# Patient Record
Sex: FEMALE | Race: WHITE | NOT HISPANIC OR LATINO | Employment: OTHER | URBAN - NONMETROPOLITAN AREA
[De-identification: names, ages, dates, MRNs, and addresses within clinical notes are randomized per-mention and may not be internally consistent; named-entity substitution may affect disease eponyms.]

---

## 2017-02-15 DIAGNOSIS — M85.80 OSTEOPENIA: Primary | ICD-10-CM

## 2017-02-15 DIAGNOSIS — M85.88 OSTEOPENIA OF SPINE: ICD-10-CM

## 2017-03-23 RX ORDER — MONTELUKAST SODIUM 10 MG/1
TABLET ORAL
Qty: 90 TABLET | Refills: 3 | Status: SHIPPED | OUTPATIENT
Start: 2017-03-23 | End: 2018-03-30 | Stop reason: SDUPTHER

## 2017-03-23 RX ORDER — AMLODIPINE BESYLATE 5 MG/1
TABLET ORAL
Qty: 90 TABLET | Refills: 3 | Status: SHIPPED | OUTPATIENT
Start: 2017-03-23 | End: 2018-03-31 | Stop reason: SDUPTHER

## 2017-05-02 ENCOUNTER — LAB (OUTPATIENT)
Dept: LAB | Facility: OTHER | Age: 72
End: 2017-05-02

## 2017-05-02 DIAGNOSIS — E78.00 HYPERCHOLESTEROLEMIA: ICD-10-CM

## 2017-05-02 DIAGNOSIS — E55.9 VITAMIN D DEFICIENCY: ICD-10-CM

## 2017-05-02 DIAGNOSIS — E11.9 TYPE 2 DIABETES MELLITUS WITHOUT COMPLICATION, WITHOUT LONG-TERM CURRENT USE OF INSULIN (HCC): ICD-10-CM

## 2017-05-02 DIAGNOSIS — E03.9 ACQUIRED HYPOTHYROIDISM: ICD-10-CM

## 2017-05-02 LAB
ALBUMIN SERPL-MCNC: 4.3 G/DL (ref 3.2–5.5)
ALBUMIN/GLOB SERPL: 1.2 G/DL (ref 1–3)
ALP SERPL-CCNC: 73 U/L (ref 15–121)
ALT SERPL W P-5'-P-CCNC: 48 U/L (ref 10–60)
ANION GAP SERPL CALCULATED.3IONS-SCNC: 11 MMOL/L (ref 5–15)
ARTICHOKE IGE QN: 107 MG/DL (ref 0–129)
AST SERPL-CCNC: 47 U/L (ref 10–60)
BASOPHILS # BLD AUTO: 0.12 10*3/MM3 (ref 0–0.2)
BASOPHILS NFR BLD AUTO: 1.4 % (ref 0–2)
BILIRUB SERPL-MCNC: 0.7 MG/DL (ref 0.2–1)
BUN BLD-MCNC: 20 MG/DL (ref 8–25)
BUN/CREAT SERPL: 18.2 (ref 7–25)
CALCIUM SPEC-SCNC: 10 MG/DL (ref 8.4–10.8)
CHLORIDE SERPL-SCNC: 104 MMOL/L (ref 100–112)
CO2 SERPL-SCNC: 26 MMOL/L (ref 20–32)
CREAT BLD-MCNC: 1.1 MG/DL (ref 0.4–1.3)
DEPRECATED RDW RBC AUTO: 47.4 FL (ref 36.4–46.3)
EOSINOPHIL # BLD AUTO: 0.72 10*3/MM3 (ref 0–0.7)
EOSINOPHIL NFR BLD AUTO: 8.1 % (ref 0–7)
ERYTHROCYTE [DISTWIDTH] IN BLOOD BY AUTOMATED COUNT: 14.9 % (ref 11.5–14.5)
GFR SERPL CREATININE-BSD FRML MDRD: 49 ML/MIN/1.73 (ref 45–104)
GLOBULIN UR ELPH-MCNC: 3.7 GM/DL (ref 2.5–4.6)
GLUCOSE BLD-MCNC: 123 MG/DL (ref 70–100)
HCT VFR BLD AUTO: 41.7 % (ref 35–45)
HGB BLD-MCNC: 13.2 G/DL (ref 12–15.5)
LYMPHOCYTES # BLD AUTO: 2.32 10*3/MM3 (ref 0.6–4.2)
LYMPHOCYTES NFR BLD AUTO: 26.2 % (ref 10–50)
MCH RBC QN AUTO: 27.7 PG (ref 26.5–34)
MCHC RBC AUTO-ENTMCNC: 31.7 G/DL (ref 31.4–36)
MCV RBC AUTO: 87.4 FL (ref 80–98)
MONOCYTES # BLD AUTO: 0.86 10*3/MM3 (ref 0–0.9)
MONOCYTES NFR BLD AUTO: 9.7 % (ref 0–12)
NEUTROPHILS # BLD AUTO: 4.85 10*3/MM3 (ref 2–8.6)
NEUTROPHILS NFR BLD AUTO: 54.6 % (ref 37–80)
PLATELET # BLD AUTO: 436 10*3/MM3 (ref 150–450)
PMV BLD AUTO: 9.5 FL (ref 8–12)
POTASSIUM BLD-SCNC: 4.6 MMOL/L (ref 3.4–5.4)
PROT SERPL-MCNC: 8 G/DL (ref 6.7–8.2)
RBC # BLD AUTO: 4.77 10*6/MM3 (ref 3.77–5.16)
SODIUM BLD-SCNC: 141 MMOL/L (ref 134–146)
WBC NRBC COR # BLD: 8.87 10*3/MM3 (ref 3.2–9.8)

## 2017-05-02 PROCEDURE — 83036 HEMOGLOBIN GLYCOSYLATED A1C: CPT | Performed by: INTERNAL MEDICINE

## 2017-05-02 PROCEDURE — 83721 ASSAY OF BLOOD LIPOPROTEIN: CPT | Performed by: INTERNAL MEDICINE

## 2017-05-02 PROCEDURE — 84439 ASSAY OF FREE THYROXINE: CPT | Performed by: INTERNAL MEDICINE

## 2017-05-02 PROCEDURE — 85025 COMPLETE CBC W/AUTO DIFF WBC: CPT | Performed by: INTERNAL MEDICINE

## 2017-05-02 PROCEDURE — 80053 COMPREHEN METABOLIC PANEL: CPT | Performed by: INTERNAL MEDICINE

## 2017-05-02 PROCEDURE — 82306 VITAMIN D 25 HYDROXY: CPT | Performed by: INTERNAL MEDICINE

## 2017-05-02 PROCEDURE — 84443 ASSAY THYROID STIM HORMONE: CPT | Performed by: INTERNAL MEDICINE

## 2017-05-02 PROCEDURE — 36415 COLL VENOUS BLD VENIPUNCTURE: CPT | Performed by: INTERNAL MEDICINE

## 2017-05-03 LAB
25(OH)D3 SERPL-MCNC: 39.1 NG/ML (ref 30–100)
HBA1C MFR BLD: 7.02 % (ref 4–5.6)
T4 FREE SERPL-MCNC: 1.05 NG/DL (ref 0.78–2.19)
TSH SERPL DL<=0.05 MIU/L-ACNC: 3.52 MIU/ML (ref 0.46–4.68)

## 2017-05-09 ENCOUNTER — OFFICE VISIT (OUTPATIENT)
Dept: FAMILY MEDICINE CLINIC | Facility: CLINIC | Age: 72
End: 2017-05-09

## 2017-05-09 VITALS
BODY MASS INDEX: 32.5 KG/M2 | DIASTOLIC BLOOD PRESSURE: 60 MMHG | TEMPERATURE: 98 F | HEIGHT: 62 IN | WEIGHT: 176.6 LBS | SYSTOLIC BLOOD PRESSURE: 108 MMHG | HEART RATE: 60 BPM

## 2017-05-09 DIAGNOSIS — J30.1 SEASONAL ALLERGIC RHINITIS DUE TO POLLEN: ICD-10-CM

## 2017-05-09 DIAGNOSIS — E78.00 HYPERCHOLESTEROLEMIA: ICD-10-CM

## 2017-05-09 DIAGNOSIS — M85.80 OSTEOPENIA: ICD-10-CM

## 2017-05-09 DIAGNOSIS — I10 ESSENTIAL HYPERTENSION: ICD-10-CM

## 2017-05-09 DIAGNOSIS — E03.9 ACQUIRED HYPOTHYROIDISM: ICD-10-CM

## 2017-05-09 DIAGNOSIS — E55.9 VITAMIN D DEFICIENCY: ICD-10-CM

## 2017-05-09 DIAGNOSIS — E11.9 TYPE 2 DIABETES MELLITUS WITHOUT COMPLICATION, WITHOUT LONG-TERM CURRENT USE OF INSULIN (HCC): Primary | ICD-10-CM

## 2017-05-09 DIAGNOSIS — E66.9 OBESITY (BMI 30.0-34.9): ICD-10-CM

## 2017-05-09 PROCEDURE — 99214 OFFICE O/P EST MOD 30 MIN: CPT | Performed by: INTERNAL MEDICINE

## 2017-06-22 DIAGNOSIS — Z12.31 ENCOUNTER FOR SCREENING MAMMOGRAM FOR MALIGNANT NEOPLASM OF BREAST: Primary | ICD-10-CM

## 2017-07-14 ENCOUNTER — APPOINTMENT (OUTPATIENT)
Dept: MAMMOGRAPHY | Facility: CLINIC | Age: 72
End: 2017-07-14

## 2017-07-14 DIAGNOSIS — Z12.31 ENCOUNTER FOR SCREENING MAMMOGRAM FOR MALIGNANT NEOPLASM OF BREAST: ICD-10-CM

## 2017-07-14 PROCEDURE — G0202 SCR MAMMO BI INCL CAD: HCPCS | Performed by: INTERNAL MEDICINE

## 2017-07-14 PROCEDURE — 77063 BREAST TOMOSYNTHESIS BI: CPT | Performed by: INTERNAL MEDICINE

## 2017-09-22 RX ORDER — LEVOTHYROXINE SODIUM 0.05 MG/1
TABLET ORAL
Qty: 90 TABLET | Refills: 3 | Status: SHIPPED | OUTPATIENT
Start: 2017-09-22 | End: 2018-09-26 | Stop reason: SDUPTHER

## 2017-09-22 RX ORDER — LOSARTAN POTASSIUM AND HYDROCHLOROTHIAZIDE 12.5; 1 MG/1; MG/1
TABLET ORAL
Qty: 90 TABLET | Refills: 3 | Status: SHIPPED | OUTPATIENT
Start: 2017-09-22 | End: 2018-09-26 | Stop reason: SDUPTHER

## 2017-11-02 ENCOUNTER — LAB (OUTPATIENT)
Dept: LAB | Facility: OTHER | Age: 72
End: 2017-11-02

## 2017-11-02 DIAGNOSIS — E55.9 VITAMIN D DEFICIENCY: ICD-10-CM

## 2017-11-02 DIAGNOSIS — E78.00 HYPERCHOLESTEROLEMIA: ICD-10-CM

## 2017-11-02 DIAGNOSIS — E03.9 ACQUIRED HYPOTHYROIDISM: ICD-10-CM

## 2017-11-02 LAB
ALBUMIN SERPL-MCNC: 4.3 G/DL (ref 3.2–5.5)
ALBUMIN/GLOB SERPL: 1.1 G/DL (ref 1–3)
ALP SERPL-CCNC: 56 U/L (ref 15–121)
ALT SERPL W P-5'-P-CCNC: 49 U/L (ref 10–60)
ANION GAP SERPL CALCULATED.3IONS-SCNC: 9 MMOL/L (ref 5–15)
AST SERPL-CCNC: 56 U/L (ref 10–60)
BASOPHILS # BLD AUTO: 0.17 10*3/MM3 (ref 0–0.2)
BASOPHILS NFR BLD AUTO: 1.7 % (ref 0–2)
BILIRUB SERPL-MCNC: 0.7 MG/DL (ref 0.2–1)
BUN BLD-MCNC: 17 MG/DL (ref 8–25)
BUN/CREAT SERPL: 15.5 (ref 7–25)
CALCIUM SPEC-SCNC: 10.1 MG/DL (ref 8.4–10.8)
CHLORIDE SERPL-SCNC: 106 MMOL/L (ref 100–112)
CHOLEST SERPL-MCNC: 146 MG/DL (ref 150–200)
CO2 SERPL-SCNC: 27 MMOL/L (ref 20–32)
CREAT BLD-MCNC: 1.1 MG/DL (ref 0.4–1.3)
DEPRECATED RDW RBC AUTO: 49.5 FL (ref 36.4–46.3)
EOSINOPHIL # BLD AUTO: 0.89 10*3/MM3 (ref 0–0.7)
EOSINOPHIL NFR BLD AUTO: 8.7 % (ref 0–7)
ERYTHROCYTE [DISTWIDTH] IN BLOOD BY AUTOMATED COUNT: 15.4 % (ref 11.5–14.5)
GFR SERPL CREATININE-BSD FRML MDRD: 49 ML/MIN/1.73 (ref 39–90)
GLOBULIN UR ELPH-MCNC: 3.8 GM/DL (ref 2.5–4.6)
GLUCOSE BLD-MCNC: 108 MG/DL (ref 70–100)
HCT VFR BLD AUTO: 41.6 % (ref 35–45)
HDLC SERPL-MCNC: 40 MG/DL (ref 35–100)
HGB BLD-MCNC: 12.8 G/DL (ref 12–15.5)
LDLC SERPL CALC-MCNC: 86 MG/DL
LDLC/HDLC SERPL: 2.14 {RATIO}
LYMPHOCYTES # BLD AUTO: 2.68 10*3/MM3 (ref 0.6–4.2)
LYMPHOCYTES NFR BLD AUTO: 26.2 % (ref 10–50)
MCH RBC QN AUTO: 27.6 PG (ref 26.5–34)
MCHC RBC AUTO-ENTMCNC: 30.8 G/DL (ref 31.4–36)
MCV RBC AUTO: 89.7 FL (ref 80–98)
MONOCYTES # BLD AUTO: 0.91 10*3/MM3 (ref 0–0.9)
MONOCYTES NFR BLD AUTO: 8.9 % (ref 0–12)
NEUTROPHILS # BLD AUTO: 5.59 10*3/MM3 (ref 2–8.6)
NEUTROPHILS NFR BLD AUTO: 54.5 % (ref 37–80)
PLATELET # BLD AUTO: 479 10*3/MM3 (ref 150–450)
PMV BLD AUTO: 10.2 FL (ref 8–12)
POTASSIUM BLD-SCNC: 4.8 MMOL/L (ref 3.4–5.4)
PROT SERPL-MCNC: 8.1 G/DL (ref 6.7–8.2)
RBC # BLD AUTO: 4.64 10*6/MM3 (ref 3.77–5.16)
SODIUM BLD-SCNC: 142 MMOL/L (ref 134–146)
TRIGL SERPL-MCNC: 102 MG/DL (ref 35–160)
VLDLC SERPL-MCNC: 20.4 MG/DL
WBC NRBC COR # BLD: 10.24 10*3/MM3 (ref 3.2–9.8)

## 2017-11-02 PROCEDURE — 80061 LIPID PANEL: CPT | Performed by: INTERNAL MEDICINE

## 2017-11-02 PROCEDURE — 84443 ASSAY THYROID STIM HORMONE: CPT | Performed by: INTERNAL MEDICINE

## 2017-11-02 PROCEDURE — 36415 COLL VENOUS BLD VENIPUNCTURE: CPT | Performed by: INTERNAL MEDICINE

## 2017-11-02 PROCEDURE — 82306 VITAMIN D 25 HYDROXY: CPT | Performed by: INTERNAL MEDICINE

## 2017-11-02 PROCEDURE — 80053 COMPREHEN METABOLIC PANEL: CPT | Performed by: INTERNAL MEDICINE

## 2017-11-02 PROCEDURE — 85025 COMPLETE CBC W/AUTO DIFF WBC: CPT | Performed by: INTERNAL MEDICINE

## 2017-11-02 PROCEDURE — 84439 ASSAY OF FREE THYROXINE: CPT | Performed by: INTERNAL MEDICINE

## 2017-11-03 LAB
25(OH)D3 SERPL-MCNC: 44.1 NG/ML (ref 30–100)
T4 FREE SERPL-MCNC: 1.11 NG/DL (ref 0.78–2.19)
TSH SERPL DL<=0.05 MIU/L-ACNC: 1.52 MIU/ML (ref 0.46–4.68)

## 2017-11-08 RX ORDER — MOMETASONE FUROATE AND FORMOTEROL FUMARATE DIHYDRATE 200; 5 UG/1; UG/1
AEROSOL RESPIRATORY (INHALATION)
Qty: 13 G | Refills: 5 | Status: SHIPPED | OUTPATIENT
Start: 2017-11-08 | End: 2019-03-25 | Stop reason: SDUPTHER

## 2017-11-10 ENCOUNTER — OFFICE VISIT (OUTPATIENT)
Dept: FAMILY MEDICINE CLINIC | Facility: CLINIC | Age: 72
End: 2017-11-10

## 2017-11-10 VITALS
BODY MASS INDEX: 31.91 KG/M2 | HEART RATE: 72 BPM | WEIGHT: 173.4 LBS | TEMPERATURE: 98.2 F | SYSTOLIC BLOOD PRESSURE: 140 MMHG | HEIGHT: 62 IN | DIASTOLIC BLOOD PRESSURE: 60 MMHG

## 2017-11-10 DIAGNOSIS — E78.00 HYPERCHOLESTEROLEMIA: Chronic | ICD-10-CM

## 2017-11-10 DIAGNOSIS — E03.9 ACQUIRED HYPOTHYROIDISM: Chronic | ICD-10-CM

## 2017-11-10 DIAGNOSIS — E55.9 VITAMIN D DEFICIENCY: Chronic | ICD-10-CM

## 2017-11-10 DIAGNOSIS — R19.7 DIARRHEA, UNSPECIFIED TYPE: ICD-10-CM

## 2017-11-10 DIAGNOSIS — J30.1 CHRONIC SEASONAL ALLERGIC RHINITIS DUE TO POLLEN: Chronic | ICD-10-CM

## 2017-11-10 DIAGNOSIS — J45.30 MILD PERSISTENT ASTHMA WITHOUT COMPLICATION: Chronic | ICD-10-CM

## 2017-11-10 DIAGNOSIS — I10 ESSENTIAL HYPERTENSION: Chronic | ICD-10-CM

## 2017-11-10 DIAGNOSIS — E66.9 OBESITY (BMI 30.0-34.9): Chronic | ICD-10-CM

## 2017-11-10 DIAGNOSIS — E11.9 TYPE 2 DIABETES MELLITUS WITHOUT COMPLICATION, WITHOUT LONG-TERM CURRENT USE OF INSULIN (HCC): Primary | Chronic | ICD-10-CM

## 2017-11-10 PROCEDURE — 99214 OFFICE O/P EST MOD 30 MIN: CPT | Performed by: INTERNAL MEDICINE

## 2017-11-10 NOTE — PROGRESS NOTES
Subjective        History of Present Illness     Gale Cabral is a 72 y.o. female who presents for 6-month follow up on type 2 diabetes, hypertension, hyperlipidemia/low HDL, asthma, and hypothyroidism among other issues.     She reports some occasional episodes of numbness/weakness in her upper extremities.  She denies chest pain.      She reports persistent diarrhea since her cholecystectomy in 1990.  She has frequent flares of days   She takes Pepto Bismol tablets, which hasn't been effective for symptom control.  She had a recent airplane trip, which was uncomfortable due to the urgency or bowel.  I recommended use of Imodium and suggested she may want to premedicate prior to future flights.           She reports good diabetic control when monitoring at home.  She is checking glucose in the morning, for which I recommended alternating the monitoring morning and evening for more consistency.         She is reporting episodes of inadequate control of asthma symptoms.  She has been using Dulera nightly, but has not been compliant with twice daily use.  She continues to have ProAir to use for rescue inhaler.  I recommended compliance with her Dulera twice daily to help avoid flares.  She is having increased postnasal drainage.  She is using Flonase each night and continues on Singulair 10 mg daily.  I recommended daily antihistamine and increasing her Flonase to twice daily.        Repeat DEXA 02/2017 reveals osteopenia of left hip and lumbar spine.  She continues on calcium/vitamin D supplement once daily.    Weight is down 3 pounds in the past six months.  Blood pressure is at goal.      The patient's relevant past medical, surgical, and social history was reviewed in Epic.   Lab results are reviewed with the patient today.  CBC unremarkable other than mild elevation of WBCs at 10.2.  Total cholesterol 146  Vitamin D level is at goal.      Review of Systems   Constitutional: Negative for chills, fatigue and  "fever.   HENT: Negative for congestion, ear pain, postnasal drip, sinus pressure and sore throat.    Respiratory: Negative for cough, shortness of breath and wheezing.    Cardiovascular: Negative for chest pain, palpitations and leg swelling.   Gastrointestinal: Negative for abdominal pain, blood in stool, constipation, diarrhea, nausea and vomiting.   Endocrine: Negative for cold intolerance, heat intolerance, polydipsia and polyuria.   Genitourinary: Negative for dysuria, frequency, hematuria and urgency.   Skin: Negative for rash.   Neurological: Negative for syncope and weakness.        Objective     Vitals:    11/10/17 1035   BP: 140/60   Pulse: 72   Temp: 98.2 °F (36.8 °C)   TempSrc: Oral   Weight: 173 lb 6.4 oz (78.7 kg)   Height: 62\" (157.5 cm)     Physical Exam   Constitutional: She is oriented to person, place, and time. She appears well-developed and well-nourished. No distress.   Obese female.     HENT:   Head: Normocephalic and atraumatic.   Nose: Right sinus exhibits no maxillary sinus tenderness and no frontal sinus tenderness. Left sinus exhibits no maxillary sinus tenderness and no frontal sinus tenderness.   Mouth/Throat: Uvula is midline, oropharynx is clear and moist and mucous membranes are normal. No oral lesions. No tonsillar exudate.   Eyes: Conjunctivae and EOM are normal. Pupils are equal, round, and reactive to light.   Neck: Trachea normal. Neck supple. No JVD present. Carotid bruit is not present. No tracheal deviation present. No thyroid mass and no thyromegaly present.   Cardiovascular: Normal rate, regular rhythm and normal heart sounds.   No extrasystoles are present. PMI is not displaced.    No murmur heard.  Pulmonary/Chest: Effort normal. No accessory muscle usage. No respiratory distress. She has no decreased breath sounds. She has wheezes. She has no rhonchi. She has no rales.   Diminished breath sounds on expiratory phase of cough with wheezes.     Abdominal: Soft. Bowel sounds " are normal. She exhibits no distension. There is no hepatosplenomegaly. There is no tenderness.   Obese abdomen limits exam.        Vascular Status -  Her exam exhibits right foot vasculature normal. Her exam exhibits no right foot edema. Her exam exhibits left foot vasculature normal. Her exam exhibits no left foot edema.  Lymphadenopathy:     She has no cervical adenopathy.   Neurological: She is alert and oriented to person, place, and time. No cranial nerve deficit. Coordination normal.   Skin: Skin is warm, dry and intact. No rash noted. No cyanosis. Nails show no clubbing.   Dry skin noted.    Psychiatric: She has a normal mood and affect. Her speech is normal and behavior is normal. Thought content normal.   Vitals reviewed.        Assessment/Plan      I recommended use of Imodium taking two tablets with first loose stool and one with each additional stool, up to five daily.   She may need to premedicate before long trips on a plane.       Increase use of Dulera to two puffs twice daily with current flare.  Continue the rescue inhaler.  Increase us of Flonase to one spray each nostril twice daily.  Recommended use of a daily antihistamine Claritin (loratadine) 10 mg daily.  Continue with Singulair.         Recommended use of a good moisturizing lotion for the dry skin.      Continue current prescription medications.  Continue with vitamin D and calcium supplements.   Recommended compliance with diabetic diet, exercise, and weight loss efforts.  She will return in six months for follow up with fasting labs one week prior.       Scribed for Dr. Soliman by Lydia Davis [facility].     Diagnoses and all orders for this visit:    Type 2 diabetes mellitus without complication, without long-term current use of insulin  -     CBC Auto Differential; Future  -     Comprehensive Metabolic Panel; Future  -     Hemoglobin A1c; Future  -     TSH; Future  -     T4, free; Future  -     Microalbumin / Creatinine Urine Ratio -  Urine, Clean Catch; Future  -     LDL Cholesterol, Direct; Future  -     Vitamin D 25 Hydroxy; Future    Mild persistent asthma without complication    Chronic seasonal allergic rhinitis due to pollen    Essential hypertension    Hypercholesterolemia  -     LDL Cholesterol, Direct; Future    Obesity (BMI 30.0-34.9)    Vitamin D deficiency  -     Vitamin D 25 Hydroxy; Future    Acquired hypothyroidism  -     TSH; Future  -     T4, free; Future    Diarrhea, unspecified type - prior cholecystectomy.  On metformin      Lab on 11/02/2017   Component Date Value Ref Range Status   • WBC 11/02/2017 10.24* 3.20 - 9.80 10*3/mm3 Final   • RBC 11/02/2017 4.64  3.77 - 5.16 10*6/mm3 Final   • Hemoglobin 11/02/2017 12.8  12.0 - 15.5 g/dL Final   • Hematocrit 11/02/2017 41.6  35.0 - 45.0 % Final   • MCV 11/02/2017 89.7  80.0 - 98.0 fL Final   • MCH 11/02/2017 27.6  26.5 - 34.0 pg Final   • MCHC 11/02/2017 30.8* 31.4 - 36.0 g/dL Final   • RDW 11/02/2017 15.4* 11.5 - 14.5 % Final   • RDW-SD 11/02/2017 49.5* 36.4 - 46.3 fl Final   • MPV 11/02/2017 10.2  8.0 - 12.0 fL Final   • Platelets 11/02/2017 479* 150 - 450 10*3/mm3 Final   • Neutrophil % 11/02/2017 54.5  37.0 - 80.0 % Final   • Lymphocyte % 11/02/2017 26.2  10.0 - 50.0 % Final   • Monocyte % 11/02/2017 8.9  0.0 - 12.0 % Final   • Eosinophil % 11/02/2017 8.7* 0.0 - 7.0 % Final   • Basophil % 11/02/2017 1.7  0.0 - 2.0 % Final   • Neutrophils, Absolute 11/02/2017 5.59  2.00 - 8.60 10*3/mm3 Final   • Lymphocytes, Absolute 11/02/2017 2.68  0.60 - 4.20 10*3/mm3 Final   • Monocytes, Absolute 11/02/2017 0.91* 0.00 - 0.90 10*3/mm3 Final   • Eosinophils, Absolute 11/02/2017 0.89* 0.00 - 0.70 10*3/mm3 Final   • Basophils, Absolute 11/02/2017 0.17  0.00 - 0.20 10*3/mm3 Final   • Glucose 11/02/2017 108* 70 - 100 mg/dL Final   • BUN 11/02/2017 17  8 - 25 mg/dL Final   • Creatinine 11/02/2017 1.10  0.40 - 1.30 mg/dL Final   • Sodium 11/02/2017 142  134 - 146 mmol/L Final   • Potassium  11/02/2017 4.8  3.4 - 5.4 mmol/L Final   • Chloride 11/02/2017 106  100 - 112 mmol/L Final   • CO2 11/02/2017 27.0  20.0 - 32.0 mmol/L Final   • Calcium 11/02/2017 10.1  8.4 - 10.8 mg/dL Final   • Total Protein 11/02/2017 8.1  6.7 - 8.2 g/dL Final   • Albumin 11/02/2017 4.30  3.20 - 5.50 g/dL Final   • ALT (SGPT) 11/02/2017 49  10 - 60 U/L Final   • AST (SGOT) 11/02/2017 56  10 - 60 U/L Final   • Alkaline Phosphatase 11/02/2017 56  15 - 121 U/L Final   • Total Bilirubin 11/02/2017 0.7  0.2 - 1.0 mg/dL Final   • eGFR Non African Amer 11/02/2017 49  39 - 90 mL/min/1.73 Final   • Globulin 11/02/2017 3.8  2.5 - 4.6 gm/dL Final   • A/G Ratio 11/02/2017 1.1  1.0 - 3.0 g/dL Final   • BUN/Creatinine Ratio 11/02/2017 15.5  7.0 - 25.0 Final   • Anion Gap 11/02/2017 9.0  5.0 - 15.0 mmol/L Final   • Total Cholesterol 11/02/2017 146* 150 - 200 mg/dL Final   • Triglycerides 11/02/2017 102  35 - 160 mg/dL Final   • HDL Cholesterol 11/02/2017 40  35 - 100 mg/dL Final   • LDL Cholesterol  11/02/2017 86  mg/dL Final   • VLDL Cholesterol 11/02/2017 20.4  mg/dL Final   • LDL/HDL Ratio 11/02/2017 2.14   Final   • TSH 11/02/2017 1.520  0.460 - 4.680 mIU/mL Final   • Free T4 11/02/2017 1.11  0.78 - 2.19 ng/dL Final   • 25 Hydroxy, Vitamin D 11/02/2017 44.1  30.0 - 100.0 ng/ml Final   ]

## 2017-12-26 RX ORDER — ATORVASTATIN CALCIUM 20 MG/1
TABLET, FILM COATED ORAL
Qty: 90 TABLET | Refills: 3 | Status: SHIPPED | OUTPATIENT
Start: 2017-12-26 | End: 2018-12-13 | Stop reason: SDUPTHER

## 2017-12-26 RX ORDER — CITALOPRAM 20 MG/1
TABLET ORAL
Qty: 90 TABLET | Refills: 3 | Status: SHIPPED | OUTPATIENT
Start: 2017-12-26 | End: 2019-03-25 | Stop reason: SDUPTHER

## 2018-03-30 RX ORDER — LANCETS 33 GAUGE
EACH MISCELLANEOUS
Qty: 100 EACH | Refills: 3 | Status: SHIPPED | OUTPATIENT
Start: 2018-03-30 | End: 2019-05-28 | Stop reason: SDUPTHER

## 2018-03-30 RX ORDER — MONTELUKAST SODIUM 10 MG/1
TABLET ORAL
Qty: 90 TABLET | Refills: 3 | Status: SHIPPED | OUTPATIENT
Start: 2018-03-30 | End: 2019-03-26 | Stop reason: SDUPTHER

## 2018-04-02 RX ORDER — AMLODIPINE BESYLATE 5 MG/1
TABLET ORAL
Qty: 90 TABLET | Refills: 3 | Status: SHIPPED | OUTPATIENT
Start: 2018-04-02 | End: 2019-03-26 | Stop reason: SDUPTHER

## 2018-05-04 ENCOUNTER — LAB (OUTPATIENT)
Dept: LAB | Facility: OTHER | Age: 73
End: 2018-05-04

## 2018-05-04 DIAGNOSIS — E55.9 VITAMIN D DEFICIENCY: Chronic | ICD-10-CM

## 2018-05-04 DIAGNOSIS — E78.00 HYPERCHOLESTEROLEMIA: Chronic | ICD-10-CM

## 2018-05-04 DIAGNOSIS — E03.9 ACQUIRED HYPOTHYROIDISM: Chronic | ICD-10-CM

## 2018-05-04 DIAGNOSIS — E11.9 TYPE 2 DIABETES MELLITUS WITHOUT COMPLICATION, WITHOUT LONG-TERM CURRENT USE OF INSULIN (HCC): Chronic | ICD-10-CM

## 2018-05-04 LAB
25(OH)D3 SERPL-MCNC: 45.3 NG/ML (ref 30–100)
ALBUMIN SERPL-MCNC: 4 G/DL (ref 3.2–5.5)
ALBUMIN UR-MCNC: <0.6 MG/L
ALBUMIN/GLOB SERPL: 1.1 G/DL (ref 1–3)
ALP SERPL-CCNC: 67 U/L (ref 15–121)
ALT SERPL W P-5'-P-CCNC: 48 U/L (ref 10–60)
ANION GAP SERPL CALCULATED.3IONS-SCNC: 8 MMOL/L (ref 5–15)
ARTICHOKE IGE QN: 107 MG/DL (ref 0–129)
AST SERPL-CCNC: 62 U/L (ref 10–60)
BASOPHILS # BLD AUTO: 0.15 10*3/MM3 (ref 0–0.2)
BASOPHILS NFR BLD AUTO: 1.5 % (ref 0–2)
BILIRUB SERPL-MCNC: 0.6 MG/DL (ref 0.2–1)
BUN BLD-MCNC: 17 MG/DL (ref 8–25)
BUN/CREAT SERPL: 18.9 (ref 7–25)
CALCIUM SPEC-SCNC: 9.6 MG/DL (ref 8.4–10.8)
CHLORIDE SERPL-SCNC: 106 MMOL/L (ref 100–112)
CO2 SERPL-SCNC: 26 MMOL/L (ref 20–32)
CREAT BLD-MCNC: 0.9 MG/DL (ref 0.4–1.3)
CREAT UR-MCNC: 135.3 MG/DL
DEPRECATED RDW RBC AUTO: 49.3 FL (ref 36.4–46.3)
EOSINOPHIL # BLD AUTO: 0.82 10*3/MM3 (ref 0–0.7)
EOSINOPHIL NFR BLD AUTO: 8.4 % (ref 0–7)
ERYTHROCYTE [DISTWIDTH] IN BLOOD BY AUTOMATED COUNT: 15.4 % (ref 11.5–14.5)
GFR SERPL CREATININE-BSD FRML MDRD: 62 ML/MIN/1.73 (ref 39–90)
GLOBULIN UR ELPH-MCNC: 3.8 GM/DL (ref 2.5–4.6)
GLUCOSE BLD-MCNC: 129 MG/DL (ref 70–100)
HBA1C MFR BLD: 7.4 % (ref 4–5.6)
HCT VFR BLD AUTO: 41.2 % (ref 35–45)
HGB BLD-MCNC: 12.7 G/DL (ref 12–15.5)
LYMPHOCYTES # BLD AUTO: 2.69 10*3/MM3 (ref 0.6–4.2)
LYMPHOCYTES NFR BLD AUTO: 27.5 % (ref 10–50)
MCH RBC QN AUTO: 27.4 PG (ref 26.5–34)
MCHC RBC AUTO-ENTMCNC: 30.8 G/DL (ref 31.4–36)
MCV RBC AUTO: 89 FL (ref 80–98)
MICROALBUMIN/CREAT UR: NORMAL MG/G (ref 0–30)
MONOCYTES # BLD AUTO: 0.94 10*3/MM3 (ref 0–0.9)
MONOCYTES NFR BLD AUTO: 9.6 % (ref 0–12)
NEUTROPHILS # BLD AUTO: 5.18 10*3/MM3 (ref 2–8.6)
NEUTROPHILS NFR BLD AUTO: 53 % (ref 37–80)
PLATELET # BLD AUTO: 409 10*3/MM3 (ref 150–450)
PMV BLD AUTO: 9.1 FL (ref 8–12)
POTASSIUM BLD-SCNC: 4.1 MMOL/L (ref 3.4–5.4)
PROT SERPL-MCNC: 7.8 G/DL (ref 6.7–8.2)
RBC # BLD AUTO: 4.63 10*6/MM3 (ref 3.77–5.16)
SODIUM BLD-SCNC: 140 MMOL/L (ref 134–146)
T4 FREE SERPL-MCNC: 1.08 NG/DL (ref 0.78–2.19)
TSH SERPL DL<=0.05 MIU/L-ACNC: 3.48 MIU/ML (ref 0.46–4.68)
WBC NRBC COR # BLD: 9.78 10*3/MM3 (ref 3.2–9.8)

## 2018-05-04 PROCEDURE — 82043 UR ALBUMIN QUANTITATIVE: CPT | Performed by: INTERNAL MEDICINE

## 2018-05-04 PROCEDURE — 83036 HEMOGLOBIN GLYCOSYLATED A1C: CPT | Performed by: INTERNAL MEDICINE

## 2018-05-04 PROCEDURE — 36415 COLL VENOUS BLD VENIPUNCTURE: CPT | Performed by: INTERNAL MEDICINE

## 2018-05-04 PROCEDURE — 84439 ASSAY OF FREE THYROXINE: CPT | Performed by: INTERNAL MEDICINE

## 2018-05-04 PROCEDURE — 82570 ASSAY OF URINE CREATININE: CPT | Performed by: INTERNAL MEDICINE

## 2018-05-04 PROCEDURE — 85025 COMPLETE CBC W/AUTO DIFF WBC: CPT | Performed by: INTERNAL MEDICINE

## 2018-05-04 PROCEDURE — 82306 VITAMIN D 25 HYDROXY: CPT | Performed by: INTERNAL MEDICINE

## 2018-05-04 PROCEDURE — 83721 ASSAY OF BLOOD LIPOPROTEIN: CPT | Performed by: INTERNAL MEDICINE

## 2018-05-04 PROCEDURE — 84443 ASSAY THYROID STIM HORMONE: CPT | Performed by: INTERNAL MEDICINE

## 2018-05-04 PROCEDURE — 80053 COMPREHEN METABOLIC PANEL: CPT | Performed by: INTERNAL MEDICINE

## 2018-05-14 ENCOUNTER — OFFICE VISIT (OUTPATIENT)
Dept: FAMILY MEDICINE CLINIC | Facility: CLINIC | Age: 73
End: 2018-05-14

## 2018-05-14 VITALS
SYSTOLIC BLOOD PRESSURE: 140 MMHG | BODY MASS INDEX: 32.24 KG/M2 | WEIGHT: 175.2 LBS | DIASTOLIC BLOOD PRESSURE: 60 MMHG | TEMPERATURE: 98.2 F | HEART RATE: 80 BPM | HEIGHT: 62 IN

## 2018-05-14 DIAGNOSIS — E78.00 HYPERCHOLESTEROLEMIA: Chronic | ICD-10-CM

## 2018-05-14 DIAGNOSIS — J45.30 MILD PERSISTENT ASTHMA WITHOUT COMPLICATION: Chronic | ICD-10-CM

## 2018-05-14 DIAGNOSIS — E55.9 VITAMIN D DEFICIENCY: Chronic | ICD-10-CM

## 2018-05-14 DIAGNOSIS — K91.89 POSTCHOLECYSTECTOMY DIARRHEA: Chronic | ICD-10-CM

## 2018-05-14 DIAGNOSIS — E66.9 OBESITY (BMI 30.0-34.9): Chronic | ICD-10-CM

## 2018-05-14 DIAGNOSIS — I10 ESSENTIAL HYPERTENSION: Chronic | ICD-10-CM

## 2018-05-14 DIAGNOSIS — J30.1 CHRONIC SEASONAL ALLERGIC RHINITIS DUE TO POLLEN: Chronic | ICD-10-CM

## 2018-05-14 DIAGNOSIS — E03.9 ACQUIRED HYPOTHYROIDISM: Chronic | ICD-10-CM

## 2018-05-14 DIAGNOSIS — M85.88 OSTEOPENIA OF SPINE: Chronic | ICD-10-CM

## 2018-05-14 DIAGNOSIS — R19.7 POSTCHOLECYSTECTOMY DIARRHEA: Chronic | ICD-10-CM

## 2018-05-14 DIAGNOSIS — E11.9 TYPE 2 DIABETES MELLITUS WITHOUT COMPLICATION, WITHOUT LONG-TERM CURRENT USE OF INSULIN (HCC): Primary | Chronic | ICD-10-CM

## 2018-05-14 PROCEDURE — 99214 OFFICE O/P EST MOD 30 MIN: CPT | Performed by: INTERNAL MEDICINE

## 2018-05-14 NOTE — PROGRESS NOTES
Subjective        History of Present Illness     Gale Cabral is a 72 y.o. female who presents for 6-month follow up on type 2 diabetes, hypertension, hyperlipidemia/low HDL, asthma, and hypothyroidism among other issues.    She is taking Claritin,, Flonase Singular and using Dulera for management of seasonal allergies and mild persistent asthma.  She has not been requiring use of her rescue ProAir inhaler, but has this at home to use as needed.       She reports episodic diarrhea post cholecystectomy, but feels this is manageable.      She is reporting some mild pain with range of motion involving right shoulder, for which I recommended range of motion exercises.      She reports a fullness in the right side of her neck last winter, which has resolved.  She is given assurance this was probably related to eustachian tube dysfunction given her description of symptoms and resolution.       DEXA 02/2017 revealed osteopenia of left hip and lumbar spine.  She continues on calcium/vitamin D supplement once daily. She will be due for repeat DEXA 02/2019.     She brings in a diabetic diary today revealing an average glucose of 130-135 for the past three weeks, a little more variable as the day progresses.  She reports she has been drinking a lot of orange juice recently.  I recommended she limit this to around 4 ounces daily.       Weight is up 2 pounds in the past six months after winter and holiday months, but down 1 pound in the past year.  Blood pressure is at goal.      The patient's relevant past medical, surgical, and social history was reviewed in Epic.   Lab results are reviewed with the patient today. CBC unremarkable.  Fasting glucose 129.  A1c is 7.4.   on Lipitor.  Vitamin D at goal.  Liver and renal function normal.     Review of Systems   Constitutional: Negative for chills, fatigue and fever.   HENT: Negative for congestion, ear pain, postnasal drip, sinus pressure and sore throat.   "  Respiratory: Negative for cough, shortness of breath and wheezing.    Cardiovascular: Negative for chest pain, palpitations and leg swelling.   Gastrointestinal: Negative for abdominal pain, blood in stool, constipation, diarrhea, nausea and vomiting.   Endocrine: Negative for cold intolerance, heat intolerance, polydipsia and polyuria.   Genitourinary: Negative for dysuria, frequency, hematuria and urgency.   Skin: Negative for rash.   Neurological: Negative for syncope and weakness.        Objective     Vitals:    05/14/18 1029   BP: 140/60   Pulse: 80   Temp: 98.2 °F (36.8 °C)   TempSrc: Oral   Weight: 79.5 kg (175 lb 3.2 oz)   Height: 157.5 cm (62\")     Physical Exam   Constitutional: She is oriented to person, place, and time. She appears well-developed and well-nourished. No distress.   Obese female.    HENT:   Head: Normocephalic and atraumatic.   Nose: Right sinus exhibits no maxillary sinus tenderness and no frontal sinus tenderness. Left sinus exhibits no maxillary sinus tenderness and no frontal sinus tenderness.   Mouth/Throat: Uvula is midline, oropharynx is clear and moist and mucous membranes are normal. No oral lesions. No tonsillar exudate.   Mild postnasal drip.     Eyes: Conjunctivae and EOM are normal. Pupils are equal, round, and reactive to light.   Neck: Trachea normal. Neck supple. No JVD present. Carotid bruit is not present. No tracheal deviation present. No thyroid mass and no thyromegaly present.   Cardiovascular: Normal rate, regular rhythm, normal heart sounds and intact distal pulses.   No extrasystoles are present. PMI is not displaced.    No murmur heard.  Pulmonary/Chest: Effort normal and breath sounds normal. No accessory muscle usage. No respiratory distress. She has no decreased breath sounds. She has no wheezes. She has no rhonchi. She has no rales.   Diminished excursion on expiratory phase of cough.    Abdominal: Soft. Bowel sounds are normal. She exhibits no distension. " There is no hepatosplenomegaly. There is no tenderness.   Obese abdomen limits exam.      Vascular Status -  Her right foot exhibits normal foot vasculature  and no edema. Her left foot exhibits normal foot vasculature  and no edema.  Lymphadenopathy:     She has no cervical adenopathy.   Neurological: She is alert and oriented to person, place, and time. No cranial nerve deficit. Coordination normal.   Skin: Skin is warm, dry and intact. No rash noted. No cyanosis. Nails show no clubbing.   Psychiatric: She has a normal mood and affect. Her speech is normal and behavior is normal. Judgment and thought content normal.   Vitals reviewed.        Assessment/Plan      Continue with vitamin D and calcium supplements.  She will be due for repeat DEXA 02/2019.    Continue daily Singulair, Claritin, Flonase nasal spray and use of inhalers for management of seasonal allergies and intermittent asthma.     Recommended range of motion exercises to help improve range of motion of the right shoulder.     Continue with metformin 500 mg b.i.d.  Continue to monitor glucose and notify me if consistently above 150.  Intensify diabetic diet, exercise, and weight loss efforts.  Written literature regarding diet and exercise included in patient's AVS today.     Continue other medications and vitamin and mineral supplements to treat additional medical problems which we addressed today.  Return in six months for follow up with fasting labs one week prior.        Scribed for Dr. Soliman by Lydia Davis [facility].     Diagnoses and all orders for this visit:    Type 2 diabetes mellitus without complication, without long-term current use of insulin    Essential hypertension    Hypercholesterolemia    Mild persistent asthma without complication    Chronic seasonal allergic rhinitis due to pollen    Vitamin D deficiency    Obesity (BMI 30.0-34.9)    Acquired hypothyroidism    Osteopenia of spine    Postcholecystectomy diarrhea    Other  orders  -     glucose blood (ONE TOUCH ULTRA TEST) test strip; 1 each by Other route Daily. Check 1-2times daily  E11.9  90 day supply        Lab on 05/04/2018   Component Date Value Ref Range Status   • Microalbumin/Creatinine Ratio 05/04/2018   0.0 - 30.0 mg/g Final    Unable to calculate   • Creatinine, Urine 05/04/2018 135.3  mg/dL Final   • Microalbumin, Urine 05/04/2018 <0.6  mg/L Final   • WBC 05/04/2018 9.78  3.20 - 9.80 10*3/mm3 Final   • RBC 05/04/2018 4.63  3.77 - 5.16 10*6/mm3 Final   • Hemoglobin 05/04/2018 12.7  12.0 - 15.5 g/dL Final   • Hematocrit 05/04/2018 41.2  35.0 - 45.0 % Final   • MCV 05/04/2018 89.0  80.0 - 98.0 fL Final   • MCH 05/04/2018 27.4  26.5 - 34.0 pg Final   • MCHC 05/04/2018 30.8* 31.4 - 36.0 g/dL Final   • RDW 05/04/2018 15.4* 11.5 - 14.5 % Final   • RDW-SD 05/04/2018 49.3* 36.4 - 46.3 fl Final   • MPV 05/04/2018 9.1  8.0 - 12.0 fL Final   • Platelets 05/04/2018 409  150 - 450 10*3/mm3 Final   • Neutrophil % 05/04/2018 53.0  37.0 - 80.0 % Final   • Lymphocyte % 05/04/2018 27.5  10.0 - 50.0 % Final   • Monocyte % 05/04/2018 9.6  0.0 - 12.0 % Final   • Eosinophil % 05/04/2018 8.4* 0.0 - 7.0 % Final   • Basophil % 05/04/2018 1.5  0.0 - 2.0 % Final   • Neutrophils, Absolute 05/04/2018 5.18  2.00 - 8.60 10*3/mm3 Final   • Lymphocytes, Absolute 05/04/2018 2.69  0.60 - 4.20 10*3/mm3 Final   • Monocytes, Absolute 05/04/2018 0.94* 0.00 - 0.90 10*3/mm3 Final   • Eosinophils, Absolute 05/04/2018 0.82* 0.00 - 0.70 10*3/mm3 Final   • Basophils, Absolute 05/04/2018 0.15  0.00 - 0.20 10*3/mm3 Final   • Glucose 05/04/2018 129* 70 - 100 mg/dL Final   • BUN 05/04/2018 17  8 - 25 mg/dL Final   • Creatinine 05/04/2018 0.90  0.40 - 1.30 mg/dL Final   • Sodium 05/04/2018 140  134 - 146 mmol/L Final   • Potassium 05/04/2018 4.1  3.4 - 5.4 mmol/L Final   • Chloride 05/04/2018 106  100 - 112 mmol/L Final   • CO2 05/04/2018 26.0  20.0 - 32.0 mmol/L Final   • Calcium 05/04/2018 9.6  8.4 - 10.8 mg/dL Final    • Total Protein 05/04/2018 7.8  6.7 - 8.2 g/dL Final   • Albumin 05/04/2018 4.00  3.20 - 5.50 g/dL Final   • ALT (SGPT) 05/04/2018 48  10 - 60 U/L Final   • AST (SGOT) 05/04/2018 62* 10 - 60 U/L Final   • Alkaline Phosphatase 05/04/2018 67  15 - 121 U/L Final   • Total Bilirubin 05/04/2018 0.6  0.2 - 1.0 mg/dL Final   • eGFR Non African Amer 05/04/2018 62  39 - 90 mL/min/1.73 Final   • Globulin 05/04/2018 3.8  2.5 - 4.6 gm/dL Final   • A/G Ratio 05/04/2018 1.1  1.0 - 3.0 g/dL Final   • BUN/Creatinine Ratio 05/04/2018 18.9  7.0 - 25.0 Final   • Anion Gap 05/04/2018 8.0  5.0 - 15.0 mmol/L Final   • Hemoglobin A1C 05/04/2018 7.4* 4 - 5.6 % Final   • TSH 05/04/2018 3.480  0.460 - 4.680 mIU/mL Final   • Free T4 05/04/2018 1.08  0.78 - 2.19 ng/dL Final   • LDL Cholesterol  05/04/2018 107  0 - 129 mg/dL Final   • 25 Hydroxy, Vitamin D 05/04/2018 45.3  30.0 - 100.0 ng/ml Final   ]

## 2018-05-14 NOTE — PATIENT INSTRUCTIONS
Diabetes Mellitus and Exercise  Exercising regularly is important for your overall health, especially when you have diabetes (diabetes mellitus). Exercising is not only about losing weight. It has many health benefits, such as increasing muscle strength and bone density and reducing body fat and stress. This leads to improved fitness, flexibility, and endurance, all of which result in better overall health.  Exercise has additional benefits for people with diabetes, including:  · Reducing appetite.  · Helping to lower and control blood glucose.  · Lowering blood pressure.  · Helping to control amounts of fatty substances (lipids) in the blood, such as cholesterol and triglycerides.  · Helping the body to respond better to insulin (improving insulin sensitivity).  · Reducing how much insulin the body needs.  · Decreasing the risk for heart disease by:  ¨ Lowering cholesterol and triglyceride levels.  ¨ Increasing the levels of good cholesterol.  ¨ Lowering blood glucose levels.  What is my activity plan?  Your health care provider or certified diabetes educator can help you make a plan for the type and frequency of exercise (activity plan) that works for you. Make sure that you:  · Do at least 150 minutes of moderate-intensity or vigorous-intensity exercise each week. This could be brisk walking, biking, or water aerobics.  ¨ Do stretching and strength exercises, such as yoga or weightlifting, at least 2 times a week.  ¨ Spread out your activity over at least 3 days of the week.  · Get some form of physical activity every day.  ¨ Do not go more than 2 days in a row without some kind of physical activity.  ¨ Avoid being inactive for more than 90 minutes at a time. Take frequent breaks to walk or stretch.  · Choose a type of exercise or activity that you enjoy, and set realistic goals.  · Start slowly, and gradually increase the intensity of your exercise over time.  What do I need to know about managing my  diabetes?  · Check your blood glucose before and after exercising.  ¨ If your blood glucose is higher than 240 mg/dL (13.3 mmol/L) before you exercise, check your urine for ketones. If you have ketones in your urine, do not exercise until your blood glucose returns to normal.  · Know the symptoms of low blood glucose (hypoglycemia) and how to treat it. Your risk for hypoglycemia increases during and after exercise. Common symptoms of hypoglycemia can include:  ¨ Hunger.  ¨ Anxiety.  ¨ Sweating and feeling clammy.  ¨ Confusion.  ¨ Dizziness or feeling light-headed.  ¨ Increased heart rate or palpitations.  ¨ Blurry vision.  ¨ Tingling or numbness around the mouth, lips, or tongue.  ¨ Tremors or shakes.  ¨ Irritability.  · Keep a rapid-acting carbohydrate snack available before, during, and after exercise to help prevent or treat hypoglycemia.  · Avoid injecting insulin into areas of the body that are going to be exercised. For example, avoid injecting insulin into:  ¨ The arms, when playing tennis.  ¨ The legs, when jogging.  · Keep records of your exercise habits. Doing this can help you and your health care provider adjust your diabetes management plan as needed. Write down:  ¨ Food that you eat before and after you exercise.  ¨ Blood glucose levels before and after you exercise.  ¨ The type and amount of exercise you have done.  ¨ When your insulin is expected to peak, if you use insulin. Avoid exercising at times when your insulin is peaking.  · When you start a new exercise or activity, work with your health care provider to make sure the activity is safe for you, and to adjust your insulin, medicines, or food intake as needed.  · Drink plenty of water while you exercise to prevent dehydration or heat stroke. Drink enough fluid to keep your urine clear or pale yellow.  This information is not intended to replace advice given to you by your health care provider. Make sure you discuss any questions you have with  your health care provider.  Document Released: 03/09/2005 Document Revised: 07/07/2017 Document Reviewed: 05/29/2017  [employer] Interactive Patient Education © 2017 [employer]  Calorie Counting for Weight Loss  Calories are units of energy. Your body needs a certain amount of calories from food to keep you going throughout the day. When you eat more calories than your body needs, your body stores the extra calories as fat. When you eat fewer calories than your body needs, your body burns fat to get the energy it needs.  Calorie counting means keeping track of how many calories you eat and drink each day. Calorie counting can be helpful if you need to lose weight. If you make sure to eat fewer calories than your body needs, you should lose weight. Ask your health care provider what a healthy weight is for you.  For calorie counting to work, you will need to eat the right number of calories in a day in order to lose a healthy amount of weight per week. A dietitian can help you determine how many calories you need in a day and will give you suggestions on how to reach your calorie goal.  · A healthy amount of weight to lose per week is usually 1-2 lb (0.5-0.9 kg). This usually means that your daily calorie intake should be reduced by 500-750 calories.  · Eating 1,200 - 1,500 calories per day can help most women lose weight.  · Eating 1,500 - 1,800 calories per day can help most men lose weight.  What is my plan?  My goal is to have __________ calories per day.  If I have this many calories per day, I should lose around __________ pounds per week.  What do I need to know about calorie counting?  In order to meet your daily calorie goal, you will need to:  · Find out how many calories are in each food you would like to eat. Try to do this before you eat.  · Decide how much of the food you plan to eat.  · Write down what you ate and how many calories it had. Doing this is called keeping a food log.  To successfully lose  weight, it is important to balance calorie counting with a healthy lifestyle that includes regular activity. Aim for 150 minutes of moderate exercise (such as walking) or 75 minutes of vigorous exercise (such as running) each week.  Where do I find calorie information?     The number of calories in a food can be found on a Nutrition Facts label. If a food does not have a Nutrition Facts label, try to look up the calories online or ask your dietitian for help.  Remember that calories are listed per serving. If you choose to have more than one serving of a food, you will have to multiply the calories per serving by the amount of servings you plan to eat. For example, the label on a package of bread might say that a serving size is 1 slice and that there are 90 calories in a serving. If you eat 1 slice, you will have eaten 90 calories. If you eat 2 slices, you will have eaten 180 calories.  How do I keep a food log?  Immediately after each meal, record the following information in your food log:  · What you ate. Don't forget to include toppings, sauces, and other extras on the food.  · How much you ate. This can be measured in cups, ounces, or number of items.  · How many calories each food and drink had.  · The total number of calories in the meal.  Keep your food log near you, such as in a small notebook in your pocket, or use a mobile hebert or website. Some programs will calculate calories for you and show you how many calories you have left for the day to meet your goal.  What are some calorie counting tips?  · Use your calories on foods and drinks that will fill you up and not leave you hungry:  ¨ Some examples of foods that fill you up are nuts and nut butters, vegetables, lean proteins, and high-fiber foods like whole grains. High-fiber foods are foods with more than 5 g fiber per serving.  ¨ Drinks such as sodas, specialty coffee drinks, alcohol, and juices have a lot of calories, yet do not fill you up.  · Eat  "nutritious foods and avoid empty calories. Empty calories are calories you get from foods or beverages that do not have many vitamins or protein, such as candy, sweets, and soda. It is better to have a nutritious high-calorie food (such as an avocado) than a food with few nutrients (such as a bag of chips).  · Know how many calories are in the foods you eat most often. This will help you calculate calorie counts faster.  · Pay attention to calories in drinks. Low-calorie drinks include water and unsweetened drinks.  · Pay attention to nutrition labels for \"low fat\" or \"fat free\" foods. These foods sometimes have the same amount of calories or more calories than the full fat versions. They also often have added sugar, starch, or salt, to make up for flavor that was removed with the fat.  · Find a way of tracking calories that works for you. Get creative. Try different apps or programs if writing down calories does not work for you.  What are some portion control tips?  · Know how many calories are in a serving. This will help you know how many servings of a certain food you can have.  · Use a measuring cup to measure serving sizes. You could also try weighing out portions on a kitchen scale. With time, you will be able to estimate serving sizes for some foods.  · Take some time to put servings of different foods on your favorite plates, bowls, and cups so you know what a serving looks like.  · Try not to eat straight from a bag or box. Doing this can lead to overeating. Put the amount you would like to eat in a cup or on a plate to make sure you are eating the right portion.  · Use smaller plates, glasses, and bowls to prevent overeating.  · Try not to multitask (for example, watch TV or use your computer) while eating. If it is time to eat, sit down at a table and enjoy your food. This will help you to know when you are full. It will also help you to be aware of what you are eating and how much you are eating.  What " are tips for following this plan?  Reading food labels   · Check the calorie count compared to the serving size. The serving size may be smaller than what you are used to eating.  · Check the source of the calories. Make sure the food you are eating is high in vitamins and protein and low in saturated and trans fats.  Shopping   · Read nutrition labels while you shop. This will help you make healthy decisions before you decide to purchase your food.  · Make a grocery list and stick to it.  Cooking   · Try to cook your favorite foods in a healthier way. For example, try baking instead of frying.  · Use low-fat dairy products.  Meal planning   · Use more fruits and vegetables. Half of your plate should be fruits and vegetables.  · Include lean proteins like poultry and fish.  How do I count calories when eating out?  · Ask for smaller portion sizes.  · Consider sharing an entree and sides instead of getting your own entree.  · If you get your own entree, eat only half. Ask for a box at the beginning of your meal and put the rest of your entree in it so you are not tempted to eat it.  · If calories are listed on the menu, choose the lower calorie options.  · Choose dishes that include vegetables, fruits, whole grains, low-fat dairy products, and lean protein.  · Choose items that are boiled, broiled, grilled, or steamed. Stay away from items that are buttered, battered, fried, or served with cream sauce. Items labeled “crispy” are usually fried, unless stated otherwise.  · Choose water, low-fat milk, unsweetened iced tea, or other drinks without added sugar. If you want an alcoholic beverage, choose a lower calorie option such as a glass of wine or light beer.  · Ask for dressings, sauces, and syrups on the side. These are usually high in calories, so you should limit the amount you eat.  · If you want a salad, choose a garden salad and ask for grilled meats. Avoid extra toppings like juarez, cheese, or fried items. Ask  for the dressing on the side, or ask for olive oil and vinegar or lemon to use as dressing.  · Estimate how many servings of a food you are given. For example, a serving of cooked rice is ½ cup or about the size of half a baseball. Knowing serving sizes will help you be aware of how much food you are eating at restaurants. The list below tells you how big or small some common portion sizes are based on everyday objects:  ¨ 1 oz--4 stacked dice.  ¨ 3 oz--1 deck of cards.  ¨ 1 tsp--1 die.  ¨ 1 Tbsp--½ a ping-pong ball.  ¨ 2 Tbsp--1 ping-pong ball.  ¨ ½ cup--½ baseball.  ¨ 1 cup--1 baseball.  Summary  · Calorie counting means keeping track of how many calories you eat and drink each day. If you eat fewer calories than your body needs, you should lose weight.  · A healthy amount of weight to lose per week is usually 1-2 lb (0.5-0.9 kg). This usually means reducing your daily calorie intake by 500-750 calories.  · The number of calories in a food can be found on a Nutrition Facts label. If a food does not have a Nutrition Facts label, try to look up the calories online or ask your dietitian for help.  · Use your calories on foods and drinks that will fill you up, and not on foods and drinks that will leave you hungry.  · Use smaller plates, glasses, and bowls to prevent overeating.  This information is not intended to replace advice given to you by your health care provider. Make sure you discuss any questions you have with your health care provider.  Document Released: 12/18/2006 Document Revised: 11/17/2017 Document Reviewed: 11/17/2017  [employer] Interactive Patient Education © 2017 [employer]

## 2018-07-11 DIAGNOSIS — Z12.31 ENCOUNTER FOR SCREENING MAMMOGRAM FOR MALIGNANT NEOPLASM OF BREAST: Primary | ICD-10-CM

## 2018-09-26 RX ORDER — LEVOTHYROXINE SODIUM 0.05 MG/1
50 TABLET ORAL DAILY
Qty: 90 TABLET | Refills: 3 | Status: SHIPPED | OUTPATIENT
Start: 2018-09-26 | End: 2019-12-03 | Stop reason: SDUPTHER

## 2018-09-26 RX ORDER — LOSARTAN POTASSIUM AND HYDROCHLOROTHIAZIDE 12.5; 1 MG/1; MG/1
1 TABLET ORAL DAILY
Qty: 90 TABLET | Refills: 3 | Status: SHIPPED | OUTPATIENT
Start: 2018-09-26 | End: 2019-08-30 | Stop reason: SDUPTHER

## 2018-10-24 DIAGNOSIS — E03.9 ACQUIRED HYPOTHYROIDISM: ICD-10-CM

## 2018-10-24 DIAGNOSIS — I10 ESSENTIAL HYPERTENSION: ICD-10-CM

## 2018-10-24 DIAGNOSIS — E11.9 TYPE 2 DIABETES MELLITUS WITHOUT COMPLICATION, WITHOUT LONG-TERM CURRENT USE OF INSULIN (HCC): ICD-10-CM

## 2018-10-24 DIAGNOSIS — E78.00 HYPERCHOLESTEROLEMIA: Primary | ICD-10-CM

## 2018-10-24 DIAGNOSIS — E55.9 VITAMIN D DEFICIENCY: ICD-10-CM

## 2018-10-25 ENCOUNTER — LAB (OUTPATIENT)
Dept: LAB | Facility: OTHER | Age: 73
End: 2018-10-25

## 2018-10-25 DIAGNOSIS — E55.9 VITAMIN D DEFICIENCY: ICD-10-CM

## 2018-10-25 DIAGNOSIS — E03.9 ACQUIRED HYPOTHYROIDISM: ICD-10-CM

## 2018-10-25 DIAGNOSIS — E78.00 HYPERCHOLESTEROLEMIA: ICD-10-CM

## 2018-10-25 DIAGNOSIS — E11.9 TYPE 2 DIABETES MELLITUS WITHOUT COMPLICATION, WITHOUT LONG-TERM CURRENT USE OF INSULIN (HCC): ICD-10-CM

## 2018-10-25 DIAGNOSIS — I10 ESSENTIAL HYPERTENSION: ICD-10-CM

## 2018-10-25 LAB
25(OH)D3 SERPL-MCNC: 44.9 NG/ML (ref 30–100)
ALBUMIN SERPL-MCNC: 4.5 G/DL (ref 3.5–5)
ALBUMIN/GLOB SERPL: 1.2 G/DL (ref 1.1–1.8)
ALP SERPL-CCNC: 76 U/L (ref 38–126)
ALT SERPL W P-5'-P-CCNC: 51 U/L
ANION GAP SERPL CALCULATED.3IONS-SCNC: 13 MMOL/L (ref 5–15)
AST SERPL-CCNC: 59 U/L (ref 14–36)
BASOPHILS # BLD AUTO: 0.15 10*3/MM3 (ref 0–0.2)
BASOPHILS NFR BLD AUTO: 1.5 % (ref 0–2)
BILIRUB SERPL-MCNC: 0.5 MG/DL (ref 0.2–1.3)
BUN BLD-MCNC: 17 MG/DL (ref 7–17)
BUN/CREAT SERPL: 15 (ref 7–25)
CALCIUM SPEC-SCNC: 10.1 MG/DL (ref 8.4–10.2)
CHLORIDE SERPL-SCNC: 108 MMOL/L (ref 98–107)
CHOLEST SERPL-MCNC: 174 MG/DL (ref 150–200)
CO2 SERPL-SCNC: 25 MMOL/L (ref 22–30)
CREAT BLD-MCNC: 1.13 MG/DL (ref 0.52–1.04)
DEPRECATED RDW RBC AUTO: 48.1 FL (ref 36.4–46.3)
EOSINOPHIL # BLD AUTO: 0.9 10*3/MM3 (ref 0–0.7)
EOSINOPHIL NFR BLD AUTO: 9.2 % (ref 0–7)
ERYTHROCYTE [DISTWIDTH] IN BLOOD BY AUTOMATED COUNT: 15 % (ref 11.5–14.5)
GFR SERPL CREATININE-BSD FRML MDRD: 47 ML/MIN/1.73 (ref 39–90)
GLOBULIN UR ELPH-MCNC: 3.9 GM/DL (ref 2.3–3.5)
GLUCOSE BLD-MCNC: 137 MG/DL (ref 74–99)
HBA1C MFR BLD: 7.5 % (ref 4–5.6)
HCT VFR BLD AUTO: 42 % (ref 35–45)
HDLC SERPL-MCNC: 41 MG/DL (ref 40–59)
HGB BLD-MCNC: 13 G/DL (ref 12–15.5)
LDLC SERPL CALC-MCNC: 98 MG/DL
LDLC/HDLC SERPL: 2.4 {RATIO} (ref 0–3.22)
LYMPHOCYTES # BLD AUTO: 2.39 10*3/MM3 (ref 0.6–4.2)
LYMPHOCYTES NFR BLD AUTO: 24.4 % (ref 10–50)
MCH RBC QN AUTO: 27.6 PG (ref 26.5–34)
MCHC RBC AUTO-ENTMCNC: 31 G/DL (ref 31.4–36)
MCV RBC AUTO: 89.2 FL (ref 80–98)
MONOCYTES # BLD AUTO: 0.81 10*3/MM3 (ref 0–0.9)
MONOCYTES NFR BLD AUTO: 8.3 % (ref 0–12)
NEUTROPHILS # BLD AUTO: 5.53 10*3/MM3 (ref 2–8.6)
NEUTROPHILS NFR BLD AUTO: 56.6 % (ref 37–80)
PLATELET # BLD AUTO: 435 10*3/MM3 (ref 150–450)
PMV BLD AUTO: 9 FL (ref 8–12)
POTASSIUM BLD-SCNC: 4.2 MMOL/L (ref 3.4–5)
PROT SERPL-MCNC: 8.4 G/DL (ref 6.3–8.2)
RBC # BLD AUTO: 4.71 10*6/MM3 (ref 3.77–5.16)
SODIUM BLD-SCNC: 146 MMOL/L (ref 137–145)
T4 FREE SERPL-MCNC: 1.15 NG/DL (ref 0.78–2.19)
TRIGL SERPL-MCNC: 173 MG/DL
TSH SERPL DL<=0.05 MIU/L-ACNC: 3.4 MIU/ML (ref 0.46–4.68)
VLDLC SERPL-MCNC: 34.6 MG/DL
WBC NRBC COR # BLD: 9.78 10*3/MM3 (ref 3.2–9.8)

## 2018-10-25 PROCEDURE — 82306 VITAMIN D 25 HYDROXY: CPT | Performed by: INTERNAL MEDICINE

## 2018-10-25 PROCEDURE — 80053 COMPREHEN METABOLIC PANEL: CPT | Performed by: INTERNAL MEDICINE

## 2018-10-25 PROCEDURE — 85025 COMPLETE CBC W/AUTO DIFF WBC: CPT | Performed by: INTERNAL MEDICINE

## 2018-10-25 PROCEDURE — 36415 COLL VENOUS BLD VENIPUNCTURE: CPT | Performed by: INTERNAL MEDICINE

## 2018-10-25 PROCEDURE — 83036 HEMOGLOBIN GLYCOSYLATED A1C: CPT | Performed by: INTERNAL MEDICINE

## 2018-10-25 PROCEDURE — 80061 LIPID PANEL: CPT | Performed by: INTERNAL MEDICINE

## 2018-10-25 PROCEDURE — 84443 ASSAY THYROID STIM HORMONE: CPT | Performed by: INTERNAL MEDICINE

## 2018-10-25 PROCEDURE — 84439 ASSAY OF FREE THYROXINE: CPT | Performed by: INTERNAL MEDICINE

## 2018-11-16 ENCOUNTER — OFFICE VISIT (OUTPATIENT)
Dept: FAMILY MEDICINE CLINIC | Facility: CLINIC | Age: 73
End: 2018-11-16

## 2018-11-16 VITALS
BODY MASS INDEX: 31.83 KG/M2 | WEIGHT: 173 LBS | HEART RATE: 80 BPM | TEMPERATURE: 98 F | HEIGHT: 62 IN | DIASTOLIC BLOOD PRESSURE: 60 MMHG | SYSTOLIC BLOOD PRESSURE: 132 MMHG

## 2018-11-16 DIAGNOSIS — J30.1 SEASONAL ALLERGIC RHINITIS DUE TO POLLEN: Chronic | ICD-10-CM

## 2018-11-16 DIAGNOSIS — I10 ESSENTIAL HYPERTENSION: Chronic | ICD-10-CM

## 2018-11-16 DIAGNOSIS — E03.9 ACQUIRED HYPOTHYROIDISM: ICD-10-CM

## 2018-11-16 DIAGNOSIS — E66.9 OBESITY (BMI 30.0-34.9): Chronic | ICD-10-CM

## 2018-11-16 DIAGNOSIS — J45.30 MILD PERSISTENT ASTHMA WITHOUT COMPLICATION: Chronic | ICD-10-CM

## 2018-11-16 DIAGNOSIS — E78.00 HYPERCHOLESTEROLEMIA: Chronic | ICD-10-CM

## 2018-11-16 DIAGNOSIS — M85.88 OSTEOPENIA OF SPINE: Chronic | ICD-10-CM

## 2018-11-16 DIAGNOSIS — E11.9 TYPE 2 DIABETES MELLITUS WITHOUT COMPLICATION, WITHOUT LONG-TERM CURRENT USE OF INSULIN (HCC): Primary | Chronic | ICD-10-CM

## 2018-11-16 PROCEDURE — 90662 IIV NO PRSV INCREASED AG IM: CPT | Performed by: INTERNAL MEDICINE

## 2018-11-16 PROCEDURE — 99214 OFFICE O/P EST MOD 30 MIN: CPT | Performed by: INTERNAL MEDICINE

## 2018-11-16 PROCEDURE — G0008 ADMIN INFLUENZA VIRUS VAC: HCPCS | Performed by: INTERNAL MEDICINE

## 2018-11-16 PROCEDURE — 90732 PPSV23 VACC 2 YRS+ SUBQ/IM: CPT | Performed by: INTERNAL MEDICINE

## 2018-11-16 PROCEDURE — G0009 ADMIN PNEUMOCOCCAL VACCINE: HCPCS | Performed by: INTERNAL MEDICINE

## 2018-11-16 NOTE — PROGRESS NOTES
Subjective        History of Present Illness     Gale Cabral is a 73 y.o. female who presents for 6-month follow up on type 2 diabetes, hypertension, hyperlipidemia/low HDL, asthma, and hypothyroidism among other issues.    Diabetes is not quite at goal on metformin 500 mg b.i.d.  She brings in a diabetic diary revealing glucose is at goal 65% of the time with one isolated glucose over 200. We reviewed treatment options including increasing her dose of metformin.  However, she struggles with episodic diarrhea post cholecystectomy in 1990, but feels this is manageable.  Jardiance is covered on her insurance formulary.  She agrees to add this.  We discussed side effects of increased urination and possibility of mycotic infections.  She wears a protective pad for bladder incontinence.           She is taking Claritin, Flonase, Singular and using Dulera and ProAir for management of seasonal allergies and mild persistent asthma.  She has only been using her Dulera once daily, as she feels like she is excessively dried out when using Dulera twice daily, but continues to have a frequent nonproductive cough.  I recommended trying one puff of Dulera three times daily to help manage symptoms without the excessive drying out.             DEXA 02/2017 revealed osteopenia of left hip and lumbar spine.  She continues on calcium/vitamin D supplement once daily. She will be due for repeat DEXA 02/2019.     She had Prevnar 05/2017.  She is due Pneumovax.  She is given Prevnar and influenza today.       Weight is down 2 pounds in the past six months.   Blood pressure at goal.     The patient's relevant past medical, surgical, and social history was reviewed in Epic.   Lab results are reviewed with the patient today.  CBC unremarkable.  Fasting glucose 137.  Total cholesterol 174.  LDL 98.  LDL 41.  Triglycerides Vitamin D at goal.  Renal function.  Liver enzymes mildly above goal.      Review of Systems   Constitutional:  "Negative for chills, fatigue and fever.   HENT: Negative for congestion, ear pain, postnasal drip, sinus pressure and sore throat.    Respiratory: Negative for cough, shortness of breath and wheezing.    Cardiovascular: Negative for chest pain, palpitations and leg swelling.   Gastrointestinal: Negative for abdominal pain, blood in stool, constipation, diarrhea, nausea and vomiting.   Endocrine: Negative for cold intolerance, heat intolerance, polydipsia and polyuria.   Genitourinary: Negative for dysuria, frequency, hematuria and urgency.   Skin: Negative for rash.   Neurological: Negative for syncope and weakness.        Objective     Vitals:    11/16/18 1015   BP: 132/60   Pulse: 80   Temp: 98 °F (36.7 °C)   TempSrc: Oral   Weight: 78.5 kg (173 lb)   Height: 157.5 cm (62\")     Physical Exam   Constitutional: She is oriented to person, place, and time. She appears well-developed and well-nourished. No distress.   Obese female.  Accompanied by her .    HENT:   Head: Normocephalic and atraumatic.   Nose: Right sinus exhibits no maxillary sinus tenderness and no frontal sinus tenderness. Left sinus exhibits no maxillary sinus tenderness and no frontal sinus tenderness.   Mouth/Throat: Uvula is midline, oropharynx is clear and moist and mucous membranes are normal. No oral lesions. No tonsillar exudate.   Eyes: Conjunctivae and EOM are normal. Pupils are equal, round, and reactive to light.   Bilateral sclera red and injected.    Neck: Trachea normal. Neck supple. No JVD present. Carotid bruit is not present. No tracheal deviation present. No thyroid mass and no thyromegaly present.   Cardiovascular: Normal rate, regular rhythm, normal heart sounds and intact distal pulses.  No extrasystoles are present. PMI is not displaced.   No murmur heard.  Pulmonary/Chest: Effort normal and breath sounds normal. No accessory muscle usage. No respiratory distress. She has no decreased breath sounds. She has no wheezes. She " has no rhonchi. She has no rales.   Mild wheeze on expiratory phase of cough.      Abdominal: Soft. Bowel sounds are normal. She exhibits no distension. There is no hepatosplenomegaly. There is no tenderness.   Obese abdomen limits exam.       Vascular Status -  Her right foot exhibits normal foot vasculature  and no edema. Her left foot exhibits normal foot vasculature  and no edema.  Lymphadenopathy:     She has no cervical adenopathy.   Neurological: She is alert and oriented to person, place, and time. No cranial nerve deficit. Coordination normal.   Skin: Skin is warm, dry and intact. No rash noted. No cyanosis. Nails show no clubbing.   Excessively dry skin bilateral lower extremities.    Psychiatric: She has a normal mood and affect. Her speech is normal and behavior is normal. Judgment and thought content normal.   Vitals reviewed.        Assessment/Plan      A prescription is sent for Jardiance.  Continue the metformin 500 mg b.i.d. Pursue diabetic diet, exercise and weight loss efforts.  She continues on losartan HCT. If she starts to notice orthostatic symptoms when the Jardiance is added, she can notify us and we can eliminate the hydrochlorothiazide.     Continue daily Singulair, Claritin, and Flonase nasal spray.  Increase the Dulera to one puff 2-3 times daily to help improvement management of symptoms without the excessive drying. Continue with ProAir rescue inhaler as needed.     She is given influenza and Pneumovax today.  She had Prevnar 05/2017.  She can use Tylenol for local site reaction or generalized pain.       Moisturize skin liberally.  She may add a combination of Aquaphor to the more severe dry skin.       Continue with vitamin D and calcium supplements.  She will be due for repeat DEXA 02/2019.    Continue other medications and vitamin and mineral supplements to treat additional medical problems which we addressed today.  Return in six months for follow up with fasting labs one week  prior.        Scribed for Dr. Soliman by Lydia Davis [facility].     Diagnoses and all orders for this visit:    Type 2 diabetes mellitus without complication, without long-term current use of insulin (CMS/[facility])  -     CBC Auto Differential; Future  -     Comprehensive Metabolic Panel; Future  -     Hemoglobin A1c; Future  -     Microalbumin / Creatinine Urine Ratio - Urine, Clean Catch; Future    Essential hypertension    Hypercholesterolemia  -     LDL Cholesterol, Direct; Future    Osteopenia of spine  -     Vitamin D 25 Hydroxy; Future    Obesity (BMI 30.0-34.9)    Mild persistent asthma without complication    Seasonal allergic rhinitis due to pollen    Acquired hypothyroidism  -     TSH; Future  -     T4, free; Future    Other orders  -     Empagliflozin 25 MG tablet; Take 25 mg by mouth Every Morning. For diabetes  -     Pneumococcal Polysaccharide Vaccine 23-Valent Greater Than or Equal To 3yo Subcutaneous / IM  -     Fluzone High Dose =>65Years        No visits with results within 3 Week(s) from this visit.   Latest known visit with results is:   Lab on 10/25/2018   Component Date Value Ref Range Status   • Glucose 10/25/2018 137* 74 - 99 mg/dL Final   • BUN 10/25/2018 17  7 - 17 mg/dL Final   • Creatinine 10/25/2018 1.13* 0.52 - 1.04 mg/dL Final   • Sodium 10/25/2018 146* 137 - 145 mmol/L Final   • Potassium 10/25/2018 4.2  3.4 - 5.0 mmol/L Final   • Chloride 10/25/2018 108* 98 - 107 mmol/L Final   • CO2 10/25/2018 25.0  22.0 - 30.0 mmol/L Final   • Calcium 10/25/2018 10.1  8.4 - 10.2 mg/dL Final   • Total Protein 10/25/2018 8.4* 6.3 - 8.2 g/dL Final   • Albumin 10/25/2018 4.50  3.50 - 5.00 g/dL Final   • ALT (SGPT) 10/25/2018 51* <=35 U/L Final   • AST (SGOT) 10/25/2018 59* 14 - 36 U/L Final   • Alkaline Phosphatase 10/25/2018 76  38 - 126 U/L Final   • Total Bilirubin 10/25/2018 0.5  0.2 - 1.3 mg/dL Final   • eGFR Non  Amer 10/25/2018 47  39 - 90 mL/min/1.73 Final   • Globulin 10/25/2018 3.9* 2.3 -  3.5 gm/dL Final   • A/G Ratio 10/25/2018 1.2  1.1 - 1.8 g/dL Final   • BUN/Creatinine Ratio 10/25/2018 15.0  7.0 - 25.0 Final   • Anion Gap 10/25/2018 13.0  5.0 - 15.0 mmol/L Final   • Total Cholesterol 10/25/2018 174  150 - 200 mg/dL Final   • Triglycerides 10/25/2018 173* <=150 mg/dL Final   • HDL Cholesterol 10/25/2018 41  40 - 59 mg/dL Final   • LDL Cholesterol  10/25/2018 98  <=100 mg/dL Final   • VLDL Cholesterol 10/25/2018 34.6  mg/dL Final   • LDL/HDL Ratio 10/25/2018 2.40  0.00 - 3.22 Final   • TSH 10/25/2018 3.400  0.460 - 4.680 mIU/mL Final   • Free T4 10/25/2018 1.15  0.78 - 2.19 ng/dL Final   • Hemoglobin A1C 10/25/2018 7.5* 4 - 5.6 % Final   • 25 Hydroxy, Vitamin D 10/25/2018 44.9  30.0 - 100.0 ng/ml Final   • WBC 10/25/2018 9.78  3.20 - 9.80 10*3/mm3 Final   • RBC 10/25/2018 4.71  3.77 - 5.16 10*6/mm3 Final   • Hemoglobin 10/25/2018 13.0  12.0 - 15.5 g/dL Final   • Hematocrit 10/25/2018 42.0  35.0 - 45.0 % Final   • MCV 10/25/2018 89.2  80.0 - 98.0 fL Final   • MCH 10/25/2018 27.6  26.5 - 34.0 pg Final   • MCHC 10/25/2018 31.0* 31.4 - 36.0 g/dL Final   • RDW 10/25/2018 15.0* 11.5 - 14.5 % Final   • RDW-SD 10/25/2018 48.1* 36.4 - 46.3 fl Final   • MPV 10/25/2018 9.0  8.0 - 12.0 fL Final   • Platelets 10/25/2018 435  150 - 450 10*3/mm3 Final   • Neutrophil % 10/25/2018 56.6  37.0 - 80.0 % Final   • Lymphocyte % 10/25/2018 24.4  10.0 - 50.0 % Final   • Monocyte % 10/25/2018 8.3  0.0 - 12.0 % Final   • Eosinophil % 10/25/2018 9.2* 0.0 - 7.0 % Final   • Basophil % 10/25/2018 1.5  0.0 - 2.0 % Final   • Neutrophils, Absolute 10/25/2018 5.53  2.00 - 8.60 10*3/mm3 Final   • Lymphocytes, Absolute 10/25/2018 2.39  0.60 - 4.20 10*3/mm3 Final   • Monocytes, Absolute 10/25/2018 0.81  0.00 - 0.90 10*3/mm3 Final   • Eosinophils, Absolute 10/25/2018 0.90* 0.00 - 0.70 10*3/mm3 Final   • Basophils, Absolute 10/25/2018 0.15  0.00 - 0.20 10*3/mm3 Final   ]

## 2018-11-16 NOTE — PATIENT INSTRUCTIONS
Exercising to Lose Weight  Exercising can help you to lose weight. In order to lose weight through exercise, you need to do vigorous-intensity exercise. You can tell that you are exercising with vigorous intensity if you are breathing very hard and fast and cannot hold a conversation while exercising.  Moderate-intensity exercise helps to maintain your current weight. You can tell that you are exercising at a moderate level if you have a higher heart rate and faster breathing, but you are still able to hold a conversation.  How often should I exercise?  Choose an activity that you enjoy and set realistic goals. Your health care provider can help you to make an activity plan that works for you. Exercise regularly as directed by your health care provider. This may include:  · Doing resistance training twice each week, such as:  ? Push-ups.  ? Sit-ups.  ? Lifting weights.  ? Using resistance bands.  · Doing a given intensity of exercise for a given amount of time. Choose from these options:  ? 150 minutes of moderate-intensity exercise every week.  ? 75 minutes of vigorous-intensity exercise every week.  ? A mix of moderate-intensity and vigorous-intensity exercise every week.    Children, pregnant women, people who are out of shape, people who are overweight, and older adults may need to consult a health care provider for individual recommendations. If you have any sort of medical condition, be sure to consult your health care provider before starting a new exercise program.  What are some activities that can help me to lose weight?  · Walking at a rate of at least 4.5 miles an hour.  · Jogging or running at a rate of 5 miles per hour.  · Biking at a rate of at least 10 miles per hour.  · Lap swimming.  · Roller-skating or in-line skating.  · Cross-country skiing.  · Vigorous competitive sports, such as football, basketball, and soccer.  · Jumping rope.  · Aerobic dancing.  How can I be more active in my day-to-day  activities?  · Use the stairs instead of the elevator.  · Take a walk during your lunch break.  · If you drive, park your car farther away from work or school.  · If you take public transportation, get off one stop early and walk the rest of the way.  · Make all of your phone calls while standing up and walking around.  · Get up, stretch, and walk around every 30 minutes throughout the day.  What guidelines should I follow while exercising?  · Do not exercise so much that you hurt yourself, feel dizzy, or get very short of breath.  · Consult your health care provider prior to starting a new exercise program.  · Wear comfortable clothes and shoes with good support.  · Drink plenty of water while you exercise to prevent dehydration or heat stroke. Body water is lost during exercise and must be replaced.  · Work out until you breathe faster and your heart beats faster.  This information is not intended to replace advice given to you by your health care provider. Make sure you discuss any questions you have with your health care provider.  Document Released: 01/20/2012 Document Revised: 05/25/2017 Document Reviewed: 05/21/2015  [employer] Patient Education © 2018 [employer]      Calorie Counting for Weight Loss  Calories are units of energy. Your body needs a certain amount of calories from food to keep you going throughout the day. When you eat more calories than your body needs, your body stores the extra calories as fat. When you eat fewer calories than your body needs, your body burns fat to get the energy it needs.  Calorie counting means keeping track of how many calories you eat and drink each day. Calorie counting can be helpful if you need to lose weight. If you make sure to eat fewer calories than your body needs, you should lose weight. Ask your health care provider what a healthy weight is for you.  For calorie counting to work, you will need to eat the right number of calories in a day in order  to lose a healthy amount of weight per week. A dietitian can help you determine how many calories you need in a day and will give you suggestions on how to reach your calorie goal.  · A healthy amount of weight to lose per week is usually 1-2 lb (0.5-0.9 kg). This usually means that your daily calorie intake should be reduced by 500-750 calories.  · Eating 1,200 - 1,500 calories per day can help most women lose weight.  · Eating 1,500 - 1,800 calories per day can help most men lose weight.    What do I need to know about calorie counting?  In order to meet your daily calorie goal, you will need to:  · Find out how many calories are in each food you would like to eat. Try to do this before you eat.  · Decide how much of the food you plan to eat.  · Write down what you ate and how many calories it had. Doing this is called keeping a food log.    To successfully lose weight, it is important to balance calorie counting with a healthy lifestyle that includes regular activity. Aim for 150 minutes of moderate exercise (such as walking) or 75 minutes of vigorous exercise (such as running) each week.  Where do I find calorie information?    The number of calories in a food can be found on a Nutrition Facts label. If a food does not have a Nutrition Facts label, try to look up the calories online or ask your dietitian for help.  Remember that calories are listed per serving. If you choose to have more than one serving of a food, you will have to multiply the calories per serving by the amount of servings you plan to eat. For example, the label on a package of bread might say that a serving size is 1 slice and that there are 90 calories in a serving. If you eat 1 slice, you will have eaten 90 calories. If you eat 2 slices, you will have eaten 180 calories.  How do I keep a food log?  Immediately after each meal, record the following information in your food log:  · What you ate. Don't forget to include toppings, sauces, and  "other extras on the food.  · How much you ate. This can be measured in cups, ounces, or number of items.  · How many calories each food and drink had.  · The total number of calories in the meal.    Keep your food log near you, such as in a small notebook in your pocket, or use a mobile hebert or website. Some programs will calculate calories for you and show you how many calories you have left for the day to meet your goal.  What are some calorie counting tips?  · Use your calories on foods and drinks that will fill you up and not leave you hungry:  ? Some examples of foods that fill you up are nuts and nut butters, vegetables, lean proteins, and high-fiber foods like whole grains. High-fiber foods are foods with more than 5 g fiber per serving.  ? Drinks such as sodas, specialty coffee drinks, alcohol, and juices have a lot of calories, yet do not fill you up.  · Eat nutritious foods and avoid empty calories. Empty calories are calories you get from foods or beverages that do not have many vitamins or protein, such as candy, sweets, and soda. It is better to have a nutritious high-calorie food (such as an avocado) than a food with few nutrients (such as a bag of chips).  · Know how many calories are in the foods you eat most often. This will help you calculate calorie counts faster.  · Pay attention to calories in drinks. Low-calorie drinks include water and unsweetened drinks.  · Pay attention to nutrition labels for \"low fat\" or \"fat free\" foods. These foods sometimes have the same amount of calories or more calories than the full fat versions. They also often have added sugar, starch, or salt, to make up for flavor that was removed with the fat.  · Find a way of tracking calories that works for you. Get creative. Try different apps or programs if writing down calories does not work for you.  What are some portion control tips?  · Know how many calories are in a serving. This will help you know how many servings of " a certain food you can have.  · Use a measuring cup to measure serving sizes. You could also try weighing out portions on a kitchen scale. With time, you will be able to estimate serving sizes for some foods.  · Take some time to put servings of different foods on your favorite plates, bowls, and cups so you know what a serving looks like.  · Try not to eat straight from a bag or box. Doing this can lead to overeating. Put the amount you would like to eat in a cup or on a plate to make sure you are eating the right portion.  · Use smaller plates, glasses, and bowls to prevent overeating.  · Try not to multitask (for example, watch TV or use your computer) while eating. If it is time to eat, sit down at a table and enjoy your food. This will help you to know when you are full. It will also help you to be aware of what you are eating and how much you are eating.  What are tips for following this plan?  Reading food labels  · Check the calorie count compared to the serving size. The serving size may be smaller than what you are used to eating.  · Check the source of the calories. Make sure the food you are eating is high in vitamins and protein and low in saturated and trans fats.  Shopping  · Read nutrition labels while you shop. This will help you make healthy decisions before you decide to purchase your food.  · Make a grocery list and stick to it.  Cooking  · Try to cook your favorite foods in a healthier way. For example, try baking instead of frying.  · Use low-fat dairy products.  Meal planning  · Use more fruits and vegetables. Half of your plate should be fruits and vegetables.  · Include lean proteins like poultry and fish.  How do I count calories when eating out?  · Ask for smaller portion sizes.  · Consider sharing an entree and sides instead of getting your own entree.  · If you get your own entree, eat only half. Ask for a box at the beginning of your meal and put the rest of your entree in it so you are  not tempted to eat it.  · If calories are listed on the menu, choose the lower calorie options.  · Choose dishes that include vegetables, fruits, whole grains, low-fat dairy products, and lean protein.  · Choose items that are boiled, broiled, grilled, or steamed. Stay away from items that are buttered, battered, fried, or served with cream sauce. Items labeled “crispy” are usually fried, unless stated otherwise.  · Choose water, low-fat milk, unsweetened iced tea, or other drinks without added sugar. If you want an alcoholic beverage, choose a lower calorie option such as a glass of wine or light beer.  · Ask for dressings, sauces, and syrups on the side. These are usually high in calories, so you should limit the amount you eat.  · If you want a salad, choose a garden salad and ask for grilled meats. Avoid extra toppings like juarez, cheese, or fried items. Ask for the dressing on the side, or ask for olive oil and vinegar or lemon to use as dressing.  · Estimate how many servings of a food you are given. For example, a serving of cooked rice is ½ cup or about the size of half a baseball. Knowing serving sizes will help you be aware of how much food you are eating at restaurants. The list below tells you how big or small some common portion sizes are based on everyday objects:  ? 1 oz--4 stacked dice.  ? 3 oz--1 deck of cards.  ? 1 tsp--1 die.  ? 1 Tbsp--½ a ping-pong ball.  ? 2 Tbsp--1 ping-pong ball.  ? ½ cup--½ baseball.  ? 1 cup--1 baseball.  Summary  · Calorie counting means keeping track of how many calories you eat and drink each day. If you eat fewer calories than your body needs, you should lose weight.  · A healthy amount of weight to lose per week is usually 1-2 lb (0.5-0.9 kg). This usually means reducing your daily calorie intake by 500-750 calories.  · The number of calories in a food can be found on a Nutrition Facts label. If a food does not have a Nutrition Facts label, try to look up the calories  online or ask your dietitian for help.  · Use your calories on foods and drinks that will fill you up, and not on foods and drinks that will leave you hungry.  · Use smaller plates, glasses, and bowls to prevent overeating.  This information is not intended to replace advice given to you by your health care provider. Make sure you discuss any questions you have with your health care provider.  Document Released: 12/18/2006 Document Revised: 11/17/2017 Document Reviewed: 11/17/2017  [employer] Interactive Patient Education © 2018 [employer]

## 2018-12-13 RX ORDER — ATORVASTATIN CALCIUM 20 MG/1
TABLET, FILM COATED ORAL
Qty: 90 TABLET | Refills: 0 | Status: SHIPPED | OUTPATIENT
Start: 2018-12-13 | End: 2019-03-26 | Stop reason: SDUPTHER

## 2019-03-20 DIAGNOSIS — M85.88 OSTEOPENIA OF SPINE: Chronic | ICD-10-CM

## 2019-03-20 DIAGNOSIS — Z78.0 POST-MENOPAUSAL: ICD-10-CM

## 2019-03-20 DIAGNOSIS — E55.9 VITAMIN D DEFICIENCY: Primary | Chronic | ICD-10-CM

## 2019-03-21 ENCOUNTER — TELEPHONE (OUTPATIENT)
Dept: FAMILY MEDICINE CLINIC | Facility: CLINIC | Age: 74
End: 2019-03-21

## 2019-03-21 NOTE — TELEPHONE ENCOUNTER
I advised the patient of her DEXA results and  that her DEXA reveals persistent osteopenia of the lumbar spine and hip, essentially unchanged from prior study.  Continue the calcium and vitamin D supplements.  Pursue daily weightbearing exercises, such as walking for exercise. Dr will reevaluate in 2 years. The patient voiced understanding.       ----- Message from Darian Soliman MD sent at 3/20/2019  6:17 PM CDT -----  Notify the patient that her DEXA reveals persistent osteopenia of the lumbar spine and hip, essentially unchanged from prior study.  Continue the calcium and vitamin D supplements.  Pursue daily weightbearing exercises, such as walking for exercise.  We will reevaluate in 2 years.  EB

## 2019-03-25 RX ORDER — ALBUTEROL SULFATE 90 UG/1
2 AEROSOL, METERED RESPIRATORY (INHALATION) 4 TIMES DAILY PRN
Qty: 8.5 INHALER | Refills: 6 | Status: SHIPPED | OUTPATIENT
Start: 2019-03-25 | End: 2022-01-31 | Stop reason: SDUPTHER

## 2019-03-25 RX ORDER — CITALOPRAM 20 MG/1
20 TABLET ORAL DAILY
Qty: 90 TABLET | Refills: 3 | Status: SHIPPED | OUTPATIENT
Start: 2019-03-25 | End: 2020-10-05

## 2019-03-26 RX ORDER — ATORVASTATIN CALCIUM 20 MG/1
TABLET, FILM COATED ORAL
Qty: 90 TABLET | Refills: 3 | Status: SHIPPED | OUTPATIENT
Start: 2019-03-26 | End: 2020-03-11

## 2019-03-26 RX ORDER — AMLODIPINE BESYLATE 5 MG/1
TABLET ORAL
Qty: 90 TABLET | Refills: 3 | Status: SHIPPED | OUTPATIENT
Start: 2019-03-26 | End: 2019-12-03 | Stop reason: SDUPTHER

## 2019-03-26 RX ORDER — MONTELUKAST SODIUM 10 MG/1
TABLET ORAL
Qty: 90 TABLET | Refills: 3 | Status: SHIPPED | OUTPATIENT
Start: 2019-03-26 | End: 2019-12-03 | Stop reason: SDUPTHER

## 2019-05-28 ENCOUNTER — OFFICE VISIT (OUTPATIENT)
Dept: FAMILY MEDICINE CLINIC | Facility: CLINIC | Age: 74
End: 2019-05-28

## 2019-05-28 VITALS
TEMPERATURE: 98.3 F | BODY MASS INDEX: 30.95 KG/M2 | SYSTOLIC BLOOD PRESSURE: 128 MMHG | WEIGHT: 168.2 LBS | HEIGHT: 62 IN | DIASTOLIC BLOOD PRESSURE: 70 MMHG | HEART RATE: 56 BPM

## 2019-05-28 DIAGNOSIS — R19.7 POSTCHOLECYSTECTOMY DIARRHEA: Chronic | ICD-10-CM

## 2019-05-28 DIAGNOSIS — J45.30 MILD PERSISTENT ASTHMA WITHOUT COMPLICATION: Chronic | ICD-10-CM

## 2019-05-28 DIAGNOSIS — E11.9 TYPE 2 DIABETES MELLITUS WITHOUT COMPLICATION, WITHOUT LONG-TERM CURRENT USE OF INSULIN (HCC): Primary | Chronic | ICD-10-CM

## 2019-05-28 DIAGNOSIS — E03.9 ACQUIRED HYPOTHYROIDISM: Chronic | ICD-10-CM

## 2019-05-28 DIAGNOSIS — E66.9 OBESITY (BMI 30.0-34.9): Chronic | ICD-10-CM

## 2019-05-28 DIAGNOSIS — G47.33 OBSTRUCTIVE SLEEP APNEA SYNDROME: ICD-10-CM

## 2019-05-28 DIAGNOSIS — E55.9 VITAMIN D DEFICIENCY: Chronic | ICD-10-CM

## 2019-05-28 DIAGNOSIS — K91.89 POSTCHOLECYSTECTOMY DIARRHEA: Chronic | ICD-10-CM

## 2019-05-28 DIAGNOSIS — I10 ESSENTIAL HYPERTENSION: Chronic | ICD-10-CM

## 2019-05-28 DIAGNOSIS — E78.00 HYPERCHOLESTEROLEMIA: Chronic | ICD-10-CM

## 2019-05-28 DIAGNOSIS — J30.1 SEASONAL ALLERGIC RHINITIS DUE TO POLLEN: Chronic | ICD-10-CM

## 2019-05-28 DIAGNOSIS — M85.88 OSTEOPENIA OF SPINE: Chronic | ICD-10-CM

## 2019-05-28 PROCEDURE — 99214 OFFICE O/P EST MOD 30 MIN: CPT | Performed by: INTERNAL MEDICINE

## 2019-05-28 RX ORDER — LANCETS 33 GAUGE
EACH MISCELLANEOUS
Qty: 100 EACH | Refills: 3 | Status: SHIPPED | OUTPATIENT
Start: 2019-05-28 | End: 2022-04-29 | Stop reason: SDUPTHER

## 2019-05-28 NOTE — PATIENT INSTRUCTIONS
Exercising to Lose Weight  Exercising can help you to lose weight. In order to lose weight through exercise, you need to do vigorous-intensity exercise. You can tell that you are exercising with vigorous intensity if you are breathing very hard and fast and cannot hold a conversation while exercising.  Moderate-intensity exercise helps to maintain your current weight. You can tell that you are exercising at a moderate level if you have a higher heart rate and faster breathing, but you are still able to hold a conversation.  How often should I exercise?  Choose an activity that you enjoy and set realistic goals. Your health care provider can help you to make an activity plan that works for you. Exercise regularly as directed by your health care provider. This may include:  · Doing resistance training twice each week, such as:  ? Push-ups.  ? Sit-ups.  ? Lifting weights.  ? Using resistance bands.  · Doing a given intensity of exercise for a given amount of time. Choose from these options:  ? 150 minutes of moderate-intensity exercise every week.  ? 75 minutes of vigorous-intensity exercise every week.  ? A mix of moderate-intensity and vigorous-intensity exercise every week.    Children, pregnant women, people who are out of shape, people who are overweight, and older adults may need to consult a health care provider for individual recommendations. If you have any sort of medical condition, be sure to consult your health care provider before starting a new exercise program.  What are some activities that can help me to lose weight?  · Walking at a rate of at least 4.5 miles an hour.  · Jogging or running at a rate of 5 miles per hour.  · Biking at a rate of at least 10 miles per hour.  · Lap swimming.  · Roller-skating or in-line skating.  · Cross-country skiing.  · Vigorous competitive sports, such as football, basketball, and soccer.  · Jumping rope.  · Aerobic dancing.  How can I be more active in my day-to-day  activities?  · Use the stairs instead of the elevator.  · Take a walk during your lunch break.  · If you drive, park your car farther away from work or school.  · If you take public transportation, get off one stop early and walk the rest of the way.  · Make all of your phone calls while standing up and walking around.  · Get up, stretch, and walk around every 30 minutes throughout the day.  What guidelines should I follow while exercising?  · Do not exercise so much that you hurt yourself, feel dizzy, or get very short of breath.  · Consult your health care provider prior to starting a new exercise program.  · Wear comfortable clothes and shoes with good support.  · Drink plenty of water while you exercise to prevent dehydration or heat stroke. Body water is lost during exercise and must be replaced.  · Work out until you breathe faster and your heart beats faster.  This information is not intended to replace advice given to you by your health care provider. Make sure you discuss any questions you have with your health care provider.  Document Released: 01/20/2012 Document Revised: 05/25/2017 Document Reviewed: 05/21/2015  [employer] Interactive Patient Education © 2019 [employer]      Calorie Counting for Weight Loss  Calories are units of energy. Your body needs a certain amount of calories from food to keep you going throughout the day. When you eat more calories than your body needs, your body stores the extra calories as fat. When you eat fewer calories than your body needs, your body burns fat to get the energy it needs.  Calorie counting means keeping track of how many calories you eat and drink each day. Calorie counting can be helpful if you need to lose weight. If you make sure to eat fewer calories than your body needs, you should lose weight. Ask your health care provider what a healthy weight is for you.  For calorie counting to work, you will need to eat the right number of calories in a day in order  to lose a healthy amount of weight per week. A dietitian can help you determine how many calories you need in a day and will give you suggestions on how to reach your calorie goal.  · A healthy amount of weight to lose per week is usually 1-2 lb (0.5-0.9 kg). This usually means that your daily calorie intake should be reduced by 500-750 calories.  · Eating 1,200 - 1,500 calories per day can help most women lose weight.  · Eating 1,500 - 1,800 calories per day can help most men lose weight.    What is my plan?  My goal is to have __________ calories per day.  If I have this many calories per day, I should lose around __________ pounds per week.  What do I need to know about calorie counting?  In order to meet your daily calorie goal, you will need to:  · Find out how many calories are in each food you would like to eat. Try to do this before you eat.  · Decide how much of the food you plan to eat.  · Write down what you ate and how many calories it had. Doing this is called keeping a food log.    To successfully lose weight, it is important to balance calorie counting with a healthy lifestyle that includes regular activity. Aim for 150 minutes of moderate exercise (such as walking) or 75 minutes of vigorous exercise (such as running) each week.  Where do I find calorie information?    The number of calories in a food can be found on a Nutrition Facts label. If a food does not have a Nutrition Facts label, try to look up the calories online or ask your dietitian for help.  Remember that calories are listed per serving. If you choose to have more than one serving of a food, you will have to multiply the calories per serving by the amount of servings you plan to eat. For example, the label on a package of bread might say that a serving size is 1 slice and that there are 90 calories in a serving. If you eat 1 slice, you will have eaten 90 calories. If you eat 2 slices, you will have eaten 180 calories.  How do I keep a  "food log?  Immediately after each meal, record the following information in your food log:  · What you ate. Don't forget to include toppings, sauces, and other extras on the food.  · How much you ate. This can be measured in cups, ounces, or number of items.  · How many calories each food and drink had.  · The total number of calories in the meal.    Keep your food log near you, such as in a small notebook in your pocket, or use a mobile hebert or website. Some programs will calculate calories for you and show you how many calories you have left for the day to meet your goal.  What are some calorie counting tips?  · Use your calories on foods and drinks that will fill you up and not leave you hungry:  ? Some examples of foods that fill you up are nuts and nut butters, vegetables, lean proteins, and high-fiber foods like whole grains. High-fiber foods are foods with more than 5 g fiber per serving.  ? Drinks such as sodas, specialty coffee drinks, alcohol, and juices have a lot of calories, yet do not fill you up.  · Eat nutritious foods and avoid empty calories. Empty calories are calories you get from foods or beverages that do not have many vitamins or protein, such as candy, sweets, and soda. It is better to have a nutritious high-calorie food (such as an avocado) than a food with few nutrients (such as a bag of chips).  · Know how many calories are in the foods you eat most often. This will help you calculate calorie counts faster.  · Pay attention to calories in drinks. Low-calorie drinks include water and unsweetened drinks.  · Pay attention to nutrition labels for \"low fat\" or \"fat free\" foods. These foods sometimes have the same amount of calories or more calories than the full fat versions. They also often have added sugar, starch, or salt, to make up for flavor that was removed with the fat.  · Find a way of tracking calories that works for you. Get creative. Try different apps or programs if writing down " calories does not work for you.  What are some portion control tips?  · Know how many calories are in a serving. This will help you know how many servings of a certain food you can have.  · Use a measuring cup to measure serving sizes. You could also try weighing out portions on a kitchen scale. With time, you will be able to estimate serving sizes for some foods.  · Take some time to put servings of different foods on your favorite plates, bowls, and cups so you know what a serving looks like.  · Try not to eat straight from a bag or box. Doing this can lead to overeating. Put the amount you would like to eat in a cup or on a plate to make sure you are eating the right portion.  · Use smaller plates, glasses, and bowls to prevent overeating.  · Try not to multitask (for example, watch TV or use your computer) while eating. If it is time to eat, sit down at a table and enjoy your food. This will help you to know when you are full. It will also help you to be aware of what you are eating and how much you are eating.  What are tips for following this plan?  Reading food labels  · Check the calorie count compared to the serving size. The serving size may be smaller than what you are used to eating.  · Check the source of the calories. Make sure the food you are eating is high in vitamins and protein and low in saturated and trans fats.  Shopping  · Read nutrition labels while you shop. This will help you make healthy decisions before you decide to purchase your food.  · Make a grocery list and stick to it.  Cooking  · Try to cook your favorite foods in a healthier way. For example, try baking instead of frying.  · Use low-fat dairy products.  Meal planning  · Use more fruits and vegetables. Half of your plate should be fruits and vegetables.  · Include lean proteins like poultry and fish.  How do I count calories when eating out?  · Ask for smaller portion sizes.  · Consider sharing an entree and sides instead of  getting your own entree.  · If you get your own entree, eat only half. Ask for a box at the beginning of your meal and put the rest of your entree in it so you are not tempted to eat it.  · If calories are listed on the menu, choose the lower calorie options.  · Choose dishes that include vegetables, fruits, whole grains, low-fat dairy products, and lean protein.  · Choose items that are boiled, broiled, grilled, or steamed. Stay away from items that are buttered, battered, fried, or served with cream sauce. Items labeled “crispy” are usually fried, unless stated otherwise.  · Choose water, low-fat milk, unsweetened iced tea, or other drinks without added sugar. If you want an alcoholic beverage, choose a lower calorie option such as a glass of wine or light beer.  · Ask for dressings, sauces, and syrups on the side. These are usually high in calories, so you should limit the amount you eat.  · If you want a salad, choose a garden salad and ask for grilled meats. Avoid extra toppings like juarez, cheese, or fried items. Ask for the dressing on the side, or ask for olive oil and vinegar or lemon to use as dressing.  · Estimate how many servings of a food you are given. For example, a serving of cooked rice is ½ cup or about the size of half a baseball. Knowing serving sizes will help you be aware of how much food you are eating at restaurants. The list below tells you how big or small some common portion sizes are based on everyday objects:  ? 1 oz--4 stacked dice.  ? 3 oz--1 deck of cards.  ? 1 tsp--1 die.  ? 1 Tbsp--½ a ping-pong ball.  ? 2 Tbsp--1 ping-pong ball.  ? ½ cup--½ baseball.  ? 1 cup--1 baseball.  Summary  · Calorie counting means keeping track of how many calories you eat and drink each day. If you eat fewer calories than your body needs, you should lose weight.  · A healthy amount of weight to lose per week is usually 1-2 lb (0.5-0.9 kg). This usually means reducing your daily calorie intake by 500-750  calories.  · The number of calories in a food can be found on a Nutrition Facts label. If a food does not have a Nutrition Facts label, try to look up the calories online or ask your dietitian for help.  · Use your calories on foods and drinks that will fill you up, and not on foods and drinks that will leave you hungry.  · Use smaller plates, glasses, and bowls to prevent overeating.  This information is not intended to replace advice given to you by your health care provider. Make sure you discuss any questions you have with your health care provider.  Document Released: 12/18/2006 Document Revised: 11/17/2017 Document Reviewed: 11/17/2017  [employer] Interactive Patient Education © 2019 [employer]

## 2019-05-28 NOTE — PROGRESS NOTES
Subjective        History of Present Illness     Gale Cabral is a 73 y.o. female who presents for 6-month follow up on type 2 diabetes, hypertension, hyperlipidemia/low HDL, asthma, and hypothyroidism among other issues.    DEXA 03/2019 reveals persistent osteopenia of the lumbar spine and hip, essentially unchanged from prior study, although, L4 T-score of -2.8  corresponds to osteoporosisy.  With this finding in mind, I recommended fall precautions to avoid falls and decrease fracture risk.  She continues calcium and vitamin D supplements.      She reports loud snoring and apneic pauses consistent with sleep apnea.  We will refer to [facility] to evaluate and schedule sleep study.     We started patient on Jardiance last fall in addition to her metformin.  A1c is improved and only slightly above goal at 6.8 decreased from 7.5 with adding the Jardiance.  Weight is down 5 pounds in the past six months.  She reports the Jardiance is costing $300 and is asking if there is a less expensive alternative.  I recommended she decrease the Jardiance to take 1/2 tablet.  I also recommended she contact her pharmacy to see if there is a comparable alternative. She denies diabetic neuropathy symptoms.        She reports being treated at an Urgent Care in [place] for bronchitis around [place] holidays.  Symptoms resolved with antibiotics and steroids.  She was seen again in April with another flare of bronchitis treated and resolved with antibiotics.  She reports current cough occasionally productive of yellow sputum and continues using her Dulera twice daily with rare use of albuterol inhaler and daily Singulair.  Flares of seasonal allergies are managed with episodic use of Claritin and Flonase, although, she is not currently using these in peak allergy season.         Blood pressure at goal.      The patient's relevant past medical, surgical, and social history was reviewed in Epic.   Lab results are scanned into  "patient's electronic record and reviewed with the patient today.  CBC unremarkable.  Fasting glucose 127.  Normal thyroid function.  Normal electrolytes. Normal liver and renal function.  LDL is 127 on Lipitor 20 mg daily.    Review of Systems   Constitutional: Negative for chills, fatigue and fever.   HENT: Negative for congestion, ear pain, postnasal drip, sinus pressure and sore throat.    Respiratory: Positive for cough and wheezing. Negative for shortness of breath.    Cardiovascular: Negative for chest pain, palpitations and leg swelling.   Gastrointestinal: Negative for abdominal pain, blood in stool, constipation, diarrhea, nausea and vomiting.   Endocrine: Negative for cold intolerance, heat intolerance, polydipsia and polyuria.   Genitourinary: Negative for dysuria, frequency, hematuria and urgency.   Skin: Negative for rash.   Neurological: Negative for syncope and weakness.        Objective     Vitals:    05/28/19 0914   BP: 128/70   Pulse: 56   Temp: 98.3 °F (36.8 °C)   TempSrc: Oral   Weight: 76.3 kg (168 lb 3.2 oz)   Height: 157.5 cm (62\")     Physical Exam   Constitutional: She is oriented to person, place, and time. She appears well-developed and well-nourished. No distress.   Obese female.    HENT:   Head: Normocephalic and atraumatic.   Nose: Right sinus exhibits no maxillary sinus tenderness and no frontal sinus tenderness. Left sinus exhibits no maxillary sinus tenderness and no frontal sinus tenderness.   Mouth/Throat: Uvula is midline, oropharynx is clear and moist and mucous membranes are normal. No oral lesions. No tonsillar exudate.   Eyes: Conjunctivae and EOM are normal. Pupils are equal, round, and reactive to light.   Neck: Trachea normal. Neck supple. No JVD present. Carotid bruit is not present. No tracheal deviation present. No thyroid mass and no thyromegaly present.   Cardiovascular: Normal rate, regular rhythm, normal heart sounds and intact distal pulses.  No extrasystoles are " present. PMI is not displaced.   No murmur heard.  Pulmonary/Chest: Effort normal. No accessory muscle usage. No respiratory distress. She has no decreased breath sounds. She has wheezes. She has no rhonchi. She has no rales.   A few inspiratory wheezes.     Abdominal: Soft. Bowel sounds are normal. She exhibits no distension. There is no hepatosplenomegaly. There is no tenderness.   Obese abdomen limits exam.     Vascular Status -  Her right foot exhibits normal foot vasculature  and no edema. Her left foot exhibits normal foot vasculature  and no edema.  Lymphadenopathy:     She has no cervical adenopathy.   Neurological: She is alert and oriented to person, place, and time. No cranial nerve deficit. Coordination normal.   Skin: Skin is warm, dry and intact. No rash noted. No cyanosis. Nails show no clubbing.   Psychiatric: She has a normal mood and affect. Her speech is normal and behavior is normal. Judgment and thought content normal.   Vitals reviewed.        Assessment/Plan      Continue Lipitor 20 mg daily.  We will monitor LDL closely.  If no improvement in the next six months, we will need to add Zetia or increase Lipitor.      Continue daily Singulair, Dulera, and albuterol rescue inhaler.  I recommended she resume her daily Claritin and Flonase to help manage her seasonal allergy symptoms during this peak allergy season.        We will refer to [facility] to evaluate and schedule sleep study.  Recommended weight loss, which would also be beneficial.     Continue with vitamin D and calcium supplements.  She will be due for repeat DEXA 02/2021.    Continue the Jardiance and metformin.  I recommended she decrease the Jardiance to take 1/2 tablet and contact her pharmacy to see if a comparable medication is available at less expensive copay.         Continue current dose of Synthroid.      Continue other medications and vitamin and mineral supplements to treat additional medical problems which we addressed  today.  Return in six months for follow up with fasting labs one week prior.         Scribed for Dr. Soliman by Lydia Davis [facility].     Diagnoses and all orders for this visit:    Type 2 diabetes mellitus without complication, without long-term current use of insulin (CMS/[facility])  -     CBC Auto Differential; Future  -     Comprehensive Metabolic Panel; Future  -     Hemoglobin A1c; Future    Essential hypertension    Hypercholesterolemia  -     Lipid Panel; Future    Mild persistent asthma without complication    Seasonal allergic rhinitis due to pollen    Postcholecystectomy diarrhea    Vitamin D deficiency  -     Vitamin D 25 Hydroxy; Future    Acquired hypothyroidism  -     TSH; Future  -     T4, free; Future    Osteopenia of spine    Obesity (BMI 30.0-34.9)    Obstructive sleep apnea syndrome  -     Ambulatory Referral to Sleep Medicine    Other orders  -     LANCETS MICRO THIN 33G misc; Check once daily  E11.9  [facility]  -     Discontinue: glucose blood (ONE TOUCH ULTRA TEST) test strip; 1 each by Other route Daily. Check 1-2times daily  E11.9  90 day supply  One Touch Verio        No visits with results within 3 Week(s) from this visit.   Latest known visit with results is:   Lab on 10/25/2018   Component Date Value Ref Range Status   • Glucose 10/25/2018 137* 74 - 99 mg/dL Final   • BUN 10/25/2018 17  7 - 17 mg/dL Final   • Creatinine 10/25/2018 1.13* 0.52 - 1.04 mg/dL Final   • Sodium 10/25/2018 146* 137 - 145 mmol/L Final   • Potassium 10/25/2018 4.2  3.4 - 5.0 mmol/L Final   • Chloride 10/25/2018 108* 98 - 107 mmol/L Final   • CO2 10/25/2018 25.0  22.0 - 30.0 mmol/L Final   • Calcium 10/25/2018 10.1  8.4 - 10.2 mg/dL Final   • Total Protein 10/25/2018 8.4* 6.3 - 8.2 g/dL Final   • Albumin 10/25/2018 4.50  3.50 - 5.00 g/dL Final   • ALT (SGPT) 10/25/2018 51* <=35 U/L Final   • AST (SGOT) 10/25/2018 59* 14 - 36 U/L Final   • Alkaline Phosphatase 10/25/2018 76  38 - 126 U/L Final   • Total Bilirubin  10/25/2018 0.5  0.2 - 1.3 mg/dL Final   • eGFR Non  Amer 10/25/2018 47  39 - 90 mL/min/1.73 Final   • Globulin 10/25/2018 3.9* 2.3 - 3.5 gm/dL Final   • A/G Ratio 10/25/2018 1.2  1.1 - 1.8 g/dL Final   • BUN/Creatinine Ratio 10/25/2018 15.0  7.0 - 25.0 Final   • Anion Gap 10/25/2018 13.0  5.0 - 15.0 mmol/L Final   • Total Cholesterol 10/25/2018 174  150 - 200 mg/dL Final   • Triglycerides 10/25/2018 173* <=150 mg/dL Final   • HDL Cholesterol 10/25/2018 41  40 - 59 mg/dL Final   • LDL Cholesterol  10/25/2018 98  <=100 mg/dL Final   • VLDL Cholesterol 10/25/2018 34.6  mg/dL Final   • LDL/HDL Ratio 10/25/2018 2.40  0.00 - 3.22 Final   • TSH 10/25/2018 3.400  0.460 - 4.680 mIU/mL Final   • Free T4 10/25/2018 1.15  0.78 - 2.19 ng/dL Final   • Hemoglobin A1C 10/25/2018 7.5* 4 - 5.6 % Final   • 25 Hydroxy, Vitamin D 10/25/2018 44.9  30.0 - 100.0 ng/ml Final   • WBC 10/25/2018 9.78  3.20 - 9.80 10*3/mm3 Final   • RBC 10/25/2018 4.71  3.77 - 5.16 10*6/mm3 Final   • Hemoglobin 10/25/2018 13.0  12.0 - 15.5 g/dL Final   • Hematocrit 10/25/2018 42.0  35.0 - 45.0 % Final   • MCV 10/25/2018 89.2  80.0 - 98.0 fL Final   • MCH 10/25/2018 27.6  26.5 - 34.0 pg Final   • MCHC 10/25/2018 31.0* 31.4 - 36.0 g/dL Final   • RDW 10/25/2018 15.0* 11.5 - 14.5 % Final   • RDW-SD 10/25/2018 48.1* 36.4 - 46.3 fl Final   • MPV 10/25/2018 9.0  8.0 - 12.0 fL Final   • Platelets 10/25/2018 435  150 - 450 10*3/mm3 Final   • Neutrophil % 10/25/2018 56.6  37.0 - 80.0 % Final   • Lymphocyte % 10/25/2018 24.4  10.0 - 50.0 % Final   • Monocyte % 10/25/2018 8.3  0.0 - 12.0 % Final   • Eosinophil % 10/25/2018 9.2* 0.0 - 7.0 % Final   • Basophil % 10/25/2018 1.5  0.0 - 2.0 % Final   • Neutrophils, Absolute 10/25/2018 5.53  2.00 - 8.60 10*3/mm3 Final   • Lymphocytes, Absolute 10/25/2018 2.39  0.60 - 4.20 10*3/mm3 Final   • Monocytes, Absolute 10/25/2018 0.81  0.00 - 0.90 10*3/mm3 Final   • Eosinophils, Absolute 10/25/2018 0.90* 0.00 - 0.70 10*3/mm3  Final   • Basophils, Absolute 10/25/2018 0.15  0.00 - 0.20 10*3/mm3 Final   ]

## 2019-06-13 ENCOUNTER — CONSULT (OUTPATIENT)
Dept: SLEEP MEDICINE | Facility: HOSPITAL | Age: 74
End: 2019-06-13

## 2019-06-13 VITALS
OXYGEN SATURATION: 98 % | WEIGHT: 167 LBS | SYSTOLIC BLOOD PRESSURE: 118 MMHG | HEIGHT: 62 IN | HEART RATE: 70 BPM | DIASTOLIC BLOOD PRESSURE: 60 MMHG | BODY MASS INDEX: 30.73 KG/M2

## 2019-06-13 DIAGNOSIS — G47.33 OBSTRUCTIVE SLEEP APNEA, ADULT: Primary | ICD-10-CM

## 2019-06-13 PROCEDURE — 99203 OFFICE O/P NEW LOW 30 MIN: CPT | Performed by: INTERNAL MEDICINE

## 2019-06-13 NOTE — PROGRESS NOTES
New Patient Sleep Medicine Consultation    Encounter Date: 6/13/2019         Patient's PCP: Darian Soliman MD  Referring provider: Darian Soliman MD  Reason for consultation chief complaint: snoring, excessive daytime sleepiness and unrefreshing sleep    Gale Cabral is a 73 y.o. female who admits to snoring, unrestful sleep, High blod pressure, gasping during sleep, frequent unexplained arousals, irritability, sleeping less than 6 hours per night, Disturbed or restless sleep, Up to the bathroom at night, abnormal or violent dreams and difficulty falling asleep.   She denies cataplexy, sleep paralysis, or hypnagogic hallucinations. Her bedtime is ~ 2200. She  falls asleep after 5-60 minutes, and is up 1-2 times per night. She wakes up ~ 8953-0651. She endorses 5-6 hours of sleep. She drinks 3 cups of coffee, 1 teas, and 2 sodas per day. She drinks 0 alcoholic beverages per week. She is not a current smoker. She does not take sedatives or hypnotics. She has some sleepiness with driving. She naps some days    [place] - 6    Past Medical History:   Diagnosis Date   • Acquired hypothyroidism     started synthroid 9/2015   • Allergic rhinitis, seasonal    • Cervicalgia     right upper extremity radiculopathy   • Encounter for screening for malignant neoplasm of colon    • Essential hypertension    • Glaucoma    • Health maintenance examination     individual health exam   • Hypercholesterolemia    • Hyperglycemia     steroid-induced hyperglycemia in diabetic patient   • Lumbar disc disorder     w/right radiculopathy   • Menopause    • Mild persistent asthma     resumed dulera twice daily   • Osteopenia     improved DEXA 2/2013   • 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   • Sebaceous cyst     subepidermal cyst   • Shoulder pain     likely radicular pain due to cervical DJD   • Spasm of back muscles     right neck and trapezius   • Spinal stenosis of lumbar region    • Strain of neck muscle     cervical strain   •  "Temporomandibular joint disorder     h/o   • Vitamin D deficiency     on oral calcium/vitamin D supplement     Social History     Socioeconomic History   • Marital status:      Spouse name: Not on file   • Number of children: Not on file   • Years of education: Not on file   • Highest education level: Not on file   Tobacco Use   • Smoking status: Never Smoker   • Smokeless tobacco: Never Used   Substance and Sexual Activity   • Alcohol use: No   • Drug use: No     Family History   Problem Relation Age of Onset   • Colon cancer Mother    , 2 kids  1 brothers and 0 sisters  Other family history + for: brother  of brain aneurysm  Smoking history: smoked never  FH of sleep disorders: son    Review of Systems:  Constitutional: negative  Eyes: negative  Ears, nose, mouth, throat, and face: negative  Respiratory: negative  Cardiovascular: negative  Gastrointestinal: negative  Genitourinary:negative  Integument/breast: negative  Hematologic/lymphatic: negative  Musculoskeletal:negative  Neurological: negative  Behavioral/Psych: negative  Endocrine: negative  Allergic/Immunologic: negative Patient advised to discuss any positive ROS with PCP.      Vitals:    19 1103   BP: 118/60   Pulse: 70   SpO2: 98%           19  1103   Weight: 75.8 kg (167 lb)       Body mass index is 30.54 kg/m². Patient's Body mass index is 30.54 kg/m². BMI is above normal parameters. Recommendations include: referral to primary care.  Neck circumference: 14.5\"          General: Alert. Cooperative. Well developed. No acute distress.             Head:  Normocephalic. Symmetrical. Atraumatic.              Eyes: Sclera clear. No icterus. PERRLA. Normal EOM.             Ears: No deformities. Normal hearing.             Nose: No septal deviation. No drainage.          Throat: No oral lesions. No thrush. Moist mucous membranes. Trachea midline    Tongue is enlarged    Dentition is fair       Pharynx: Posterior pharyngeal pillars " are narrow    Mallampati score of IV (only hard palate visible)    Pharynx is normal with unrermarkable tonsils   Chest Wall:  Normal shape. Symmetric expansion with respiration. No tenderness.          Lungs:  Clear to auscultation bilaterally. No wheezes. No rhonchi. No rales. Respirations regular, even and unlabored.            Heart:  Regular rhythm and normal rate. Normal S1 and S2. No murmur.     Abdomen:  Soft, non-tender and non-distended. Normal bowel sounds. No masses.  Extremities:  Moves all extremities well. No edema.           Pulses: Pulses palpable and equal bilaterally.               Skin: Dry. Intact. No bleeding. No rash.           Neuro: Moves all 4 extremities and cranial nerves grossly intact.  Psychiatric: Normal mood and affect.      Current Outpatient Medications:   •  albuterol sulfate HFA (PROAIR HFA) 108 (90 Base) MCG/ACT inhaler, Inhale 2 puffs 4 (Four) Times a Day As Needed for Wheezing., Disp: 8.5 inhaler, Rfl: 6  •  amLODIPine (NORVASC) 5 MG tablet, TAKE 1 TABLET BY MOUTH ONCE DAILY FOR BLOOD PRESSURE, Disp: 90 tablet, Rfl: 3  •  aspirin 81 MG EC tablet, Take 81 mg by mouth Daily., Disp: , Rfl:   •  atorvastatin (LIPITOR) 20 MG tablet, TAKE 1 TABLET BY MOUTH ONCE DAILY, Disp: 90 tablet, Rfl: 3  •  Blood Glucose Monitoring Suppl (ONE TOUCH ULTRA 2) W/DEVICE kit, , Disp: , Rfl:   •  Calcium Carbonate-Vitamin D 600-200 MG-UNIT tablet, Take 1 tablet by mouth Daily., Disp: , Rfl:   •  citalopram (CeleXA) 20 MG tablet, Take 1 tablet by mouth Daily., Disp: 90 tablet, Rfl: 3  •  Empagliflozin 25 MG tablet, Take 25 mg by mouth Every Morning. For diabetes, Disp: 90 tablet, Rfl: 3  •  fluticasone (FLONASE) 50 MCG/ACT nasal spray, 2 sprays into each nostril As Needed for rhinitis., Disp: , Rfl:   •  glucose blood (ONE TOUCH ULTRA TEST) test strip, 1 each by Other route Daily. Check 1-2times daily  E11.9  90 day supply One Touch Verio, Disp: 150 each, Rfl: 3  •  LANCETS MICRO THIN 33G [facility] [place]  once daily  E11.9  Trueplus, Disp: 100 each, Rfl: 3  •  latanoprost (XALATAN) 0.005 % ophthalmic solution, Administer 1 drop to both eyes Every Night., Disp: , Rfl:   •  levothyroxine (SYNTHROID, LEVOTHROID) 50 MCG tablet, Take 1 tablet by mouth Daily., Disp: 90 tablet, Rfl: 3  •  losartan-hydrochlorothiazide (HYZAAR) 100-12.5 MG per tablet, Take 1 tablet by mouth Daily., Disp: 90 tablet, Rfl: 3  •  metFORMIN (GLUCOPHAGE) 500 MG tablet, TAKE 1 TABLET BY MOUTH TWICE A DAY WITH FOOD FOR DIABETES, Disp: 180 tablet, Rfl: 3  •  mometasone-formoterol (DULERA) 200-5 MCG/ACT inhaler, Inhale 2 puffs 2 (Two) Times a Day., Disp: 13 g, Rfl: 5  •  montelukast (SINGULAIR) 10 MG tablet, TAKE 1 TABLET BY MOUTH AT BEDTIME FOR ASTHMA, Disp: 90 tablet, Rfl: 3    WBC   Date Value Ref Range Status   10/25/2018 9.78 3.20 - 9.80 10*3/mm3 Final     RBC   Date Value Ref Range Status   10/25/2018 4.71 3.77 - 5.16 10*6/mm3 Final     Hemoglobin   Date Value Ref Range Status   10/25/2018 13.0 12.0 - 15.5 g/dL Final     Hematocrit   Date Value Ref Range Status   10/25/2018 42.0 35.0 - 45.0 % Final     MCV   Date Value Ref Range Status   10/25/2018 89.2 80.0 - 98.0 fL Final     MCH   Date Value Ref Range Status   10/25/2018 27.6 26.5 - 34.0 pg Final     MCHC   Date Value Ref Range Status   10/25/2018 31.0 (L) 31.4 - 36.0 g/dL Final     RDW   Date Value Ref Range Status   10/25/2018 15.0 (H) 11.5 - 14.5 % Final     RDW-SD   Date Value Ref Range Status   10/25/2018 48.1 (H) 36.4 - 46.3 fl Final     MPV   Date Value Ref Range Status   10/25/2018 9.0 8.0 - 12.0 fL Final     Platelets   Date Value Ref Range Status   10/25/2018 435 150 - 450 10*3/mm3 Final     Neutrophil %   Date Value Ref Range Status   10/25/2018 56.6 37.0 - 80.0 % Final     Lymphocyte %   Date Value Ref Range Status   10/25/2018 24.4 10.0 - 50.0 % Final     Monocyte %   Date Value Ref Range Status   10/25/2018 8.3 0.0 - 12.0 % Final     Eosinophil %   Date Value Ref Range Status    10/25/2018 9.2 (H) 0.0 - 7.0 % Final     Basophil %   Date Value Ref Range Status   10/25/2018 1.5 0.0 - 2.0 % Final     Neutrophils, Absolute   Date Value Ref Range Status   10/25/2018 5.53 2.00 - 8.60 10*3/mm3 Final     Lymphocytes, Absolute   Date Value Ref Range Status   10/25/2018 2.39 0.60 - 4.20 10*3/mm3 Final     Monocytes, Absolute   Date Value Ref Range Status   10/25/2018 0.81 0.00 - 0.90 10*3/mm3 Final     Eosinophils, Absolute   Date Value Ref Range Status   10/25/2018 0.90 (H) 0.00 - 0.70 10*3/mm3 Final     Basophils, Absolute   Date Value Ref Range Status   10/25/2018 0.15 0.00 - 0.20 10*3/mm3 Final     nRBC   Date Value Ref Range Status   10/25/2016 0  Final   10/25/2016 0  Final       ASSESSMENT:  1. Obstructive sleep apnea New, further workup planned (4)  1. Check home sleep study   2. DM  3. HTN  4. Obesity  5. Sleep onset insomnia  New, no further w/u planned (3)   1. Reduce evening caffeine    RTC in 3 months         This document has been electronically signed by Ministerio Pulido MD on June 13, 2019         CC: Darian Soliman MD Bandy, Eric L, MD

## 2019-06-20 ENCOUNTER — HOSPITAL ENCOUNTER (OUTPATIENT)
Dept: SLEEP MEDICINE | Facility: HOSPITAL | Age: 74
Discharge: HOME | End: 2019-06-20
Admitting: INTERNAL MEDICINE

## 2019-06-20 DIAGNOSIS — G47.33 OBSTRUCTIVE SLEEP APNEA, ADULT: ICD-10-CM

## 2019-06-20 PROCEDURE — 95800 SLP STDY UNATTENDED: CPT

## 2019-06-20 PROCEDURE — 95800 SLP STDY UNATTENDED: CPT | Performed by: INTERNAL MEDICINE

## 2019-07-02 DIAGNOSIS — G47.33 OBSTRUCTIVE SLEEP APNEA, ADULT: Primary | ICD-10-CM

## 2019-08-06 DIAGNOSIS — Z12.31 ENCOUNTER FOR SCREENING MAMMOGRAM FOR MALIGNANT NEOPLASM OF BREAST: Primary | ICD-10-CM

## 2019-08-30 RX ORDER — LOSARTAN POTASSIUM AND HYDROCHLOROTHIAZIDE 12.5; 1 MG/1; MG/1
1 TABLET ORAL DAILY
Qty: 90 TABLET | Refills: 0 | Status: SHIPPED | OUTPATIENT
Start: 2019-08-30 | End: 2019-12-03 | Stop reason: SDUPTHER

## 2019-09-17 ENCOUNTER — OFFICE VISIT (OUTPATIENT)
Dept: FAMILY MEDICINE CLINIC | Facility: CLINIC | Age: 74
End: 2019-09-17

## 2019-09-17 VITALS
HEIGHT: 62 IN | OXYGEN SATURATION: 96 % | DIASTOLIC BLOOD PRESSURE: 60 MMHG | SYSTOLIC BLOOD PRESSURE: 126 MMHG | WEIGHT: 166 LBS | HEART RATE: 86 BPM | TEMPERATURE: 98.4 F | BODY MASS INDEX: 30.55 KG/M2

## 2019-09-17 DIAGNOSIS — G47.33 OBSTRUCTIVE SLEEP APNEA SYNDROME: Chronic | ICD-10-CM

## 2019-09-17 DIAGNOSIS — B35.4 TINEA CORPORIS: Primary | ICD-10-CM

## 2019-09-17 DIAGNOSIS — R61 HYPERHIDROSIS: ICD-10-CM

## 2019-09-17 DIAGNOSIS — L57.0 AK (ACTINIC KERATOSIS): ICD-10-CM

## 2019-09-17 DIAGNOSIS — B35.6 TINEA CRURIS: ICD-10-CM

## 2019-09-17 PROCEDURE — 99213 OFFICE O/P EST LOW 20 MIN: CPT | Performed by: INTERNAL MEDICINE

## 2019-09-17 PROCEDURE — 17000 DESTRUCT PREMALG LESION: CPT | Performed by: INTERNAL MEDICINE

## 2019-09-17 RX ORDER — NYSTATIN AND TRIAMCINOLONE ACETONIDE 100000; 1 [USP'U]/G; MG/G
OINTMENT TOPICAL 2 TIMES DAILY
Qty: 60 G | Refills: 2 | Status: SHIPPED | OUTPATIENT
Start: 2019-09-17 | End: 2020-09-28

## 2019-09-17 RX ORDER — TERBINAFINE HYDROCHLORIDE 250 MG/1
250 TABLET ORAL DAILY
Qty: 42 TABLET | Refills: 0 | Status: SHIPPED | OUTPATIENT
Start: 2019-09-17 | End: 2019-10-29

## 2019-09-17 NOTE — PROGRESS NOTES
"Subjective        History of Present Illness     Gale Cabral is a 73 y.o. female who reports 10-day history of pruritic fungal-appearing skin rash in skin folds of legs and axilla.   She reports history of recurring tinea infections.  Her mother had similar issues under the breasts.  She has tried multiple medicated powders and OTC creams with partial relief of the itching, but not improving the rash.  Applying deodorant also burns.      She has a couple of skin lesions she would like evaluated.  Exam reveals an AK on the dorsum of right wrist and right side of her nose. Cryotherapy is used to destroy the lesions without difficulty.  See procedure note.        Dr. Pulido is managing her sleep apnea with CPAP.  Since she has started using it, breathing has been improved and she has not been requiring her inhaler as often.  She also feels since wearing the CPAP, she has noticed increased energy.      Review of Systems   Constitutional: Negative for chills, fatigue and fever.   HENT: Negative for congestion, ear pain, postnasal drip, sinus pressure and sore throat.    Respiratory: Negative for cough, shortness of breath and wheezing.    Cardiovascular: Negative for chest pain, palpitations and leg swelling.   Gastrointestinal: Negative for abdominal pain, blood in stool, constipation, diarrhea, nausea and vomiting.   Endocrine: Negative for cold intolerance, heat intolerance, polydipsia and polyuria.   Genitourinary: Negative for dysuria, frequency, hematuria and urgency.   Skin: Positive for rash.   Neurological: Negative for syncope and weakness.        Objective     Vitals:    09/17/19 0826   BP: 126/60   Pulse: 86   Temp: 98.4 °F (36.9 °C)   TempSrc: Oral   SpO2: 96%   Weight: 75.3 kg (166 lb)   Height: 157.5 cm (62\")     Physical Exam   Constitutional: She is oriented to person, place, and time. She appears well-developed and well-nourished. No distress.   HENT:   Head: Normocephalic and atraumatic.   Nose: " Nose normal.   Mouth/Throat: Oropharynx is clear and moist. No oropharyngeal exudate.   Eyes: EOM are normal. Pupils are equal, round, and reactive to light.   .    Neck: Neck supple. No JVD present. No thyromegaly present.   Cardiovascular: Normal rate, regular rhythm and normal heart sounds.   Pulmonary/Chest: Effort normal and breath sounds normal. No accessory muscle usage. No respiratory distress. She has no wheezes. She has no rales.   Abdominal: Soft. Bowel sounds are normal. She exhibits no distension. There is no tenderness.   Musculoskeletal: She exhibits no edema.   Lymphadenopathy:     She has no cervical adenopathy.   Neurological: She is alert and oriented to person, place, and time. No cranial nerve deficit.   Skin:   Tinea rash bilateral axilla, on skin fold of panus and in inguinal skin folds.  No skin breakdown currently.  Actinic keratosis on right side of the nose and dorsum of right wrist    Psychiatric: She has a normal mood and affect. Her speech is normal and behavior is normal. Judgment and thought content normal.     Future Appointments   Date Time Provider Department Center   10/24/2019  2:00 PM Maral Fish APRN [facility]  [facility] None   12/2/2019  9:30 AM Darian Soliman MD [facility] None         Assessment/Plan      For the tinea corporis and tinea cruris, I recommended she keep the skin clean and dry.  A prescription is sent for Lamisil 250 mg to take one tablet q.d. and Mycolog ointment to apply topically twice daily applying a layer of powder.  When symptoms are settled down, she can apply Certain Dri or Sure Dry antiperspirant to axilla and skin folds.  Applying a cool cloth may give temporary relief.      Cryotherapy, Skin Lesion  Date/Time: 9/17/2019 9:00 AM  Performed by: Darian Soliman MD  Authorized by: Darian Soliman MD   Local anesthesia used: no    Anesthesia:  Local anesthesia used: no    Sedation:  Patient sedated: no    Patient tolerance: Patient tolerated the procedure  well with no immediate complications  Comments: After informed verbal consent, cryotherapy is used to destroy AK on the right side of the nose and dorsum of right wrist without difficulty.  She tolerated well.           Continue follow up visits with Dr. Pulido and compliance with CPAP to treat sleep apnea.      Return in December for routine follow up with fasting labs one week prior.        Scribed for Dr. Soliman by Lydia Davis [facility].     Diagnoses and all orders for this visit:    Tinea corporis    Tinea cruris    Hyperhidrosis    AK (actinic keratosis)    Obstructive sleep apnea syndrome - Dr. Pulido    Other orders  -     mupirocin (BACTROBAN) 2 % ointment; APPLY INTO NOSTRIL BID FOR 7 DAYS  -     terbinafine (LAMISIL) 250 MG tablet; Take 1 tablet by mouth Daily for 42 days.  -     nystatin-triamcinolone (MYCOLOG) 321254-7.1 UNIT/GM-% ointment; Apply  topically to the appropriate area as directed 2 (Two) Times a Day.        No visits with results within 3 Week(s) from this visit.   Latest known visit with results is:   Lab on 10/25/2018   Component Date Value Ref Range Status   • Glucose 10/25/2018 137* 74 - 99 mg/dL Final   • BUN 10/25/2018 17  7 - 17 mg/dL Final   • Creatinine 10/25/2018 1.13* 0.52 - 1.04 mg/dL Final   • Sodium 10/25/2018 146* 137 - 145 mmol/L Final   • Potassium 10/25/2018 4.2  3.4 - 5.0 mmol/L Final   • Chloride 10/25/2018 108* 98 - 107 mmol/L Final   • CO2 10/25/2018 25.0  22.0 - 30.0 mmol/L Final   • Calcium 10/25/2018 10.1  8.4 - 10.2 mg/dL Final   • Total Protein 10/25/2018 8.4* 6.3 - 8.2 g/dL Final   • Albumin 10/25/2018 4.50  3.50 - 5.00 g/dL Final   • ALT (SGPT) 10/25/2018 51* <=35 U/L Final   • AST (SGOT) 10/25/2018 59* 14 - 36 U/L Final   • Alkaline Phosphatase 10/25/2018 76  38 - 126 U/L Final   • Total Bilirubin 10/25/2018 0.5  0.2 - 1.3 mg/dL Final   • eGFR Non  Amer 10/25/2018 47  39 - 90 mL/min/1.73 Final   • Globulin 10/25/2018 3.9* 2.3 - 3.5 gm/dL Final   • A/G  Ratio 10/25/2018 1.2  1.1 - 1.8 g/dL Final   • BUN/Creatinine Ratio 10/25/2018 15.0  7.0 - 25.0 Final   • Anion Gap 10/25/2018 13.0  5.0 - 15.0 mmol/L Final   • Total Cholesterol 10/25/2018 174  150 - 200 mg/dL Final   • Triglycerides 10/25/2018 173* <=150 mg/dL Final   • HDL Cholesterol 10/25/2018 41  40 - 59 mg/dL Final   • LDL Cholesterol  10/25/2018 98  <=100 mg/dL Final   • VLDL Cholesterol 10/25/2018 34.6  mg/dL Final   • LDL/HDL Ratio 10/25/2018 2.40  0.00 - 3.22 Final   • TSH 10/25/2018 3.400  0.460 - 4.680 mIU/mL Final   • Free T4 10/25/2018 1.15  0.78 - 2.19 ng/dL Final   • Hemoglobin A1C 10/25/2018 7.5* 4 - 5.6 % Final   • 25 Hydroxy, Vitamin D 10/25/2018 44.9  30.0 - 100.0 ng/ml Final   • WBC 10/25/2018 9.78  3.20 - 9.80 10*3/mm3 Final   • RBC 10/25/2018 4.71  3.77 - 5.16 10*6/mm3 Final   • Hemoglobin 10/25/2018 13.0  12.0 - 15.5 g/dL Final   • Hematocrit 10/25/2018 42.0  35.0 - 45.0 % Final   • MCV 10/25/2018 89.2  80.0 - 98.0 fL Final   • MCH 10/25/2018 27.6  26.5 - 34.0 pg Final   • MCHC 10/25/2018 31.0* 31.4 - 36.0 g/dL Final   • RDW 10/25/2018 15.0* 11.5 - 14.5 % Final   • RDW-SD 10/25/2018 48.1* 36.4 - 46.3 fl Final   • MPV 10/25/2018 9.0  8.0 - 12.0 fL Final   • Platelets 10/25/2018 435  150 - 450 10*3/mm3 Final   • Neutrophil % 10/25/2018 56.6  37.0 - 80.0 % Final   • Lymphocyte % 10/25/2018 24.4  10.0 - 50.0 % Final   • Monocyte % 10/25/2018 8.3  0.0 - 12.0 % Final   • Eosinophil % 10/25/2018 9.2* 0.0 - 7.0 % Final   • Basophil % 10/25/2018 1.5  0.0 - 2.0 % Final   • Neutrophils, Absolute 10/25/2018 5.53  2.00 - 8.60 10*3/mm3 Final   • Lymphocytes, Absolute 10/25/2018 2.39  0.60 - 4.20 10*3/mm3 Final   • Monocytes, Absolute 10/25/2018 0.81  0.00 - 0.90 10*3/mm3 Final   • Eosinophils, Absolute 10/25/2018 0.90* 0.00 - 0.70 10*3/mm3 Final   • Basophils, Absolute 10/25/2018 0.15  0.00 - 0.20 10*3/mm3 Final   ]

## 2019-11-06 ENCOUNTER — OFFICE VISIT (OUTPATIENT)
Dept: SLEEP MEDICINE | Facility: HOSPITAL | Age: 74
End: 2019-11-06

## 2019-11-06 VITALS
OXYGEN SATURATION: 98 % | BODY MASS INDEX: 30.36 KG/M2 | HEIGHT: 62 IN | SYSTOLIC BLOOD PRESSURE: 126 MMHG | DIASTOLIC BLOOD PRESSURE: 66 MMHG | HEART RATE: 80 BPM | WEIGHT: 165 LBS

## 2019-11-06 DIAGNOSIS — G47.33 OBSTRUCTIVE SLEEP APNEA, ADULT: Primary | ICD-10-CM

## 2019-11-06 PROCEDURE — 99214 OFFICE O/P EST MOD 30 MIN: CPT | Performed by: NURSE PRACTITIONER

## 2019-11-06 NOTE — PROGRESS NOTES
Sleep Clinic Follow Up    Date: 2019  Primary Care Physician: Darian Soliman MD    Last office visit: 2019 (I reviewed this note)    CC: Follow up: GLO started on CPAP      Interim History:  Since the last visit:    1) mild GLO -  Gale Cabral was started on CPAP. She has remained compliant with therapy. She denies mask and machine issues, dry mouth, headaches, or pressures intolerance. She denies abnormal dreams, sleep paralysis, nasal congestion, URI sx. Since starting on therapy, patient reports less daytime sleepiness, more refreshing sleep, and less snoring.    Sleep Testin. HST on 2019, AHI of 8.7   2. Currently on 5-20 cm H2O    PAP Data:    Time frame: 2019-2019   Compliance 94.1 %  Average use on days used: 6 hrs 39 min  Percent of days with usage greater than or equal to 4 hours: 91.2%  PAP range : 5-20 cm H2O  Average 90% pressure: 9.0 cmH2O  Leak: 5 minutes  Average AHI 5.4 events/hr  Mask type: Nasal pillows  DME: [employer]    Bed time: 6212-6991  Sleep latency: 15-30 minutes  Number of times awakens during the night: 1-2  Wake time: 4403-3611  Estimated total sleep time at night: 6-7 hours  Caffeine intake: 3 cups of coffee, 1 cups of tea, and 1-2 sodas per day  Alcohol intake: 0 drinks per week  Nap time: very rare   Sleepiness with Driving: none     [place] - 3    2) Patient denies RLS symptoms.     PMHx, FH, SH reviewed and pertinent changes are: unchanged from last office visit on 2019      REVIEW OF SYSTEMS:   Negative for chest pain, SOA, fever, chills, cough, N/V/D, abdominal pain.    Smoking:none        Exam:  Vitals:    19 08   BP: 126/66   Pulse: 80   SpO2: 98%           19   Weight: 74.8 kg (165 lb)     Body mass index is 30.17 kg/m². Patient's Body mass index is 30.17 kg/m². BMI is above normal parameters. Recommendations include: referral to primary care.      Gen:                No distress, conversant, pleasant, appears  stated age, alert, oriented  Eyes:               Anicteric sclera, moist conjunctiva, no lid lag                           PERRL, EOMI   Heent:             NC/AT                          Oropharynx clear                          Normal hearing  Lungs:             Normal effort, non-labored breathing                          Clear to auscultation bilaterally          CV:                  Normal S1/S2, no murmur                          No lower extremity edema  ABD:               Soft, rounded, non-distended                          Normal bowel sounds                    Psych:             Appropriate affect  Neuro:             CN 2-12 appear intact    Past Medical History:   Diagnosis Date   • Acquired hypothyroidism     started synthroid 9/2015   • Allergic rhinitis, seasonal    • Cervicalgia     right upper extremity radiculopathy   • Encounter for screening for malignant neoplasm of colon    • Essential hypertension    • Glaucoma    • Health maintenance examination     individual health exam   • Hypercholesterolemia    • Hyperglycemia     steroid-induced hyperglycemia in diabetic patient   • Lumbar disc disorder     w/right radiculopathy   • Menopause    • Mild persistent asthma     resumed dulera twice daily   • Obstructive sleep apnea syndrome 9/17/2019   • Osteopenia     improved DEXA 2/2013   • 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   • Sebaceous cyst     subepidermal cyst   • Shoulder pain     likely radicular pain due to cervical DJD   • Spasm of back muscles     right neck and trapezius   • Spinal stenosis of lumbar region    • Strain of neck muscle     cervical strain   • Temporomandibular joint disorder     h/o   • Vitamin D deficiency     on oral calcium/vitamin D supplement       Current Outpatient Medications:   •  albuterol sulfate HFA (PROAIR HFA) 108 (90 Base) MCG/ACT inhaler, Inhale 2 puffs 4 (Four) Times a Day As Needed for Wheezing., Disp: 8.5 inhaler, Rfl: 6  •  amLODIPine (NORVASC) 5 MG  tablet, TAKE 1 TABLET BY MOUTH ONCE DAILY FOR BLOOD PRESSURE, Disp: 90 tablet, Rfl: 3  •  aspirin 81 MG EC tablet, Take 81 mg by mouth Daily., Disp: , Rfl:   •  atorvastatin (LIPITOR) 20 MG tablet, TAKE 1 TABLET BY MOUTH ONCE DAILY, Disp: 90 tablet, Rfl: 3  •  Blood Glucose Monitoring Suppl (ONE TOUCH ULTRA 2) W/DEVICE kit, , Disp: , Rfl:   •  Calcium Carbonate-Vitamin D 600-200 MG-UNIT tablet, Take 1 tablet by mouth Daily., Disp: , Rfl:   •  citalopram (CeleXA) 20 MG tablet, Take 1 tablet by mouth Daily., Disp: 90 tablet, Rfl: 3  •  Empagliflozin 25 MG tablet, Take 25 mg by mouth Every Morning. For diabetes, Disp: 90 tablet, Rfl: 3  •  fluticasone (FLONASE) 50 MCG/ACT nasal spray, 2 sprays into each nostril As Needed for rhinitis., Disp: , Rfl:   •  glucose blood (ONE TOUCH ULTRA TEST) test strip, 1 each by Other route Daily. Check 1-2times daily  E11.9  90 day supply One Touch Verio, Disp: 150 each, Rfl: 3  •  LANCETS MICRO THIN 33G misc, Check once daily  E11.9  Trueplus, Disp: 100 each, Rfl: 3  •  latanoprost (XALATAN) 0.005 % ophthalmic solution, Administer 1 drop to both eyes Every Night., Disp: , Rfl:   •  levothyroxine (SYNTHROID, LEVOTHROID) 50 MCG tablet, Take 1 tablet by mouth Daily., Disp: 90 tablet, Rfl: 3  •  losartan-hydrochlorothiazide (HYZAAR) 100-12.5 MG per tablet, TAKE 1 TABLET BY MOUTH DAILY, Disp: 90 tablet, Rfl: 0  •  metFORMIN (GLUCOPHAGE) 500 MG tablet, TAKE 1 TABLET BY MOUTH TWICE A DAY WITH FOOD FOR DIABETES, Disp: 180 tablet, Rfl: 3  •  mometasone-formoterol (DULERA) 200-5 MCG/ACT inhaler, Inhale 2 puffs 2 (Two) Times a Day., Disp: 13 g, Rfl: 5  •  montelukast (SINGULAIR) 10 MG tablet, TAKE 1 TABLET BY MOUTH AT BEDTIME FOR ASTHMA, Disp: 90 tablet, Rfl: 3  •  mupirocin (BACTROBAN) 2 % ointment, APPLY INTO NOSTRIL BID FOR 7 DAYS, Disp: , Rfl: 0  •  nystatin-triamcinolone (MYCOLOG) 563831-6.1 UNIT/GM-% ointment, Apply  topically to the appropriate area as directed 2 (Two) Times a Day., Disp:  60 g, Rfl: 2      Assessment and Plan:    1. Obstructive sleep apnea stable chronic illness and Established, stable (1)  1. Compliant with PAP therapy  2. Continue PAP as prescribed - increase pressure to 8-20 cm h2O  3. Script for PAP supplies  4. Return to clinic in 1 year with compliance report unless change in symptoms in interim period  2. Insomnia - sleep onset Established, improved (1)    1.    Improved since starting therapy        15 of 25 minutes spent face-to-face counseling patient extensively regarding:   PAP therapy, PAP compliance, PAP maintenance and Sleep hygiene      RTC in 12 months. Patient agrees to return sooner if changes in symptoms.        This document has been electronically signed by YOHANA Irving on November 6, 2019 8:20 AM          CC: Darian Soliman MD          No ref. provider found

## 2019-11-25 ENCOUNTER — LAB (OUTPATIENT)
Dept: LAB | Facility: OTHER | Age: 74
End: 2019-11-25

## 2019-11-25 DIAGNOSIS — E11.9 TYPE 2 DIABETES MELLITUS WITHOUT COMPLICATION, WITHOUT LONG-TERM CURRENT USE OF INSULIN (HCC): Chronic | ICD-10-CM

## 2019-11-25 DIAGNOSIS — E55.9 VITAMIN D DEFICIENCY: Chronic | ICD-10-CM

## 2019-11-25 DIAGNOSIS — E78.00 HYPERCHOLESTEROLEMIA: Chronic | ICD-10-CM

## 2019-11-25 DIAGNOSIS — E03.9 ACQUIRED HYPOTHYROIDISM: Chronic | ICD-10-CM

## 2019-11-25 LAB
25(OH)D3 SERPL-MCNC: 30.9 NG/ML (ref 30–100)
ALBUMIN SERPL-MCNC: 4.5 G/DL (ref 3.5–5)
ALBUMIN/GLOB SERPL: 1.2 G/DL (ref 1.1–1.8)
ALP SERPL-CCNC: 78 U/L (ref 38–126)
ALT SERPL W P-5'-P-CCNC: 36 U/L
ANION GAP SERPL CALCULATED.3IONS-SCNC: 8 MMOL/L (ref 5–15)
AST SERPL-CCNC: 38 U/L (ref 14–36)
BASOPHILS # BLD AUTO: 0.18 10*3/MM3 (ref 0–0.2)
BASOPHILS NFR BLD AUTO: 1.8 % (ref 0–1.5)
BILIRUB SERPL-MCNC: 0.3 MG/DL (ref 0.2–1.3)
BUN BLD-MCNC: 18 MG/DL (ref 7–23)
BUN/CREAT SERPL: 16.4 (ref 7–25)
CALCIUM SPEC-SCNC: 10.1 MG/DL (ref 8.4–10.2)
CHLORIDE SERPL-SCNC: 107 MMOL/L (ref 101–112)
CHOLEST SERPL-MCNC: 204 MG/DL (ref 150–200)
CO2 SERPL-SCNC: 28 MMOL/L (ref 22–30)
CREAT BLD-MCNC: 1.1 MG/DL (ref 0.52–1.04)
DEPRECATED RDW RBC AUTO: 50.4 FL (ref 37–54)
EOSINOPHIL # BLD AUTO: 0.67 10*3/MM3 (ref 0–0.4)
EOSINOPHIL NFR BLD AUTO: 6.9 % (ref 0.3–6.2)
ERYTHROCYTE [DISTWIDTH] IN BLOOD BY AUTOMATED COUNT: 16.4 % (ref 12.3–15.4)
GFR SERPL CREATININE-BSD FRML MDRD: 49 ML/MIN/1.73 (ref 39–90)
GLOBULIN UR ELPH-MCNC: 3.7 GM/DL (ref 2.3–3.5)
GLUCOSE BLD-MCNC: 109 MG/DL (ref 70–99)
HBA1C MFR BLD: 7.08 % (ref 4.8–5.6)
HCT VFR BLD AUTO: 41.1 % (ref 34–46.6)
HDLC SERPL-MCNC: 47 MG/DL (ref 40–59)
HGB BLD-MCNC: 12.9 G/DL (ref 12–15.9)
LDLC SERPL CALC-MCNC: 126 MG/DL
LDLC/HDLC SERPL: 2.68 {RATIO} (ref 0–3.22)
LYMPHOCYTES # BLD AUTO: 2.32 10*3/MM3 (ref 0.7–3.1)
LYMPHOCYTES NFR BLD AUTO: 23.7 % (ref 19.6–45.3)
MCH RBC QN AUTO: 26.5 PG (ref 26.6–33)
MCHC RBC AUTO-ENTMCNC: 31.4 G/DL (ref 31.5–35.7)
MCV RBC AUTO: 84.6 FL (ref 79–97)
MONOCYTES # BLD AUTO: 0.83 10*3/MM3 (ref 0.1–0.9)
MONOCYTES NFR BLD AUTO: 8.5 % (ref 5–12)
NEUTROPHILS # BLD AUTO: 5.77 10*3/MM3 (ref 1.7–7)
NEUTROPHILS NFR BLD AUTO: 59.1 % (ref 42.7–76)
PLATELET # BLD AUTO: 443 10*3/MM3 (ref 140–450)
PMV BLD AUTO: 9.1 FL (ref 6–12)
POTASSIUM BLD-SCNC: 4.1 MMOL/L (ref 3.4–5)
PROT SERPL-MCNC: 8.2 G/DL (ref 6.3–8.6)
RBC # BLD AUTO: 4.86 10*6/MM3 (ref 3.77–5.28)
SODIUM BLD-SCNC: 143 MMOL/L (ref 137–145)
T4 FREE SERPL-MCNC: 1.12 NG/DL (ref 0.93–1.7)
TRIGL SERPL-MCNC: 156 MG/DL
TSH SERPL DL<=0.05 MIU/L-ACNC: 4.02 UIU/ML (ref 0.27–4.2)
VLDLC SERPL-MCNC: 31.2 MG/DL
WBC NRBC COR # BLD: 9.77 10*3/MM3 (ref 3.4–10.8)

## 2019-11-25 PROCEDURE — 83036 HEMOGLOBIN GLYCOSYLATED A1C: CPT | Performed by: INTERNAL MEDICINE

## 2019-11-25 PROCEDURE — 84439 ASSAY OF FREE THYROXINE: CPT | Performed by: INTERNAL MEDICINE

## 2019-11-25 PROCEDURE — 82306 VITAMIN D 25 HYDROXY: CPT | Performed by: INTERNAL MEDICINE

## 2019-11-25 PROCEDURE — 84443 ASSAY THYROID STIM HORMONE: CPT | Performed by: INTERNAL MEDICINE

## 2019-11-25 PROCEDURE — 80053 COMPREHEN METABOLIC PANEL: CPT | Performed by: INTERNAL MEDICINE

## 2019-11-25 PROCEDURE — 80061 LIPID PANEL: CPT | Performed by: INTERNAL MEDICINE

## 2019-11-25 PROCEDURE — 36415 COLL VENOUS BLD VENIPUNCTURE: CPT | Performed by: INTERNAL MEDICINE

## 2019-11-25 PROCEDURE — 85025 COMPLETE CBC W/AUTO DIFF WBC: CPT | Performed by: INTERNAL MEDICINE

## 2019-12-02 ENCOUNTER — OFFICE VISIT (OUTPATIENT)
Dept: FAMILY MEDICINE CLINIC | Facility: CLINIC | Age: 74
End: 2019-12-02

## 2019-12-02 VITALS
HEIGHT: 62 IN | WEIGHT: 168.4 LBS | HEART RATE: 72 BPM | DIASTOLIC BLOOD PRESSURE: 60 MMHG | SYSTOLIC BLOOD PRESSURE: 126 MMHG | BODY MASS INDEX: 30.99 KG/M2 | TEMPERATURE: 98.1 F

## 2019-12-02 DIAGNOSIS — E66.9 OBESITY (BMI 30.0-34.9): Chronic | ICD-10-CM

## 2019-12-02 DIAGNOSIS — M85.88 OSTEOPENIA OF SPINE: Chronic | ICD-10-CM

## 2019-12-02 DIAGNOSIS — R21 RASH: ICD-10-CM

## 2019-12-02 DIAGNOSIS — E03.9 ACQUIRED HYPOTHYROIDISM: Chronic | ICD-10-CM

## 2019-12-02 DIAGNOSIS — Z00.00 MEDICARE ANNUAL WELLNESS VISIT, INITIAL: Primary | ICD-10-CM

## 2019-12-02 DIAGNOSIS — E11.9 TYPE 2 DIABETES MELLITUS WITHOUT COMPLICATION, WITHOUT LONG-TERM CURRENT USE OF INSULIN (HCC): Chronic | ICD-10-CM

## 2019-12-02 DIAGNOSIS — I10 ESSENTIAL HYPERTENSION: Chronic | ICD-10-CM

## 2019-12-02 DIAGNOSIS — G47.33 OBSTRUCTIVE SLEEP APNEA SYNDROME: Chronic | ICD-10-CM

## 2019-12-02 DIAGNOSIS — E78.00 HYPERCHOLESTEROLEMIA: Chronic | ICD-10-CM

## 2019-12-02 DIAGNOSIS — E55.9 VITAMIN D DEFICIENCY: Chronic | ICD-10-CM

## 2019-12-02 PROCEDURE — G0439 PPPS, SUBSEQ VISIT: HCPCS | Performed by: INTERNAL MEDICINE

## 2019-12-02 PROCEDURE — 99214 OFFICE O/P EST MOD 30 MIN: CPT | Performed by: INTERNAL MEDICINE

## 2019-12-02 RX ORDER — VITAMIN B COMPLEX
1000 TABLET ORAL DAILY
COMMUNITY
End: 2020-11-16

## 2019-12-02 NOTE — PROGRESS NOTES
Subjective        History of Present Illness     Gale Cabral is a 74 y.o. female who presents for 6-month follow up on type 2 diabetes, hypertension, hyperlipidemia/low HDL, asthma, and hypothyroidism among other issues.  Patient was started on CPAP by DR. Ministerio Pulido to treat mild obstructive sleep apnea.  She has noticed improved energy level.       She has a new problem of a new, mild rash of the right axilla waxing and waning in intensity for the past 2 weeks.  She reports the previous tinea corporis and tinea cruris we treated with Lamisil and Mycolog resolved.  She has refills on the Mycolog and Lamisil.  We may need to treat with Diflucan if rash flares again in the future.         DEXA dated 03/2019 reveals persistent osteopenia of the lumbar spine and hip, essentially unchanged from prior study, although, L4 T-score of -2.8  corresponds to osteoporosisy.  She continues a calcium supplement, but is not taking a vitamin D supplement.   Vitamin D has drifted down.    Diabetes management improved with A1c 7.08 improved from  7.5 one year ago despite decreasing the Jardiance to 1/2 tablet. The Jardiance is more affordable at approximately $50 monthly with taking 1/2 tablet.  She also continues on metformin.  She did not ask her pharmacist about a comparable alternative, but this remains an option.   She reports occasional glucose greater than 150 at night, but this is not occurring frequently.     She has a new problem of a skin lesion on the dorsum of the right wrist.  We had attempted cryotherapy in the past, but it did not completely respond with cryotherapy 09/2019.  Lesion could be a dermatofibroma, but does not appear suspicious, for which I recommended referral to dermatology or general surgery for removal.  She would like to ask around and contact us if she needs a referral to a dermatologist in the [place] area.         Weight is stable.  Blood pressure at goal.  She is not meeting her  "dietary goals or weight loss goals, reporting there is lots of room for improvement in her diet.   .  The patient's relevant past medical, surgical, and social history was reviewed in Epic.   Lab results are reviewed with the patient today.  CBC unremarkable.  Fasting glucose 109. A1c is 7.08.  Total cholesterol 204. HDL 47. .  Triglycerides 156.  LDL/HDL ratio 2.68.  Vitamin D is slightly low at 31.     Review of Systems   Constitutional: Negative for chills, fatigue and fever.   HENT: Negative for congestion, ear pain, postnasal drip, sinus pressure and sore throat.    Respiratory: Negative for cough, shortness of breath and wheezing.    Cardiovascular: Negative for chest pain, palpitations and leg swelling.   Gastrointestinal: Negative for abdominal pain, blood in stool, constipation, diarrhea, nausea and vomiting.   Endocrine: Negative for cold intolerance, heat intolerance, polydipsia and polyuria.   Genitourinary: Negative for dysuria, frequency, hematuria and urgency.   Skin: Positive for rash.   Neurological: Negative for syncope and weakness.        Objective     Visit Vitals  /60   Pulse 72   Temp 98.1 °F (36.7 °C) (Oral)   Ht 157.5 cm (62\")   Wt 76.4 kg (168 lb 6.4 oz)   Breastfeeding? No   BMI 30.80 kg/m²     Physical Exam   Constitutional: She is oriented to person, place, and time. She appears well-developed and well-nourished. No distress.   Obese female.  Accompanied by her .   HENT:   Head: Normocephalic and atraumatic.   Nose: Right sinus exhibits no maxillary sinus tenderness and no frontal sinus tenderness. Left sinus exhibits no maxillary sinus tenderness and no frontal sinus tenderness.   Mouth/Throat: Uvula is midline, oropharynx is clear and moist and mucous membranes are normal. No oral lesions. No tonsillar exudate.   Eyes: Conjunctivae and EOM are normal. Pupils are equal, round, and reactive to light.   Allergy changes to the eyes.    Neck: Trachea normal. Neck supple. " No JVD present. Carotid bruit is not present. No tracheal deviation present. No thyroid mass and no thyromegaly present.   Cardiovascular: Normal rate, regular rhythm, normal heart sounds and intact distal pulses.  No extrasystoles are present. PMI is not displaced.   No murmur heard.  Pulmonary/Chest: Effort normal and breath sounds normal. No accessory muscle usage. No respiratory distress. She has no decreased breath sounds. She has no wheezes. She has no rhonchi. She has no rales.   Abdominal: Soft. Bowel sounds are normal. She exhibits no distension. There is no hepatosplenomegaly. There is no tenderness.   Obese abdomen.      Vascular Status -  Her right foot exhibits normal foot vasculature  and no edema. Her left foot exhibits normal foot vasculature  and no edema.  Lymphadenopathy:     She has no cervical adenopathy.   Neurological: She is alert and oriented to person, place, and time. No cranial nerve deficit. Coordination normal.   Skin: Skin is warm, dry and intact. No rash noted. No cyanosis. Nails show no clubbing.   Some hyperpigmented skin in the skin folds due to prior extensive tinea infection.  Just a minimal rash in the right axilla.  No pustules.   Psychiatric: She has a normal mood and affect. Her speech is normal and behavior is normal. Judgment and thought content normal.   Vitals reviewed.          Assessment/Plan      For the new problem of rash of the right axilla, try using the Mycolog topically as needed.  For the new problem of persistent skin lesion dorsum of the right wrist, I recommended dermatology referral, and they could also further evaluate the skin rash issue.  She wants to ask around and see a dermatologist in [place].  She will let us know if she needs a referral.    For any tinea corporis flares,  I recommended she keep the skin clean and dry.  She has Lamisil and Mycolog ointment to apply topically twice daily applying a layer of powder.  Continue with the Ban  antiperspirant.    She responded to a 6-week course of Lamisil, but felt that the improvement was slow.  We may need to try Diflucan for future flares.     She will contact us if she needs a referral for removal of the benign appearing skin lesion/possible dermatofibroma to dorsum of right wrist.        Continue Lipitor 20 mg daily.      Recommended start OTC vitamin D 1000 IU daily.  Continue the calcium supplement.  We will plan to repeat DEXA 03/2021.     Continue the Jardiance and metformin. Pursue weight loss with more aggressive diabetic diet, exercise, and weight loss efforts.      Continue current dose of Synthroid.    Continue the nasal CPAP.  Keep follow-up appointments with Dr. Pulido office.    Continue other medications and vitamin and mineral supplements to treat additional medical problems which we addressed today.  Return in six months for follow up with fasting labs one week prior.       Scribed for Dr. Soliman by Lydia Davis [facility].     Diagnoses and all orders for this visit:    Medicare annual wellness visit, initial    Type 2 diabetes mellitus without complication, without long-term current use of insulin (CMS/[facility])  -     CBC Auto Differential; Future  -     Comprehensive Metabolic Panel; Future  -     Hemoglobin A1c; Future  -     Microalbumin / Creatinine Urine Ratio - Urine, Clean Catch; Future    Essential hypertension  -     Comprehensive Metabolic Panel; Future    Hypercholesterolemia  -     LDL Cholesterol, Direct; Future  -     Triglycerides; Future    Vitamin D deficiency  -     Vitamin D 25 Hydroxy; Future    Acquired hypothyroidism    Osteopenia of spine  -     Vitamin D 25 Hydroxy; Future    Obesity (BMI 30.0-34.9)    Obstructive sleep apnea syndrome - Dr. Pulido    Rash    Other orders  -     Cholecalciferol (VITAMIN D) 25 MCG (1000 UT) tablet; Take 1 tablet by mouth Daily.        Lab on 11/25/2019   Component Date Value Ref Range Status   • WBC 11/25/2019 9.77  3.40 - 10.80  10*3/mm3 Final   • RBC 11/25/2019 4.86  3.77 - 5.28 10*6/mm3 Final   • Hemoglobin 11/25/2019 12.9  12.0 - 15.9 g/dL Final   • Hematocrit 11/25/2019 41.1  34.0 - 46.6 % Final   • MCV 11/25/2019 84.6  79.0 - 97.0 fL Final   • MCH 11/25/2019 26.5* 26.6 - 33.0 pg Final   • MCHC 11/25/2019 31.4* 31.5 - 35.7 g/dL Final   • RDW 11/25/2019 16.4* 12.3 - 15.4 % Final   • RDW-SD 11/25/2019 50.4  37.0 - 54.0 fl Final   • MPV 11/25/2019 9.1  6.0 - 12.0 fL Final   • Platelets 11/25/2019 443  140 - 450 10*3/mm3 Final   • Neutrophil % 11/25/2019 59.1  42.7 - 76.0 % Final   • Lymphocyte % 11/25/2019 23.7  19.6 - 45.3 % Final   • Monocyte % 11/25/2019 8.5  5.0 - 12.0 % Final   • Eosinophil % 11/25/2019 6.9* 0.3 - 6.2 % Final   • Basophil % 11/25/2019 1.8* 0.0 - 1.5 % Final   • Neutrophils, Absolute 11/25/2019 5.77  1.70 - 7.00 10*3/mm3 Final   • Lymphocytes, Absolute 11/25/2019 2.32  0.70 - 3.10 10*3/mm3 Final   • Monocytes, Absolute 11/25/2019 0.83  0.10 - 0.90 10*3/mm3 Final   • Eosinophils, Absolute 11/25/2019 0.67* 0.00 - 0.40 10*3/mm3 Final   • Basophils, Absolute 11/25/2019 0.18  0.00 - 0.20 10*3/mm3 Final   • Glucose 11/25/2019 109* 70 - 99 mg/dL Final   • BUN 11/25/2019 18  7 - 23 mg/dL Final   • Creatinine 11/25/2019 1.10* 0.52 - 1.04 mg/dL Final   • Sodium 11/25/2019 143  137 - 145 mmol/L Final   • Potassium 11/25/2019 4.1  3.4 - 5.0 mmol/L Final   • Chloride 11/25/2019 107  101 - 112 mmol/L Final   • CO2 11/25/2019 28.0  22.0 - 30.0 mmol/L Final   • Calcium 11/25/2019 10.1  8.4 - 10.2 mg/dL Final   • Total Protein 11/25/2019 8.2  6.3 - 8.6 g/dL Final   • Albumin 11/25/2019 4.50  3.50 - 5.00 g/dL Final   • ALT (SGPT) 11/25/2019 36* <=35 U/L Final   • AST (SGOT) 11/25/2019 38* 14 - 36 U/L Final   • Alkaline Phosphatase 11/25/2019 78  38 - 126 U/L Final   • Total Bilirubin 11/25/2019 0.3  0.2 - 1.3 mg/dL Final   • eGFR Non African Amer 11/25/2019 49  39 - 90 mL/min/1.73 Final   • Globulin 11/25/2019 3.7* 2.3 - 3.5 gm/dL Final    • A/G Ratio 11/25/2019 1.2  1.1 - 1.8 g/dL Final   • BUN/Creatinine Ratio 11/25/2019 16.4  7.0 - 25.0 Final   • Anion Gap 11/25/2019 8.0  5.0 - 15.0 mmol/L Final   • Hemoglobin A1C 11/25/2019 7.08* 4.80 - 5.60 % Final   • Total Cholesterol 11/25/2019 204* 150 - 200 mg/dL Final   • Triglycerides 11/25/2019 156* <=150 mg/dL Final   • HDL Cholesterol 11/25/2019 47  40 - 59 mg/dL Final   • LDL Cholesterol  11/25/2019 126* <=100 mg/dL Final   • VLDL Cholesterol 11/25/2019 31.2  mg/dL Final   • LDL/HDL Ratio 11/25/2019 2.68  0.00 - 3.22 Final   • TSH 11/25/2019 4.020  0.270 - 4.200 uIU/mL Final   • Free T4 11/25/2019 1.12  0.93 - 1.70 ng/dL Final   • 25 Hydroxy, Vitamin D 11/25/2019 30.9  30.0 - 100.0 ng/ml Final   ]

## 2019-12-02 NOTE — PATIENT INSTRUCTIONS
Medicare Wellness  Personal Prevention Plan of Service     Date of Office Visit:  2019  Encounter Provider:  Darian Soliman MD  Place of Service:  [facility]  Patient Name: Gale Cabral  :  1945    As part of the Medicare Wellness portion of your visit today, we are providing you with this personalized preventive plan of services (PPPS). This plan is based upon recommendations of the [place] Preventive Services Task Force (USPSTF) and the Advisory Committee on Immunization Practices (ACIP).    This lists the preventive care services that should be considered, and provides dates of when you are due. Items listed as completed are up-to-date and do not require any further intervention.    Health Maintenance   Topic Date Due   • TDAP/TD VACCINES (1 - Tdap) 1966   • ZOSTER VACCINE (2 of 3) 2013   • HEPATITIS C SCREENING  10/24/2016   • MEDICARE ANNUAL WELLNESS  10/24/2016   • DIABETIC FOOT EXAM  10/24/2016   • DIABETIC EYE EXAM  2018   • URINE MICROALBUMIN  2019   • INFLUENZA VACCINE  2019   • HEMOGLOBIN A1C  2020   • LIPID PANEL  2020   • DXA SCAN  2021   • MAMMOGRAM  2021   • COLONOSCOPY  2025   • PNEUMOCOCCAL VACCINES (65+ LOW/MEDIUM RISK)  Completed       No orders of the defined types were placed in this encounter.      Return in about 6 months (around 2020) for Next scheduled follow up.          Exercising to Lose Weight  Exercise is structured, repetitive physical activity to improve fitness and health. Getting regular exercise is important for everyone. It is especially important if you are overweight. Being overweight increases your risk of heart disease, stroke, diabetes, high blood pressure, and several types of cancer. Reducing your calorie intake and exercising can help you lose weight.  Exercise is usually categorized as moderate or vigorous intensity. To lose weight, most  people need to do a certain amount of moderate-intensity or vigorous-intensity exercise each week.  Moderate-intensity exercise    Moderate-intensity exercise is any activity that gets you moving enough to burn at least three times more energy (calories) than if you were sitting.  Examples of moderate exercise include:  · Walking a mile in 15 minutes.  · Doing light yard work.  · Biking at an easy pace.  Most people should get at least 150 minutes (2 hours and 30 minutes) a week of moderate-intensity exercise to maintain their body weight.  Vigorous-intensity exercise  Vigorous-intensity exercise is any activity that gets you moving enough to burn at least six times more calories than if you were sitting. When you exercise at this intensity, you should be working hard enough that you are not able to carry on a conversation.  Examples of vigorous exercise include:  · Running.  · Playing a team sport, such as football, basketball, and soccer.  · Jumping rope.  Most people should get at least 75 minutes (1 hour and 15 minutes) a week of vigorous-intensity exercise to maintain their body weight.  How can exercise affect me?  When you exercise enough to burn more calories than you eat, you lose weight. Exercise also reduces body fat and builds muscle. The more muscle you have, the more calories you burn. Exercise also:  · Improves mood.  · Reduces stress and tension.  · Improves your overall fitness, flexibility, and endurance.  · Increases bone strength.  The amount of exercise you need to lose weight depends on:  · Your age.  · The type of exercise.  · Any health conditions you have.  · Your overall physical ability.  Talk to your health care provider about how much exercise you need and what types of activities are safe for you.  What actions can I take to lose weight?  Nutrition    · Make changes to your diet as told by your health care provider or diet and nutrition specialist (dietitian). This may  include:  ? Eating fewer calories.  ? Eating more protein.  ? Eating less unhealthy fats.  ? Eating a diet that includes fresh fruits and vegetables, whole grains, low-fat dairy products, and lean protein.  ? Avoiding foods with added fat, salt, and sugar.  · Drink plenty of water while you exercise to prevent dehydration or heat stroke.  Activity  · Choose an activity that you enjoy and set realistic goals. Your health care provider can help you make an exercise plan that works for you.  · Exercise at a moderate or vigorous intensity most days of the week.  ? The intensity of exercise may vary from person to person. You can tell how intense a workout is for you by paying attention to your breathing and heartbeat. Most people will notice their breathing and heartbeat get faster with more intense exercise.  · Do resistance training twice each week, such as:  ? Push-ups.  ? Sit-ups.  ? Lifting weights.  ? Using resistance bands.  · Getting short amounts of exercise can be just as helpful as long structured periods of exercise. If you have trouble finding time to exercise, try to include exercise in your daily routine.  ? Get up, stretch, and walk around every 30 minutes throughout the day.  ? Go for a walk during your lunch break.  ? Park your car farther away from your destination.  ? If you take public transportation, get off one stop early and walk the rest of the way.  ? Make phone calls while standing up and walking around.  ? Take the stairs instead of elevators or escalators.  · Wear comfortable clothes and shoes with good support.  · Do not exercise so much that you hurt yourself, feel dizzy, or get very short of breath.  Where to find more information  · [place] Department of Health and Human Services: www.hhs.gov  · Centers for Disease Control and Prevention (CDC): www.cdc.gov  Contact a health care provider:  · Before starting a new exercise program.  · If you have questions or concerns about your  weight.  · If you have a medical problem that keeps you from exercising.  Get help right away if you have any of the following while exercising:  · Injury.  · Dizziness.  · Difficulty breathing or shortness of breath that does not go away when you stop exercising.  · Chest pain.  · Rapid heartbeat.  Summary  · Being overweight increases your risk of heart disease, stroke, diabetes, high blood pressure, and several types of cancer.  · Losing weight happens when you burn more calories than you eat.  · Reducing the amount of calories you eat in addition to getting regular moderate or vigorous exercise each week helps you lose weight.  This information is not intended to replace advice given to you by your health care provider. Make sure you discuss any questions you have with your health care provider.  Document Released: 01/20/2012 Document Revised: 12/31/2018 Document Reviewed: 12/31/2018  [employer] Interactive Patient Education © 2019 [employer]      Calorie Counting for Weight Loss  Calories are units of energy. Your body needs a certain amount of calories from food to keep you going throughout the day. When you eat more calories than your body needs, your body stores the extra calories as fat. When you eat fewer calories than your body needs, your body burns fat to get the energy it needs.  Calorie counting means keeping track of how many calories you eat and drink each day. Calorie counting can be helpful if you need to lose weight. If you make sure to eat fewer calories than your body needs, you should lose weight. Ask your health care provider what a healthy weight is for you.  For calorie counting to work, you will need to eat the right number of calories in a day in order to lose a healthy amount of weight per week. A dietitian can help you determine how many calories you need in a day and will give you suggestions on how to reach your calorie goal.  · A healthy amount of weight to lose per week is usually  1-2 lb (0.5-0.9 kg). This usually means that your daily calorie intake should be reduced by 500-750 calories.  · Eating 1,200 - 1,500 calories per day can help most women lose weight.  · Eating 1,500 - 1,800 calories per day can help most men lose weight.  What is my plan?  My goal is to have __________ calories per day.  If I have this many calories per day, I should lose around __________ pounds per week.  What do I need to know about calorie counting?  In order to meet your daily calorie goal, you will need to:  · Find out how many calories are in each food you would like to eat. Try to do this before you eat.  · Decide how much of the food you plan to eat.  · Write down what you ate and how many calories it had. Doing this is called keeping a food log.  To successfully lose weight, it is important to balance calorie counting with a healthy lifestyle that includes regular activity. Aim for 150 minutes of moderate exercise (such as walking) or 75 minutes of vigorous exercise (such as running) each week.  Where do I find calorie information?    The number of calories in a food can be found on a Nutrition Facts label. If a food does not have a Nutrition Facts label, try to look up the calories online or ask your dietitian for help.  Remember that calories are listed per serving. If you choose to have more than one serving of a food, you will have to multiply the calories per serving by the amount of servings you plan to eat. For example, the label on a package of bread might say that a serving size is 1 slice and that there are 90 calories in a serving. If you eat 1 slice, you will have eaten 90 calories. If you eat 2 slices, you will have eaten 180 calories.  How do I keep a food log?  Immediately after each meal, record the following information in your food log:  · What you ate. Don't forget to include toppings, sauces, and other extras on the food.  · How much you ate. This can be measured in cups, ounces, or  "number of items.  · How many calories each food and drink had.  · The total number of calories in the meal.  Keep your food log near you, such as in a small notebook in your pocket, or use a mobile hebert or website. Some programs will calculate calories for you and show you how many calories you have left for the day to meet your goal.  What are some calorie counting tips?    · Use your calories on foods and drinks that will fill you up and not leave you hungry:  ? Some examples of foods that fill you up are nuts and nut butters, vegetables, lean proteins, and high-fiber foods like whole grains. High-fiber foods are foods with more than 5 g fiber per serving.  ? Drinks such as sodas, specialty coffee drinks, alcohol, and juices have a lot of calories, yet do not fill you up.  · Eat nutritious foods and avoid empty calories. Empty calories are calories you get from foods or beverages that do not have many vitamins or protein, such as candy, sweets, and soda. It is better to have a nutritious high-calorie food (such as an avocado) than a food with few nutrients (such as a bag of chips).  · Know how many calories are in the foods you eat most often. This will help you calculate calorie counts faster.  · Pay attention to calories in drinks. Low-calorie drinks include water and unsweetened drinks.  · Pay attention to nutrition labels for \"low fat\" or \"fat free\" foods. These foods sometimes have the same amount of calories or more calories than the full fat versions. They also often have added sugar, starch, or salt, to make up for flavor that was removed with the fat.  · Find a way of tracking calories that works for you. Get creative. Try different apps or programs if writing down calories does not work for you.  What are some portion control tips?  · Know how many calories are in a serving. This will help you know how many servings of a certain food you can have.  · Use a measuring cup to measure serving sizes. You could " also try weighing out portions on a kitchen scale. With time, you will be able to estimate serving sizes for some foods.  · Take some time to put servings of different foods on your favorite plates, bowls, and cups so you know what a serving looks like.  · Try not to eat straight from a bag or box. Doing this can lead to overeating. Put the amount you would like to eat in a cup or on a plate to make sure you are eating the right portion.  · Use smaller plates, glasses, and bowls to prevent overeating.  · Try not to multitask (for example, watch TV or use your computer) while eating. If it is time to eat, sit down at a table and enjoy your food. This will help you to know when you are full. It will also help you to be aware of what you are eating and how much you are eating.  What are tips for following this plan?  Reading food labels  · Check the calorie count compared to the serving size. The serving size may be smaller than what you are used to eating.  · Check the source of the calories. Make sure the food you are eating is high in vitamins and protein and low in saturated and trans fats.  Shopping  · Read nutrition labels while you shop. This will help you make healthy decisions before you decide to purchase your food.  · Make a grocery list and stick to it.  Cooking  · Try to cook your favorite foods in a healthier way. For example, try baking instead of frying.  · Use low-fat dairy products.  Meal planning  · Use more fruits and vegetables. Half of your plate should be fruits and vegetables.  · Include lean proteins like poultry and fish.  How do I count calories when eating out?  · Ask for smaller portion sizes.  · Consider sharing an entree and sides instead of getting your own entree.  · If you get your own entree, eat only half. Ask for a box at the beginning of your meal and put the rest of your entree in it so you are not tempted to eat it.  · If calories are listed on the menu, choose the lower calorie  "options.  · Choose dishes that include vegetables, fruits, whole grains, low-fat dairy products, and lean protein.  · Choose items that are boiled, broiled, grilled, or steamed. Stay away from items that are buttered, battered, fried, or served with cream sauce. Items labeled \"crispy\" are usually fried, unless stated otherwise.  · Choose water, low-fat milk, unsweetened iced tea, or other drinks without added sugar. If you want an alcoholic beverage, choose a lower calorie option such as a glass of wine or light beer.  · Ask for dressings, sauces, and syrups on the side. These are usually high in calories, so you should limit the amount you eat.  · If you want a salad, choose a garden salad and ask for grilled meats. Avoid extra toppings like juarez, cheese, or fried items. Ask for the dressing on the side, or ask for olive oil and vinegar or lemon to use as dressing.  · Estimate how many servings of a food you are given. For example, a serving of cooked rice is ½ cup or about the size of half a baseball. Knowing serving sizes will help you be aware of how much food you are eating at restaurants. The list below tells you how big or small some common portion sizes are based on everyday objects:  ? 1 oz--4 stacked dice.  ? 3 oz--1 deck of cards.  ? 1 tsp--1 die.  ? 1 Tbsp--½ a ping-pong ball.  ? 2 Tbsp--1 ping-pong ball.  ? ½ cup--½ baseball.  ? 1 cup--1 baseball.  Summary  · Calorie counting means keeping track of how many calories you eat and drink each day. If you eat fewer calories than your body needs, you should lose weight.  · A healthy amount of weight to lose per week is usually 1-2 lb (0.5-0.9 kg). This usually means reducing your daily calorie intake by 500-750 calories.  · The number of calories in a food can be found on a Nutrition Facts label. If a food does not have a Nutrition Facts label, try to look up the calories online or ask your dietitian for help.  · Use your calories on foods and drinks that " will fill you up, and not on foods and drinks that will leave you hungry.  · Use smaller plates, glasses, and bowls to prevent overeating.  This information is not intended to replace advice given to you by your health care provider. Make sure you discuss any questions you have with your health care provider.  Document Released: 12/18/2006 Document Revised: 09/06/2019 Document Reviewed: 11/17/2017  [employer] Interactive Patient Education © 2019 [employer]

## 2019-12-02 NOTE — PROGRESS NOTES
The ABCs of the Annual Wellness Visit  Initial Medicare Wellness Visit    Chief Complaint   Patient presents with   • Follow-up     6 month   • Medicare Wellness-Initial Visit       Subjective   History of Present Illness:  Gale Cabral is a 74 y.o. female who presents for an Initial Medicare Wellness Visit.    HEALTH RISK ASSESSMENT    Recent Hospitalizations:  No hospitalization(s) within the last year.    Current Medical Providers:  Patient Care Team:  Darian Soliman MD as PCP - General    Smoking Status:  Social History     Tobacco Use   Smoking Status Never Smoker   Smokeless Tobacco Never Used       Alcohol Consumption:  Social History     Substance and Sexual Activity   Alcohol Use No       Depression Screen:   PHQ-2/PHQ-9 Depression Screening 12/2/2019   Little interest or pleasure in doing things 0   Feeling down, depressed, or hopeless 0   Trouble falling or staying asleep, or sleeping too much -   Feeling tired or having little energy -   Poor appetite or overeating -   Feeling bad about yourself - or that you are a failure or have let yourself or your family down -   Trouble concentrating on things, such as reading the newspaper or watching television -   Moving or speaking so slowly that other people could have noticed. Or the opposite - being so fidgety or restless that you have been moving around a lot more than usual -   Thoughts that you would be better off dead, or of hurting yourself in some way -   Total Score 0   If you checked off any problems, how difficult have these problems made it for you to do your work, take care of things at home, or get along with other people? -       Fall Risk Screen:  STEADI Fall Risk Assessment was completed, and patient is at LOW risk for falls.Assessment completed on:9/17/2019    Health Habits and Functional and Cognitive Screening:  Functional & Cognitive Status 12/2/2019   Do you have difficulty preparing food and eating? No   Do you have difficulty  bathing yourself, getting dressed or grooming yourself? No   Do you have difficulty using the toilet? No   Do you have difficulty moving around from place to place? No   Do you have trouble with steps or getting out of a bed or a chair? No   Current Diet Limited Junk Food   Dental Exam Up to date   Eye Exam Up to date   Exercise (times per week) 7 times per week   Current Exercise Activities Include Housecleaning   Do you need help using the phone?  No   Are you deaf or do you have serious difficulty hearing?  No   Do you need help with transportation? No   Do you need help shopping? No   Do you need help preparing meals?  No   Do you need help with housework?  No   Do you need help with laundry? No   Do you need help taking your medications? No   Do you need help managing money? No   Do you ever drive or ride in a car without wearing a seat belt? No   Have you felt unusual stress, anger or loneliness in the last month? No   Who do you live with? Spouse   If you need help, do you have trouble finding someone available to you? No   Have you been bothered in the last four weeks by sexual problems? No   Do you have difficulty concentrating, remembering or making decisions? No         Does the patient have evidence of cognitive impairment? No    Asprin use counseling:Taking ASA appropriately as indicated    Age-appropriate Screening Schedule:  Refer to the list below for future screening recommendations based on patient's age, sex and/or medical conditions. Orders for these recommended tests are listed in the plan section. The patient has been provided with a written plan.    Health Maintenance   Topic Date Due   • TDAP/TD VACCINES (1 - Tdap) 09/29/1966   • ZOSTER VACCINE (2 of 3) 04/22/2013   • DIABETIC FOOT EXAM  10/24/2016   • DIABETIC EYE EXAM  03/01/2018   • URINE MICROALBUMIN  05/04/2019   • INFLUENZA VACCINE  08/01/2019   • HEMOGLOBIN A1C  05/25/2020   • LIPID PANEL  11/25/2020   • DXA SCAN  03/20/2021   •  MAMMOGRAM  09/05/2021   • COLONOSCOPY  12/17/2025   • PNEUMOCOCCAL VACCINES (65+ LOW/MEDIUM RISK)  Completed          The following portions of the patient's history were reviewed and updated as appropriate:   She  has a past medical history of Acquired hypothyroidism, Allergic rhinitis, seasonal, Cervicalgia, Encounter for screening for malignant neoplasm of colon, Essential hypertension, Glaucoma, Health maintenance examination, Hypercholesterolemia, Hyperglycemia, Lumbar disc disorder, Menopause, Mild persistent asthma, Obstructive sleep apnea syndrome (9/17/2019), Osteopenia, 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  She does not have any pertinent problems on file.  She  has a past surgical history that includes endoscopy and colonoscopy (08/2010); Colonoscopy (12/17/2015); Hysterectomy; and Oophorectomy.  Her family history includes Colon cancer in her mother.  She  reports that she has never smoked. She has never used smokeless tobacco. She reports that she does not drink alcohol or use drugs.  Current Outpatient Medications   Medication Sig Dispense Refill   • albuterol sulfate HFA (PROAIR HFA) 108 (90 Base) MCG/ACT inhaler Inhale 2 puffs 4 (Four) Times a Day As Needed for Wheezing. 8.5 inhaler 6   • amLODIPine (NORVASC) 5 MG tablet TAKE 1 TABLET BY MOUTH ONCE DAILY FOR BLOOD PRESSURE 90 tablet 3   • aspirin 81 MG EC tablet Take 81 mg by mouth Daily.     • atorvastatin (LIPITOR) 20 MG tablet TAKE 1 TABLET BY MOUTH ONCE DAILY 90 tablet 3   • Blood Glucose Monitoring Suppl (ONE TOUCH ULTRA 2) W/DEVICE kit      • Calcium Carbonate-Vitamin D 600-200 MG-UNIT tablet Take 1 tablet by mouth Daily.     • citalopram (CeleXA) 20 MG tablet Take 1 tablet by mouth Daily. 90 tablet 3   • Empagliflozin 25 MG tablet Take 25 mg by mouth Every Morning. For  diabetes 90 tablet 3   • fluticasone (FLONASE) 50 MCG/ACT nasal spray 2 sprays into each nostril As Needed for rhinitis.     • glucose blood (ONE TOUCH ULTRA TEST) test strip 1 each by Other route Daily. Check 1-2times daily  E11.9  90 day supply  One Touch Verio 150 each 3   • LANCETS MICRO THIN 33G misc Check once daily  E11.9  Trueplus 100 each 3   • latanoprost (XALATAN) 0.005 % ophthalmic solution Administer 1 drop to both eyes Every Night.     • levothyroxine (SYNTHROID, LEVOTHROID) 50 MCG tablet Take 1 tablet by mouth Daily. 90 tablet 3   • losartan-hydrochlorothiazide (HYZAAR) 100-12.5 MG per tablet TAKE 1 TABLET BY MOUTH DAILY 90 tablet 0   • metFORMIN (GLUCOPHAGE) 500 MG tablet TAKE 1 TABLET BY MOUTH TWICE A DAY WITH FOOD FOR DIABETES 180 tablet 3   • mometasone-formoterol (DULERA) 200-5 MCG/ACT inhaler Inhale 2 puffs 2 (Two) Times a Day. 13 g 5   • montelukast (SINGULAIR) 10 MG tablet TAKE 1 TABLET BY MOUTH AT BEDTIME FOR ASTHMA 90 tablet 3   • mupirocin (BACTROBAN) 2 % ointment APPLY INTO NOSTRIL BID FOR 7 DAYS  0   • nystatin-triamcinolone (MYCOLOG) 028600-9.1 UNIT/GM-% ointment Apply  topically to the appropriate area as directed 2 (Two) Times a Day. 60 g 2   • Cholecalciferol (VITAMIN D) 25 MCG (1000 UT) tablet Take 1 tablet by mouth Daily.       No current facility-administered medications for this visit.      Current Outpatient Medications on File Prior to Visit   Medication Sig   • albuterol sulfate HFA (PROAIR HFA) 108 (90 Base) MCG/ACT inhaler Inhale 2 puffs 4 (Four) Times a Day As Needed for Wheezing.   • amLODIPine (NORVASC) 5 MG tablet TAKE 1 TABLET BY MOUTH ONCE DAILY FOR BLOOD PRESSURE   • aspirin 81 MG EC tablet Take 81 mg by mouth Daily.   • atorvastatin (LIPITOR) 20 MG tablet TAKE 1 TABLET BY MOUTH ONCE DAILY   • Blood Glucose Monitoring Suppl (ONE TOUCH ULTRA 2) W/DEVICE kit    • Calcium Carbonate-Vitamin D 600-200 MG-UNIT tablet Take 1 tablet by mouth Daily.   • citalopram (CeleXA) 20  MG tablet Take 1 tablet by mouth Daily.   • Empagliflozin 25 MG tablet Take 25 mg by mouth Every Morning. For diabetes   • fluticasone (FLONASE) 50 MCG/ACT nasal spray 2 sprays into each nostril As Needed for rhinitis.   • glucose blood (ONE TOUCH ULTRA TEST) test strip 1 each by Other route Daily. Check 1-2times daily  E11.9  90 day supply  One Touch Verio   • LANCETS MICRO THIN 33G misc Check once daily  E11.9  Trueplus   • latanoprost (XALATAN) 0.005 % ophthalmic solution Administer 1 drop to both eyes Every Night.   • levothyroxine (SYNTHROID, LEVOTHROID) 50 MCG tablet Take 1 tablet by mouth Daily.   • losartan-hydrochlorothiazide (HYZAAR) 100-12.5 MG per tablet TAKE 1 TABLET BY MOUTH DAILY   • metFORMIN (GLUCOPHAGE) 500 MG tablet TAKE 1 TABLET BY MOUTH TWICE A DAY WITH FOOD FOR DIABETES   • mometasone-formoterol (DULERA) 200-5 MCG/ACT inhaler Inhale 2 puffs 2 (Two) Times a Day.   • montelukast (SINGULAIR) 10 MG tablet TAKE 1 TABLET BY MOUTH AT BEDTIME FOR ASTHMA   • mupirocin (BACTROBAN) 2 % ointment APPLY INTO NOSTRIL BID FOR 7 DAYS   • nystatin-triamcinolone (MYCOLOG) 685017-8.1 UNIT/GM-% ointment Apply  topically to the appropriate area as directed 2 (Two) Times a Day.   • Cholecalciferol (VITAMIN D) 25 MCG (1000 UT) tablet Take 1 tablet by mouth Daily.     No current facility-administered medications on file prior to visit.      She is allergic to other..    Outpatient Medications Prior to Visit   Medication Sig Dispense Refill   • albuterol sulfate HFA (PROAIR HFA) 108 (90 Base) MCG/ACT inhaler Inhale 2 puffs 4 (Four) Times a Day As Needed for Wheezing. 8.5 inhaler 6   • amLODIPine (NORVASC) 5 MG tablet TAKE 1 TABLET BY MOUTH ONCE DAILY FOR BLOOD PRESSURE 90 tablet 3   • aspirin 81 MG EC tablet Take 81 mg by mouth Daily.     • atorvastatin (LIPITOR) 20 MG tablet TAKE 1 TABLET BY MOUTH ONCE DAILY 90 tablet 3   • Blood Glucose Monitoring Suppl (ONE TOUCH ULTRA 2) W/DEVICE kit      • Calcium Carbonate-Vitamin  D 600-200 MG-UNIT tablet Take 1 tablet by mouth Daily.     • citalopram (CeleXA) 20 MG tablet Take 1 tablet by mouth Daily. 90 tablet 3   • Empagliflozin 25 MG tablet Take 25 mg by mouth Every Morning. For diabetes 90 tablet 3   • fluticasone (FLONASE) 50 MCG/ACT nasal spray 2 sprays into each nostril As Needed for rhinitis.     • glucose blood (ONE TOUCH ULTRA TEST) test strip 1 each by Other route Daily. Check 1-2times daily  E11.9  90 day supply  One Touch Verio 150 each 3   • LANCETS MICRO THIN 33G misc Check once daily  E11.9  Trueplus 100 each 3   • latanoprost (XALATAN) 0.005 % ophthalmic solution Administer 1 drop to both eyes Every Night.     • levothyroxine (SYNTHROID, LEVOTHROID) 50 MCG tablet Take 1 tablet by mouth Daily. 90 tablet 3   • losartan-hydrochlorothiazide (HYZAAR) 100-12.5 MG per tablet TAKE 1 TABLET BY MOUTH DAILY 90 tablet 0   • metFORMIN (GLUCOPHAGE) 500 MG tablet TAKE 1 TABLET BY MOUTH TWICE A DAY WITH FOOD FOR DIABETES 180 tablet 3   • mometasone-formoterol (DULERA) 200-5 MCG/ACT inhaler Inhale 2 puffs 2 (Two) Times a Day. 13 g 5   • montelukast (SINGULAIR) 10 MG tablet TAKE 1 TABLET BY MOUTH AT BEDTIME FOR ASTHMA 90 tablet 3   • mupirocin (BACTROBAN) 2 % ointment APPLY INTO NOSTRIL BID FOR 7 DAYS  0   • nystatin-triamcinolone (MYCOLOG) 937988-8.1 UNIT/GM-% ointment Apply  topically to the appropriate area as directed 2 (Two) Times a Day. 60 g 2   • Cholecalciferol (VITAMIN D) 25 MCG (1000 UT) tablet Take 1 tablet by mouth Daily.       No facility-administered medications prior to visit.        Patient Active Problem List   Diagnosis   • Vitamin D deficiency   • Type 2 diabetes mellitus without complication (CMS/HCC)   • Temporomandibular joint disorder   • Strain of neck muscle   • Spinal stenosis of lumbar region   • Spasm of back muscles   • Shoulder pain   • Sebaceous cyst   • Osteopenia of spine   • Mild persistent asthma   • Menopause   • Lumbar disc disorder   • Hyperglycemia   •  "Hypercholesterolemia   • Health maintenance examination   • Glaucoma   • Essential hypertension   • Encounter for screening for malignant neoplasm of colon   • Cervicalgia   • Allergic rhinitis, seasonal   • Acquired hypothyroidism   • Obesity (BMI 30.0-34.9)   • Postcholecystectomy diarrhea   • Obstructive sleep apnea syndrome - Nasal CPAP. Dr. Pulido       Advanced Care Planning:  Patient does not have an advance directive - information provided to the patient today    Review of Systems    Compared to one year ago, the patient feels her physical health is the same.  Compared to one year ago, the patient feels her mental health is the same.    Reviewed chart for potential of high risk medication in the elderly: yes  Reviewed chart for potential of harmful drug interactions in the elderly:yes    Objective         Vitals:    12/02/19 0936   BP: 126/60   Pulse: 72   Temp: 98.1 °F (36.7 °C)   TempSrc: Oral   Weight: 76.4 kg (168 lb 6.4 oz)   Height: 157.5 cm (62\")   PainSc: 0-No pain       Body mass index is 30.8 kg/m².  Discussed the patient's BMI with her. The BMI is above average; BMI management plan is completed.    Physical Exam    Lab Results   Component Value Date    TRIG 156 (H) 11/25/2019    HDL 47 11/25/2019     (H) 11/25/2019    VLDL 31.2 11/25/2019    HGBA1C 7.08 (H) 11/25/2019        Assessment/Plan   Medicare Risks and Personalized Health Plan  CMS Preventative Services Quick Reference  Advance Directive Discussion  Obesity/Overweight     The above risks/problems have been discussed with the patient.  Pertinent information has been shared with the patient in the After Visit Summary.  Follow up plans and orders are seen below in the Assessment/Plan Section.    Diagnoses and all orders for this visit:    1. Medicare annual wellness visit, initial (Primary)    2. Type 2 diabetes mellitus without complication, without long-term current use of insulin (CMS/[facility])  -     CBC Auto Differential; Future  -     " Comprehensive Metabolic Panel; Future  -     Hemoglobin A1c; Future  -     Microalbumin / Creatinine Urine Ratio - Urine, Clean Catch; Future    3. Essential hypertension  -     Comprehensive Metabolic Panel; Future    4. Hypercholesterolemia  -     LDL Cholesterol, Direct; Future  -     Triglycerides; Future    5. Vitamin D deficiency  -     Vitamin D 25 Hydroxy; Future    6. Acquired hypothyroidism    7. Osteopenia of spine  -     Vitamin D 25 Hydroxy; Future    8. Obesity (BMI 30.0-34.9)    9. Obstructive sleep apnea syndrome - Dr. Pulido    10. Rash      Follow Up:  Return in about 6 months (around 6/2/2020) for Next scheduled follow up.     An After Visit Summary and PPPS were given to the patient.

## 2019-12-03 RX ORDER — MONTELUKAST SODIUM 10 MG/1
TABLET ORAL
Qty: 90 TABLET | Refills: 3 | Status: SHIPPED | OUTPATIENT
Start: 2019-12-03 | End: 2020-10-05

## 2019-12-03 RX ORDER — LEVOTHYROXINE SODIUM 0.05 MG/1
50 TABLET ORAL DAILY
Qty: 90 TABLET | Refills: 3 | Status: SHIPPED | OUTPATIENT
Start: 2019-12-03 | End: 2021-01-05 | Stop reason: SDUPTHER

## 2019-12-03 RX ORDER — LOSARTAN POTASSIUM AND HYDROCHLOROTHIAZIDE 12.5; 1 MG/1; MG/1
1 TABLET ORAL DAILY
Qty: 90 TABLET | Refills: 3 | Status: SHIPPED | OUTPATIENT
Start: 2019-12-03 | End: 2021-03-03 | Stop reason: SDUPTHER

## 2019-12-03 RX ORDER — AMLODIPINE BESYLATE 5 MG/1
TABLET ORAL
Qty: 90 TABLET | Refills: 3 | Status: SHIPPED | OUTPATIENT
Start: 2019-12-03 | End: 2020-10-05

## 2020-03-11 RX ORDER — ATORVASTATIN CALCIUM 20 MG/1
TABLET, FILM COATED ORAL
Qty: 90 TABLET | Refills: 3 | Status: SHIPPED | OUTPATIENT
Start: 2020-03-11 | End: 2020-10-05

## 2020-06-03 ENCOUNTER — LAB (OUTPATIENT)
Dept: LAB | Facility: OTHER | Age: 75
End: 2020-06-03

## 2020-06-03 DIAGNOSIS — I10 ESSENTIAL HYPERTENSION: Chronic | ICD-10-CM

## 2020-06-03 DIAGNOSIS — M85.88 OSTEOPENIA OF SPINE: Chronic | ICD-10-CM

## 2020-06-03 DIAGNOSIS — E11.9 TYPE 2 DIABETES MELLITUS WITHOUT COMPLICATION, WITHOUT LONG-TERM CURRENT USE OF INSULIN (HCC): Chronic | ICD-10-CM

## 2020-06-03 DIAGNOSIS — E55.9 VITAMIN D DEFICIENCY: Chronic | ICD-10-CM

## 2020-06-03 DIAGNOSIS — E78.00 HYPERCHOLESTEROLEMIA: Chronic | ICD-10-CM

## 2020-06-03 LAB
ALBUMIN SERPL-MCNC: 4.4 G/DL (ref 3.5–5)
ALBUMIN UR-MCNC: <1.2 MG/DL
ALBUMIN/GLOB SERPL: 1.2 G/DL (ref 1.1–1.8)
ALP SERPL-CCNC: 67 U/L (ref 38–126)
ALT SERPL W P-5'-P-CCNC: 42 U/L
ANION GAP SERPL CALCULATED.3IONS-SCNC: 8 MMOL/L (ref 5–15)
AST SERPL-CCNC: 49 U/L (ref 14–36)
BASOPHILS # BLD AUTO: 0.16 10*3/MM3 (ref 0–0.2)
BASOPHILS NFR BLD AUTO: 1.8 % (ref 0–1.5)
BILIRUB SERPL-MCNC: 0.2 MG/DL (ref 0.2–1.3)
BUN BLD-MCNC: 17 MG/DL (ref 7–23)
BUN/CREAT SERPL: 17 (ref 7–25)
CALCIUM SPEC-SCNC: 9.8 MG/DL (ref 8.4–10.2)
CHLORIDE SERPL-SCNC: 106 MMOL/L (ref 101–112)
CO2 SERPL-SCNC: 27 MMOL/L (ref 22–30)
CREAT BLD-MCNC: 1 MG/DL (ref 0.52–1.04)
CREAT UR-MCNC: 145.7 MG/DL
DEPRECATED RDW RBC AUTO: 51.7 FL (ref 37–54)
EOSINOPHIL # BLD AUTO: 0.69 10*3/MM3 (ref 0–0.4)
EOSINOPHIL NFR BLD AUTO: 7.8 % (ref 0.3–6.2)
ERYTHROCYTE [DISTWIDTH] IN BLOOD BY AUTOMATED COUNT: 16.8 % (ref 12.3–15.4)
GFR SERPL CREATININE-BSD FRML MDRD: 55 ML/MIN/1.73 (ref 39–90)
GLOBULIN UR ELPH-MCNC: 3.6 GM/DL (ref 2.3–3.5)
GLUCOSE BLD-MCNC: 126 MG/DL (ref 70–99)
HBA1C MFR BLD: 7.41 % (ref 4.8–5.6)
HCT VFR BLD AUTO: 42.4 % (ref 34–46.6)
HGB BLD-MCNC: 13.3 G/DL (ref 12–15.9)
LYMPHOCYTES # BLD AUTO: 2.34 10*3/MM3 (ref 0.7–3.1)
LYMPHOCYTES NFR BLD AUTO: 26.4 % (ref 19.6–45.3)
MCH RBC QN AUTO: 26.4 PG (ref 26.6–33)
MCHC RBC AUTO-ENTMCNC: 31.4 G/DL (ref 31.5–35.7)
MCV RBC AUTO: 84.3 FL (ref 79–97)
MICROALBUMIN/CREAT UR: NORMAL MG/G{CREAT}
MONOCYTES # BLD AUTO: 0.77 10*3/MM3 (ref 0.1–0.9)
MONOCYTES NFR BLD AUTO: 8.7 % (ref 5–12)
NEUTROPHILS # BLD AUTO: 4.92 10*3/MM3 (ref 1.7–7)
NEUTROPHILS NFR BLD AUTO: 55.3 % (ref 42.7–76)
PLATELET # BLD AUTO: 441 10*3/MM3 (ref 140–450)
PMV BLD AUTO: 9.1 FL (ref 6–12)
POTASSIUM BLD-SCNC: 4.1 MMOL/L (ref 3.4–5)
PROT SERPL-MCNC: 8 G/DL (ref 6.3–8.6)
RBC # BLD AUTO: 5.03 10*6/MM3 (ref 3.77–5.28)
SODIUM BLD-SCNC: 141 MMOL/L (ref 137–145)
TRIGL SERPL-MCNC: 168 MG/DL
WBC NRBC COR # BLD: 8.88 10*3/MM3 (ref 3.4–10.8)

## 2020-06-03 PROCEDURE — 84478 ASSAY OF TRIGLYCERIDES: CPT | Performed by: INTERNAL MEDICINE

## 2020-06-03 PROCEDURE — 82043 UR ALBUMIN QUANTITATIVE: CPT | Performed by: INTERNAL MEDICINE

## 2020-06-03 PROCEDURE — 36415 COLL VENOUS BLD VENIPUNCTURE: CPT | Performed by: INTERNAL MEDICINE

## 2020-06-03 PROCEDURE — 83036 HEMOGLOBIN GLYCOSYLATED A1C: CPT | Performed by: INTERNAL MEDICINE

## 2020-06-03 PROCEDURE — 83721 ASSAY OF BLOOD LIPOPROTEIN: CPT | Performed by: INTERNAL MEDICINE

## 2020-06-03 PROCEDURE — 80053 COMPREHEN METABOLIC PANEL: CPT | Performed by: INTERNAL MEDICINE

## 2020-06-03 PROCEDURE — 82306 VITAMIN D 25 HYDROXY: CPT | Performed by: INTERNAL MEDICINE

## 2020-06-03 PROCEDURE — 85025 COMPLETE CBC W/AUTO DIFF WBC: CPT | Performed by: INTERNAL MEDICINE

## 2020-06-03 PROCEDURE — 82570 ASSAY OF URINE CREATININE: CPT | Performed by: INTERNAL MEDICINE

## 2020-06-04 LAB
25(OH)D3 SERPL-MCNC: 39.2 NG/ML (ref 30–100)
ARTICHOKE IGE QN: 116 MG/DL (ref 0–100)

## 2020-06-12 ENCOUNTER — TELEMEDICINE (OUTPATIENT)
Dept: FAMILY MEDICINE CLINIC | Facility: CLINIC | Age: 75
End: 2020-06-12

## 2020-06-12 VITALS
DIASTOLIC BLOOD PRESSURE: 66 MMHG | SYSTOLIC BLOOD PRESSURE: 132 MMHG | BODY MASS INDEX: 30.91 KG/M2 | WEIGHT: 168 LBS | HEIGHT: 62 IN

## 2020-06-12 DIAGNOSIS — I10 ESSENTIAL HYPERTENSION: Chronic | ICD-10-CM

## 2020-06-12 DIAGNOSIS — G47.33 OBSTRUCTIVE SLEEP APNEA SYNDROME: Chronic | ICD-10-CM

## 2020-06-12 DIAGNOSIS — E03.9 ACQUIRED HYPOTHYROIDISM: Chronic | ICD-10-CM

## 2020-06-12 DIAGNOSIS — E66.9 OBESITY (BMI 30.0-34.9): Chronic | ICD-10-CM

## 2020-06-12 DIAGNOSIS — E55.9 VITAMIN D DEFICIENCY: Chronic | ICD-10-CM

## 2020-06-12 DIAGNOSIS — E78.00 HYPERCHOLESTEROLEMIA: Chronic | ICD-10-CM

## 2020-06-12 DIAGNOSIS — E11.9 TYPE 2 DIABETES MELLITUS WITHOUT COMPLICATION, WITHOUT LONG-TERM CURRENT USE OF INSULIN (HCC): Primary | Chronic | ICD-10-CM

## 2020-06-12 PROCEDURE — 99443 PR PHYS/QHP TELEPHONE EVALUATION 21-30 MIN: CPT | Performed by: INTERNAL MEDICINE

## 2020-06-12 NOTE — PATIENT INSTRUCTIONS
Exercising to Lose Weight  Exercise is structured, repetitive physical activity to improve fitness and health. Getting regular exercise is important for everyone. It is especially important if you are overweight. Being overweight increases your risk of heart disease, stroke, diabetes, high blood pressure, and several types of cancer. Reducing your calorie intake and exercising can help you lose weight.  Exercise is usually categorized as moderate or vigorous intensity. To lose weight, most people need to do a certain amount of moderate-intensity or vigorous-intensity exercise each week.  Moderate-intensity exercise    Moderate-intensity exercise is any activity that gets you moving enough to burn at least three times more energy (calories) than if you were sitting.  Examples of moderate exercise include:  · Walking a mile in 15 minutes.  · Doing light yard work.  · Biking at an easy pace.  Most people should get at least 150 minutes (2 hours and 30 minutes) a week of moderate-intensity exercise to maintain their body weight.  Vigorous-intensity exercise  Vigorous-intensity exercise is any activity that gets you moving enough to burn at least six times more calories than if you were sitting. When you exercise at this intensity, you should be working hard enough that you are not able to carry on a conversation.  Examples of vigorous exercise include:  · Running.  · Playing a team sport, such as football, basketball, and soccer.  · Jumping rope.  Most people should get at least 75 minutes (1 hour and 15 minutes) a week of vigorous-intensity exercise to maintain their body weight.  How can exercise affect me?  When you exercise enough to burn more calories than you eat, you lose weight. Exercise also reduces body fat and builds muscle. The more muscle you have, the more calories you burn. Exercise also:  · Improves mood.  · Reduces stress and tension.  · Improves your overall fitness, flexibility, and  endurance.  · Increases bone strength.  The amount of exercise you need to lose weight depends on:  · Your age.  · The type of exercise.  · Any health conditions you have.  · Your overall physical ability.  Talk to your health care provider about how much exercise you need and what types of activities are safe for you.  What actions can I take to lose weight?  Nutrition    · Make changes to your diet as told by your health care provider or diet and nutrition specialist (dietitian). This may include:  ? Eating fewer calories.  ? Eating more protein.  ? Eating less unhealthy fats.  ? Eating a diet that includes fresh fruits and vegetables, whole grains, low-fat dairy products, and lean protein.  ? Avoiding foods with added fat, salt, and sugar.  · Drink plenty of water while you exercise to prevent dehydration or heat stroke.  Activity  · Choose an activity that you enjoy and set realistic goals. Your health care provider can help you make an exercise plan that works for you.  · Exercise at a moderate or vigorous intensity most days of the week.  ? The intensity of exercise may vary from person to person. You can tell how intense a workout is for you by paying attention to your breathing and heartbeat. Most people will notice their breathing and heartbeat get faster with more intense exercise.  · Do resistance training twice each week, such as:  ? Push-ups.  ? Sit-ups.  ? Lifting weights.  ? Using resistance bands.  · Getting short amounts of exercise can be just as helpful as long structured periods of exercise. If you have trouble finding time to exercise, try to include exercise in your daily routine.  ? Get up, stretch, and walk around every 30 minutes throughout the day.  ? Go for a walk during your lunch break.  ? Park your car farther away from your destination.  ? If you take public transportation, get off one stop early and walk the rest of the way.  ? Make phone calls while standing up and walking  around.  ? Take the stairs instead of elevators or escalators.  · Wear comfortable clothes and shoes with good support.  · Do not exercise so much that you hurt yourself, feel dizzy, or get very short of breath.  Where to find more information  · [place] Department of Health and Human Services: www.hhs.gov  · Centers for Disease Control and Prevention (CDC): www.cdc.gov  Contact a health care provider:  · Before starting a new exercise program.  · If you have questions or concerns about your weight.  · If you have a medical problem that keeps you from exercising.  Get help right away if you have any of the following while exercising:  · Injury.  · Dizziness.  · Difficulty breathing or shortness of breath that does not go away when you stop exercising.  · Chest pain.  · Rapid heartbeat.  Summary  · Being overweight increases your risk of heart disease, stroke, diabetes, high blood pressure, and several types of cancer.  · Losing weight happens when you burn more calories than you eat.  · Reducing the amount of calories you eat in addition to getting regular moderate or vigorous exercise each week helps you lose weight.  This information is not intended to replace advice given to you by your health care provider. Make sure you discuss any questions you have with your health care provider.  Document Released: 01/20/2012 Document Revised: 12/31/2018 Document Reviewed: 12/31/2018  [employer]vier Patient Education © 2020 [employer]      Calorie Counting for Weight Loss  Calories are units of energy. Your body needs a certain amount of calories from food to keep you going throughout the day. When you eat more calories than your body needs, your body stores the extra calories as fat. When you eat fewer calories than your body needs, your body burns fat to get the energy it needs.  Calorie counting means keeping track of how many calories you eat and drink each day. Calorie counting can be helpful if you need to lose weight. If  you make sure to eat fewer calories than your body needs, you should lose weight. Ask your health care provider what a healthy weight is for you.  For calorie counting to work, you will need to eat the right number of calories in a day in order to lose a healthy amount of weight per week. A dietitian can help you determine how many calories you need in a day and will give you suggestions on how to reach your calorie goal.  · A healthy amount of weight to lose per week is usually 1-2 lb (0.5-0.9 kg). This usually means that your daily calorie intake should be reduced by 500-750 calories.  · Eating 1,200 - 1,500 calories per day can help most women lose weight.  · Eating 1,500 - 1,800 calories per day can help most men lose weight.  What is my plan?  My goal is to have __________ calories per day.  If I have this many calories per day, I should lose around __________ pounds per week.  What do I need to know about calorie counting?  In order to meet your daily calorie goal, you will need to:  · Find out how many calories are in each food you would like to eat. Try to do this before you eat.  · Decide how much of the food you plan to eat.  · Write down what you ate and how many calories it had. Doing this is called keeping a food log.  To successfully lose weight, it is important to balance calorie counting with a healthy lifestyle that includes regular activity. Aim for 150 minutes of moderate exercise (such as walking) or 75 minutes of vigorous exercise (such as running) each week.  Where do I find calorie information?    The number of calories in a food can be found on a Nutrition Facts label. If a food does not have a Nutrition Facts label, try to look up the calories online or ask your dietitian for help.  Remember that calories are listed per serving. If you choose to have more than one serving of a food, you will have to multiply the calories per serving by the amount of servings you plan to eat. For example, the  "label on a package of bread might say that a serving size is 1 slice and that there are 90 calories in a serving. If you eat 1 slice, you will have eaten 90 calories. If you eat 2 slices, you will have eaten 180 calories.  How do I keep a food log?  Immediately after each meal, record the following information in your food log:  · What you ate. Don't forget to include toppings, sauces, and other extras on the food.  · How much you ate. This can be measured in cups, ounces, or number of items.  · How many calories each food and drink had.  · The total number of calories in the meal.  Keep your food log near you, such as in a small notebook in your pocket, or use a mobile hebert or website. Some programs will calculate calories for you and show you how many calories you have left for the day to meet your goal.  What are some calorie counting tips?    · Use your calories on foods and drinks that will fill you up and not leave you hungry:  ? Some examples of foods that fill you up are nuts and nut butters, vegetables, lean proteins, and high-fiber foods like whole grains. High-fiber foods are foods with more than 5 g fiber per serving.  ? Drinks such as sodas, specialty coffee drinks, alcohol, and juices have a lot of calories, yet do not fill you up.  · Eat nutritious foods and avoid empty calories. Empty calories are calories you get from foods or beverages that do not have many vitamins or protein, such as candy, sweets, and soda. It is better to have a nutritious high-calorie food (such as an avocado) than a food with few nutrients (such as a bag of chips).  · Know how many calories are in the foods you eat most often. This will help you calculate calorie counts faster.  · Pay attention to calories in drinks. Low-calorie drinks include water and unsweetened drinks.  · Pay attention to nutrition labels for \"low fat\" or \"fat free\" foods. These foods sometimes have the same amount of calories or more calories than the " full fat versions. They also often have added sugar, starch, or salt, to make up for flavor that was removed with the fat.  · Find a way of tracking calories that works for you. Get creative. Try different apps or programs if writing down calories does not work for you.  What are some portion control tips?  · Know how many calories are in a serving. This will help you know how many servings of a certain food you can have.  · Use a measuring cup to measure serving sizes. You could also try weighing out portions on a kitchen scale. With time, you will be able to estimate serving sizes for some foods.  · Take some time to put servings of different foods on your favorite plates, bowls, and cups so you know what a serving looks like.  · Try not to eat straight from a bag or box. Doing this can lead to overeating. Put the amount you would like to eat in a cup or on a plate to make sure you are eating the right portion.  · Use smaller plates, glasses, and bowls to prevent overeating.  · Try not to multitask (for example, watch TV or use your computer) while eating. If it is time to eat, sit down at a table and enjoy your food. This will help you to know when you are full. It will also help you to be aware of what you are eating and how much you are eating.  What are tips for following this plan?  Reading food labels  · Check the calorie count compared to the serving size. The serving size may be smaller than what you are used to eating.  · Check the source of the calories. Make sure the food you are eating is high in vitamins and protein and low in saturated and trans fats.  Shopping  · Read nutrition labels while you shop. This will help you make healthy decisions before you decide to purchase your food.  · Make a grocery list and stick to it.  Cooking  · Try to cook your favorite foods in a healthier way. For example, try baking instead of frying.  · Use low-fat dairy products.  Meal planning  · Use more fruits and  "vegetables. Half of your plate should be fruits and vegetables.  · Include lean proteins like poultry and fish.  How do I count calories when eating out?  · Ask for smaller portion sizes.  · Consider sharing an entree and sides instead of getting your own entree.  · If you get your own entree, eat only half. Ask for a box at the beginning of your meal and put the rest of your entree in it so you are not tempted to eat it.  · If calories are listed on the menu, choose the lower calorie options.  · Choose dishes that include vegetables, fruits, whole grains, low-fat dairy products, and lean protein.  · Choose items that are boiled, broiled, grilled, or steamed. Stay away from items that are buttered, battered, fried, or served with cream sauce. Items labeled \"crispy\" are usually fried, unless stated otherwise.  · Choose water, low-fat milk, unsweetened iced tea, or other drinks without added sugar. If you want an alcoholic beverage, choose a lower calorie option such as a glass of wine or light beer.  · Ask for dressings, sauces, and syrups on the side. These are usually high in calories, so you should limit the amount you eat.  · If you want a salad, choose a garden salad and ask for grilled meats. Avoid extra toppings like juarez, cheese, or fried items. Ask for the dressing on the side, or ask for olive oil and vinegar or lemon to use as dressing.  · Estimate how many servings of a food you are given. For example, a serving of cooked rice is ½ cup or about the size of half a baseball. Knowing serving sizes will help you be aware of how much food you are eating at restaurants. The list below tells you how big or small some common portion sizes are based on everyday objects:  ? 1 oz--4 stacked dice.  ? 3 oz--1 deck of cards.  ? 1 tsp--1 die.  ? 1 Tbsp--½ a ping-pong ball.  ? 2 Tbsp--1 ping-pong ball.  ? ½ cup--½ baseball.  ? 1 cup--1 baseball.  Summary  · Calorie counting means keeping track of how many calories you " eat and drink each day. If you eat fewer calories than your body needs, you should lose weight.  · A healthy amount of weight to lose per week is usually 1-2 lb (0.5-0.9 kg). This usually means reducing your daily calorie intake by 500-750 calories.  · The number of calories in a food can be found on a Nutrition Facts label. If a food does not have a Nutrition Facts label, try to look up the calories online or ask your dietitian for help.  · Use your calories on foods and drinks that will fill you up, and not on foods and drinks that will leave you hungry.  · Use smaller plates, glasses, and bowls to prevent overeating.  This information is not intended to replace advice given to you by your health care provider. Make sure you discuss any questions you have with your health care provider.  Document Released: 12/18/2006 Document Revised: 09/06/2019 Document Reviewed: 11/17/2017  [employer] Patient Education © 2020 [employer]

## 2020-06-12 NOTE — PROGRESS NOTES
Subjective          History of Present Illness       You have chosen to receive care through a telephone visit. Do you consent to use a telephone visit for your medical care today? Yes  Total visit time: 21 minutes.      Gale Cabral is a 74 y.o. female who receives care via video visit for 6-month follow up on type 2 diabetes, hypertension, hyperlipidemia/low HDL, asthma, and hypothyroidism among other issues.  Patient continues using CPAP to treat mild obstructive sleep apnea.     DEXA dated 03/2019 reveals persistent osteopenia of the lumbar spine and hip, essentially unchanged from prior study, although, L4 T-score of -2.8 corresponds to osteoporosis.  With her last visit, I recommended start OTC vitamin D 1000 IU daily.g a vitamin D supplement due to her vitamin D level drifting down.   Vitamin D had drifted down.  Vitamin D improved at 39.2.     Diabetes progressed with A1c 7.41, increased from 7.08. She checked glucose this morning, which was 128.   She reports noncompliance with diabetic diet and feels like she has plenty of room for improvement.  She has not monitored glucose as frequently at home for the past few weeks.  She reports stable body weight.      Blood pressure at goal at 132/66.     The patient's relevant past medical, surgical, and social history was reviewed in Epic.   Lab results are reviewed with the patient today.  CBC unremarkable.  A1c 7.41.  Fasting glucose 126.  .  Triglycerides 168. Liver enzymes slightly elevated with ALT 42 increased from 36 and ALT 49 up from 38. Normal renal function.      Review of Systems   Constitutional: Negative for chills, fatigue and fever.   HENT: Negative for congestion, ear pain, postnasal drip, sinus pressure and sore throat.    Respiratory: Negative for cough, shortness of breath and wheezing.    Cardiovascular: Negative for chest pain, palpitations and leg swelling.   Gastrointestinal: Negative for abdominal pain, blood in stool,  "constipation, diarrhea, nausea and vomiting.   Endocrine: Negative for cold intolerance, heat intolerance, polydipsia and polyuria.   Genitourinary: Negative for dysuria, frequency, hematuria and urgency.   Skin: Negative for rash.   Neurological: Negative for syncope and weakness.        Objective     Visit Vitals  Ht 157.5 cm (62\")   Wt 76.2 kg (168 lb)   BMI 30.73 kg/m²     Physical Exam      Assessment/Plan      Continue the current diabetic medications.  Patient reports noncompliance with diabetic diet, for which I recommended she decrease carbohydrates and sugar and monitor glucose alternating morning and evening, notifying me after a month if glucose is not consistently at goal and we will make adjustments in medications.  Recommended she stay up to date with diabetic eye exams with Dr. Tuttle.        Encouraged to continue social distancing and frequent handwashing to help prevent Covid-19.     Continue Lipitor 20 mg daily. Intensify low-cholesterol, low-fat diet.       Continue OTC vitamin D 1000 IU daily.  Continue the calcium supplement.  We will plan to repeat DEXA 03/2021.      Continue current dose of Synthroid.     Continue the nasal CPAP.  Keep follow-up appointments with sleep medicine.     Continue other medications and vitamin and mineral supplements to treat additional medical problems which we addressed today.  Return in six months for follow up with fasting labs one week prior.        Scribed for Dr. Soliman by Lydia Davis [facility].     Diagnoses and all orders for this visit:    Type 2 diabetes mellitus without complication, without long-term current use of insulin (CMS/[facility])  -     CBC Auto Differential; Future  -     Comprehensive Metabolic Panel; Future  -     Hemoglobin A1c; Future    Essential hypertension  -     Comprehensive Metabolic Panel; Future    Hypercholesterolemia  -     Lipid Panel; Future    Vitamin D deficiency  -     Vitamin D 25 Hydroxy; Future    Acquired " hypothyroidism  -     TSH; Future  -     T4, Free; Future    Obesity (BMI 30.0-34.9)    Obstructive sleep apnea syndrome - Nasal CPAP. Dr. Pulido        Lab on 06/03/2020   Component Date Value Ref Range Status   • WBC 06/03/2020 8.88  3.40 - 10.80 10*3/mm3 Final   • RBC 06/03/2020 5.03  3.77 - 5.28 10*6/mm3 Final   • Hemoglobin 06/03/2020 13.3  12.0 - 15.9 g/dL Final   • Hematocrit 06/03/2020 42.4  34.0 - 46.6 % Final   • MCV 06/03/2020 84.3  79.0 - 97.0 fL Final   • MCH 06/03/2020 26.4* 26.6 - 33.0 pg Final   • MCHC 06/03/2020 31.4* 31.5 - 35.7 g/dL Final   • RDW 06/03/2020 16.8* 12.3 - 15.4 % Final   • RDW-SD 06/03/2020 51.7  37.0 - 54.0 fl Final   • MPV 06/03/2020 9.1  6.0 - 12.0 fL Final   • Platelets 06/03/2020 441  140 - 450 10*3/mm3 Final   • Neutrophil % 06/03/2020 55.3  42.7 - 76.0 % Final   • Lymphocyte % 06/03/2020 26.4  19.6 - 45.3 % Final   • Monocyte % 06/03/2020 8.7  5.0 - 12.0 % Final   • Eosinophil % 06/03/2020 7.8* 0.3 - 6.2 % Final   • Basophil % 06/03/2020 1.8* 0.0 - 1.5 % Final   • Neutrophils, Absolute 06/03/2020 4.92  1.70 - 7.00 10*3/mm3 Final   • Lymphocytes, Absolute 06/03/2020 2.34  0.70 - 3.10 10*3/mm3 Final   • Monocytes, Absolute 06/03/2020 0.77  0.10 - 0.90 10*3/mm3 Final   • Eosinophils, Absolute 06/03/2020 0.69* 0.00 - 0.40 10*3/mm3 Final   • Basophils, Absolute 06/03/2020 0.16  0.00 - 0.20 10*3/mm3 Final   • Glucose 06/03/2020 126* 70 - 99 mg/dL Final   • BUN 06/03/2020 17  7 - 23 mg/dL Final   • Creatinine 06/03/2020 1.00  0.52 - 1.04 mg/dL Final   • Sodium 06/03/2020 141  137 - 145 mmol/L Final   • Potassium 06/03/2020 4.1  3.4 - 5.0 mmol/L Final   • Chloride 06/03/2020 106  101 - 112 mmol/L Final   • CO2 06/03/2020 27.0  22.0 - 30.0 mmol/L Final   • Calcium 06/03/2020 9.8  8.4 - 10.2 mg/dL Final   • Total Protein 06/03/2020 8.0  6.3 - 8.6 g/dL Final   • Albumin 06/03/2020 4.40  3.50 - 5.00 g/dL Final   • ALT (SGPT) 06/03/2020 42* <=35 U/L Final   • AST (SGOT) 06/03/2020 49* 14 -  36 U/L Final   • Alkaline Phosphatase 06/03/2020 67  38 - 126 U/L Final   • Total Bilirubin 06/03/2020 0.2  0.2 - 1.3 mg/dL Final   • eGFR Non African Amer 06/03/2020 55  39 - 90 mL/min/1.73 Final   • Globulin 06/03/2020 3.6* 2.3 - 3.5 gm/dL Final   • A/G Ratio 06/03/2020 1.2  1.1 - 1.8 g/dL Final   • BUN/Creatinine Ratio 06/03/2020 17.0  7.0 - 25.0 Final   • Anion Gap 06/03/2020 8.0  5.0 - 15.0 mmol/L Final   • Hemoglobin A1C 06/03/2020 7.41* 4.80 - 5.60 % Final   • Microalbumin/Creatinine Ratio 06/03/2020    Final    Unable to calculate   • Creatinine, Urine 06/03/2020 145.7  mg/dL Final   • Microalbumin, Urine 06/03/2020 <1.2  mg/dL Final   • LDL Cholesterol  06/03/2020 116* 0 - 100 mg/dL Final   • Triglycerides 06/03/2020 168* <=150 mg/dL Final   • 25 Hydroxy, Vitamin D 06/03/2020 39.2  30.0 - 100.0 ng/ml Final   ]

## 2020-06-17 ENCOUNTER — TELEPHONE (OUTPATIENT)
Dept: FAMILY MEDICINE CLINIC | Facility: CLINIC | Age: 75
End: 2020-06-17

## 2020-06-17 NOTE — TELEPHONE ENCOUNTER
----- Message from Darian Soliman MD sent at 6/17/2020  4:30 PM CDT -----  Farxiga 10 mg every morning.  EB  ----- Message -----  From: Cassy Izaguirre RN  Sent: 6/17/2020   2:48 PM CDT  To: Darian Soliman MD    She called and states Jardiance not on formulary but they will pay for Invokana or Farxiga. Please advise. TP

## 2020-08-11 DIAGNOSIS — Z12.31 ENCOUNTER FOR SCREENING MAMMOGRAM FOR MALIGNANT NEOPLASM OF BREAST: Primary | ICD-10-CM

## 2020-09-02 RX ORDER — MOMETASONE FUROATE AND FORMOTEROL FUMARATE DIHYDRATE 200; 5 UG/1; UG/1
AEROSOL RESPIRATORY (INHALATION)
Qty: 13 G | Refills: 5 | Status: SHIPPED | OUTPATIENT
Start: 2020-09-02 | End: 2020-10-05

## 2020-09-14 ENCOUNTER — TELEPHONE (OUTPATIENT)
Dept: FAMILY MEDICINE CLINIC | Facility: CLINIC | Age: 75
End: 2020-09-14

## 2020-09-14 DIAGNOSIS — R92.8 ABNORMAL MAMMOGRAM OF RIGHT BREAST: Primary | ICD-10-CM

## 2020-09-14 NOTE — TELEPHONE ENCOUNTER
I relayed results to patient and sent orders for referral and ultrasound. TP        ----- Message from Darian Soliman MD sent at 9/14/2020  9:46 AM CDT -----  Inform Glae that her mammogram is abnormal again this year.  The area in question is the same as last year, but it has more suspicious features this year.  Radiologist recommends an ultrasound, and arrange that.  Refer to Dr. Alexandra, or her surgeon of choice for further evaluation.  She needs a biopsy.  EB

## 2020-09-23 ENCOUNTER — TRANSCRIBE ORDERS (OUTPATIENT)
Dept: GENERAL RADIOLOGY | Facility: HOSPITAL | Age: 75
End: 2020-09-23

## 2020-09-23 ENCOUNTER — CONSULT (OUTPATIENT)
Dept: SURGERY | Facility: CLINIC | Age: 75
End: 2020-09-23

## 2020-09-23 VITALS
BODY MASS INDEX: 30.88 KG/M2 | HEART RATE: 73 BPM | OXYGEN SATURATION: 98 % | HEIGHT: 62 IN | WEIGHT: 167.8 LBS | TEMPERATURE: 97.7 F | DIASTOLIC BLOOD PRESSURE: 62 MMHG | SYSTOLIC BLOOD PRESSURE: 110 MMHG

## 2020-09-23 DIAGNOSIS — R92.8 ABNORMAL MAMMOGRAM OF RIGHT BREAST: Primary | ICD-10-CM

## 2020-09-23 DIAGNOSIS — Z01.818 PRE-OP TESTING: Primary | ICD-10-CM

## 2020-09-23 PROCEDURE — 99203 OFFICE O/P NEW LOW 30 MIN: CPT | Performed by: SURGERY

## 2020-09-23 RX ORDER — DIAZEPAM 5 MG/1
5 TABLET ORAL ONCE
Status: CANCELLED | OUTPATIENT
Start: 2020-09-23 | End: 2020-09-23

## 2020-09-23 RX ORDER — OXYCODONE HYDROCHLORIDE AND ACETAMINOPHEN 5; 325 MG/1; MG/1
1 TABLET ORAL ONCE
Status: CANCELLED | OUTPATIENT
Start: 2020-09-23 | End: 2020-09-23

## 2020-09-23 RX ORDER — LIDOCAINE HYDROCHLORIDE AND EPINEPHRINE 10; 10 MG/ML; UG/ML
30 INJECTION, SOLUTION INFILTRATION; PERINEURAL ONCE
Status: CANCELLED | OUTPATIENT
Start: 2020-09-23 | End: 2020-09-23

## 2020-09-23 NOTE — H&P (VIEW-ONLY)
Chief Complaint   Patient presents with   • Consult     ref: Jourdan, abnormal mammogram        HPI  74 year old with abnormality found on routine screening mammogram. Present last year but grew in the intervening time. No newly palpables, no skin dimpling, no nipple discharge, no axillary adenopathy. Imagng:    Study Result    PROCEDURE: Right breast sonogram     COMPARISON: No comparison     HISTORY: Abnormal mammogram     FINDINGS: Realtime grayscale and color-flow imaging is performed  of the right breast in the 10:00 axis, 4 cm from the nipple,  showing a hypoechoic mass with posterior acoustic shadowing  representing the calcifications seen on the recent mammogram.  Recommend stereotactic biopsy.  Additionally, there is a cyst in the 9:00 axis, 7 cm from the  nipple.     IMPRESSION:  CONCLUSION:    1.  Hypoechoic mass with posterior acoustic shadowing  representing the calcifications seen on the recent mammogram.  2.  Recommend stereotactic biopsy.  3.  BI-RADS Category 5: Highly suggestive of malignancy.     Findings and recommendations were discussed with the patient at  the time of the exam.  Findings and recommendations  discussed with BRIELLE CLANCY on  9/17/2020 at 3:45 PM.     Electronically signed by:  Quinton Huerta MD  9/17/2020 4:43 PM CDT  Workstation: [id number]     I have personally reviewed the imaging and concur with the radiologist's findings.      Past Medical History:   Diagnosis Date   • Abnormal mammogram of right breast    • Acquired hypothyroidism     started synthroid 9/2015   • Allergic rhinitis, seasonal    • Cervicalgia     right upper extremity radiculopathy   • Encounter for screening for malignant neoplasm of colon    • Essential hypertension    • Glaucoma    • Health maintenance examination     individual health exam   • Hypercholesterolemia    • Hyperglycemia     steroid-induced hyperglycemia in diabetic patient   • Lumbar disc disorder     w/right radiculopathy   • Menopause    •  Mild persistent asthma     resumed dulera twice daily   • Obstructive sleep apnea syndrome 9/17/2019   • Osteopenia     improved DEXA 2/2013   • 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   • Sebaceous cyst     subepidermal cyst   • Shoulder pain     likely radicular pain due to cervical DJD   • Spasm of back muscles     right neck and trapezius   • Spinal stenosis of lumbar region    • Strain of neck muscle     cervical strain   • Temporomandibular joint disorder     h/o   • Vitamin D deficiency     on oral calcium/vitamin D supplement       Past Surgical History:   Procedure Laterality Date   • COLONOSCOPY  12/17/2015    normal   • ENDOSCOPY AND COLONOSCOPY  08/2010   • HYSTERECTOMY     • OOPHORECTOMY           Current Outpatient Medications:   •  albuterol sulfate HFA (PROAIR HFA) 108 (90 Base) MCG/ACT inhaler, Inhale 2 puffs 4 (Four) Times a Day As Needed for Wheezing., Disp: 8.5 inhaler, Rfl: 6  •  amLODIPine (NORVASC) 5 MG tablet, TAKE 1 TABLET BY MOUTH ONCE DAILY FOR BLOOD PRESSURE, Disp: 90 tablet, Rfl: 3  •  aspirin 81 MG EC tablet, Take 81 mg by mouth Daily., Disp: , Rfl:   •  atorvastatin (LIPITOR) 20 MG tablet, TAKE 1 TABLET BY MOUTH ONCE DAILY, Disp: 90 tablet, Rfl: 3  •  Blood Glucose Monitoring Suppl (ONE TOUCH ULTRA 2) W/DEVICE kit, , Disp: , Rfl:   •  Calcium Carbonate-Vitamin D 600-200 MG-UNIT tablet, Take 1 tablet by mouth Daily., Disp: , Rfl:   •  Cholecalciferol (VITAMIN D) 25 MCG (1000 UT) tablet, Take 1 tablet by mouth Daily., Disp: , Rfl:   •  citalopram (CeleXA) 20 MG tablet, Take 1 tablet by mouth Daily. (Patient taking differently: Take 10 mg by mouth Daily.), Disp: 90 tablet, Rfl: 3  •  Dapagliflozin Propanediol (Farxiga) 10 MG tablet, Take 10 mg by mouth Every Morning., Disp: 90 tablet, Rfl: 1  •  DULERA 200-5 MCG/ACT inhaler, INHALE 2 PUFFS BY MOUTH TWICE DAILY, Disp: 13 g, Rfl: 5  •  fluticasone (FLONASE) 50 MCG/ACT nasal spray, 2 sprays into each nostril As Needed for rhinitis., Disp: ,  Rfl:   •  glucose blood (ONE TOUCH ULTRA TEST) test strip, 1 each by Other route Daily. Check 1-2times daily  E11.9  90 day supply One Touch Verio, Disp: 150 each, Rfl: 3  •  LANCETS MICRO THIN 33G misc, Check once daily  E11.9  Trueplus, Disp: 100 each, Rfl: 3  •  latanoprost (XALATAN) 0.005 % ophthalmic solution, Administer 1 drop to both eyes Every Night., Disp: , Rfl:   •  levothyroxine (SYNTHROID, LEVOTHROID) 50 MCG tablet, TAKE 1 TABLET BY MOUTH DAILY, Disp: 90 tablet, Rfl: 3  •  losartan-hydrochlorothiazide (HYZAAR) 100-12.5 MG per tablet, TAKE 1 TABLET BY MOUTH DAILY, Disp: 90 tablet, Rfl: 3  •  metFORMIN (GLUCOPHAGE) 500 MG tablet, TAKE 1 TABLET BY MOUTH TWICE A DAY WITH FOOD FOR DIABETES, Disp: 180 tablet, Rfl: 3  •  montelukast (SINGULAIR) 10 MG tablet, TAKE 1 TABLET BY MOUTH AT BEDTIME FOR ASTHMA, Disp: 90 tablet, Rfl: 3  •  mupirocin (BACTROBAN) 2 % ointment, APPLY INTO NOSTRIL BID FOR 7 DAYS, Disp: , Rfl: 0  •  nystatin-triamcinolone (MYCOLOG) 335078-0.1 UNIT/GM-% ointment, Apply  topically to the appropriate area as directed 2 (Two) Times a Day., Disp: 60 g, Rfl: 2    Allergies   Allergen Reactions   • Other Other (See Comments)     Coda-clear       Family History   Problem Relation Age of Onset   • Colon cancer Mother    • Heart attack Father    • Aneurysm Brother    • No Known Problems Son    • Alzheimer's disease Maternal Grandmother    • Heart attack Maternal Grandfather    • Alzheimer's disease Paternal Grandmother    • No Known Problems Son        Social History     Socioeconomic History   • Marital status:      Spouse name: Not on file   • Number of children: Not on file   • Years of education: Not on file   • Highest education level: Not on file   Tobacco Use   • Smoking status: Never Smoker   • Smokeless tobacco: Never Used   Substance and Sexual Activity   • Alcohol use: No   • Drug use: No   • Sexual activity: Defer       Review of Systems   Constitutional: Negative for appetite  change, chills, fever and unexpected weight change.   HENT: Negative for hearing loss, nosebleeds and trouble swallowing.    Eyes: Negative for visual disturbance.   Respiratory: Positive for shortness of breath and wheezing. Negative for apnea, cough, choking, chest tightness and stridor.    Cardiovascular: Negative for chest pain, palpitations and leg swelling.   Gastrointestinal: Positive for diarrhea. Negative for abdominal distention, abdominal pain, blood in stool, constipation, nausea and vomiting.   Endocrine: Negative for cold intolerance, heat intolerance, polydipsia, polyphagia and polyuria.   Genitourinary: Negative for difficulty urinating, dysuria, frequency, hematuria and urgency.        Stress urinary incontinence   Musculoskeletal: Negative for arthralgias, back pain, myalgias and neck pain.   Skin: Negative for color change, pallor and rash.   Allergic/Immunologic: Negative for immunocompromised state.   Neurological: Positive for dizziness. Negative for seizures, syncope, light-headedness, numbness and headaches.   Hematological: Negative for adenopathy.   Psychiatric/Behavioral: Negative for suicidal ideas. The patient is not nervous/anxious.    All other systems reviewed and are negative.      Physical Exam  Vitals signs reviewed.   Constitutional:       General: She is not in acute distress.     Appearance: She is well-developed. She is not diaphoretic.   HENT:      Head: Normocephalic and atraumatic.      Mouth/Throat:      Pharynx: No oropharyngeal exudate.   Eyes:      General: No scleral icterus.     Pupils: Pupils are equal, round, and reactive to light.   Neck:      Musculoskeletal: Normal range of motion and neck supple.      Thyroid: No thyromegaly.      Vascular: No JVD.      Trachea: No tracheal deviation.   Cardiovascular:      Rate and Rhythm: Normal rate and regular rhythm.      Heart sounds: Normal heart sounds.   Pulmonary:      Effort: Pulmonary effort is normal. No respiratory  distress.   Chest:      Breasts: Breasts are symmetrical.         Right: No inverted nipple, mass, nipple discharge, skin change or tenderness.         Left: No inverted nipple, mass, nipple discharge, skin change or tenderness.   Musculoskeletal: Normal range of motion.         General: No tenderness or deformity.   Lymphadenopathy:      Cervical: No cervical adenopathy.   Skin:     General: Skin is warm and dry.      Coloration: Skin is not pale.      Findings: No erythema or rash.   Neurological:      Mental Status: She is alert and oriented to person, place, and time.   Psychiatric:         Behavior: Behavior normal.         Judgment: Judgment normal.           ASSESSMENT    Gale was seen today for consult.    Diagnoses and all orders for this visit:    Abnormal mammogram of right breast  -     US Guided Breast Biopsy With & Without Device initial Right        PLAN    1. US mammotome right breat with clip placement    The following were discussed with the patient/family:      What are the indications that have led your doctor to the opinion that an operation is necessary?    Breast imaging has determined an abnormal area requiring biopsy.    What, if any, alternative treatments are available for your condition?    Alternatively, open biopsy in the operating room may be performed under sedation or general anesthesia. Observation is not a good option.    What will be the likely result if you don't have the operation?    There is a possibility of missing a breast cancer diagnosis.    What are the basic procedures involved in the operation?    The patient will be placed supine for the procedure. A small incision will be made under local anesthesia, and a special, large needle will be used to perform the biopsy.   A permanent titanium clip will be placed in the breast to juliana the site of biopsy should further surgery be necessary.    What are the risks?    The risks of bleeding, infection, the possible need for open  biopsy or other procedure, scarring, and poor wound healing are explained. Bruising almost always occurs. There is a low transfusion risk. The risks of chronic pain are, likewise, low.     How is the operation expected to improve your health or quality of life?    The lesion can usually be determined to be benign or malignant. Follow up xrays or further open excision may be necessary depending on the pathology.    Is hospitalization necessary and, if so, how long can you expect to be hospitalized?    This procedure is performed on an outpatient basis.    What can you expect during your recovery period?    Minimal pain is usually controlled with non-narcotic analgesia.    When can you expect to resume normal activities?    Normal activity may be resumed the following day.    Are there likely to be residual effects from the operation?    Usually, there are no residual effects following biiopsy.    .   All questions were answered. The patient agrees to operation.              This document has been electronically signed by Duke Alexandra MD on September 23, 2020 11:12 CDT

## 2020-09-23 NOTE — PATIENT INSTRUCTIONS

## 2020-09-23 NOTE — PROGRESS NOTES
Chief Complaint   Patient presents with   • Consult     ref: Jourdan, abnormal mammogram        HPI  74 year old with abnormality found on routine screening mammogram. Present last year but grew in the intervening time. No newly palpables, no skin dimpling, no nipple discharge, no axillary adenopathy. Imagng:    Study Result    PROCEDURE: Right breast sonogram     COMPARISON: No comparison     HISTORY: Abnormal mammogram     FINDINGS: Realtime grayscale and color-flow imaging is performed  of the right breast in the 10:00 axis, 4 cm from the nipple,  showing a hypoechoic mass with posterior acoustic shadowing  representing the calcifications seen on the recent mammogram.  Recommend stereotactic biopsy.  Additionally, there is a cyst in the 9:00 axis, 7 cm from the  nipple.     IMPRESSION:  CONCLUSION:    1.  Hypoechoic mass with posterior acoustic shadowing  representing the calcifications seen on the recent mammogram.  2.  Recommend stereotactic biopsy.  3.  BI-RADS Category 5: Highly suggestive of malignancy.     Findings and recommendations were discussed with the patient at  the time of the exam.  Findings and recommendations  discussed with BRIELLE CLANCY on  9/17/2020 at 3:45 PM.     Electronically signed by:  Quinton Huerta MD  9/17/2020 4:43 PM CDT  Workstation: [id number]     I have personally reviewed the imaging and concur with the radiologist's findings.      Past Medical History:   Diagnosis Date   • Abnormal mammogram of right breast    • Acquired hypothyroidism     started synthroid 9/2015   • Allergic rhinitis, seasonal    • Cervicalgia     right upper extremity radiculopathy   • Encounter for screening for malignant neoplasm of colon    • Essential hypertension    • Glaucoma    • Health maintenance examination     individual health exam   • Hypercholesterolemia    • Hyperglycemia     steroid-induced hyperglycemia in diabetic patient   • Lumbar disc disorder     w/right radiculopathy   • Menopause    •  Mild persistent asthma     resumed dulera twice daily   • Obstructive sleep apnea syndrome 9/17/2019   • Osteopenia     improved DEXA 2/2013   • 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   • Sebaceous cyst     subepidermal cyst   • Shoulder pain     likely radicular pain due to cervical DJD   • Spasm of back muscles     right neck and trapezius   • Spinal stenosis of lumbar region    • Strain of neck muscle     cervical strain   • Temporomandibular joint disorder     h/o   • Vitamin D deficiency     on oral calcium/vitamin D supplement       Past Surgical History:   Procedure Laterality Date   • COLONOSCOPY  12/17/2015    normal   • ENDOSCOPY AND COLONOSCOPY  08/2010   • HYSTERECTOMY     • OOPHORECTOMY           Current Outpatient Medications:   •  albuterol sulfate HFA (PROAIR HFA) 108 (90 Base) MCG/ACT inhaler, Inhale 2 puffs 4 (Four) Times a Day As Needed for Wheezing., Disp: 8.5 inhaler, Rfl: 6  •  amLODIPine (NORVASC) 5 MG tablet, TAKE 1 TABLET BY MOUTH ONCE DAILY FOR BLOOD PRESSURE, Disp: 90 tablet, Rfl: 3  •  aspirin 81 MG EC tablet, Take 81 mg by mouth Daily., Disp: , Rfl:   •  atorvastatin (LIPITOR) 20 MG tablet, TAKE 1 TABLET BY MOUTH ONCE DAILY, Disp: 90 tablet, Rfl: 3  •  Blood Glucose Monitoring Suppl (ONE TOUCH ULTRA 2) W/DEVICE kit, , Disp: , Rfl:   •  Calcium Carbonate-Vitamin D 600-200 MG-UNIT tablet, Take 1 tablet by mouth Daily., Disp: , Rfl:   •  Cholecalciferol (VITAMIN D) 25 MCG (1000 UT) tablet, Take 1 tablet by mouth Daily., Disp: , Rfl:   •  citalopram (CeleXA) 20 MG tablet, Take 1 tablet by mouth Daily. (Patient taking differently: Take 10 mg by mouth Daily.), Disp: 90 tablet, Rfl: 3  •  Dapagliflozin Propanediol (Farxiga) 10 MG tablet, Take 10 mg by mouth Every Morning., Disp: 90 tablet, Rfl: 1  •  DULERA 200-5 MCG/ACT inhaler, INHALE 2 PUFFS BY MOUTH TWICE DAILY, Disp: 13 g, Rfl: 5  •  fluticasone (FLONASE) 50 MCG/ACT nasal spray, 2 sprays into each nostril As Needed for rhinitis., Disp: ,  Rfl:   •  glucose blood (ONE TOUCH ULTRA TEST) test strip, 1 each by Other route Daily. Check 1-2times daily  E11.9  90 day supply One Touch Verio, Disp: 150 each, Rfl: 3  •  LANCETS MICRO THIN 33G misc, Check once daily  E11.9  Trueplus, Disp: 100 each, Rfl: 3  •  latanoprost (XALATAN) 0.005 % ophthalmic solution, Administer 1 drop to both eyes Every Night., Disp: , Rfl:   •  levothyroxine (SYNTHROID, LEVOTHROID) 50 MCG tablet, TAKE 1 TABLET BY MOUTH DAILY, Disp: 90 tablet, Rfl: 3  •  losartan-hydrochlorothiazide (HYZAAR) 100-12.5 MG per tablet, TAKE 1 TABLET BY MOUTH DAILY, Disp: 90 tablet, Rfl: 3  •  metFORMIN (GLUCOPHAGE) 500 MG tablet, TAKE 1 TABLET BY MOUTH TWICE A DAY WITH FOOD FOR DIABETES, Disp: 180 tablet, Rfl: 3  •  montelukast (SINGULAIR) 10 MG tablet, TAKE 1 TABLET BY MOUTH AT BEDTIME FOR ASTHMA, Disp: 90 tablet, Rfl: 3  •  mupirocin (BACTROBAN) 2 % ointment, APPLY INTO NOSTRIL BID FOR 7 DAYS, Disp: , Rfl: 0  •  nystatin-triamcinolone (MYCOLOG) 626021-3.1 UNIT/GM-% ointment, Apply  topically to the appropriate area as directed 2 (Two) Times a Day., Disp: 60 g, Rfl: 2    Allergies   Allergen Reactions   • Other Other (See Comments)     Coda-clear       Family History   Problem Relation Age of Onset   • Colon cancer Mother    • Heart attack Father    • Aneurysm Brother    • No Known Problems Son    • Alzheimer's disease Maternal Grandmother    • Heart attack Maternal Grandfather    • Alzheimer's disease Paternal Grandmother    • No Known Problems Son        Social History     Socioeconomic History   • Marital status:      Spouse name: Not on file   • Number of children: Not on file   • Years of education: Not on file   • Highest education level: Not on file   Tobacco Use   • Smoking status: Never Smoker   • Smokeless tobacco: Never Used   Substance and Sexual Activity   • Alcohol use: No   • Drug use: No   • Sexual activity: Defer       Review of Systems   Constitutional: Negative for appetite  change, chills, fever and unexpected weight change.   HENT: Negative for hearing loss, nosebleeds and trouble swallowing.    Eyes: Negative for visual disturbance.   Respiratory: Positive for shortness of breath and wheezing. Negative for apnea, cough, choking, chest tightness and stridor.    Cardiovascular: Negative for chest pain, palpitations and leg swelling.   Gastrointestinal: Positive for diarrhea. Negative for abdominal distention, abdominal pain, blood in stool, constipation, nausea and vomiting.   Endocrine: Negative for cold intolerance, heat intolerance, polydipsia, polyphagia and polyuria.   Genitourinary: Negative for difficulty urinating, dysuria, frequency, hematuria and urgency.        Stress urinary incontinence   Musculoskeletal: Negative for arthralgias, back pain, myalgias and neck pain.   Skin: Negative for color change, pallor and rash.   Allergic/Immunologic: Negative for immunocompromised state.   Neurological: Positive for dizziness. Negative for seizures, syncope, light-headedness, numbness and headaches.   Hematological: Negative for adenopathy.   Psychiatric/Behavioral: Negative for suicidal ideas. The patient is not nervous/anxious.    All other systems reviewed and are negative.      Physical Exam  Vitals signs reviewed.   Constitutional:       General: She is not in acute distress.     Appearance: She is well-developed. She is not diaphoretic.   HENT:      Head: Normocephalic and atraumatic.      Mouth/Throat:      Pharynx: No oropharyngeal exudate.   Eyes:      General: No scleral icterus.     Pupils: Pupils are equal, round, and reactive to light.   Neck:      Musculoskeletal: Normal range of motion and neck supple.      Thyroid: No thyromegaly.      Vascular: No JVD.      Trachea: No tracheal deviation.   Cardiovascular:      Rate and Rhythm: Normal rate and regular rhythm.      Heart sounds: Normal heart sounds.   Pulmonary:      Effort: Pulmonary effort is normal. No respiratory  distress.   Chest:      Breasts: Breasts are symmetrical.         Right: No inverted nipple, mass, nipple discharge, skin change or tenderness.         Left: No inverted nipple, mass, nipple discharge, skin change or tenderness.   Musculoskeletal: Normal range of motion.         General: No tenderness or deformity.   Lymphadenopathy:      Cervical: No cervical adenopathy.   Skin:     General: Skin is warm and dry.      Coloration: Skin is not pale.      Findings: No erythema or rash.   Neurological:      Mental Status: She is alert and oriented to person, place, and time.   Psychiatric:         Behavior: Behavior normal.         Judgment: Judgment normal.           ASSESSMENT    Gale was seen today for consult.    Diagnoses and all orders for this visit:    Abnormal mammogram of right breast  -     US Guided Breast Biopsy With & Without Device initial Right        PLAN    1. US mammotome right breat with clip placement    The following were discussed with the patient/family:      What are the indications that have led your doctor to the opinion that an operation is necessary?    Breast imaging has determined an abnormal area requiring biopsy.    What, if any, alternative treatments are available for your condition?    Alternatively, open biopsy in the operating room may be performed under sedation or general anesthesia. Observation is not a good option.    What will be the likely result if you don't have the operation?    There is a possibility of missing a breast cancer diagnosis.    What are the basic procedures involved in the operation?    The patient will be placed supine for the procedure. A small incision will be made under local anesthesia, and a special, large needle will be used to perform the biopsy.   A permanent titanium clip will be placed in the breast to juliana the site of biopsy should further surgery be necessary.    What are the risks?    The risks of bleeding, infection, the possible need for open  biopsy or other procedure, scarring, and poor wound healing are explained. Bruising almost always occurs. There is a low transfusion risk. The risks of chronic pain are, likewise, low.     How is the operation expected to improve your health or quality of life?    The lesion can usually be determined to be benign or malignant. Follow up xrays or further open excision may be necessary depending on the pathology.    Is hospitalization necessary and, if so, how long can you expect to be hospitalized?    This procedure is performed on an outpatient basis.    What can you expect during your recovery period?    Minimal pain is usually controlled with non-narcotic analgesia.    When can you expect to resume normal activities?    Normal activity may be resumed the following day.    Are there likely to be residual effects from the operation?    Usually, there are no residual effects following biiopsy.    .   All questions were answered. The patient agrees to operation.              This document has been electronically signed by Duke Alexandra MD on September 23, 2020 11:12 CDT

## 2020-09-25 ENCOUNTER — LAB (OUTPATIENT)
Dept: LAB | Facility: HOSPITAL | Age: 75
End: 2020-09-25

## 2020-09-25 DIAGNOSIS — Z01.818 PRE-OP TESTING: ICD-10-CM

## 2020-09-25 PROCEDURE — C9803 HOPD COVID-19 SPEC COLLECT: HCPCS

## 2020-09-25 PROCEDURE — 88360 TUMOR IMMUNOHISTOCHEM/MANUAL: CPT

## 2020-09-25 PROCEDURE — U0003 INFECTIOUS AGENT DETECTION BY NUCLEIC ACID (DNA OR RNA); SEVERE ACUTE RESPIRATORY SYNDROME CORONAVIRUS 2 (SARS-COV-2) (CORONAVIRUS DISEASE [COVID-19]), AMPLIFIED PROBE TECHNIQUE, MAKING USE OF HIGH THROUGHPUT TECHNOLOGIES AS DESCRIBED BY CMS-2020-01-R: HCPCS

## 2020-09-26 LAB
COVID LABCORP PRIORITY: NORMAL
SARS-COV-2 RNA RESP QL NAA+PROBE: NOT DETECTED

## 2020-09-28 ENCOUNTER — HOSPITAL ENCOUNTER (OUTPATIENT)
Dept: ULTRASOUND IMAGING | Facility: HOSPITAL | Age: 75
Discharge: HOME | End: 2020-09-28
Admitting: SURGERY

## 2020-09-28 VITALS
HEART RATE: 67 BPM | TEMPERATURE: 97.8 F | HEIGHT: 62 IN | BODY MASS INDEX: 31.28 KG/M2 | WEIGHT: 169.97 LBS | SYSTOLIC BLOOD PRESSURE: 117 MMHG | OXYGEN SATURATION: 95 % | RESPIRATION RATE: 18 BRPM | DIASTOLIC BLOOD PRESSURE: 57 MMHG

## 2020-09-28 DIAGNOSIS — R92.8 ABNORMAL MAMMOGRAM OF RIGHT BREAST: ICD-10-CM

## 2020-09-28 PROCEDURE — 88360 TUMOR IMMUNOHISTOCHEM/MANUAL: CPT

## 2020-09-28 PROCEDURE — 19083 BX BREAST 1ST LESION US IMAG: CPT | Performed by: SURGERY

## 2020-09-28 PROCEDURE — 88305 TISSUE EXAM BY PATHOLOGIST: CPT

## 2020-09-28 PROCEDURE — 88342 IMHCHEM/IMCYTCHM 1ST ANTB: CPT

## 2020-09-28 PROCEDURE — A4648 IMPLANTABLE TISSUE MARKER: HCPCS

## 2020-09-28 RX ORDER — OXYCODONE HYDROCHLORIDE AND ACETAMINOPHEN 5; 325 MG/1; MG/1
1 TABLET ORAL ONCE
Status: COMPLETED | OUTPATIENT
Start: 2020-09-28 | End: 2020-09-28

## 2020-09-28 RX ORDER — LIDOCAINE HYDROCHLORIDE AND EPINEPHRINE 10; 10 MG/ML; UG/ML
30 INJECTION, SOLUTION INFILTRATION; PERINEURAL ONCE
Status: COMPLETED | OUTPATIENT
Start: 2020-09-28 | End: 2020-09-28

## 2020-09-28 RX ORDER — DIAZEPAM 5 MG/1
5 TABLET ORAL ONCE
Status: COMPLETED | OUTPATIENT
Start: 2020-09-28 | End: 2020-09-28

## 2020-09-28 RX ADMIN — LIDOCAINE HYDROCHLORIDE,EPINEPHRINE BITARTRATE 14 ML: 10; .01 INJECTION, SOLUTION INFILTRATION; PERINEURAL at 08:17

## 2020-09-28 RX ADMIN — DIAZEPAM 5 MG: 5 TABLET ORAL at 07:15

## 2020-09-28 RX ADMIN — OXYCODONE HYDROCHLORIDE AND ACETAMINOPHEN 1 TABLET: 5; 325 TABLET ORAL at 07:14

## 2020-09-28 NOTE — INTERVAL H&P NOTE
H&P reviewed. The patient was examined and there are no changes to the H&P.      Temp:  [98.6 °F (37 °C)] 98.6 °F (37 °C)  Heart Rate:  [67] 67  Resp:  [18] 18  BP: (134)/(63) 134/63

## 2020-09-30 ENCOUNTER — NURSE NAVIGATOR (OUTPATIENT)
Dept: ONCOLOGY | Facility: CLINIC | Age: 75
End: 2020-09-30

## 2020-09-30 ENCOUNTER — OFFICE VISIT (OUTPATIENT)
Dept: SURGERY | Facility: CLINIC | Age: 75
End: 2020-09-30

## 2020-09-30 VITALS
BODY MASS INDEX: 30.95 KG/M2 | WEIGHT: 168.2 LBS | TEMPERATURE: 98.2 F | OXYGEN SATURATION: 99 % | DIASTOLIC BLOOD PRESSURE: 64 MMHG | HEIGHT: 62 IN | SYSTOLIC BLOOD PRESSURE: 110 MMHG | HEART RATE: 72 BPM

## 2020-09-30 DIAGNOSIS — C50.911 INVASIVE DUCTAL CARCINOMA OF RIGHT BREAST (HCC): Primary | ICD-10-CM

## 2020-09-30 LAB
LAB AP CASE REPORT: NORMAL
PATH REPORT.FINAL DX SPEC: NORMAL

## 2020-09-30 PROCEDURE — 99212 OFFICE O/P EST SF 10 MIN: CPT | Performed by: SURGERY

## 2020-09-30 NOTE — PROGRESS NOTES
ONCOLOGY PATIENT NAVIGATION    DIAGNOSIS: Breast Cancer  REFERRAL SOURCE: [facility]  ENCOUNTER TYPE: In-Person    NOTE: Spoke to pt today concerning newly diagnosed breast cancer by Dr. Alexandra. Pt informed of diagnosis by physician and plan of care. Referral received for nurse navigation support in cancer care. Referral to medical oncology for treatment evaluation based on diagnosis of [place] Breast Ca.    EDUCATION PROVIDED: Informed pt of my role as the nurse navigator in cancer care and my support to pt and family. Provided pt with the breast cancer treatment handbook for support and education surrounding diagnosis and ongoing treatment planning. Spent approximately 30 mins in face to face contact of diagnosis and treatment planning. Pt was had several questions surrounding plan of care. Pt validated extensive family support and denied any logistical concerns. Educated pt on team navigation approach offered at [facility] to include nurse navigator currently in place, oncology social worker, financial navigator, and dietician.     NEEDS ASSESSMENT:     Barriers to care:  1. Information Needs/Questions/Understanding  2. Coordinating Appts/Tests/Procedures on Patient Behalf  3. Difficulty Coping Related to Diagnosis    Action Plan to Address Barriers:  1. Educate patient about Diagnosis and Treatment Planning  2. Assist Patient with Formulating Questions to Ask Healthcare Team  3. Provided Written Education and Information  4. Provided Verbal Education  5. Schedule Appts/Tests/Procedures on Patient Behalf with Healthcare Team  6. Provide Emotional Support as needed  7. Navigator Available by Phone for Supportive Care Needs  8. Serve as Liaison between Patient and Healthcare Team  9. Connect Patient with  at Oncology Visit    NAVIGATION OUTCOMES ACHIEVED:  1. Improved Patient Satisfaction  2. Improved Healthcare Efficiency  3. Reduced Care Fragmentation  4. Improved Timeliness to Care  5. Distress Triage    PLAN  OF CARE: Provided my contact information and encouraged pt to call me with concerns or questions. Encouraged pt to utilize breast cancer handbook for education and assistance. Informed pt will set up medical oncology appt a team member will call pt with appt info. Will f/u with pt at next office visit for supportive care and education needs. Pt stated understanding of all discussed and denied further questions.

## 2020-10-01 NOTE — PROGRESS NOTES
CHIEF COMPLAINT:   Chief Complaint   Patient presents with   • Abnormal mammogram of right breast       HPI: This patient presents for a post-operative visit after undergoing a right ultrasound biopsy. Patient reports no problems.  No pain.  No bruising.    PATHOLOGY:       Physical Exam  Incisions healing with no infection, cellulitis or hematoma    ASSESSMENT:    Gale was seen today for abnormal mammogram of right breast.    Diagnoses and all orders for this visit:    Invasive ductal carcinoma of right breast (CMS/HCC)        PLAN:    1.  She is scheduled for medical oncologic visit to discuss whether or not chemotherapy should be administered immediately and surgery performed secondarily    Should Mediport be requested, the procedure is discussed.    The following were discussed with the patient/family:    What are the indications that have led your doctor to the opinion that an operation is necessary?    A mediport is a device placed under the skin, that connects to a large vein in the chest or neck. It is used for the administration of fluid or medication.     What, if any, alternative treatments are available for your condition?    Peripheral veins or a peripherally placed PICC line may be used.    What will be the likely result if you don't have the operation?    It may not be possible to administer needed medications. Toxic medications may be harmful to the veins if given in a peripheral vein.    What are the basic procedures involved in the operation?    After anesthesia, a needle is passed into a large vein in the neck or chest. Ultrasound may be used to find the veins of the neck, but not the chest. Once accessed, a wire is passed through the needle and into the central veins using fluoroscopy.  Sheath is passed over the wire and the wire is removed. The tubing is passed under the skin and through the sheath. The sheath is pealed away and the tubing is left in the vein. Placement is confirmed with  xray.    What are the risks?    Risks of the procedure include but are not limited to bleeding, infection, pneumothorax, blood clots, poor wound healing, poor port function, skin erosion, and pain. Facial and arm swelling require immediate evaluation.    How is the operation expected to improve your health or quality of life?    IV access is more easily obtained.    Is hospitalization necessary and, if so, how long can you expect to be hospitalized?    The procedure is performed on an outpatient basis.    What can you expect during your recovery period?    Minimal pain is usually controlled with non-narcotic pain medication.    When can you expect to resume normal activities?    Normal activity may occur within a few days. The port may be accessed immediately for treatment.    Are there likely to be residual effects from the operation?    There are usually no residual effects of operation.    He understands, agrees, and desires to proceed.

## 2020-10-02 ENCOUNTER — CONSULT (OUTPATIENT)
Dept: ONCOLOGY | Facility: CLINIC | Age: 75
End: 2020-10-02

## 2020-10-02 ENCOUNTER — LAB (OUTPATIENT)
Dept: ONCOLOGY | Facility: HOSPITAL | Age: 75
End: 2020-10-02

## 2020-10-02 ENCOUNTER — DOCUMENTATION (OUTPATIENT)
Dept: ONCOLOGY | Facility: CLINIC | Age: 75
End: 2020-10-02

## 2020-10-02 VITALS
DIASTOLIC BLOOD PRESSURE: 63 MMHG | HEART RATE: 80 BPM | RESPIRATION RATE: 16 BRPM | SYSTOLIC BLOOD PRESSURE: 143 MMHG | WEIGHT: 168.6 LBS | BODY MASS INDEX: 31.03 KG/M2 | HEIGHT: 62 IN | TEMPERATURE: 97.7 F

## 2020-10-02 DIAGNOSIS — D72.829 LEUKOCYTOSIS, UNSPECIFIED TYPE: ICD-10-CM

## 2020-10-02 DIAGNOSIS — D75.839 THROMBOCYTOSIS: ICD-10-CM

## 2020-10-02 DIAGNOSIS — Z17.1 MALIGNANT NEOPLASM OF LOWER-OUTER QUADRANT OF RIGHT BREAST OF FEMALE, ESTROGEN RECEPTOR NEGATIVE (HCC): Primary | ICD-10-CM

## 2020-10-02 DIAGNOSIS — C50.911 INVASIVE DUCTAL CARCINOMA OF RIGHT BREAST (HCC): ICD-10-CM

## 2020-10-02 DIAGNOSIS — R79.89 ELEVATED LFTS: ICD-10-CM

## 2020-10-02 DIAGNOSIS — C50.511 MALIGNANT NEOPLASM OF LOWER-OUTER QUADRANT OF RIGHT BREAST OF FEMALE, ESTROGEN RECEPTOR NEGATIVE (HCC): Primary | ICD-10-CM

## 2020-10-02 LAB
ALBUMIN SERPL-MCNC: 4.3 G/DL (ref 3.5–5.2)
ALBUMIN/GLOB SERPL: 1.2 G/DL
ALP SERPL-CCNC: 78 U/L (ref 39–117)
ALT SERPL W P-5'-P-CCNC: 35 U/L (ref 1–33)
ANION GAP SERPL CALCULATED.3IONS-SCNC: 13 MMOL/L (ref 5–15)
AST SERPL-CCNC: 41 U/L (ref 1–32)
BASOPHILS # BLD AUTO: 0.25 10*3/MM3 (ref 0–0.2)
BASOPHILS NFR BLD AUTO: 2 % (ref 0–1.5)
BILIRUB SERPL-MCNC: 0.3 MG/DL (ref 0–1.2)
BUN SERPL-MCNC: 17 MG/DL (ref 8–23)
BUN/CREAT SERPL: 15 (ref 7–25)
CALCIUM SPEC-SCNC: 10 MG/DL (ref 8.6–10.5)
CHLORIDE SERPL-SCNC: 102 MMOL/L (ref 98–107)
CO2 SERPL-SCNC: 23 MMOL/L (ref 22–29)
CREAT SERPL-MCNC: 1.13 MG/DL (ref 0.57–1)
DEPRECATED RDW RBC AUTO: 46.1 FL (ref 37–54)
EOSINOPHIL # BLD AUTO: 0.94 10*3/MM3 (ref 0–0.4)
EOSINOPHIL NFR BLD AUTO: 7.4 % (ref 0.3–6.2)
ERYTHROCYTE [DISTWIDTH] IN BLOOD BY AUTOMATED COUNT: 15.1 % (ref 12.3–15.4)
GFR SERPL CREATININE-BSD FRML MDRD: 47 ML/MIN/1.73
GLOBULIN UR ELPH-MCNC: 3.7 GM/DL
GLUCOSE SERPL-MCNC: 97 MG/DL (ref 65–99)
HCT VFR BLD AUTO: 43.1 % (ref 34–46.6)
HGB BLD-MCNC: 13.6 G/DL (ref 12–15.9)
IMM GRANULOCYTES # BLD AUTO: 0.03 10*3/MM3 (ref 0–0.05)
IMM GRANULOCYTES NFR BLD AUTO: 0.2 % (ref 0–0.5)
LYMPHOCYTES # BLD AUTO: 3.47 10*3/MM3 (ref 0.7–3.1)
LYMPHOCYTES NFR BLD AUTO: 27.5 % (ref 19.6–45.3)
MCH RBC QN AUTO: 26.3 PG (ref 26.6–33)
MCHC RBC AUTO-ENTMCNC: 31.6 G/DL (ref 31.5–35.7)
MCV RBC AUTO: 83.4 FL (ref 79–97)
MONOCYTES # BLD AUTO: 1.12 10*3/MM3 (ref 0.1–0.9)
MONOCYTES NFR BLD AUTO: 8.9 % (ref 5–12)
NEUTROPHILS NFR BLD AUTO: 54 % (ref 42.7–76)
NEUTROPHILS NFR BLD AUTO: 6.83 10*3/MM3 (ref 1.7–7)
NRBC BLD AUTO-RTO: 0 /100 WBC (ref 0–0.2)
PLATELET # BLD AUTO: 497 10*3/MM3 (ref 140–450)
PMV BLD AUTO: 9.1 FL (ref 6–12)
POTASSIUM SERPL-SCNC: 3.7 MMOL/L (ref 3.5–5.2)
PROT SERPL-MCNC: 8 G/DL (ref 6–8.5)
RBC # BLD AUTO: 5.17 10*6/MM3 (ref 3.77–5.28)
SODIUM SERPL-SCNC: 138 MMOL/L (ref 136–145)
WBC # BLD AUTO: 12.64 10*3/MM3 (ref 3.4–10.8)

## 2020-10-02 PROCEDURE — 1123F ACP DISCUSS/DSCN MKR DOCD: CPT | Performed by: INTERNAL MEDICINE

## 2020-10-02 PROCEDURE — 80053 COMPREHEN METABOLIC PANEL: CPT | Performed by: INTERNAL MEDICINE

## 2020-10-02 PROCEDURE — G8731 PAIN NEG NO PLAN: HCPCS | Performed by: INTERNAL MEDICINE

## 2020-10-02 PROCEDURE — 99204 OFFICE O/P NEW MOD 45 MIN: CPT | Performed by: INTERNAL MEDICINE

## 2020-10-02 PROCEDURE — G0463 HOSPITAL OUTPT CLINIC VISIT: HCPCS | Performed by: INTERNAL MEDICINE

## 2020-10-02 PROCEDURE — G9903 PT SCRN TBCO ID AS NON USER: HCPCS | Performed by: INTERNAL MEDICINE

## 2020-10-02 PROCEDURE — 85025 COMPLETE CBC W/AUTO DIFF WBC: CPT | Performed by: INTERNAL MEDICINE

## 2020-10-02 RX ORDER — SODIUM CHLORIDE 0.9 % (FLUSH) 0.9 %
10 SYRINGE (ML) INJECTION AS NEEDED
Status: CANCELLED | OUTPATIENT
Start: 2020-10-08

## 2020-10-02 RX ORDER — SODIUM CHLORIDE 0.9 % (FLUSH) 0.9 %
3 SYRINGE (ML) INJECTION EVERY 12 HOURS SCHEDULED
Status: CANCELLED | OUTPATIENT
Start: 2020-10-08

## 2020-10-02 RX ORDER — SODIUM CHLORIDE, SODIUM LACTATE, POTASSIUM CHLORIDE, CALCIUM CHLORIDE 600; 310; 30; 20 MG/100ML; MG/100ML; MG/100ML; MG/100ML
100 INJECTION, SOLUTION INTRAVENOUS CONTINUOUS
Status: CANCELLED | OUTPATIENT
Start: 2020-10-08

## 2020-10-02 NOTE — PROGRESS NOTES
"New patient consultation. Medical oncology.  75 year old female presents for first oncology visit having recently been diagnosed with breast cancer, invasive ductal carcinoma. Pt is accompanied in the room by her .   Pt. Presents alert and oriented x 3, engages openly and evidencing normal mood and affect.  Pt. Describes anxious distress that is consistent with her new diagnosis of cancer and uncertainties \"of the unknown\"   SW encouraged pt to share her thoughts and feelings and was supported openly with validation of emotions and reinforcement of support and adaptive coping.   Pt is prescribed Celexa and takes 10 mg daily, indicating it was prescribed some time ago during a time of situational mild depression.  SW offered feedback as to anticipated depression/ anxiety and major depressive disorder. Pt. denies symptoms of major mood disorder, no significant  psychiatric history reported by pt.  Pt. States no immediate needs, no resource needs and  Attests to good family support and history of adaptive coping skills.    LCSW offered person centered therapeutic feedback by means of collecting history, emotional support, active listening, validation of emotions and clarification of feedback and recommendations for care.  Provided education related to oncology supports and services.    "

## 2020-10-02 NOTE — PROGRESS NOTES
DATE OF CONSULT: 10/5/2020    REQUESTING SOURCE:  Dr Alexandra      REASON FOR CONSULTATION: Triple negative right breast cancer      HISTORY OF PRESENT ILLNESS:   75-year-old female with medical problem consisting of diabetes mellitus, hypertension, dyslipidemia, asthma, osteopenia, obstructive sleep apnea, chronic back pain and knee pain has been referred to [facility] for newly diagnosed invasive ductal carcinoma of right breast.  Patient had a routine screening mammogram done on September 11, 2020 that showed developing asymmetry in the right breast.  Subsequently patient underwent diagnostic mammogram and ultrasound on September 17, 2020 followed by biopsy on September 28, 2020.  Biopsy showed invasive ductal carcinoma which was triple negative.  Patient has been referred to [facility] for further evaluation and recommendation regarding newly diagnosed triple negative right breast cancer.  Patient states she did not feel any palpable abnormality in the breast or any skin changes or any nipple discharge prior to her biopsy.  Denies any new lymph node enlargement.  Denies any major weight loss.  Denies any new headache or dizziness.  Complains of chronic back pain and knee pain.  Denies any tingling or numbness affecting upper or lower extremity.      MENSTRUAL HISTORY:    Menarche at age 12: Had a hysterectomy in 1990.  Denies any prolonged use of hormone replacement therapy.        PAST MEDICAL HISTORY:    Past Medical History:   Diagnosis Date   • Abnormal mammogram of right breast    • Acquired hypothyroidism     started synthroid 9/2015   • Allergic rhinitis, seasonal    • Breast cancer (CMS/HCC)    • Cervicalgia     right upper extremity radiculopathy   • Diabetes mellitus (CMS/HCC)    • Encounter for screening for malignant neoplasm of colon    • Essential hypertension    • Glaucoma    • Health maintenance examination     individual health exam   • Hypercholesterolemia    • Hyperglycemia      steroid-induced hyperglycemia in diabetic patient   • Lumbar disc disorder     w/right radiculopathy   • Menopause    • Mild persistent asthma     resumed dulera twice daily   • Obstructive sleep apnea syndrome 9/17/2019   • Osteopenia     improved DEXA 2/2013   • 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   • Sebaceous cyst     subepidermal cyst   • Shoulder pain     likely radicular pain due to cervical DJD   • Spasm of back muscles     right neck and trapezius   • Spinal stenosis of lumbar region    • Strain of neck muscle     cervical strain   • Temporomandibular joint disorder     h/o   • Vitamin D deficiency     on oral calcium/vitamin D supplement       PAST SURGICAL HISTORY:  Past Surgical History:   Procedure Laterality Date   • COLONOSCOPY  12/17/2015    normal   • ENDOSCOPY AND COLONOSCOPY  08/2010   • HYSTERECTOMY     • OOPHORECTOMY         ALLERGIES:    Allergies   Allergen Reactions   • Other Other (See Comments)     Coda-clear       SOCIAL HISTORY:   Social History     Tobacco Use   • Smoking status: Never Smoker   • Smokeless tobacco: Never Used   Substance Use Topics   • Alcohol use: No   • Drug use: No       CURRENT MEDICATIONS:    Current Outpatient Medications   Medication Sig Dispense Refill   • albuterol sulfate HFA (PROAIR HFA) 108 (90 Base) MCG/ACT inhaler Inhale 2 puffs 4 (Four) Times a Day As Needed for Wheezing. 8.5 inhaler 6   • amLODIPine (NORVASC) 5 MG tablet TAKE 1 TABLET BY MOUTH ONCE DAILY FOR BLOOD PRESSURE 90 tablet 3   • aspirin 81 MG EC tablet Take 81 mg by mouth Daily.     • atorvastatin (LIPITOR) 20 MG tablet TAKE 1 TABLET BY MOUTH ONCE DAILY 90 tablet 3   • Blood Glucose Monitoring Suppl (ONE TOUCH ULTRA 2) W/DEVICE kit      • Calcium Carbonate-Vitamin D 600-200 MG-UNIT tablet Take 1 tablet by mouth Daily.     • Cholecalciferol (VITAMIN D) 25 MCG (1000 UT) tablet Take 1 tablet by mouth Daily.     • citalopram (CeleXA) 20 MG tablet Take 1 tablet by mouth Daily. (Patient taking  differently: Take 10 mg by mouth Daily.) 90 tablet 3   • Dapagliflozin Propanediol (Farxiga) 10 MG tablet Take 10 mg by mouth Every Morning. 90 tablet 1   • DULERA 200-5 MCG/ACT inhaler INHALE 2 PUFFS BY MOUTH TWICE DAILY 13 g 5   • fluticasone (FLONASE) 50 MCG/ACT nasal spray 2 sprays into each nostril As Needed for rhinitis.     • glucose blood (ONE TOUCH ULTRA TEST) test strip 1 each by Other route Daily. Check 1-2times daily  E11.9  90 day supply  One Touch Verio 150 each 3   • LANCETS MICRO THIN 33G misc Check once daily  E11.9  Trueplus 100 each 3   • latanoprost (XALATAN) 0.005 % ophthalmic solution Administer 1 drop to both eyes Every Night.     • levothyroxine (SYNTHROID, LEVOTHROID) 50 MCG tablet TAKE 1 TABLET BY MOUTH DAILY 90 tablet 3   • losartan-hydrochlorothiazide (HYZAAR) 100-12.5 MG per tablet TAKE 1 TABLET BY MOUTH DAILY 90 tablet 3   • metFORMIN (GLUCOPHAGE) 500 MG tablet TAKE 1 TABLET BY MOUTH TWICE A DAY WITH FOOD FOR DIABETES 180 tablet 3   • montelukast (SINGULAIR) 10 MG tablet TAKE 1 TABLET BY MOUTH AT BEDTIME FOR ASTHMA 90 tablet 3     No current facility-administered medications for this visit.         HOME MEDICATIONS:   Current Outpatient Medications on File Prior to Visit   Medication Sig Dispense Refill   • albuterol sulfate HFA (PROAIR HFA) 108 (90 Base) MCG/ACT inhaler Inhale 2 puffs 4 (Four) Times a Day As Needed for Wheezing. 8.5 inhaler 6   • amLODIPine (NORVASC) 5 MG tablet TAKE 1 TABLET BY MOUTH ONCE DAILY FOR BLOOD PRESSURE 90 tablet 3   • aspirin 81 MG EC tablet Take 81 mg by mouth Daily.     • atorvastatin (LIPITOR) 20 MG tablet TAKE 1 TABLET BY MOUTH ONCE DAILY 90 tablet 3   • Blood Glucose Monitoring Suppl (ONE TOUCH ULTRA 2) W/DEVICE kit      • Calcium Carbonate-Vitamin D 600-200 MG-UNIT tablet Take 1 tablet by mouth Daily.     • Cholecalciferol (VITAMIN D) 25 MCG (1000 UT) tablet Take 1 tablet by mouth Daily.     • citalopram (CeleXA) 20 MG tablet Take 1 tablet by mouth  Daily. (Patient taking differently: Take 10 mg by mouth Daily.) 90 tablet 3   • Dapagliflozin Propanediol (Farxiga) 10 MG tablet Take 10 mg by mouth Every Morning. 90 tablet 1   • DULERA 200-5 MCG/ACT inhaler INHALE 2 PUFFS BY MOUTH TWICE DAILY 13 g 5   • fluticasone (FLONASE) 50 MCG/ACT nasal spray 2 sprays into each nostril As Needed for rhinitis.     • glucose blood (ONE TOUCH ULTRA TEST) test strip 1 each by Other route Daily. Check 1-2times daily  E11.9  90 day supply  One Touch Verio 150 each 3   • LANCETS MICRO THIN 33G misc Check once daily  E11.9  Trueplus 100 each 3   • latanoprost (XALATAN) 0.005 % ophthalmic solution Administer 1 drop to both eyes Every Night.     • levothyroxine (SYNTHROID, LEVOTHROID) 50 MCG tablet TAKE 1 TABLET BY MOUTH DAILY 90 tablet 3   • losartan-hydrochlorothiazide (HYZAAR) 100-12.5 MG per tablet TAKE 1 TABLET BY MOUTH DAILY 90 tablet 3   • metFORMIN (GLUCOPHAGE) 500 MG tablet TAKE 1 TABLET BY MOUTH TWICE A DAY WITH FOOD FOR DIABETES 180 tablet 3   • montelukast (SINGULAIR) 10 MG tablet TAKE 1 TABLET BY MOUTH AT BEDTIME FOR ASTHMA 90 tablet 3     No current facility-administered medications on file prior to visit.        FAMILY HISTORY:    Family History   Problem Relation Age of Onset   • Colon cancer Mother    • Heart attack Father    • Aneurysm Brother    • No Known Problems Son    • Alzheimer's disease Maternal Grandmother    • Heart attack Maternal Grandfather    • Alzheimer's disease Paternal Grandmother    • No Known Problems Son        REVIEW OF SYSTEMS:        CONSTITUTIONAL:  Complains of fatigue. Denies any fever, chills or weight loss.     EYES: No visual disturbances. No discharge. No new lesions    ENMT:  No epistaxis, mouth sores or difficulty swallowing.    RESPIRATORY: Positive for chronic intermittent shortness of breath due to asthma.  No new cough or hemoptysis.    CARDIOVASCULAR:  No chest pain or palpitations.    GASTROINTESTINAL:  No abdominal pain nausea,  "vomiting or blood in the stool.    GENITOURINARY: No Dysuria or Hematuria.    MUSCULOSKELETAL: Positive for chronic back pain and knee pain.    LYMPHATICS:  Denies any abnormal swollen glands anywhere in the body.    NEUROLOGICAL : No tingling or numbness. No headache or dizziness. No seizures or balance problems.    SKIN: No new skin lesions.    ENDOCRINE : No new hot or cold intolerance. No new polyuria . No polydipsia.        PHYSICAL EXAMINATION:      VITAL SIGNS:  /63   Pulse 80   Temp 97.7 °F (36.5 °C)   Resp 16   Ht 157.5 cm (62\")   Wt 76.5 kg (168 lb 9.6 oz)   BMI 30.84 kg/m²       10/02/20  1523   Weight: 76.5 kg (168 lb 9.6 oz)       ECOG performance status: 2    CONSTITUTIONAL:  Not in any distress.    EYES: Mild conjunctival Pallor. No Icterus. No Pterygium. Extraocular Movements intact.No ptosis.    ENMT:  Normocephalic, Atraumatic.No Facial Asymmetry noted.    NECK:  No adenopathy.Trachea midline. NO JVD.    RESPIRATORY:  Fair air entry bilateral. No rhonchi or wheezing.Fair respiratory effort.    CARDIOVASCULAR:  S1, S2. Regular rate and rhythm. No murmur or gallop appreciated.    BREAST: Breast exam was performed in presence of registered nurse Natalie.  There is tumor measuring at 1.2 cm on right lower outer quadrant of breast without any evidence of enlarged palpable lymphadenopathy in axilla.    ABDOMEN:  Soft, obese, nontender. Bowel sounds present in all four quadrants.  No Hepatosplenomegaly appreciated.    MUSCULOSKELETAL:  No edema.No Calf Tenderness.Decreased range of motion.    NEUROLOGIC:    No  Motor or sensory deficit appreciated. Cranial Nerves 2-12 grossly intact.    SKIN : No new skin lesion identified. Skin is warm and dry to touch.    LYMPHATICS: No new enlarged lymph nodes in neck or supraclavicular area.    PSYCHIATRY: Alert, awake and oriented ×3.  Appears anxious.  Normal judgment.  Makes good eye contact.          DIAGNOSTIC DATA:    WBC   Date Value Ref Range " Status   10/02/2020 12.64 (H) 3.40 - 10.80 10*3/mm3 Final     RBC   Date Value Ref Range Status   10/02/2020 5.17 3.77 - 5.28 10*6/mm3 Final     Hemoglobin   Date Value Ref Range Status   10/02/2020 13.6 12.0 - 15.9 g/dL Final     Hematocrit   Date Value Ref Range Status   10/02/2020 43.1 34.0 - 46.6 % Final     MCV   Date Value Ref Range Status   10/02/2020 83.4 79.0 - 97.0 fL Final     MCH   Date Value Ref Range Status   10/02/2020 26.3 (L) 26.6 - 33.0 pg Final     MCHC   Date Value Ref Range Status   10/02/2020 31.6 31.5 - 35.7 g/dL Final     RDW   Date Value Ref Range Status   10/02/2020 15.1 12.3 - 15.4 % Final     RDW-SD   Date Value Ref Range Status   10/02/2020 46.1 37.0 - 54.0 fl Final     MPV   Date Value Ref Range Status   10/02/2020 9.1 6.0 - 12.0 fL Final     Platelets   Date Value Ref Range Status   10/02/2020 497 (H) 140 - 450 10*3/mm3 Final     Neutrophil %   Date Value Ref Range Status   10/02/2020 54.0 42.7 - 76.0 % Final     Lymphocyte %   Date Value Ref Range Status   10/02/2020 27.5 19.6 - 45.3 % Final     Monocyte %   Date Value Ref Range Status   10/02/2020 8.9 5.0 - 12.0 % Final     Eosinophil %   Date Value Ref Range Status   10/02/2020 7.4 (H) 0.3 - 6.2 % Final     Basophil %   Date Value Ref Range Status   10/02/2020 2.0 (H) 0.0 - 1.5 % Final     Immature Grans %   Date Value Ref Range Status   10/02/2020 0.2 0.0 - 0.5 % Final     Neutrophils, Absolute   Date Value Ref Range Status   10/02/2020 6.83 1.70 - 7.00 10*3/mm3 Final     Lymphocytes, Absolute   Date Value Ref Range Status   10/02/2020 3.47 (H) 0.70 - 3.10 10*3/mm3 Final     Monocytes, Absolute   Date Value Ref Range Status   10/02/2020 1.12 (H) 0.10 - 0.90 10*3/mm3 Final     Eosinophils, Absolute   Date Value Ref Range Status   10/02/2020 0.94 (H) 0.00 - 0.40 10*3/mm3 Final     Basophils, Absolute   Date Value Ref Range Status   10/02/2020 0.25 (H) 0.00 - 0.20 10*3/mm3 Final     Immature Grans, Absolute   Date Value Ref Range  Status   10/02/2020 0.03 0.00 - 0.05 10*3/mm3 Final     nRBC   Date Value Ref Range Status   10/02/2020 0.0 0.0 - 0.2 /100 WBC Final     Glucose   Date Value Ref Range Status   10/02/2020 97 65 - 99 mg/dL Final     Sodium   Date Value Ref Range Status   10/02/2020 138 136 - 145 mmol/L Final     Potassium   Date Value Ref Range Status   10/02/2020 3.7 3.5 - 5.2 mmol/L Final     CO2   Date Value Ref Range Status   10/02/2020 23.0 22.0 - 29.0 mmol/L Final     Chloride   Date Value Ref Range Status   10/02/2020 102 98 - 107 mmol/L Final     Anion Gap   Date Value Ref Range Status   10/02/2020 13.0 5.0 - 15.0 mmol/L Final     Creatinine   Date Value Ref Range Status   10/02/2020 1.13 (H) 0.57 - 1.00 mg/dL Final     BUN   Date Value Ref Range Status   10/02/2020 17 8 - 23 mg/dL Final     BUN/Creatinine Ratio   Date Value Ref Range Status   10/02/2020 15.0 7.0 - 25.0 Final     Calcium   Date Value Ref Range Status   10/02/2020 10.0 8.6 - 10.5 mg/dL Final     eGFR Non  Amer   Date Value Ref Range Status   10/02/2020 47 (L) >60 mL/min/1.73 Final     Alkaline Phosphatase   Date Value Ref Range Status   10/02/2020 78 39 - 117 U/L Final     Total Protein   Date Value Ref Range Status   10/02/2020 8.0 6.0 - 8.5 g/dL Final     ALT (SGPT)   Date Value Ref Range Status   10/02/2020 35 (H) 1 - 33 U/L Final     AST (SGOT)   Date Value Ref Range Status   10/02/2020 41 (H) 1 - 32 U/L Final     Total Bilirubin   Date Value Ref Range Status   10/02/2020 0.3 0.0 - 1.2 mg/dL Final     Albumin   Date Value Ref Range Status   10/02/2020 4.30 3.50 - 5.20 g/dL Final     Globulin   Date Value Ref Range Status   10/02/2020 3.7 gm/dL Final     No results found for: IRON, TIBC, LABIRON, FERRITIN, [id number], FOLATE  No results found for: , LABCA2, AFPTM, HCGQUANT, , CHROMGRNA, [id number], CEA, REFLABREPO]    Radiology Data :  Ultrasound of right breast done on September 17, 2020 showed:    FINDINGS: Realtime grayscale and  color-flow imaging is performed  of the right breast in the 10:00 axis, 4 cm from the nipple,  showing a hypoechoic mass with posterior acoustic shadowing  representing the calcifications seen on the recent mammogram.  Recommend stereotactic biopsy.  Additionally, there is a cyst in the 9:00 axis, 7 cm from the  nipple.     IMPRESSION:  CONCLUSION:    1.  Hypoechoic mass with posterior acoustic shadowing  representing the calcifications seen on the recent mammogram.  2.  Recommend stereotactic biopsy.  3.  BI-RADS Category 5: Highly suggestive of malignancy.     Findings and recommendations were discussed with the patient at  the time of the exam.        No radiology results for the last 7 days    Pathology :  Pathology report from September 28, 2020 showed:        * Cannot find OR log *    ASSESSMENT AND PLAN:      1.  Triple negative right breast cancer, clinically T1 lesion with measurement around 1.2 cm.  - Result of pathology, biologic treatment and treatment option according to NCCN guidelines were discussed with patient and her .  - Since her tumor is triple negative, patient will need chemotherapy either in neoadjuvant setting were in adjuvant setting.  - We will recommend neoadjuvant chemotherapy to evaluate response after 4 rounds of chemotherapy with Taxotere and Cytoxan to be done every 3 weeks.  - We will get her an appointment with Dr. Alexandra.  -Once port is placed, will tentatively start chemotherapy end of next week or beginning of week following that.  - Side effect of chemotherapy including but not limited to nausea, vomiting, diarrhea, risk of neuropathy, lowering of blood count, risk of infection, need for blood transfusion, kidney failure, possibility of allergic reaction which could be severe and life-threatening were discussed with patient and her family.  -CMP done earlier today shows AST and ALT is mildly elevated.  Since creatinine is higher than normal, patient cannot get CT scan with  contrast done.  Will get a PET/CT done for staging purposes to rule out any metastatic disease prior to starting chemotherapy.  Recommendation were discussed with patient.  - We will see patient back in clinic with cycle 1 of chemotherapy.  - Since patient at least has 3 family members with history of breast cancer including male breast cancer.  Recommend genetic counseling.  We will refer her for genetic counseling    2.  Diabetes mellitus    3.  Asthma    4.  Hypertension    5.  Leukocytosis and thrombocytosis:  --White blood cell count is elevated at 12,000.  Platelet count is elevated at 497,000.  Will do PET/CT to rule out any reactive increase due to metastatic cancer.    6.  Health maintenance: Patient does not smoke    7. Advance Care Planning: For now patient remains full code and is able to make decisions.  Patient has health care surrogate mentioned on chart.      PHQ-9 Total Score: 1       Gale Cabral reports a pain score of 0.  Given her pain assessment as noted, treatment options were discussed and the following options were decided upon as a follow-up plan to address the patient's pain: No acute intervention required.             Thank you for this consultation.    Carlos Duong MD  10/5/2020  08:18 CDT        Part of this note may be an electronic transcription/translation of spoken language to printed text using the Dragon Dictation System.

## 2020-10-02 NOTE — PATIENT INSTRUCTIONS
Chemotherapy  Chemotherapy is a cancer treatment. It uses medicines to slow down or stop the growth of cancer. You may have chemotherapy to:  · Cure your cancer.  · Prevent the cancer from growing or spreading (metastasizing).  · Ease symptoms and improve your quality of life (palliative care).  · Improve the effects of radiation treatment.  · Shrink a tumor before surgery.  · Rid the body of cancer cells that remain after having a tumor surgically removed.  The length of chemotherapy treatment depends on many factors, including:  · The type and stage of your cancer.  · How you respond to the chemotherapy.  · Your side effects.  What are the risks?  Generally, this is a safe treatment. However, problems may occur, including:  · Infection.  · Bleeding at the IV site.  · Allergic reactions to medicines.  You may have side effects from chemotherapy. What side effects you have depends on a variety of factors, including:  · The type of chemotherapy medicine used.  · Your dosage.  · How long the medicine is used for.  · Your overall health.  What happens before treatment?  · You will meet with your cancer care team to discuss:  ? How your chemotherapy medicine will be given.  ? Common side effects and how to manage them.  ? Your treatment schedule.  · You may have blood tests.  · You may be given medicine to help prevent common side effects.  What happens during treatment?  Chemotherapy may be given continuously over time, or it may be given in cycles. Some common ways chemotherapy may be given include:  · As a pill or capsule.  · As an injection.  · As a skin (topical) cream.  · As a special wafer that is put in your body where the cancer is.  · As an injection into the cerebrospinal fluid (CSF) in the brain or spinal cord (intraventricular or intrathecal chemotherapy).  · Through a small, thin tube (catheter). There are different kinds of catheters. You might have one that:  ? Goes into a vein (intravenous  catheter).  ? Connects to a device (port) that is inserted under the skin of your chest (port catheter). A port catheter connects the port to a large vein in your chest or upper arm. The port may stay in place for many weeks or months.  ? Goes into a vein near your elbow (PICC line). This may be used for weeks or months.  ? Goes into a vein in your neck that leads to your heart (non-tunneled catheter). This catheter has a risk of infection, so it is used for only a short time.  ? Goes through the skin of your chest and into a large vein that leads to your heart (tunneled catheter). This catheter can stay in your body for months or years.  While you are receiving your medicine, your cancer care team will monitor your blood pressure, heart rate, breathing rate, and blood oxygen level (vital signs) and watch for any problems.  Some types of chemotherapy medicine are given only one time. Others are given for months, years, or for life.  What can I expect after treatment?  After chemotherapy, you may have side effects, such as:  · Nausea and vomiting.  · Appetite loss.  · Constipation or diarrhea.  · Fatigue.  · Increased risk of infections, bruising, or bleeding.  · Hair loss.  · Mouth or throat sores.  · Tingling, pain, or numbness in the hands and feet.  · Dry, sensitive, itchy, or sore skin.  · Memory changes.  Follow these instructions at home:  General instructions    · If you get chemotherapy through an IV, PICC line, or port, check the site every day for signs of infection. Check for redness, swelling, pain, fluid, or warmth.  · Wash your hands frequently with soap and water. If soap and water are not available, use hand . Have other members of your household wash their hands often.  · Chemotherapy medicines leave the body through urine or stool (feces), but they can also be present in other body fluids including vomit, tears, vaginal fluids, and semen. You must carefully follow some safety precautions  to prevent harm to others while you are taking these medicines:  ? Wash any clothes, towels, and linens that may have your bodily fluids on them twice in a washing machine.  ? Use a condom during vaginal, anal, and oral sex while you are taking chemotherapy medicines and for 48 hours after your last dose.  ? Practice good bathroom hygiene:  § Always sit when using the toilet. Close the toilet seat lid before you flush.  § Wash your hands thoroughly with soap and water after each time you use the toilet.  · Keep all follow-up visits as told by your cancer care team. This is important.  Eating and drinking  · Talk with a dietitian about what you should eat and drink during cancer treatment.  · Always wash fresh fruits and vegetables well before eating them.  · Drink enough fluid to keep urine pale yellow.  Medicines  · Take over-the-counter and prescription medicines only as told by your health care provider.  · Talk with your health care provider before taking vitamins and supplements. Some can interfere with chemotherapy.  Activity  · Get plenty of rest.  · Get regular exercise such as walking, gentle yoga, or sidra chi.  · Return to your normal activities as told by your health care provider. Ask your health care provider what activities are safe for you.  Contact a health care provider if:  · You have:  ? A skin rash that does not go away.  ? A headache.  ? A stiff neck.  ? A cough.  ? Cold or flu symptoms.  ? A burning feeling when urinating.  ? Urine that smells bad.  ? Diarrhea.  ? Nausea.  ? Vomiting.  ? Blood in your urine or stool.  · You bleed or bruise often.  · You urinate more frequently than usual.  · You cannot eat because of mouth or throat pain.  Get help right away if:  · You have:  ? A fever.  ? Redness, swelling, pain, fluid, or warmth near your IV site.  ? Bleeding that does not stop.  ? A seizure.  ? Chest pain.  ? Difficulty breathing.  · You cannot swallow.  Summary  · Chemotherapy is a way to  treat cancer. It uses medicines to slow down or stop the growth of cancer.  · Before treatment, you and your cancer care team will discuss common side effects and how to manage them.  · The way that you will get chemotherapy medicines depends on your condition.  · Take over-the-counter and prescription medicines only as told by your health care provider.  This information is not intended to replace advice given to you by your health care provider. Make sure you discuss any questions you have with your health care provider.  Document Released: 10/14/2008 Document Revised: 04/08/2020 Document Reviewed: 10/04/2018  [employer] Patient Education © 2020 [employer]  Docetaxel injection  What is this medicine?  DOCETAXEL (laws se TAX el) is a chemotherapy drug. It targets fast dividing cells, like cancer cells, and causes these cells to die. This medicine is used to treat many types of cancers like breast cancer, certain stomach cancers, head and neck cancer, lung cancer, and prostate cancer.  This medicine may be used for other purposes; ask your health care provider or pharmacist if you have questions.  COMMON BRAND NAME(S): Rodrigo Xavier  What should I tell my health care provider before I take this medicine?  They need to know if you have any of these conditions:  · infection (especially a virus infection such as chickenpox, cold sores, or herpes)  · liver disease  · low blood counts, like low white cell, platelet, or red cell counts  · an unusual or allergic reaction to docetaxel, polysorbate 80, other chemotherapy agents, other medicines, foods, dyes, or preservatives  · pregnant or trying to get pregnant  · breast-feeding  How should I use this medicine?  This drug is given as an infusion into a vein. It is administered in a hospital or clinic by a specially trained health care professional.  Talk to your pediatrician regarding the use of this medicine in children. Special care may be needed.  Overdosage: If you  think you have taken too much of this medicine contact a poison control center or emergency room at once.  NOTE: This medicine is only for you. Do not share this medicine with others.  What if I miss a dose?  It is important not to miss your dose. Call your doctor or health care professional if you are unable to keep an appointment.  What may interact with this medicine?  · aprepitant  · certain antibiotics like erythromycin or clarithromycin  · certain antivirals for HIV or hepatitis  · certain medicines for fungal infections like fluconazole, itraconazole, ketoconazole, posaconazole, or voriconazole  · cimetidine  · ciprofloxacin  · conivaptan  · cyclosporine  · dronedarone  · fluvoxamine  · grapefruit juice  · imatinib  · verapamil  This list may not describe all possible interactions. Give your health care provider a list of all the medicines, herbs, non-prescription drugs, or dietary supplements you use. Also tell them if you smoke, drink alcohol, or use illegal drugs. Some items may interact with your medicine.  What should I watch for while using this medicine?  Your condition will be monitored carefully while you are receiving this medicine. You will need important blood work done while you are taking this medicine.  Call your doctor or health care professional for advice if you get a fever, chills or sore throat, or other symptoms of a cold or flu. Do not treat yourself. This drug decreases your body's ability to fight infections. Try to avoid being around people who are sick.  Some products may contain alcohol. Ask your health care professional if this medicine contains alcohol. Be sure to tell all health care professionals you are taking this medicine. Certain medicines, like metronidazole and disulfiram, can cause an unpleasant reaction when taken with alcohol. The reaction includes flushing, headache, nausea, vomiting, sweating, and increased thirst. The reaction can last from 30 minutes to several  hours.  You may get drowsy or dizzy. Do not drive, use machinery, or do anything that needs mental alertness until you know how this medicine affects you. Do not stand or sit up quickly, especially if you are an older patient. This reduces the risk of dizzy or fainting spells. Alcohol may interfere with the effect of this medicine.  Talk to your health care professional about your risk of cancer. You may be more at risk for certain types of cancer if you take this medicine.  Do not become pregnant while taking this medicine or for 6 months after stopping it. Women should inform their doctor if they wish to become pregnant or think they might be pregnant. There is a potential for serious side effects to an unborn child. Talk to your health care professional or pharmacist for more information. Do not breast-feed an infant while taking this medicine or for 1 to 2 weeks after stopping it.  Males who get this medicine must use a condom during sex with females who can get pregnant. If you get a woman pregnant, the baby could have birth defects. The baby could die before they are born. You will need to continue wearing a condom for 3 months after stopping the medicine. Tell your health care provider right away if your partner becomes pregnant while you are taking this medicine.  This may interfere with the ability to father a child. You should talk to your doctor or health care professional if you are concerned about your fertility.  What side effects may I notice from receiving this medicine?  Side effects that you should report to your doctor or health care professional as soon as possible:  · allergic reactions like skin rash, itching or hives, swelling of the face, lips, or tongue  · blurred vision  · breathing problems  · changes in vision  · low blood counts - This drug may decrease the number of white blood cells, red blood cells and platelets. You may be at increased risk for infections and bleeding.  · nausea and  vomiting  · pain, redness or irritation at site where injected  · pain, tingling, numbness in the hands or feet  · redness, blistering, peeling, or loosening of the skin, including inside the mouth  · signs of decreased platelets or bleeding - bruising, pinpoint red spots on the skin, black, tarry stools, nosebleeds  · signs of decreased red blood cells - unusually weak or tired, fainting spells, lightheadedness  · signs of infection - fever or chills, cough, sore throat, pain or difficulty passing urine  · swelling of the ankle, feet, hands  Side effects that usually do not require medical attention (report to your doctor or health care professional if they continue or are bothersome):  · constipation  · diarrhea  · fingernail or toenail changes  · hair loss  · loss of appetite  · mouth sores  · muscle pain  This list may not describe all possible side effects. Call your doctor for medical advice about side effects. You may report side effects to FDA at 0-146-RNC-6004.  Where should I keep my medicine?  This drug is given in a hospital or clinic and will not be stored at home.  NOTE: This sheet is a summary. It may not cover all possible information. If you have questions about this medicine, talk to your doctor, pharmacist, or health care provider.  © 2020 [employer] (2020-02-21 12:23:11)  Cyclophosphamide injection  What is this medicine?  CYCLOPHOSPHAMIDE (sye kloe EMILY fa mide) is a chemotherapy drug. It slows the growth of cancer cells. This medicine is used to treat many types of cancer like lymphoma, myeloma, leukemia, breast cancer, and ovarian cancer, to name a few.  This medicine may be used for other purposes; ask your health care provider or pharmacist if you have questions.  COMMON BRAND NAME(S): Cytoxan, Neosar  What should I tell my health care provider before I take this medicine?  They need to know if you have any of these conditions:  · blood disorders  · history of other  chemotherapy  · infection  · kidney disease  · liver disease  · recent or ongoing radiation therapy  · tumors in the bone marrow  · an unusual or allergic reaction to cyclophosphamide, other chemotherapy, other medicines, foods, dyes, or preservatives  · pregnant or trying to get pregnant  · breast-feeding  How should I use this medicine?  This drug is usually given as an injection into a vein or muscle or by infusion into a vein. It is administered in a hospital or clinic by a specially trained health care professional.  Talk to your pediatrician regarding the use of this medicine in children. Special care may be needed.  Overdosage: If you think you have taken too much of this medicine contact a poison control center or emergency room at once.  NOTE: This medicine is only for you. Do not share this medicine with others.  What if I miss a dose?  It is important not to miss your dose. Call your doctor or health care professional if you are unable to keep an appointment.  What may interact with this medicine?  This medicine may interact with the following medications:  · amiodarone  · amphotericin B  · azathioprine  · certain antiviral medicines for HIV or AIDS such as protease inhibitors (e.g., indinavir, ritonavir) and zidovudine  · certain blood pressure medications such as benazepril, captopril, enalapril, fosinopril, lisinopril, moexipril, monopril, perindopril, quinapril, ramipril, trandolapril  · certain cancer medications such as anthracyclines (e.g., daunorubicin, doxorubicin), busulfan, cytarabine, paclitaxel, pentostatin, tamoxifen, trastuzumab  · certain diuretics such as chlorothiazide, chlorthalidone, hydrochlorothiazide, indapamide, metolazone  · certain medicines that treat or prevent blood clots like warfarin  · certain muscle relaxants such as succinylcholine  · cyclosporine  · etanercept  · indomethacin  · medicines to increase blood counts like filgrastim, pegfilgrastim, sargramostim  · medicines  used as general anesthesia  · metronidazole  · natalizumab  This list may not describe all possible interactions. Give your health care provider a list of all the medicines, herbs, non-prescription drugs, or dietary supplements you use. Also tell them if you smoke, drink alcohol, or use illegal drugs. Some items may interact with your medicine.  What should I watch for while using this medicine?  Visit your doctor for checks on your progress. This drug may make you feel generally unwell. This is not uncommon, as chemotherapy can affect healthy cells as well as cancer cells. Report any side effects. Continue your course of treatment even though you feel ill unless your doctor tells you to stop.  Drink water or other fluids as directed. Urinate often, even at night.  In some cases, you may be given additional medicines to help with side effects. Follow all directions for their use.  Call your doctor or health care professional for advice if you get a fever, chills or sore throat, or other symptoms of a cold or flu. Do not treat yourself. This drug decreases your body's ability to fight infections. Try to avoid being around people who are sick.  This medicine may increase your risk to bruise or bleed. Call your doctor or health care professional if you notice any unusual bleeding.  Be careful brushing and flossing your teeth or using a toothpick because you may get an infection or bleed more easily. If you have any dental work done, tell your dentist you are receiving this medicine.  You may get drowsy or dizzy. Do not drive, use machinery, or do anything that needs mental alertness until you know how this medicine affects you.  Do not become pregnant while taking this medicine or for 1 year after stopping it. Women should inform their doctor if they wish to become pregnant or think they might be pregnant. Men should not father a child while taking this medicine and for 4 months after stopping it. There is a potential  for serious side effects to an unborn child. Talk to your health care professional or pharmacist for more information. Do not breast-feed an infant while taking this medicine.  This medicine may interfere with the ability to have a child. This medicine has caused ovarian failure in some women. This medicine has caused reduced sperm counts in some men. You should talk with your doctor or health care professional if you are concerned about your fertility.  If you are going to have surgery, tell your doctor or health care professional that you have taken this medicine.  What side effects may I notice from receiving this medicine?  Side effects that you should report to your doctor or health care professional as soon as possible:  · allergic reactions like skin rash, itching or hives, swelling of the face, lips, or tongue  · low blood counts - this medicine may decrease the number of white blood cells, red blood cells and platelets. You may be at increased risk for infections and bleeding.  · signs of infection - fever or chills, cough, sore throat, pain or difficulty passing urine  · signs of decreased platelets or bleeding - bruising, pinpoint red spots on the skin, black, tarry stools, blood in the urine  · signs of decreased red blood cells - unusually weak or tired, fainting spells, lightheadedness  · breathing problems  · dark urine  · dizziness  · palpitations  · swelling of the ankles, feet, hands  · trouble passing urine or change in the amount of urine  · weight gain  · yellowing of the eyes or skin  Side effects that usually do not require medical attention (report to your doctor or health care professional if they continue or are bothersome):  · changes in nail or skin color  · hair loss  · missed menstrual periods  · mouth sores  · nausea, vomiting  This list may not describe all possible side effects. Call your doctor for medical advice about side effects. You may report side effects to FDA at  1-800-FDA-1088.  Where should I keep my medicine?  This drug is given in a hospital or clinic and will not be stored at home.  NOTE: This sheet is a summary. It may not cover all possible information. If you have questions about this medicine, talk to your doctor, pharmacist, or health care provider.  © 2020 [employer] (2013-11-01 16:22:58)    Implanted Port Insertion  Implanted port insertion is a procedure to put in a port and catheter. The port is a device with an injectable disk that can be accessed by your health care provider. The port is connected to a vein in the chest or neck by a small flexible tube (catheter). There are different types of ports. The implanted port may be used as a long-term IV access for:  · Medicines, such as chemotherapy.  · Fluids.  · Liquid nutrition, such as total parenteral nutrition (TPN).  When you have a port, this means that your health care provider will not need to use the veins in your arms for these procedures.  Tell a health care provider about:  · Any allergies you have.  · All medicines you are taking, especially blood thinners, as well as any vitamins, herbs, eye drops, creams, over-the-counter medicines, and steroids.  · Any problems you or family members have had with anesthetic medicines.  · Any blood disorders you have.  · Any surgeries you have had.  · Any medical conditions you have or have had, including diabetes or kidney problems.  · Whether you are pregnant or may be pregnant.  What are the risks?  Generally, this is a safe procedure. However, problems may occur, including:  · Allergic reactions to medicines or dyes.  · Damage to other structures or organs.  · Infection.  · Damage to the blood vessel, bruising, or bleeding at the puncture site.  · Blood clot.  · Breakdown of the skin over the port.  · A collection of air in the chest that can cause one of the lungs to collapse (pneumothorax). This is rare.  What happens before the  procedure?  Medicines  · Ask your health care provider about:  ? Changing or stopping your regular medicines. This is especially important if you are taking diabetes medicines or blood thinners.  ? Taking medicines such as aspirin and ibuprofen. These medicines can thin your blood. Do not take these medicines unless your health care provider tells you to take them.  ? Taking over-the-counter medicines, vitamins, herbs, and supplements.  Staying hydrated  Follow instructions from your health care provider about hydration, which may include:  · Up to 2 hours before the procedure - you may continue to drink clear liquids, such as water, clear fruit juice, black coffee, and plain tea.    Eating and drinking restrictions  · Follow instructions from your health care provider about eating and drinking, which may include:  ? 8 hours before the procedure - stop eating heavy meals or foods, such as meat, fried foods, or fatty foods.  ? 6 hours before the procedure - stop eating light meals or foods, such as toast or cereal.  ? 6 hours before the procedure - stop drinking milk or drinks that contain milk.  ? 2 hours before the procedure - stop drinking clear liquids.  General instructions  · Plan to have someone take you home from the hospital or clinic.  · If you will be going home right after the procedure, plan to have someone with you for 24 hours.  · You may have blood tests.  · Do not use any products that contain nicotine or tobacco for at least 4-6 weeks before the procedure. These products include cigarettes, e-cigarettes, and chewing tobacco. If you need help quitting, ask your health care provider.  · Ask your health care provider what steps will be taken to help prevent infection. These may include:  ? Removing hair at the surgery site.  ? Washing skin with a germ-killing soap.  ? Taking antibiotic medicine.  What happens during the procedure?    · An IV will be inserted into one of your veins.  · You will be given  one or more of the following:  ? A medicine to help you relax (sedative).  ? A medicine to numb the area (local anesthetic).  · Two small incisions will be made to insert the port.  ? One smaller incision will be made in your neck to get access to the vein where the catheter will lie.  ? The other incision will be made in the upper chest. This is where the port will lie.  · The procedure may be done using continuous X-ray (fluoroscopy) or other imaging tools for guidance.  · The port and catheter will be placed. There may be a small, raised area where the port is.  · The port will be flushed with a salt solution (saline), and blood will be drawn to make sure that it is working correctly.  · The incisions will be closed.  · Bandages (dressings) may be placed over the incisions.  The procedure may vary among health care providers and hospitals.  What happens after the procedure?  · Your blood pressure, heart rate, breathing rate, and blood oxygen level will be monitored until you leave the hospital or clinic.  · Do not drive for 24 hours if you were given a sedative during your procedure.  · You will be given a 's information card for the type of port that you have. Keep this with you.  · Your port will need to be flushed and checked as told by your health care provider, usually every few weeks.  · A chest X-ray will be done to:  ? Check the placement of the port.  ? Make sure there is no injury to your lung.  Summary  · Implanted port insertion is a procedure to put in a port and catheter.  · The implanted port is used as a long-term IV access.  · The port will need to be flushed and checked as told by your health care provider, usually every few weeks.  · Keep your 's information card with you at all times.  This information is not intended to replace advice given to you by your health care provider. Make sure you discuss any questions you have with your health care provider.  Document  Released: 10/08/2014 Document Revised: 04/10/2020 Document Reviewed: 07/16/2019  Elsevier Patient Education © 2020 [employer]

## 2020-10-05 ENCOUNTER — APPOINTMENT (OUTPATIENT)
Dept: PREADMISSION TESTING | Facility: HOSPITAL | Age: 75
End: 2020-10-05

## 2020-10-05 ENCOUNTER — LAB (OUTPATIENT)
Dept: LAB | Facility: HOSPITAL | Age: 75
End: 2020-10-05

## 2020-10-05 VITALS
DIASTOLIC BLOOD PRESSURE: 72 MMHG | OXYGEN SATURATION: 97 % | SYSTOLIC BLOOD PRESSURE: 148 MMHG | BODY MASS INDEX: 31.28 KG/M2 | WEIGHT: 170 LBS | RESPIRATION RATE: 18 BRPM | HEART RATE: 77 BPM | HEIGHT: 62 IN

## 2020-10-05 DIAGNOSIS — Z01.818 PREOP TESTING: Primary | ICD-10-CM

## 2020-10-05 DIAGNOSIS — C50.911 INVASIVE DUCTAL CARCINOMA OF RIGHT BREAST (HCC): ICD-10-CM

## 2020-10-05 LAB — MRSA DNA SPEC QL NAA+PROBE: NEGATIVE

## 2020-10-05 PROCEDURE — C9803 HOPD COVID-19 SPEC COLLECT: HCPCS

## 2020-10-05 PROCEDURE — U0003 INFECTIOUS AGENT DETECTION BY NUCLEIC ACID (DNA OR RNA); SEVERE ACUTE RESPIRATORY SYNDROME CORONAVIRUS 2 (SARS-COV-2) (CORONAVIRUS DISEASE [COVID-19]), AMPLIFIED PROBE TECHNIQUE, MAKING USE OF HIGH THROUGHPUT TECHNOLOGIES AS DESCRIBED BY CMS-2020-01-R: HCPCS

## 2020-10-05 PROCEDURE — 93005 ELECTROCARDIOGRAM TRACING: CPT

## 2020-10-05 PROCEDURE — 93010 ELECTROCARDIOGRAM REPORT: CPT | Performed by: INTERNAL MEDICINE

## 2020-10-05 PROCEDURE — 87641 MR-STAPH DNA AMP PROBE: CPT

## 2020-10-05 RX ORDER — ATORVASTATIN CALCIUM 20 MG/1
20 TABLET, FILM COATED ORAL NIGHTLY
COMMUNITY
End: 2021-04-06 | Stop reason: SDUPTHER

## 2020-10-05 RX ORDER — CITALOPRAM 20 MG/1
10 TABLET ORAL EVERY OTHER DAY
COMMUNITY
End: 2021-03-15 | Stop reason: SDUPTHER

## 2020-10-05 RX ORDER — MONTELUKAST SODIUM 10 MG/1
10 TABLET ORAL NIGHTLY
COMMUNITY
End: 2021-01-05 | Stop reason: SDUPTHER

## 2020-10-05 RX ORDER — AMLODIPINE BESYLATE 5 MG/1
5 TABLET ORAL NIGHTLY
COMMUNITY
End: 2021-01-05 | Stop reason: SDUPTHER

## 2020-10-05 NOTE — DISCHARGE INSTRUCTIONS
[facility]  Pre-op Information and Guidelines    You will be called after 2 p.m. the day before your surgery (Friday for Monday surgery) and notified of your time for arrival and approximate surgery time.  If you have not received a call by 4P.M., please contact Same Day Surgery at [phone] of if outside [facility] call [phone].    Please Follow these Important Safety Guidelines:    • The morning of your procedure, take only the medications listed below with   A sip of water:_____________________________________________       ______________________________________________    • DO NOT eat or drink anything after 12:00 midnight the night before surgery  Specific instructions concerning drinking clear liquids will be discussed during  the pre-surgery instruction call the day before your surgery.    • If you take a blood thinner (ex. Plavix, Coumadin, aspirin), ask your doctor when to stop it before surgery  STOP DATE: _________________    • Only 2 visitors are allowed in patient rooms at a time  Your visitors will be asked to wait in the lobby until the admission process is complete with the exception of a parent with a child and patients in need of special assistance.    • YOU CANNOT DRIVE YOURSELF HOME  You must be accompanied by someone who will be responsible for driving you home after surgery and for your care at home.    • DO NOT chew gum, use breath mints, hard candy, or smoke the day of surgery  • DO NOT drink alcohol for at least 24 hours before your surgery  • DO NOT wear any jewelry and remove all body piercing before coming to the hospital  • DO NOT wear make-up to the hospital  • If you are having surgery on an extremity (arm/leg/foot) remove nail polish/artificial nails on the surgical side  • Clothing, glasses, contacts, dentures, and hairpieces must be removed before surgery  • Bathe the night before or the morning of your surgery and do not use powders/lotions on  skin.

## 2020-10-06 LAB
COVID LABCORP PRIORITY: NORMAL
SARS-COV-2 RNA RESP QL NAA+PROBE: NOT DETECTED

## 2020-10-07 ENCOUNTER — ANESTHESIA EVENT (OUTPATIENT)
Dept: PERIOP | Facility: HOSPITAL | Age: 75
End: 2020-10-07

## 2020-10-07 DIAGNOSIS — C50.911 INVASIVE DUCTAL CARCINOMA OF RIGHT BREAST (HCC): Primary | ICD-10-CM

## 2020-10-07 RX ORDER — ONDANSETRON 4 MG/1
4 TABLET, FILM COATED ORAL 4 TIMES DAILY PRN
Qty: 40 TABLET | Refills: 3 | Status: SHIPPED | OUTPATIENT
Start: 2020-10-07

## 2020-10-07 RX ORDER — DEXAMETHASONE 4 MG/1
TABLET ORAL
Qty: 6 TABLET | Refills: 3 | Status: SHIPPED | OUTPATIENT
Start: 2020-10-07 | End: 2021-02-09

## 2020-10-08 ENCOUNTER — HOSPITAL ENCOUNTER (OUTPATIENT)
Facility: HOSPITAL | Age: 75
Setting detail: HOSPITAL OUTPATIENT SURGERY
Discharge: HOME | End: 2020-10-08
Attending: SURGERY | Admitting: SURGERY

## 2020-10-08 ENCOUNTER — APPOINTMENT (OUTPATIENT)
Dept: GENERAL RADIOLOGY | Facility: HOSPITAL | Age: 75
End: 2020-10-08

## 2020-10-08 ENCOUNTER — ANESTHESIA (OUTPATIENT)
Dept: PERIOP | Facility: HOSPITAL | Age: 75
End: 2020-10-08

## 2020-10-08 VITALS
OXYGEN SATURATION: 97 % | DIASTOLIC BLOOD PRESSURE: 62 MMHG | SYSTOLIC BLOOD PRESSURE: 118 MMHG | BODY MASS INDEX: 30.83 KG/M2 | RESPIRATION RATE: 14 BRPM | HEIGHT: 62 IN | TEMPERATURE: 97.4 F | WEIGHT: 167.55 LBS | HEART RATE: 73 BPM

## 2020-10-08 DIAGNOSIS — C50.911 INVASIVE DUCTAL CARCINOMA OF RIGHT BREAST (HCC): Primary | ICD-10-CM

## 2020-10-08 LAB
GLUCOSE BLDC GLUCOMTR-MCNC: 115 MG/DL (ref 70–130)
GLUCOSE BLDC GLUCOMTR-MCNC: 125 MG/DL (ref 70–130)

## 2020-10-08 PROCEDURE — 25010000002 MIDAZOLAM PER 1 MG: Performed by: NURSE ANESTHETIST, CERTIFIED REGISTERED

## 2020-10-08 PROCEDURE — 94640 AIRWAY INHALATION TREATMENT: CPT

## 2020-10-08 PROCEDURE — 25010000002 PROPOFOL 10 MG/ML EMULSION: Performed by: NURSE ANESTHETIST, CERTIFIED REGISTERED

## 2020-10-08 PROCEDURE — 76000 FLUOROSCOPY <1 HR PHYS/QHP: CPT

## 2020-10-08 PROCEDURE — C1788 PORT, INDWELLING, IMP: HCPCS | Performed by: SURGERY

## 2020-10-08 PROCEDURE — 25010000003 HEPARIN LOCK FLUSH PER 10 UNITS: Performed by: SURGERY

## 2020-10-08 PROCEDURE — 77001 FLUOROGUIDE FOR VEIN DEVICE: CPT | Performed by: SURGERY

## 2020-10-08 PROCEDURE — 36561 INSERT TUNNELED CV CATH: CPT | Performed by: SURGERY

## 2020-10-08 PROCEDURE — 25010000002 PHENYLEPHRINE PER 1 ML: Performed by: NURSE ANESTHETIST, CERTIFIED REGISTERED

## 2020-10-08 PROCEDURE — 25010000002 FENTANYL CITRATE (PF) 100 MCG/2ML SOLUTION: Performed by: NURSE ANESTHETIST, CERTIFIED REGISTERED

## 2020-10-08 PROCEDURE — 82962 GLUCOSE BLOOD TEST: CPT

## 2020-10-08 PROCEDURE — 25010000002 ONDANSETRON PER 1 MG: Performed by: NURSE ANESTHETIST, CERTIFIED REGISTERED

## 2020-10-08 DEVICE — POWERPORT M.R.I. IMPLANTABLE PORT WITH PRE-ATTACHED 9.6F  OPEN-ENDED SINGLE-LUMEN VENOUS CATHETER. INTERMEDIATE KIT (WITH SUTURE PLUGS)
Type: IMPLANTABLE DEVICE | Status: FUNCTIONAL
Brand: POWERPORT M.R.I.

## 2020-10-08 RX ORDER — ACETAMINOPHEN 650 MG/1
650 SUPPOSITORY RECTAL ONCE AS NEEDED
Status: DISCONTINUED | OUTPATIENT
Start: 2020-10-08 | End: 2020-10-08 | Stop reason: HOSPADM

## 2020-10-08 RX ORDER — ACETAMINOPHEN 325 MG/1
650 TABLET ORAL ONCE AS NEEDED
Status: DISCONTINUED | OUTPATIENT
Start: 2020-10-08 | End: 2020-10-08 | Stop reason: HOSPADM

## 2020-10-08 RX ORDER — ONDANSETRON 2 MG/ML
4 INJECTION INTRAMUSCULAR; INTRAVENOUS ONCE AS NEEDED
Status: DISCONTINUED | OUTPATIENT
Start: 2020-10-08 | End: 2020-10-08 | Stop reason: HOSPADM

## 2020-10-08 RX ORDER — NALOXONE HCL 0.4 MG/ML
0.4 VIAL (ML) INJECTION AS NEEDED
Status: DISCONTINUED | OUTPATIENT
Start: 2020-10-08 | End: 2020-10-08 | Stop reason: HOSPADM

## 2020-10-08 RX ORDER — ALBUTEROL SULFATE 2.5 MG/3ML
2.5 SOLUTION RESPIRATORY (INHALATION) ONCE
Status: COMPLETED | OUTPATIENT
Start: 2020-10-08 | End: 2020-10-08

## 2020-10-08 RX ORDER — FENTANYL CITRATE 50 UG/ML
INJECTION, SOLUTION INTRAMUSCULAR; INTRAVENOUS AS NEEDED
Status: DISCONTINUED | OUTPATIENT
Start: 2020-10-08 | End: 2020-10-08 | Stop reason: SURG

## 2020-10-08 RX ORDER — SODIUM CHLORIDE 0.9 % (FLUSH) 0.9 %
10 SYRINGE (ML) INJECTION AS NEEDED
Status: DISCONTINUED | OUTPATIENT
Start: 2020-10-08 | End: 2020-10-08 | Stop reason: HOSPADM

## 2020-10-08 RX ORDER — SODIUM CHLORIDE 0.9 % (FLUSH) 0.9 %
3 SYRINGE (ML) INJECTION EVERY 12 HOURS SCHEDULED
Status: DISCONTINUED | OUTPATIENT
Start: 2020-10-08 | End: 2020-10-08 | Stop reason: HOSPADM

## 2020-10-08 RX ORDER — SODIUM CHLORIDE, SODIUM LACTATE, POTASSIUM CHLORIDE, CALCIUM CHLORIDE 600; 310; 30; 20 MG/100ML; MG/100ML; MG/100ML; MG/100ML
100 INJECTION, SOLUTION INTRAVENOUS CONTINUOUS
Status: DISCONTINUED | OUTPATIENT
Start: 2020-10-08 | End: 2020-10-08 | Stop reason: HOSPADM

## 2020-10-08 RX ORDER — LIDOCAINE HYDROCHLORIDE 20 MG/ML
INJECTION, SOLUTION INFILTRATION; PERINEURAL AS NEEDED
Status: DISCONTINUED | OUTPATIENT
Start: 2020-10-08 | End: 2020-10-08 | Stop reason: SURG

## 2020-10-08 RX ORDER — DIPHENHYDRAMINE HYDROCHLORIDE 50 MG/ML
12.5 INJECTION INTRAMUSCULAR; INTRAVENOUS
Status: DISCONTINUED | OUTPATIENT
Start: 2020-10-08 | End: 2020-10-08 | Stop reason: HOSPADM

## 2020-10-08 RX ORDER — PROPOFOL 10 MG/ML
VIAL (ML) INTRAVENOUS AS NEEDED
Status: DISCONTINUED | OUTPATIENT
Start: 2020-10-08 | End: 2020-10-08 | Stop reason: SURG

## 2020-10-08 RX ORDER — HYDROCODONE BITARTRATE AND ACETAMINOPHEN 5; 325 MG/1; MG/1
1 TABLET ORAL EVERY 4 HOURS PRN
Qty: 10 TABLET | Refills: 0 | Status: SHIPPED | OUTPATIENT
Start: 2020-10-08 | End: 2021-01-11 | Stop reason: HOSPADM

## 2020-10-08 RX ORDER — MIDAZOLAM HYDROCHLORIDE 1 MG/ML
INJECTION INTRAMUSCULAR; INTRAVENOUS AS NEEDED
Status: DISCONTINUED | OUTPATIENT
Start: 2020-10-08 | End: 2020-10-08 | Stop reason: SURG

## 2020-10-08 RX ORDER — ONDANSETRON 2 MG/ML
INJECTION INTRAMUSCULAR; INTRAVENOUS AS NEEDED
Status: DISCONTINUED | OUTPATIENT
Start: 2020-10-08 | End: 2020-10-08 | Stop reason: SURG

## 2020-10-08 RX ORDER — HEPARIN SODIUM (PORCINE) LOCK FLUSH IV SOLN 100 UNIT/ML 100 UNIT/ML
SOLUTION INTRAVENOUS AS NEEDED
Status: DISCONTINUED | OUTPATIENT
Start: 2020-10-08 | End: 2020-10-08 | Stop reason: HOSPADM

## 2020-10-08 RX ORDER — DIPHENHYDRAMINE HCL 25 MG
25 CAPSULE ORAL
Status: DISCONTINUED | OUTPATIENT
Start: 2020-10-08 | End: 2020-10-08 | Stop reason: HOSPADM

## 2020-10-08 RX ADMIN — ALBUTEROL SULFATE 2.5 MG: 2.5 SOLUTION RESPIRATORY (INHALATION) at 06:35

## 2020-10-08 RX ADMIN — FENTANYL CITRATE 50 MCG: 50 INJECTION, SOLUTION INTRAMUSCULAR; INTRAVENOUS at 07:49

## 2020-10-08 RX ADMIN — FENTANYL CITRATE 50 MCG: 50 INJECTION, SOLUTION INTRAMUSCULAR; INTRAVENOUS at 07:29

## 2020-10-08 RX ADMIN — PROPOFOL 150 MG: 10 INJECTION, EMULSION INTRAVENOUS at 07:29

## 2020-10-08 RX ADMIN — PROPOFOL 50 MG: 10 INJECTION, EMULSION INTRAVENOUS at 08:10

## 2020-10-08 RX ADMIN — LIDOCAINE HYDROCHLORIDE 100 MG: 20 INJECTION, SOLUTION INFILTRATION; PERINEURAL at 07:29

## 2020-10-08 RX ADMIN — PROPOFOL 50 MG: 10 INJECTION, EMULSION INTRAVENOUS at 08:13

## 2020-10-08 RX ADMIN — PHENYLEPHRINE HYDROCHLORIDE 100 MCG: 10 INJECTION INTRAVENOUS at 07:41

## 2020-10-08 RX ADMIN — ONDANSETRON 4 MG: 2 INJECTION INTRAMUSCULAR; INTRAVENOUS at 08:03

## 2020-10-08 RX ADMIN — CEFAZOLIN 1 G: 1 INJECTION, POWDER, FOR SOLUTION INTRAMUSCULAR; INTRAVENOUS; PARENTERAL at 07:36

## 2020-10-08 RX ADMIN — PROPOFOL 50 MG: 10 INJECTION, EMULSION INTRAVENOUS at 07:50

## 2020-10-08 RX ADMIN — SODIUM CHLORIDE, POTASSIUM CHLORIDE, SODIUM LACTATE AND CALCIUM CHLORIDE 100 ML/HR: 600; 310; 30; 20 INJECTION, SOLUTION INTRAVENOUS at 06:06

## 2020-10-08 RX ADMIN — MIDAZOLAM HYDROCHLORIDE 2 MG: 2 INJECTION, SOLUTION INTRAMUSCULAR; INTRAVENOUS at 07:21

## 2020-10-08 NOTE — ANESTHESIA PROCEDURE NOTES
Airway  Urgency: elective    Date/Time: 10/8/2020 7:30 AM  Airway not difficult    General Information and Staff    Patient location during procedure: OR  CRNA: Alanis Mcpherson CRNA    Indications and Patient Condition  Indications for airway management: airway protection    Preoxygenated: yes  Mask difficulty assessment: 0 - not attempted    Final Airway Details  Final airway type: supraglottic airway      Successful airway: I-gel  Size 4    Number of attempts at approach: 1  Assessment: lips, teeth, and gum same as pre-op and atraumatic intubation

## 2020-10-08 NOTE — ANESTHESIA PREPROCEDURE EVALUATION
Anesthesia Evaluation     Patient summary reviewed and Nursing notes reviewed   no history of anesthetic complications:  NPO Solid Status: > 8 hours  NPO Liquid Status: > 4 hours           Airway   Mallampati: II  TM distance: >3 FB  Neck ROM: full  possible difficult intubation  Dental    (+) poor dentation    Comment: Right lower partial.    Pulmonary    (+) asthma,sleep apnea, decreased breath sounds, wheezes,   (-) not a smoker    PE comment: Albuterol treatment ordered pre-op.  Cardiovascular - normal exam    ECG reviewed  Rhythm: regular  Rate: normal    (+) hypertension, hyperlipidemia,   (-) murmur, carotid bruits    ROS comment: Sinus rhythm with 1st degree AV block  Left axis deviation  Left ventricular hypertrophy with QRS widening  Cannot rule out Septal infarct , age undetermined  Abnormal ECG  No previous ECGs available  Confirmed by MIGUELITO ENRIQUEZ ([id number]) on 10/6/2020 12:23:01 PM    Neuro/Psych  (+) psychiatric history Depression and Anxiety,     GI/Hepatic/Renal/Endo    (+) obesity,   diabetes mellitus type 2, thyroid problem hypothyroidism    Musculoskeletal     (+) neck pain (Cervical DDD),   Abdominal   (+) obese,    Substance History - negative use     OB/GYN negative ob/gyn ROS         Other      history of cancer (Breast.) active                    Anesthesia Plan    ASA 3     general     intravenous induction     Anesthetic plan, all risks, benefits, and alternatives have been provided, discussed and informed consent has been obtained with: patient and child.

## 2020-10-08 NOTE — ANESTHESIA POSTPROCEDURE EVALUATION
Patient: Gale Cabral    Procedure Summary     Date: 10/08/20 Room / Location: [facility] OR 03 /  [facility]    Anesthesia Start: 0723 Anesthesia Stop: 0822    Procedure: INSERTION VENOUS ACCESS DEVICE (MEDIPORT)         (c-arm#1) (Left ) Diagnosis:       Invasive ductal carcinoma of right breast (CMS/HCC)      (Invasive ductal carcinoma of right breast (CMS/HCC) [C50.911])    Surgeon: Duke Alexandra MD Provider: Clement Duque MD    Anesthesia Type: general ASA Status: 3          Anesthesia Type: general    Vitals  No vitals data found for the desired time range.          Post Anesthesia Care and Evaluation    Patient location during evaluation: PACU  Patient participation: complete - patient participated  Level of consciousness: sleepy but conscious  Pain score: 0  Pain management: adequate  Airway patency: patent  Anesthetic complications: No anesthetic complications  PONV Status: none  Cardiovascular status: acceptable  Respiratory status: acceptable  Hydration status: acceptable

## 2020-10-08 NOTE — DISCHARGE INSTRUCTIONS
Dr. Duke Alexandra  [address] [place] [address]  [phone] (office)  [phone] (hospital)    Discharge Instructions for Port Placement      1. Go home, rest and take it easy today; however, you should get up and move about several times today to reduce the risk of developing a clot in your legs.      2. You may experience some dizziness or memory loss from the anesthesia.  This may last for the next 24 hours.  Someone should plan on staying with you for the first 24 hours for your safety.    3. Do not make any important legal decisions or sign any legal papers for the next 24 hours.      4. Eat and drink lightly today.  Start off with liquids, jello, soup, crackers or other bland foods at first. You may advance your diet tomorrow as tolerated as long as you do not experience any nausea or vomiting.     5. You may remove your outer dressings in 3-4 days or until your first treatment whichever comes first. If you remove the dressing before your first treatment, please leave the little white tapes known as steri-strips alone.  They usually fall off in 1-2 weeks.  Do not worry if they come off sooner.     6. You may notice some bleeding/drainage on your outer dressings. A little bloody drainage is normal. If the bleeding/drainage is such that the bandage cannot absorb it, remove the dressing, apply clean gauze and apply firm pressure for a full 15 minutes.  If the bleeding continues, please call me.    7. You may shower tomorrow.  No tub baths until your incisions are completely healed.    8. You may have received a prescription for a narcotic pain medicine, as you may have some pain/discomfort following surgery. You will not be totally pain free, but your pain medicine should make the pain tolerable.  Please take your pain medicine as prescribed and always take your pills with food to prevent nausea. If you are having severe pain that cannot be controlled by the pain medicine, please contact me.  If the  pain is such that narcotic pain medicine is not required, you may take Tylenol or Ibuprofen as directed unless indicated otherwise.      9. You may have also received a prescription for an anti-nausea medicine.  Please take this as prescribed for any nausea or vomiting.  Nausea could be a result of the anesthesia or a result of the narcotic pain medicine.  If you experience severe nausea and vomiting that cannot be controlled by the nausea medicine, please call me.      10. No driving for 24 hours and for as long as you are taking your prescription pain medicine.        11. No surgical follow-up is needed unless a problem (such as drainage, redness of the incision, malfunction of the port) arises.              12. Remember to contact me for any of the following:    • Fever> 101 degrees  • Severe pain that cannot be controlled by taking your pain pills  • Severe nausea or vomiting that cannot be controlled by taking your nausea medicine  • Significant bleeding from your incision  • Drainage that has a bad smell or is yellow or green in appearance  • Any other questions or concerns

## 2020-10-08 NOTE — INTERVAL H&P NOTE
H&P reviewed. The patient was examined and there are no changes to the H&P.      Temp:  [97.7 °F (36.5 °C)] 97.7 °F (36.5 °C)  Heart Rate:  [81] 81  Resp:  [18] 18  BP: (146)/(59) 146/59

## 2020-10-09 ENCOUNTER — HOSPITAL ENCOUNTER (OUTPATIENT)
Dept: PET IMAGING | Facility: HOSPITAL | Age: 75
Discharge: HOME | End: 2020-10-09
Admitting: INTERNAL MEDICINE

## 2020-10-09 DIAGNOSIS — C50.511 MALIGNANT NEOPLASM OF LOWER-OUTER QUADRANT OF RIGHT BREAST OF FEMALE, ESTROGEN RECEPTOR NEGATIVE (HCC): ICD-10-CM

## 2020-10-09 DIAGNOSIS — R79.89 ELEVATED LFTS: ICD-10-CM

## 2020-10-09 DIAGNOSIS — Z17.1 MALIGNANT NEOPLASM OF LOWER-OUTER QUADRANT OF RIGHT BREAST OF FEMALE, ESTROGEN RECEPTOR NEGATIVE (HCC): ICD-10-CM

## 2020-10-09 PROCEDURE — A9552 F18 FDG: HCPCS | Performed by: INTERNAL MEDICINE

## 2020-10-09 PROCEDURE — 0 FLUDEOXYGLUCOSE F18 SOLUTION: Performed by: INTERNAL MEDICINE

## 2020-10-09 PROCEDURE — 78815 PET IMAGE W/CT SKULL-THIGH: CPT

## 2020-10-09 RX ADMIN — FLUDEOXYGLUCOSE F18 1 DOSE: 300 INJECTION INTRAVENOUS at 09:08

## 2020-10-12 ENCOUNTER — APPOINTMENT (OUTPATIENT)
Dept: ONCOLOGY | Facility: HOSPITAL | Age: 75
End: 2020-10-12

## 2020-10-12 ENCOUNTER — DOCUMENTATION (OUTPATIENT)
Dept: NUTRITION | Facility: HOSPITAL | Age: 75
End: 2020-10-12

## 2020-10-12 ENCOUNTER — INFUSION (OUTPATIENT)
Dept: ONCOLOGY | Facility: HOSPITAL | Age: 75
End: 2020-10-12

## 2020-10-12 ENCOUNTER — OFFICE VISIT (OUTPATIENT)
Dept: ONCOLOGY | Facility: CLINIC | Age: 75
End: 2020-10-12

## 2020-10-12 VITALS
HEART RATE: 71 BPM | BODY MASS INDEX: 30.12 KG/M2 | HEIGHT: 62 IN | RESPIRATION RATE: 18 BRPM | WEIGHT: 163.7 LBS | DIASTOLIC BLOOD PRESSURE: 65 MMHG | TEMPERATURE: 97.4 F | SYSTOLIC BLOOD PRESSURE: 144 MMHG

## 2020-10-12 DIAGNOSIS — R94.8 ABNORMAL PET SCAN OF COLON: ICD-10-CM

## 2020-10-12 DIAGNOSIS — C50.911 INVASIVE DUCTAL CARCINOMA OF RIGHT BREAST (HCC): Primary | ICD-10-CM

## 2020-10-12 DIAGNOSIS — C50.911 INVASIVE DUCTAL CARCINOMA OF RIGHT BREAST (HCC): ICD-10-CM

## 2020-10-12 LAB
ALBUMIN SERPL-MCNC: 4.1 G/DL (ref 3.5–5.2)
ALBUMIN/GLOB SERPL: 1.2 G/DL
ALP SERPL-CCNC: 78 U/L (ref 39–117)
ALT SERPL W P-5'-P-CCNC: 29 U/L (ref 1–33)
ANION GAP SERPL CALCULATED.3IONS-SCNC: 13 MMOL/L (ref 5–15)
AST SERPL-CCNC: 34 U/L (ref 1–32)
BASOPHILS # BLD AUTO: 0.2 10*3/MM3 (ref 0–0.2)
BASOPHILS NFR BLD AUTO: 1.7 % (ref 0–1.5)
BILIRUB SERPL-MCNC: 0.2 MG/DL (ref 0–1.2)
BUN SERPL-MCNC: 19 MG/DL (ref 8–23)
BUN/CREAT SERPL: 17.3 (ref 7–25)
CALCIUM SPEC-SCNC: 10 MG/DL (ref 8.6–10.5)
CHLORIDE SERPL-SCNC: 101 MMOL/L (ref 98–107)
CO2 SERPL-SCNC: 24 MMOL/L (ref 22–29)
CREAT SERPL-MCNC: 1.1 MG/DL (ref 0.57–1)
DEPRECATED RDW RBC AUTO: 46.4 FL (ref 37–54)
EOSINOPHIL # BLD AUTO: 0.87 10*3/MM3 (ref 0–0.4)
EOSINOPHIL NFR BLD AUTO: 7.5 % (ref 0.3–6.2)
ERYTHROCYTE [DISTWIDTH] IN BLOOD BY AUTOMATED COUNT: 15.5 % (ref 12.3–15.4)
GFR SERPL CREATININE-BSD FRML MDRD: 49 ML/MIN/1.73
GLOBULIN UR ELPH-MCNC: 3.5 GM/DL
GLUCOSE SERPL-MCNC: 112 MG/DL (ref 65–99)
HCT VFR BLD AUTO: 40 % (ref 34–46.6)
HGB BLD-MCNC: 12.9 G/DL (ref 12–15.9)
HOLD SPECIMEN: NORMAL
IMM GRANULOCYTES # BLD AUTO: 0.03 10*3/MM3 (ref 0–0.05)
IMM GRANULOCYTES NFR BLD AUTO: 0.3 % (ref 0–0.5)
LYMPHOCYTES # BLD AUTO: 2.54 10*3/MM3 (ref 0.7–3.1)
LYMPHOCYTES NFR BLD AUTO: 22 % (ref 19.6–45.3)
MCH RBC QN AUTO: 26.7 PG (ref 26.6–33)
MCHC RBC AUTO-ENTMCNC: 32.3 G/DL (ref 31.5–35.7)
MCV RBC AUTO: 82.8 FL (ref 79–97)
MONOCYTES # BLD AUTO: 0.97 10*3/MM3 (ref 0.1–0.9)
MONOCYTES NFR BLD AUTO: 8.4 % (ref 5–12)
NEUTROPHILS NFR BLD AUTO: 6.95 10*3/MM3 (ref 1.7–7)
NEUTROPHILS NFR BLD AUTO: 60.1 % (ref 42.7–76)
NRBC BLD AUTO-RTO: 0 /100 WBC (ref 0–0.2)
PLATELET # BLD AUTO: 433 10*3/MM3 (ref 140–450)
PMV BLD AUTO: 9.2 FL (ref 6–12)
POTASSIUM SERPL-SCNC: 3.8 MMOL/L (ref 3.5–5.2)
PROT SERPL-MCNC: 7.6 G/DL (ref 6–8.5)
RBC # BLD AUTO: 4.83 10*6/MM3 (ref 3.77–5.28)
SODIUM SERPL-SCNC: 138 MMOL/L (ref 136–145)
WBC # BLD AUTO: 11.56 10*3/MM3 (ref 3.4–10.8)

## 2020-10-12 PROCEDURE — G0463 HOSPITAL OUTPT CLINIC VISIT: HCPCS | Performed by: INTERNAL MEDICINE

## 2020-10-12 PROCEDURE — 80053 COMPREHEN METABOLIC PANEL: CPT

## 2020-10-12 PROCEDURE — 99214 OFFICE O/P EST MOD 30 MIN: CPT | Performed by: INTERNAL MEDICINE

## 2020-10-12 PROCEDURE — G8731 PAIN NEG NO PLAN: HCPCS | Performed by: INTERNAL MEDICINE

## 2020-10-12 PROCEDURE — 85025 COMPLETE CBC W/AUTO DIFF WBC: CPT

## 2020-10-12 PROCEDURE — G9903 PT SCRN TBCO ID AS NON USER: HCPCS | Performed by: INTERNAL MEDICINE

## 2020-10-12 PROCEDURE — 36415 COLL VENOUS BLD VENIPUNCTURE: CPT

## 2020-10-12 PROCEDURE — 1123F ACP DISCUSS/DSCN MKR DOCD: CPT | Performed by: INTERNAL MEDICINE

## 2020-10-12 RX ORDER — DEXAMETHASONE 4 MG/1
TABLET ORAL
Qty: 12 TABLET | Refills: 3 | Status: SHIPPED | OUTPATIENT
Start: 2020-10-12 | End: 2021-02-09

## 2020-10-12 NOTE — PROGRESS NOTES
DATE OF VISIT: 10/12/2020      REASON FOR VISIT: Triple negative right breast cancer, elevated liver function test, chronic kidney disease      HISTORY OF PRESENT ILLNESS:   75-year-old female with medical problem consisting of diabetes mellitus, hypertension, dyslipidemia, asthma, osteopenia, obstructive sleep apnea, chronic back pain and knee pain was initially seen in consultation on October 5, 2020 for evaluation of triple negative right breast cancer.  Due to elevated liver function test, patient had a PET/CT done on October 9, 2020, patient is here to discuss the result and start first cycle of chemotherapy with Taxotere and Cytoxan.  Denies any new tingling or numbness affecting upper or lower extremity.  Denies any bleeding.  Denies any new headache or dizziness.        PAST MEDICAL HISTORY:    Past Medical History:   Diagnosis Date   • Abnormal mammogram of right breast    • Acquired hypothyroidism     started synthroid 9/2015   • Allergic rhinitis, seasonal    • Breast cancer (CMS/HCC)    • Cervicalgia     right upper extremity radiculopathy   • Diabetes mellitus (CMS/HCC)    • Encounter for screening for malignant neoplasm of colon    • Essential hypertension    • Glaucoma    • Health maintenance examination     individual health exam   • Hypercholesterolemia    • Hyperglycemia     steroid-induced hyperglycemia in diabetic patient   • Lumbar disc disorder     w/right radiculopathy   • Menopause    • Mild persistent asthma     resumed dulera twice daily   • Obstructive sleep apnea syndrome 9/17/2019   • Osteopenia     improved DEXA 2/2013   • 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   • Sebaceous cyst     subepidermal cyst   • Shoulder pain     likely radicular pain due to cervical DJD   • Spasm of back muscles     right neck and trapezius   • Spinal stenosis of lumbar region    • Strain of neck muscle     cervical strain   • Temporomandibular joint disorder     h/o   • Vitamin D deficiency     on oral  "calcium/vitamin D supplement       SOCIAL HISTORY:    Social History     Tobacco Use   • Smoking status: Never Smoker   • Smokeless tobacco: Never Used   Substance Use Topics   • Alcohol use: No   • Drug use: No       Surgical History :  Past Surgical History:   Procedure Laterality Date   • CHOLECYSTECTOMY     • COLONOSCOPY  12/17/2015    normal   • ENDOSCOPY AND COLONOSCOPY  08/2010   • HYSTERECTOMY     • OOPHORECTOMY     • TONSILLECTOMY         ALLERGIES:    Allergies   Allergen Reactions   • Other Other (See Comments)     Coda-clear         FAMILY HISTORY:  Family History   Problem Relation Age of Onset   • Colon cancer Mother    • Heart attack Father    • Aneurysm Brother    • No Known Problems Son    • Alzheimer's disease Maternal Grandmother    • Heart attack Maternal Grandfather    • Alzheimer's disease Paternal Grandmother    • No Known Problems Son            REVIEW OF SYSTEMS:      CONSTITUTIONAL:  Complains of fatigue. Denies any fever, chills or weight loss.     EYES: No visual disturbances. No discharge. No new lesions    ENMT:  No epistaxis, mouth sores or difficulty swallowing.    RESPIRATORY: Positive for chronic intermittent shortness of breath due to asthma. No new cough or hemoptysis.    CARDIOVASCULAR:  No chest pain or palpitations.    GASTROINTESTINAL:  No abdominal pain nausea, vomiting or blood in the stool.    GENITOURINARY: No Dysuria or Hematuria.    MUSCULOSKELETAL: Positive for chronic back pain and knee pain.    LYMPHATICS:  Denies any abnormal swollen glands anywhere in the body.    NEUROLOGICAL : No tingling or numbness. No headache or dizziness. No seizures or balance problems.    SKIN: No new skin lesions.    ENDOCRINE : No new hot or cold intolerance. No new polyuria . No polydipsia.        PHYSICAL EXAMINATION:      VITAL SIGNS:  /65   Pulse 71   Temp 97.4 °F (36.3 °C) (Temporal)   Resp 18   Ht 157.5 cm (62.01\")   Wt 74.3 kg (163 lb 11.2 oz)   BMI 29.93 kg/m²       " 10/12/20  0844   Weight: 74.3 kg (163 lb 11.2 oz)         ECOG performance status: 2    CONSTITUTIONAL:  Not in any distress.    EYES: Mild conjunctival Pallor. No Icterus. No Pterygium. Extraocular Movements intact.No ptosis.    ENMT:  Normocephalic, Atraumatic.No Facial Asymmetry noted.    NECK:  No adenopathy.Trachea midline. NO JVD.    RESPIRATORY:  Fair air entry bilateral. No rhonchi or wheezing.Fair respiratory effort.    CARDIOVASCULAR:  S1, S2. Regular rate and rhythm. No murmur or gallop appreciated.    ABDOMEN:  Soft, obese, nontender. Bowel sounds present in all four quadrants.  No Hepatosplenomegaly appreciated.    MUSCULOSKELETAL:  No edema.No Calf Tenderness.Decreased range of motion.    NEUROLOGIC:    No  Motor or sensory deficit appreciated. Cranial Nerves 2-12 grossly intact.    SKIN : No new skin lesion identified. Skin is warm and moist to touch.    LYMPHATICS: No new enlarged lymph nodes in neck or supraclavicular area.    PSYCHIATRY: Alert, awake and oriented ×3.  Appears anxious.  Normal judgment.  Makes good eye contact.        DIAGNOSTIC DATA:    Glucose   Date Value Ref Range Status   10/12/2020 112 (H) 65 - 99 mg/dL Final     Sodium   Date Value Ref Range Status   10/12/2020 138 136 - 145 mmol/L Final     Potassium   Date Value Ref Range Status   10/12/2020 3.8 3.5 - 5.2 mmol/L Final     CO2   Date Value Ref Range Status   10/12/2020 24.0 22.0 - 29.0 mmol/L Final     Chloride   Date Value Ref Range Status   10/12/2020 101 98 - 107 mmol/L Final     Anion Gap   Date Value Ref Range Status   10/12/2020 13.0 5.0 - 15.0 mmol/L Final     Creatinine   Date Value Ref Range Status   10/12/2020 1.10 (H) 0.57 - 1.00 mg/dL Final     BUN   Date Value Ref Range Status   10/12/2020 19 8 - 23 mg/dL Final     BUN/Creatinine Ratio   Date Value Ref Range Status   10/12/2020 17.3 7.0 - 25.0 Final     Calcium   Date Value Ref Range Status   10/12/2020 10.0 8.6 - 10.5 mg/dL Final     eGFR Non African Amer    Date Value Ref Range Status   10/12/2020 49 (L) >60 mL/min/1.73 Final     Alkaline Phosphatase   Date Value Ref Range Status   10/12/2020 78 39 - 117 U/L Final     Total Protein   Date Value Ref Range Status   10/12/2020 7.6 6.0 - 8.5 g/dL Final     ALT (SGPT)   Date Value Ref Range Status   10/12/2020 29 1 - 33 U/L Final     AST (SGOT)   Date Value Ref Range Status   10/12/2020 34 (H) 1 - 32 U/L Final     Total Bilirubin   Date Value Ref Range Status   10/12/2020 0.2 0.0 - 1.2 mg/dL Final     Albumin   Date Value Ref Range Status   10/12/2020 4.10 3.50 - 5.20 g/dL Final     Globulin   Date Value Ref Range Status   10/12/2020 3.5 gm/dL Final     Lab Results   Component Value Date    WBC 11.56 (H) 10/12/2020    HGB 12.9 10/12/2020    HCT 40.0 10/12/2020    MCV 82.8 10/12/2020     10/12/2020     Lab Results   Component Value Date    NEUTROABS 6.95 10/12/2020     No results found for: , LABCA2, AFPTM, HCGQUANT, , CHROMGRNA, 6HPQC16HIS, CEA, REFLABREPO        PATHOLOGY:  * Cannot find OR log *         RADIOLOGY DATA :  Nm Pet Skull Base To Mid Thigh    Result Date: 10/9/2020  CONCLUSION: 1.  Right breast small linear foci of FDG activity with SUV of 2.56. This most likely represents postsurgical or postbiopsy changes versus less likely treated or very small malignancy too small to characterize. 2. Small shotty right axillary lymph nodes with FDG activity with SUV of 1.46-1.74. This most likely represents small reactive lymph nodes with metastatic disease felt less likely. 3. Remainder PET scan is unremarkable. (Please note:  a Nuclear Medicine bone scan is considered more sensitive for the detection of osteoblastic metastatic disease). Electronically signed by:  Brad Mclain MD  10/9/2020 5:02 PM CDT Workstation: [id number]    Xr Chest Post Cva Port    Result Date: 10/8/2020  Left subclavian port in place with tip overlying the mid SVC. No pneumothorax or pleural effusion. [id number] Electronically signed  by:  Ronald Osborn MD  10/8/2020 8:43 AM CDT Workstation: [id number]        ASSESSMENT AND PLAN:      1.  Triple negative right breast cancer, clinically T1 lesion with measurement around 1.2 cm.  - Result of pathology, and treatment option were discussed with patient on October 5, 2020.  -Due to elevated liver function and elevated creatinine patient had a PET/CT done for staging purpose on October 9, 2020.  - Head CT does not show any evidence of metastatic disease.  There is some faint uptake in the right axilla which is thought to be reactive rather than malignant involvement.  -Plan was to start first cycle of chemotherapy today on October 12, 2020.  Due to error in entering plan. Different  chemotherapy has been approved.  Patient was notified about this error.  Will order Taxotere and Cytoxan to be approved from insurance company.  Plan is to start chemotherapy later this week once we get it approved.  - We will continue close monitoring for any chemotherapy-induced side effects    2.  Diabetes mellitus    3.  Leukocytosis and thrombocytosis:  -Likely reactive.  Will monitor with CBC for now    4.  Abnormal uptake in rectal area.  -PET/CT has abnormal uptake in rectal area.  Patient denies any bleeding.  It looks more physiologic rather than malignant.   -We will refer patient to gastroenterology clinic for further evaluation of abnormal uptake in rectal area.  Patient does have a history of hemorrhoids that could contribute to abnormal uptake in rectal area.  Referral for gastroenterology team has been placed today on October 12, 2020.      5.  Asthma    6.  Hypertension    7.  Health maintenance: Patient does not smoke    8. Advance Care Planning: For now patient remains full code and is able to make decisions.  Patient has health care surrogate mentioned on chart.         PHQ-9 Total Score: 1   -Patient is not homicidal suicidal.  No acute intervention required.    Gale Cabral reports a pain score  of 0.  Given her pain assessment as noted, treatment options were discussed and the following options were decided upon as a follow-up plan to address the patient's pain: No acute intervention required.         Carlos Duong MD  10/12/2020  18:37 CDT        Part of this note may be an electronic transcription/translation of spoken language to printed text using the Dragon Dictation System.

## 2020-10-12 NOTE — PROGRESS NOTES
Adult Outpatient Nutrition  Assessment    Patient Name:  Gale Cabral  YOB: 1945  MRN: [id number]    Assessment Date:  10/12/2020    Comments: Chart review revealed 75WF coming to [facility] due to breast cancer. Starting chemo. Ht 62 in. Wt 167.8 lb (stable for past year). Diabetic on metformin. Glucose 97. AIC 7.41 6/3/20. Alb 4.3. Noted is to receive decadron with chemo--must monitor glucose closely.                          Electronically signed by:  Elena Bee RD  10/12/20 07:00 CDT

## 2020-10-14 ENCOUNTER — TELEPHONE (OUTPATIENT)
Dept: ONCOLOGY | Facility: CLINIC | Age: 75
End: 2020-10-14

## 2020-10-14 NOTE — TELEPHONE ENCOUNTER
Patient would like a call back from the nurse regarding medication and her chemo treatment on the schedule for tomorrow  Best call back number [phone]

## 2020-10-14 NOTE — TELEPHONE ENCOUNTER
Called and spoke with pt regarding new chemotherapy and taking decadron today and for the next 2 days.  V/u obtained.

## 2020-10-15 ENCOUNTER — DOCUMENTATION (OUTPATIENT)
Dept: NUTRITION | Facility: HOSPITAL | Age: 75
End: 2020-10-15

## 2020-10-15 ENCOUNTER — APPOINTMENT (OUTPATIENT)
Dept: ONCOLOGY | Facility: HOSPITAL | Age: 75
End: 2020-10-15

## 2020-10-15 ENCOUNTER — INFUSION (OUTPATIENT)
Dept: ONCOLOGY | Facility: HOSPITAL | Age: 75
End: 2020-10-15

## 2020-10-15 ENCOUNTER — NURSE NAVIGATOR (OUTPATIENT)
Dept: ONCOLOGY | Facility: CLINIC | Age: 75
End: 2020-10-15

## 2020-10-15 VITALS
SYSTOLIC BLOOD PRESSURE: 123 MMHG | DIASTOLIC BLOOD PRESSURE: 57 MMHG | HEART RATE: 84 BPM | TEMPERATURE: 97.8 F | RESPIRATION RATE: 18 BRPM

## 2020-10-15 DIAGNOSIS — Z45.2 ENCOUNTER FOR VENOUS ACCESS DEVICE CARE: ICD-10-CM

## 2020-10-15 DIAGNOSIS — C50.911 INVASIVE DUCTAL CARCINOMA OF RIGHT BREAST (HCC): Primary | ICD-10-CM

## 2020-10-15 PROCEDURE — 96413 CHEMO IV INFUSION 1 HR: CPT | Performed by: NURSE PRACTITIONER

## 2020-10-15 PROCEDURE — 25010000002 PALONOSETRON PER 25 MCG: Performed by: NURSE PRACTITIONER

## 2020-10-15 PROCEDURE — 25010000002 CYCLOPHOSPHAMIDE PER 100 MG: Performed by: NURSE PRACTITIONER

## 2020-10-15 PROCEDURE — 96377 APPLICATON ON-BODY INJECTOR: CPT | Performed by: NURSE PRACTITIONER

## 2020-10-15 PROCEDURE — 96417 CHEMO IV INFUS EACH ADDL SEQ: CPT | Performed by: NURSE PRACTITIONER

## 2020-10-15 PROCEDURE — 25010000003 HEPARIN LOCK FLUSH PER 10 UNITS: Performed by: NURSE PRACTITIONER

## 2020-10-15 PROCEDURE — 96375 TX/PRO/DX INJ NEW DRUG ADDON: CPT | Performed by: NURSE PRACTITIONER

## 2020-10-15 PROCEDURE — 25010000002 DOCETAXEL 20 MG/ML SOLUTION 8 ML VIAL: Performed by: NURSE PRACTITIONER

## 2020-10-15 PROCEDURE — 25010000002 PEGFILGRASTIM 6 MG/0.6ML PREFILLED SYRINGE KIT: Performed by: NURSE PRACTITIONER

## 2020-10-15 RX ORDER — SODIUM CHLORIDE 0.9 % (FLUSH) 0.9 %
10 SYRINGE (ML) INJECTION AS NEEDED
Status: DISCONTINUED | OUTPATIENT
Start: 2020-10-15 | End: 2020-10-15 | Stop reason: HOSPADM

## 2020-10-15 RX ORDER — DIPHENHYDRAMINE HYDROCHLORIDE 50 MG/ML
50 INJECTION INTRAMUSCULAR; INTRAVENOUS AS NEEDED
Status: CANCELLED | OUTPATIENT
Start: 2020-10-15

## 2020-10-15 RX ORDER — SODIUM CHLORIDE 9 MG/ML
250 INJECTION, SOLUTION INTRAVENOUS ONCE
Status: CANCELLED | OUTPATIENT
Start: 2020-10-15

## 2020-10-15 RX ORDER — HEPARIN SODIUM (PORCINE) LOCK FLUSH IV SOLN 100 UNIT/ML 100 UNIT/ML
500 SOLUTION INTRAVENOUS AS NEEDED
Status: CANCELLED | OUTPATIENT
Start: 2020-10-15

## 2020-10-15 RX ORDER — DIPHENHYDRAMINE HYDROCHLORIDE 50 MG/ML
50 INJECTION INTRAMUSCULAR; INTRAVENOUS AS NEEDED
Status: DISCONTINUED | OUTPATIENT
Start: 2020-10-15 | End: 2020-10-15 | Stop reason: HOSPADM

## 2020-10-15 RX ORDER — SODIUM CHLORIDE 9 MG/ML
250 INJECTION, SOLUTION INTRAVENOUS ONCE
Status: COMPLETED | OUTPATIENT
Start: 2020-10-15 | End: 2020-10-15

## 2020-10-15 RX ORDER — SODIUM CHLORIDE 0.9 % (FLUSH) 0.9 %
20 SYRINGE (ML) INJECTION AS NEEDED
Status: CANCELLED | OUTPATIENT
Start: 2020-10-15

## 2020-10-15 RX ORDER — FAMOTIDINE 10 MG/ML
20 INJECTION, SOLUTION INTRAVENOUS AS NEEDED
Status: CANCELLED | OUTPATIENT
Start: 2020-10-15

## 2020-10-15 RX ORDER — HEPARIN SODIUM (PORCINE) LOCK FLUSH IV SOLN 100 UNIT/ML 100 UNIT/ML
500 SOLUTION INTRAVENOUS AS NEEDED
Status: DISCONTINUED | OUTPATIENT
Start: 2020-10-15 | End: 2020-10-15 | Stop reason: HOSPADM

## 2020-10-15 RX ORDER — PALONOSETRON 0.05 MG/ML
0.25 INJECTION, SOLUTION INTRAVENOUS ONCE
Status: CANCELLED | OUTPATIENT
Start: 2020-10-15

## 2020-10-15 RX ORDER — SODIUM CHLORIDE 0.9 % (FLUSH) 0.9 %
20 SYRINGE (ML) INJECTION AS NEEDED
Status: DISCONTINUED | OUTPATIENT
Start: 2020-10-15 | End: 2020-10-15 | Stop reason: HOSPADM

## 2020-10-15 RX ORDER — FAMOTIDINE 10 MG/ML
20 INJECTION, SOLUTION INTRAVENOUS AS NEEDED
Status: DISCONTINUED | OUTPATIENT
Start: 2020-10-15 | End: 2020-10-15 | Stop reason: HOSPADM

## 2020-10-15 RX ORDER — SODIUM CHLORIDE 0.9 % (FLUSH) 0.9 %
10 SYRINGE (ML) INJECTION AS NEEDED
Status: CANCELLED | OUTPATIENT
Start: 2020-10-15

## 2020-10-15 RX ORDER — PALONOSETRON 0.05 MG/ML
0.25 INJECTION, SOLUTION INTRAVENOUS ONCE
Status: COMPLETED | OUTPATIENT
Start: 2020-10-15 | End: 2020-10-15

## 2020-10-15 RX ADMIN — DOCETAXEL 130 MG: 20 INJECTION, SOLUTION, CONCENTRATE INTRAVENOUS at 09:28

## 2020-10-15 RX ADMIN — CYCLOPHOSPHAMIDE 1060 MG: 1 INJECTION, POWDER, FOR SOLUTION INTRAVENOUS; ORAL at 10:48

## 2020-10-15 RX ADMIN — HEPARIN 500 UNITS: 100 SYRINGE at 11:35

## 2020-10-15 RX ADMIN — PALONOSETRON HYDROCHLORIDE 0.25 MG: 0.25 INJECTION, SOLUTION INTRAVENOUS at 08:44

## 2020-10-15 RX ADMIN — SODIUM CHLORIDE, PRESERVATIVE FREE 10 ML: 5 INJECTION INTRAVENOUS at 11:35

## 2020-10-15 RX ADMIN — SODIUM CHLORIDE 250 ML: 9 INJECTION, SOLUTION INTRAVENOUS at 08:45

## 2020-10-15 RX ADMIN — PEGFILGRASTIM 6 MG: KIT SUBCUTANEOUS at 11:35

## 2020-10-15 NOTE — PROGRESS NOTES
Adult Outpatient Nutrition  Assessment    Patient Name:  Gale Cabral  YOB: 1945  MRN: [id number]    Assessment Date:  10/15/2020    CHART REVIEW  Gale Cabral   1945  75 y.o.  Wt: 163.9 lb  Ht: 62 in  Dx: breast cancer  Tx: chemo  Labs: Alb 4.1/glucose 112/A1C 7.41    PATIENT/FAMILY COMMENTS  Usual Body Weight: 160-167 lb  Weight Change: close to usual  Diet: diabetic (recent poor compliance)  Food Allergies: nkfa  Number of Feedings Daily: 3 + snacks  Appetite/Intake: good  Supplements: na  Meal Preparation: pt (family avail to help)  Nutrition Concerns:  * intermit diarrhea (since gall bladder removed)  * diabetic starting chemo with steroids      GOAL(s)  -to optimize nutrition in attempt to prevent loss of LBM      EDUCATION  Discussed  -balanced nutrition w/3-4 carb choices per meal/adequate protein  -importance of staying hydrated  -food safety  -diabetic friendly supplements (if needed in future)    To reinforce education, written information with RD's name & number provided.  Patient and granddaughter very receptive to suggestions--agreed to call on dietitian as needed.              Elena Bee RD                                Electronically signed by:  Elena Bee RD  10/15/20 10:49 CDT

## 2020-10-15 NOTE — PATIENT INSTRUCTIONS
Chemotherapy  Chemotherapy is a cancer treatment. It uses medicines to slow down or stop the growth of cancer. You may have chemotherapy to:  · Cure your cancer.  · Prevent the cancer from growing or spreading (metastasizing).  · Ease symptoms and improve your quality of life (palliative care).  · Improve the effects of radiation treatment.  · Shrink a tumor before surgery.  · Rid the body of cancer cells that remain after having a tumor surgically removed.  The length of chemotherapy treatment depends on many factors, including:  · The type and stage of your cancer.  · How you respond to the chemotherapy.  · Your side effects.  What are the risks?  Generally, this is a safe treatment. However, problems may occur, including:  · Infection.  · Bleeding at the IV site.  · Allergic reactions to medicines.  You may have side effects from chemotherapy. What side effects you have depends on a variety of factors, including:  · The type of chemotherapy medicine used.  · Your dosage.  · How long the medicine is used for.  · Your overall health.  What happens before treatment?  · You will meet with your cancer care team to discuss:  ? How your chemotherapy medicine will be given.  ? Common side effects and how to manage them.  ? Your treatment schedule.  · You may have blood tests.  · You may be given medicine to help prevent common side effects.  What happens during treatment?  Chemotherapy may be given continuously over time, or it may be given in cycles. Some common ways chemotherapy may be given include:  · As a pill or capsule.  · As an injection.  · As a skin (topical) cream.  · As a special wafer that is put in your body where the cancer is.  · As an injection into the cerebrospinal fluid (CSF) in the brain or spinal cord (intraventricular or intrathecal chemotherapy).  · Through a small, thin tube (catheter). There are different kinds of catheters. You might have one that:  ? Goes into a vein (intravenous  catheter).  ? Connects to a device (port) that is inserted under the skin of your chest (port catheter). A port catheter connects the port to a large vein in your chest or upper arm. The port may stay in place for many weeks or months.  ? Goes into a vein near your elbow (PICC line). This may be used for weeks or months.  ? Goes into a vein in your neck that leads to your heart (non-tunneled catheter). This catheter has a risk of infection, so it is used for only a short time.  ? Goes through the skin of your chest and into a large vein that leads to your heart (tunneled catheter). This catheter can stay in your body for months or years.  While you are receiving your medicine, your cancer care team will monitor your blood pressure, heart rate, breathing rate, and blood oxygen level (vital signs) and watch for any problems.  Some types of chemotherapy medicine are given only one time. Others are given for months, years, or for life.  What can I expect after treatment?  After chemotherapy, you may have side effects, such as:  · Nausea and vomiting.  · Appetite loss.  · Constipation or diarrhea.  · Fatigue.  · Increased risk of infections, bruising, or bleeding.  · Hair loss.  · Mouth or throat sores.  · Tingling, pain, or numbness in the hands and feet.  · Dry, sensitive, itchy, or sore skin.  · Memory changes.  Follow these instructions at home:  General instructions    · If you get chemotherapy through an IV, PICC line, or port, check the site every day for signs of infection. Check for redness, swelling, pain, fluid, or warmth.  · Wash your hands frequently with soap and water. If soap and water are not available, use hand . Have other members of your household wash their hands often.  · Chemotherapy medicines leave the body through urine or stool (feces), but they can also be present in other body fluids including vomit, tears, vaginal fluids, and semen. You must carefully follow some safety precautions  to prevent harm to others while you are taking these medicines:  ? Wash any clothes, towels, and linens that may have your bodily fluids on them twice in a washing machine.  ? Use a condom during vaginal, anal, and oral sex while you are taking chemotherapy medicines and for 48 hours after your last dose.  ? Practice good bathroom hygiene:  § Always sit when using the toilet. Close the toilet seat lid before you flush.  § Wash your hands thoroughly with soap and water after each time you use the toilet.  · Keep all follow-up visits as told by your cancer care team. This is important.  Eating and drinking  · Talk with a dietitian about what you should eat and drink during cancer treatment.  · Always wash fresh fruits and vegetables well before eating them.  · Drink enough fluid to keep urine pale yellow.  Medicines  · Take over-the-counter and prescription medicines only as told by your health care provider.  · Talk with your health care provider before taking vitamins and supplements. Some can interfere with chemotherapy.  Activity  · Get plenty of rest.  · Get regular exercise such as walking, gentle yoga, or sidra chi.  · Return to your normal activities as told by your health care provider. Ask your health care provider what activities are safe for you.  Contact a health care provider if:  · You have:  ? A skin rash that does not go away.  ? A headache.  ? A stiff neck.  ? A cough.  ? Cold or flu symptoms.  ? A burning feeling when urinating.  ? Urine that smells bad.  ? Diarrhea.  ? Nausea.  ? Vomiting.  ? Blood in your urine or stool.  · You bleed or bruise often.  · You urinate more frequently than usual.  · You cannot eat because of mouth or throat pain.  Get help right away if:  · You have:  ? A fever.  ? Redness, swelling, pain, fluid, or warmth near your IV site.  ? Bleeding that does not stop.  ? A seizure.  ? Chest pain.  ? Difficulty breathing.  · You cannot swallow.  Summary  · Chemotherapy is a way to  treat cancer. It uses medicines to slow down or stop the growth of cancer.  · Before treatment, you and your cancer care team will discuss common side effects and how to manage them.  · The way that you will get chemotherapy medicines depends on your condition.  · Take over-the-counter and prescription medicines only as told by your health care provider.  This information is not intended to replace advice given to you by your health care provider. Make sure you discuss any questions you have with your health care provider.  Document Released: 10/14/2008 Document Revised: 04/08/2020 Document Reviewed: 10/04/2018  Elsevier Patient Education © 2020 [employer]    Managing Chemotherapy Side Effects, Adult  Chemotherapy is a treatment that uses medicine to kill cancer cells. Chemotherapy causes side effects. The specific side effects depend on the specific medicines used.  Most of the side effects of chemotherapy go away once treatment is finished. Until then, work closely with your health care providers and take an active role in managing your side effects.  What are common side effects?  · Tiredness (fatigue).  · Increased risk of infections, bruising, or bleeding.  · Nausea and vomiting.  · Constipation or diarrhea.  · Appetite loss.  · Hair loss.  · Mouth or throat sores.  · Tingling, pain, or numbness in the hands and feet.  · Dry, sensitive, itchy, or sore skin.  · Confusion, anxiety, or mood swings.  · Memory changes.  How can I help manage my side effects?  Medicines  · Take over-the-counter and prescription medicines only as told by your health care provider.  · Talk with your health care provider before taking vitamins, supplements, and over-the-counter medicines. Some of these can interfere with chemotherapy.  Activity    · Get plenty of rest.  · Get regular exercise by doing activities such as walking, gentle yoga, or sidra chi.  · Return to your normal activities as told by your health care provider. Ask  your health care provider what activities are safe for you.  Eating and drinking  · Talk to a dietitian about what you should eat and drink during cancer treatment.  · Drink enough fluid to keep your urine pale yellow.  · If you have side effects that affect eating, these tips may help:  ? Eat smaller meals and snacks often.  ? Drink high-nutrition and high-calorie shakes or supplements.  ? Eat bland and soft foods that are easy to eat.  ? Do not eat foods that are hot, spicy, or hard to swallow.  · Do not eat raw or undercooked meat, eggs, or seafood.  · Always wash fresh fruits and vegetables well before eating them.  General instructions  · Learn as much as you can about your condition.  · Keep all follow-up visits as told by your health care provider. This is important.  · Use mouth rinse only as told by your health care provider.  · Protect your skin from the sun by using sunscreen or wearing protective clothing and a hat.  · If you have sore or itchy skin:  ? Wear soft, comfortable clothing.  ? Apply creams and ointments to your skin as told by your health care provider.  · If you lose your hair, wear a wig, hat, or scarf to cover your head. You may want to have someone shave your head as you start to lose hair.  · Meet with a hair and  for makeup and skin care tips.  How can I prevent infection and bleeding?  Chemotherapy may lower your blood counts and put you at risk for infection and bleeding. Here are some ways to help prevent problems.  Vaccines  · Talk to your health care provider about vaccines. You should not get any live vaccines, such as the polio, MMR, chicken pox, and shingles vaccines. Do not be around people who have had live vaccines.  · Make sure you get a yearly flu shot. People who will be near you should also get a yearly flu shot.  Social activity  · Stay away from crowded places where you could be exposed to germs.  · Do not be around people who may be sick.  · Do not  share food or utensils with other people.  · Wear a mask when outside the home if your blood counts are low.  Cleanliness    · Wash your hands often. Also make sure that other members of your household wash their hands often.  · Brush your teeth daily using a soft toothbrush.  General tips  · Take your temperature regularly, especially if you have chills or feel warm.  · Check with your health care provider before you:  ? Travel.  ? Have a dental procedure.  · If you get chemotherapy through an IV or port, check the site every day for signs of infection. Check for redness, swelling, pain, fluid, and warmth.  · Avoid activities that put you at risk for injury.  · Use an electric razor to shave instead of a blade.  Questions to ask your health care provider  · What are the most common side effects of my treatment?  · How will they affect my daily life?  · What can I do to manage them?  · When can I expect them to end?  · What are some possible long-term side effects?  · What are possible complications?  · What support services are available?  · When should I contact my cancer care provider?  · What number can I call with questions or concerns?  Where to find support  Cancer affects the entire family. Find out what family support resources are available from your cancer treatment center. For more support, turn to:  · Your cancer care team.  · Friends and family.  · Your Buddhist community.  · Other people with cancer.  · Online support groups.  Where to find more information  · National Cancer [place]: www.cancer.gov  · American Cancer Society: www.cancer.org  Contact a health care provider if:  · You bleed or bruise often.  · You have:  ? A skin rash or dry or itchy skin.  ? A headache or stiff neck.  ? Cold or flu symptoms.  ? A cough.  ? Nausea or vomiting.  ? Diarrhea.  ? Frequent urination, burning when passing urine, or foul-smelling urine.  ? Blood in your urine or stool.  · You cannot eat because of mouth or  throat pain.  · You are sad, confused, anxious, or depressed.  Get help right away if:  · You have:  ? A fever.  ? Redness, swelling, pain, fluid, or warmth near an IV site.  ? Bleeding that you cannot stop.  ? A seizure.  · You cannot swallow.  · You have chest pain.  · You have trouble breathing.  Summary  · Chemotherapy is a treatment that uses medicine to kill cancer cells. Chemotherapy causes side effects. The specific side effects depend on the specific medicines used.  · Learn as much as you can about your condition. Ask about side effects to watch for and how to treat them.  · Seek out support and resources from others. Find out what family support resources are available from your cancer treatment center.  · Let your health care provider know if you notice any new or unusual symptoms.  This information is not intended to replace advice given to you by your health care provider. Make sure you discuss any questions you have with your health care provider.  Document Released: 03/14/2019 Document Revised: 03/14/2019 Document Reviewed: 03/14/2019  [employer] Patient Education © 2020 [employer]  Cyclophosphamide Injection  What is this medicine?  CYCLOPHOSPHAMIDE (sye kloe EMILY fa mide) is a chemotherapy drug. It slows the growth of cancer cells. This medicine is used to treat many types of cancer like lymphoma, myeloma, leukemia, breast cancer, and ovarian cancer, to name a few.  This medicine may be used for other purposes; ask your health care provider or pharmacist if you have questions.  COMMON BRAND NAME(S): Cytoxan, Neosar  What should I tell my health care provider before I take this medicine?  They need to know if you have any of these conditions:  · heart disease  · history of irregular heartbeat  · infection  · kidney disease  · liver disease  · low blood counts, like white cells, platelets, or red blood cells  · on hemodialysis  · recent or ongoing radiation therapy  · scarring or thickening of the  lungs  · trouble passing urine  · an unusual or allergic reaction to cyclophosphamide, other medicines, foods, dyes, or preservatives  · pregnant or trying to get pregnant  · breast-feeding  How should I use this medicine?  This drug is usually given as an injection into a vein or muscle or by infusion into a vein. It is administered in a hospital or clinic by a specially trained health care professional.  Talk to your pediatrician regarding the use of this medicine in children. Special care may be needed.  Overdosage: If you think you have taken too much of this medicine contact a poison control center or emergency room at once.  NOTE: This medicine is only for you. Do not share this medicine with others.  What if I miss a dose?  It is important not to miss your dose. Call your doctor or health care professional if you are unable to keep an appointment.  What may interact with this medicine?  · amphotericin B  · azathioprine  · certain antivirals for HIV or hepatitis  · certain medicines for blood pressure, heart disease, irregular heart beat  · certain medicines that treat or prevent blood clots like warfarin  · certain other medicines for cancer  · cyclosporine  · etanercept  · indomethacin  · medicines that relax muscles for surgery  · medicines to increase blood counts  · metronidazole  This list may not describe all possible interactions. Give your health care provider a list of all the medicines, herbs, non-prescription drugs, or dietary supplements you use. Also tell them if you smoke, drink alcohol, or use illegal drugs. Some items may interact with your medicine.  What should I watch for while using this medicine?  Your condition will be monitored carefully while you are receiving this medicine.  You may need blood work done while you are taking this medicine.  Drink water or other fluids as directed. Urinate often, even at night.  Some products may contain alcohol. Ask your health care professional if  this medicine contains alcohol. Be sure to tell all health care professionals you are taking this medicine. Certain medicines, like metronidazole and disulfiram, can cause an unpleasant reaction when taken with alcohol. The reaction includes flushing, headache, nausea, vomiting, sweating, and increased thirst. The reaction can last from 30 minutes to several hours.  Do not become pregnant while taking this medicine or for 1 year after stopping it. Women should inform their health care professional if they wish to become pregnant or think they might be pregnant. Men should not father a child while taking this medicine and for 4 months after stopping it. There is potential for serious side effects to an unborn child. Talk to your health care professional for more information.  Do not breast-feed an infant while taking this medicine or for 1 week after stopping it.  This medicine has caused ovarian failure in some women. This medicine may make it more difficult to get pregnant. Talk to your health care professional if you are concerned about your fertility.  This medicine has caused decreased sperm counts in some men. This may make it more difficult to father a child. Talk to your health care professional if you are concerned about your fertility.  Call your health care professional for advice if you get a fever, chills, or sore throat, or other symptoms of a cold or flu. Do not treat yourself. This medicine decreases your body's ability to fight infections. Try to avoid being around people who are sick.  Avoid taking medicines that contain aspirin, acetaminophen, ibuprofen, naproxen, or ketoprofen unless instructed by your health care professional. These medicines may hide a fever.  Talk to your health care professional about your risk of cancer. You may be more at risk for certain types of cancer if you take this medicine.  If you are going to need surgery or other procedure, tell your health care professional that  you are using this medicine.  Be careful brushing or flossing your teeth or using a toothpick because you may get an infection or bleed more easily. If you have any dental work done, tell your dentist you are receiving this medicine.  What side effects may I notice from receiving this medicine?  Side effects that you should report to your doctor or health care professional as soon as possible:  · allergic reactions like skin rash, itching or hives, swelling of the face, lips, or tongue  · breathing problems  · nausea, vomiting  · signs and symptoms of bleeding such as bloody or black, tarry stools; red or dark brown urine; spitting up blood or brown material that looks like coffee grounds; red spots on the skin; unusual bruising or bleeding from the eyes, gums, or nose  · signs and symptoms of heart failure like fast, irregular heartbeat, sudden weight gain; swelling of the ankles, feet, hands  · signs and symptoms of infection like fever; chills; cough; sore throat; pain or trouble passing urine  · signs and symptoms of kidney injury like trouble passing urine or change in the amount of urine  · signs and symptoms of liver injury like dark yellow or brown urine; general ill feeling or flu-like symptoms; light-colored stools; loss of appetite; nausea; right upper belly pain; unusually weak or tired; yellowing of the eyes or skin  Side effects that usually do not require medical attention (report to your doctor or health care professional if they continue or are bothersome):  · confusion  · decreased hearing  · diarrhea  · facial flushing  · hair loss  · headache  · loss of appetite  · missed menstrual periods  · signs and symptoms of low red blood cells or anemia such as unusually weak or tired; feeling faint or lightheaded; falls  · skin discoloration  This list may not describe all possible side effects. Call your doctor for medical advice about side effects. You may report side effects to FDA at  1-800-FDA-1088.  Where should I keep my medicine?  This drug is given in a hospital or clinic and will not be stored at home.  NOTE: This sheet is a summary. It may not cover all possible information. If you have questions about this medicine, talk to your doctor, pharmacist, or health care provider.  © 2020 [employer] (2020-09-21 09:53:29)  Docetaxel injection  What is this medicine?  DOCETAXEL (laws se TAX el) is a chemotherapy drug. It targets fast dividing cells, like cancer cells, and causes these cells to die. This medicine is used to treat many types of cancers like breast cancer, certain stomach cancers, head and neck cancer, lung cancer, and prostate cancer.  This medicine may be used for other purposes; ask your health care provider or pharmacist if you have questions.  COMMON BRAND NAME(S): Morenita Taxotere  What should I tell my health care provider before I take this medicine?  They need to know if you have any of these conditions:  · infection (especially a virus infection such as chickenpox, cold sores, or herpes)  · liver disease  · low blood counts, like low white cell, platelet, or red cell counts  · an unusual or allergic reaction to docetaxel, polysorbate 80, other chemotherapy agents, other medicines, foods, dyes, or preservatives  · pregnant or trying to get pregnant  · breast-feeding  How should I use this medicine?  This drug is given as an infusion into a vein. It is administered in a hospital or clinic by a specially trained health care professional.  Talk to your pediatrician regarding the use of this medicine in children. Special care may be needed.  Overdosage: If you think you have taken too much of this medicine contact a poison control center or emergency room at once.  NOTE: This medicine is only for you. Do not share this medicine with others.  What if I miss a dose?  It is important not to miss your dose. Call your doctor or health care professional if you are unable  to keep an appointment.  What may interact with this medicine?  · aprepitant  · certain antibiotics like erythromycin or clarithromycin  · certain antivirals for HIV or hepatitis  · certain medicines for fungal infections like fluconazole, itraconazole, ketoconazole, posaconazole, or voriconazole  · cimetidine  · ciprofloxacin  · conivaptan  · cyclosporine  · dronedarone  · fluvoxamine  · grapefruit juice  · imatinib  · verapamil  This list may not describe all possible interactions. Give your health care provider a list of all the medicines, herbs, non-prescription drugs, or dietary supplements you use. Also tell them if you smoke, drink alcohol, or use illegal drugs. Some items may interact with your medicine.  What should I watch for while using this medicine?  Your condition will be monitored carefully while you are receiving this medicine. You will need important blood work done while you are taking this medicine.  Call your doctor or health care professional for advice if you get a fever, chills or sore throat, or other symptoms of a cold or flu. Do not treat yourself. This drug decreases your body's ability to fight infections. Try to avoid being around people who are sick.  Some products may contain alcohol. Ask your health care professional if this medicine contains alcohol. Be sure to tell all health care professionals you are taking this medicine. Certain medicines, like metronidazole and disulfiram, can cause an unpleasant reaction when taken with alcohol. The reaction includes flushing, headache, nausea, vomiting, sweating, and increased thirst. The reaction can last from 30 minutes to several hours.  You may get drowsy or dizzy. Do not drive, use machinery, or do anything that needs mental alertness until you know how this medicine affects you. Do not stand or sit up quickly, especially if you are an older patient. This reduces the risk of dizzy or fainting spells. Alcohol may interfere with the effect  of this medicine.  Talk to your health care professional about your risk of cancer. You may be more at risk for certain types of cancer if you take this medicine.  Do not become pregnant while taking this medicine or for 6 months after stopping it. Women should inform their doctor if they wish to become pregnant or think they might be pregnant. There is a potential for serious side effects to an unborn child. Talk to your health care professional or pharmacist for more information. Do not breast-feed an infant while taking this medicine or for 1 week after stopping it.  Males who get this medicine must use a condom during sex with females who can get pregnant. If you get a woman pregnant, the baby could have birth defects. The baby could die before they are born. You will need to continue wearing a condom for 3 months after stopping the medicine. Tell your health care provider right away if your partner becomes pregnant while you are taking this medicine.  This may interfere with the ability to father a child. You should talk to your doctor or health care professional if you are concerned about your fertility.  What side effects may I notice from receiving this medicine?  Side effects that you should report to your doctor or health care professional as soon as possible:  · allergic reactions like skin rash, itching or hives, swelling of the face, lips, or tongue  · blurred vision  · breathing problems  · changes in vision  · low blood counts - This drug may decrease the number of white blood cells, red blood cells and platelets. You may be at increased risk for infections and bleeding.  · nausea and vomiting  · pain, redness or irritation at site where injected  · pain, tingling, numbness in the hands or feet  · redness, blistering, peeling, or loosening of the skin, including inside the mouth  · signs of decreased platelets or bleeding - bruising, pinpoint red spots on the skin, black, tarry stools,  nosebleeds  · signs of decreased red blood cells - unusually weak or tired, fainting spells, lightheadedness  · signs of infection - fever or chills, cough, sore throat, pain or difficulty passing urine  · swelling of the ankle, feet, hands  Side effects that usually do not require medical attention (report to your doctor or health care professional if they continue or are bothersome):  · constipation  · diarrhea  · fingernail or toenail changes  · hair loss  · loss of appetite  · mouth sores  · muscle pain  This list may not describe all possible side effects. Call your doctor for medical advice about side effects. You may report side effects to FDA at 2-359-ATJ-5520.  Where should I keep my medicine?  This drug is given in a hospital or clinic and will not be stored at home.  NOTE: This sheet is a summary. It may not cover all possible information. If you have questions about this medicine, talk to your doctor, pharmacist, or health care provider.  © 2020 Elsevier/Gold Standard (2020-08-13 10:19:06)  Pegfilgrastim injection  What is this medicine?  PEGFILGRASTIM (PEG omar gra stim) is a long-acting granulocyte colony-stimulating factor that stimulates the growth of neutrophils, a type of white blood cell important in the body's fight against infection. It is used to reduce the incidence of fever and infection in patients with certain types of cancer who are receiving chemotherapy that affects the bone marrow, and to increase survival after being exposed to high doses of radiation.  This medicine may be used for other purposes; ask your health care provider or pharmacist if you have questions.  COMMON BRAND NAME(S): Fulphila, Neulasta, UDENYCA, Ziextenzo  What should I tell my health care provider before I take this medicine?  They need to know if you have any of these conditions:  · kidney disease  · latex allergy  · ongoing radiation therapy  · sickle cell disease  · skin reactions to acrylic adhesives (On-Body  Injector only)  · an unusual or allergic reaction to pegfilgrastim, filgrastim, other medicines, foods, dyes, or preservatives  · pregnant or trying to get pregnant  · breast-feeding  How should I use this medicine?  This medicine is for injection under the skin. If you get this medicine at home, you will be taught how to prepare and give the pre-filled syringe or how to use the On-body Injector. Refer to the patient Instructions for Use for detailed instructions. Use exactly as directed. Tell your healthcare provider immediately if you suspect that the On-body Injector may not have performed as intended or if you suspect the use of the On-body Injector resulted in a missed or partial dose.  It is important that you put your used needles and syringes in a special sharps container. Do not put them in a trash can. If you do not have a sharps container, call your pharmacist or healthcare provider to get one.  Talk to your pediatrician regarding the use of this medicine in children. While this drug may be prescribed for selected conditions, precautions do apply.  Overdosage: If you think you have taken too much of this medicine contact a poison control center or emergency room at once.  NOTE: This medicine is only for you. Do not share this medicine with others.  What if I miss a dose?  It is important not to miss your dose. Call your doctor or health care professional if you miss your dose. If you miss a dose due to an On-body Injector failure or leakage, a new dose should be administered as soon as possible using a single prefilled syringe for manual use.  What may interact with this medicine?  Interactions have not been studied.  Give your health care provider a list of all the medicines, herbs, non-prescription drugs, or dietary supplements you use. Also tell them if you smoke, drink alcohol, or use illegal drugs. Some items may interact with your medicine.  This list may not describe all possible interactions. Give  your health care provider a list of all the medicines, herbs, non-prescription drugs, or dietary supplements you use. Also tell them if you smoke, drink alcohol, or use illegal drugs. Some items may interact with your medicine.  What should I watch for while using this medicine?  You may need blood work done while you are taking this medicine.  If you are going to need a MRI, CT scan, or other procedure, tell your doctor that you are using this medicine (On-Body Injector only).  What side effects may I notice from receiving this medicine?  Side effects that you should report to your doctor or health care professional as soon as possible:  · allergic reactions like skin rash, itching or hives, swelling of the face, lips, or tongue  · back pain  · dizziness  · fever  · pain, redness, or irritation at site where injected  · pinpoint red spots on the skin  · red or dark-brown urine  · shortness of breath or breathing problems  · stomach or side pain, or pain at the shoulder  · swelling  · tiredness  · trouble passing urine or change in the amount of urine  Side effects that usually do not require medical attention (report to your doctor or health care professional if they continue or are bothersome):  · bone pain  · muscle pain  This list may not describe all possible side effects. Call your doctor for medical advice about side effects. You may report side effects to FDA at 3-571-FDA-6327.  Where should I keep my medicine?  Keep out of the reach of children.  If you are using this medicine at home, you will be instructed on how to store it. Throw away any unused medicine after the expiration date on the label.  NOTE: This sheet is a summary. It may not cover all possible information. If you have questions about this medicine, talk to your doctor, pharmacist, or health care provider.  © 2020 Elsevier/Gold Standard (2019-03-25 16:57:08)

## 2020-10-15 NOTE — PROGRESS NOTES
ONCOLOGY PATIENT NAVIGATION     DIAGNOSIS: Breast Cancer  REFERRAL SOURCE: Alexandra/Rhoda  ENCOUNTER TYPE: In-Person     NOTE: Spoke to pt today concerning newly diagnosed breast cancer in the chemo suite for pt initial treatment. Offered education and assistance as needed.      EDUCATION PROVIDED: Reinforced my role as the nurse navigator in cancer care and my support to pt and family. Encouraged pt to continue to utilize the breast cancer treatment handbook for support and education surrounding diagnosis and ongoing treatment planning. Spent approximately 30 mins in face to face contact of diagnosis and treatment planning, and side effects possible with chemotherapy. Pt had several questions surrounding plan of care. Pt stated less distress and coping with diagnosis currently and validated family support. Reinforced with pt about navigation team and treatment team always available for supportive care needs.     NEEDS ASSESSMENT:     Barriers to care:  1. Information Needs/Questions/Understanding    Action Plan to Address Barriers:  1. Educate patient about Diagnosis and Treatment Planning  2. Assist Patient with Formulating Questions to Ask Healthcare Team  3. Provided Verbal Education  4. Provide Emotional Support as needed  5. Navigator Available by Phone for Supportive Care Needs  6. Serve as Liaison between Patient and Healthcare Team     NAVIGATION OUTCOMES ACHIEVED:  1. Improved Patient Satisfaction  2. Improved Healthcare Efficiency     PLAN OF CARE: Reinforced my contact information and encouraged pt to call me with concerns or questions. Encouraged pt to utilize breast cancer handbook for education and assistance. Will f/u with pt throughout cancer care course for navigational supportive care and education needs. Pt stated understanding of all discussed and denied further questions.

## 2020-10-21 ENCOUNTER — TELEPHONE (OUTPATIENT)
Dept: ONCOLOGY | Facility: CLINIC | Age: 75
End: 2020-10-21

## 2020-10-21 NOTE — TELEPHONE ENCOUNTER
Pt granddaughter called to discuss pt status. Stated pt has been experiencing low grade temp around . Denied any other concerning symptoms currently. Recommended pt use tylenol OTC as directed to manage and notify me or physician should pt temp reach 100.4 or higher. Pt granddaughter stated understanding and denied any further questions or concerns.

## 2020-10-27 ENCOUNTER — OFFICE VISIT (OUTPATIENT)
Dept: GASTROENTEROLOGY | Facility: CLINIC | Age: 75
End: 2020-10-27

## 2020-10-27 VITALS
BODY MASS INDEX: 29.59 KG/M2 | DIASTOLIC BLOOD PRESSURE: 58 MMHG | HEART RATE: 82 BPM | WEIGHT: 160.8 LBS | HEIGHT: 62 IN | SYSTOLIC BLOOD PRESSURE: 100 MMHG

## 2020-10-27 DIAGNOSIS — Z12.11 ENCOUNTER FOR SCREENING FOR MALIGNANT NEOPLASM OF COLON: ICD-10-CM

## 2020-10-27 DIAGNOSIS — Z80.0 FAMILY HISTORY OF GI MALIGNANCY: Primary | ICD-10-CM

## 2020-10-27 PROCEDURE — S0260 H&P FOR SURGERY: HCPCS | Performed by: INTERNAL MEDICINE

## 2020-10-27 RX ORDER — DEXTROSE AND SODIUM CHLORIDE 5; .45 G/100ML; G/100ML
30 INJECTION, SOLUTION INTRAVENOUS CONTINUOUS PRN
Status: CANCELLED | OUTPATIENT
Start: 2020-11-19

## 2020-10-27 RX ORDER — SODIUM, POTASSIUM,MAG SULFATES 17.5-3.13G
1 SOLUTION, RECONSTITUTED, ORAL ORAL EVERY 12 HOURS
Qty: 1 BOTTLE | Refills: 0 | Status: ON HOLD | OUTPATIENT
Start: 2020-10-27 | End: 2020-11-19

## 2020-10-27 NOTE — PATIENT INSTRUCTIONS
"BMI for Adults  What is BMI?  Body mass index (BMI) is a number that is calculated from a person's weight and height. BMI can help estimate how much of a person's weight is composed of fat. BMI does not measure body fat directly. Rather, it is an alternative to procedures that directly measure body fat, which can be difficult and expensive.  BMI can help identify people who may be at higher risk for certain medical problems.  What are BMI measurements used for?  BMI is used as a screening tool to identify possible weight problems. It helps determine whether a person is obese, overweight, a healthy weight, or underweight.  BMI is useful for:  · Identifying a weight problem that may be related to a medical condition or may increase the risk for medical problems.  · Promoting changes, such as changes in diet and exercise, to help reach a healthy weight. BMI screening can be repeated to see if these changes are working.  How is BMI calculated?  BMI involves measuring your weight in relation to your height. Both height and weight are measured, and the BMI is calculated from those numbers. This can be done either in English ([place] or metric measurements. Note that charts and online BMI calculators are available to help you find your BMI quickly and easily without having to do these calculations yourself.  To calculate your BMI in English ([place] measurements:    1. Measure your weight in pounds (lb).  2. Multiply the number of pounds by 703.  ? For example, for a person who weighs 180 lb, multiply that number by 703, which equals 126,540.  3. Measure your height in inches. Then multiply that number by itself to get a measurement called \"inches squared.\"  ? For example, for a person who is 70 inches tall, the \"inches squared\" measurement is 70 inches x 70 inches, which equals 4,900 inches squared.  4. Divide the total from step 2 (number of lb x 703) by the total from step 3 (inches squared): 126,540 ÷ 4,900 = 25.8. This is " "your BMI.  To calculate your BMI in metric measurements:  1. Measure your weight in kilograms (kg).  2. Measure your height in meters (m). Then multiply that number by itself to get a measurement called \"meters squared.\"  ? For example, for a person who is 1.75 m tall, the \"meters squared\" measurement is 1.75 m x 1.75 m, which is equal to 3.1 meters squared.  3. Divide the number of kilograms (your weight) by the meters squared number. In this example: 70 ÷ 3.1 = 22.6. This is your BMI.  What do the results mean?  BMI charts are used to identify whether you are underweight, normal weight, overweight, or obese. The following guidelines will be used:  · Underweight: BMI less than 18.5.  · Normal weight: BMI between 18.5 and 24.9.  · Overweight: BMI between 25 and 29.9.  · Obese: BMI of 30 or above.  Keep these notes in mind:  · Weight includes both fat and muscle, so someone with a muscular build, such as an athlete, may have a BMI that is higher than 24.9. In cases like these, BMI is not an accurate measure of body fat.  · To determine if excess body fat is the cause of a BMI of 25 or higher, further assessments may need to be done by a health care provider.  · BMI is usually interpreted in the same way for men and women.  Where to find more information  For more information about BMI, including tools to quickly calculate your BMI, go to these websites:  · Centers for Disease Control and Prevention: www.cdc.gov  · American Heart Association: www.heart.org  · National Heart, Lung, and Blood [place]: www.nhlbi.nih.gov  Summary  · Body mass index (BMI) is a number that is calculated from a person's weight and height.  · BMI may help estimate how much of a person's weight is composed of fat. BMI can help identify those who may be at higher risk for certain medical problems.  · BMI can be measured using English measurements or metric measurements.  · BMI charts are used to identify whether you are underweight, normal " weight, overweight, or obese.  This information is not intended to replace advice given to you by your health care provider. Make sure you discuss any questions you have with your health care provider.  Document Released: 08/29/2005 Document Revised: 09/09/2020 Document Reviewed: 07/17/2020  Elsevier Patient Education © 2020 Elsevier Inc.

## 2020-10-27 NOTE — PROGRESS NOTES
[facility]      Chief Complaint:   Chief Complaint   Patient presents with   • Abnormal PET scan of colon       Subjective     HPI:   With breast cancer currently being treated with chemotherapy.  Patient states that she has had multiple polyps in the past and family history of colon cancer.  Patient is seeing Dr. Duong being treated with chemotherapy and states that he told her she should undergo repeat colonoscopy is been greater than 5 years since her last colonoscopy.    Plan; schedule patient for screening colonoscopy secondary to family history of colon malignancy    Past Medical History:   Past Medical History:   Diagnosis Date   • Abnormal mammogram of right breast    • Acquired hypothyroidism     started synthroid 9/2015   • Allergic rhinitis, seasonal    • Breast cancer (CMS/HCC)    • Cervicalgia     right upper extremity radiculopathy   • Diabetes mellitus (CMS/HCC)    • Encounter for screening for malignant neoplasm of colon    • Essential hypertension    • Glaucoma    • Health maintenance examination     individual health exam   • Hypercholesterolemia    • Hyperglycemia     steroid-induced hyperglycemia in diabetic patient   • Lumbar disc disorder     w/right radiculopathy   • Menopause    • Mild persistent asthma     resumed dulera twice daily   • Obstructive sleep apnea syndrome 9/17/2019   • Osteopenia     improved DEXA 2/2013   • 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   • Sebaceous cyst     subepidermal cyst   • Shoulder pain     likely radicular pain due to cervical DJD   • Spasm of back muscles     right neck and trapezius   • Spinal stenosis of lumbar region    • Strain of neck muscle     cervical strain   • Temporomandibular joint disorder     h/o   • Vitamin D deficiency     on oral calcium/vitamin D supplement       Past Surgical History:  Past Surgical History:   Procedure Laterality Date   • CHOLECYSTECTOMY     • COLONOSCOPY  12/17/2015    normal   • ENDOSCOPY AND  COLONOSCOPY  08/2010   • HYSTERECTOMY     • OOPHORECTOMY     • TONSILLECTOMY     • VENOUS ACCESS DEVICE (PORT) INSERTION Left 10/8/2020    Procedure: INSERTION VENOUS ACCESS DEVICE (MEDIPORT)         (c-arm#1);  Surgeon: Duke Alexandra MD;  Location: [facility];  Service: General;  Laterality: Left;       Family History:  Family History   Problem Relation Age of Onset   • Colon cancer Mother    • Heart attack Father    • Aneurysm Brother    • No Known Problems Son    • Alzheimer's disease Maternal Grandmother    • Heart attack Maternal Grandfather    • Alzheimer's disease Paternal Grandmother    • No Known Problems Son        Social History:   reports that she has never smoked. She has never used smokeless tobacco. She reports that she does not drink alcohol or use drugs.    Medications:   Prior to Admission medications    Medication Sig Start Date End Date Taking? Authorizing Provider   albuterol sulfate HFA (PROAIR HFA) 108 (90 Base) MCG/ACT inhaler Inhale 2 puffs 4 (Four) Times a Day As Needed for Wheezing. 3/25/19  Yes Darian Soliman MD   amLODIPine (NORVASC) 5 MG tablet Take 5 mg by mouth Every Night.   Yes Shivam Dolan MD   aspirin 81 MG EC tablet Take 81 mg by mouth Every Night.   Yes Shivam Dolan MD   atorvastatin (LIPITOR) 20 MG tablet Take 20 mg by mouth Every Night.   Yes Shivam Dolan MD   Blood Glucose Monitoring Suppl (ONE TOUCH ULTRA 2) W/DEVICE kit    Yes Shivam Dolan MD   Calcium Carbonate-Vitamin D 600-200 MG-UNIT tablet Take 1 tablet by mouth Daily.   Yes Shivam Dolan MD   Cholecalciferol (VITAMIN D) 25 MCG (1000 UT) tablet Take 1 tablet by mouth Daily.   Yes Darian Soliman MD   citalopram (CeleXA) 20 MG tablet Take 10 mg by mouth Every Other Day.   Yes Shivam Dolan MD   Dapagliflozin Propanediol (Farxiga) 10 MG tablet Take 10 mg by mouth Every Morning. 6/17/20  Yes Darian Soliman MD   dexamethasone (DECADRON) 4 MG tablet Take 2 tablets in the  morning daily on Days 2, 3 & 4.  Take with food. 10/7/20  Yes Carlos Duong MD   dexamethasone (DECADRON) 4 MG tablet Take 2 tablets oral twice a day for 3 consecutive days beginning the day before chemotherapy and continue for 6 doses. 10/12/20  Yes Carlos Duong MD   fluticasone (FLONASE) 50 MCG/ACT nasal spray 2 sprays into each nostril As Needed for rhinitis.   Yes Shivam Dolan MD   glucose blood (ONE TOUCH ULTRA TEST) test strip 1 each by Other route Daily. Check 1-2times daily  E11.9  90 day supply  One Touch Verio 5/28/19  Yes Darian Soliman MD   HYDROcodone-acetaminophen (NORCO) 5-325 MG per tablet Take 1 tablet by mouth Every 4 (Four) Hours As Needed (Pain). 10/8/20  Yes Duke Alexandra MD   LANCETS MICRO THIN 33G misc Check once daily  E11.9  Trueplus 5/28/19  Yes Darian Soliman MD   latanoprost (XALATAN) 0.005 % ophthalmic solution Administer 1 drop to both eyes Every Night.   Yes Shivam Dolan MD   levothyroxine (SYNTHROID, LEVOTHROID) 50 MCG tablet TAKE 1 TABLET BY MOUTH DAILY 12/3/19  Yes Darian Soliman MD   Loperamide HCl (IMODIUM PO) Take  by mouth.   Yes Shivam Dolan MD   losartan-hydrochlorothiazide (HYZAAR) 100-12.5 MG per tablet TAKE 1 TABLET BY MOUTH DAILY 12/3/19  Yes Darian Soliman MD   metFORMIN (GLUCOPHAGE) 500 MG tablet Take 500 mg by mouth 2 (Two) Times a Day With Meals.   Yes Shivam Dolan MD   mometasone-formoterol (DULERA 200) 200-5 MCG/ACT inhaler Inhale 2 puffs 2 (Two) Times a Day.   Yes Shivam Dolan MD   montelukast (SINGULAIR) 10 MG tablet Take 10 mg by mouth Every Night.   Yes Shivam Dolan MD   ondansetron (ZOFRAN) 4 MG tablet Take 1 tablet by mouth 4 (Four) Times a Day As Needed for Nausea or Vomiting. 10/7/20  Yes Carlos Duong MD   sodium-potassium-magnesium sulfates (SUPREP) 17.5-3.13-1.6 GM/177ML solution oral solution Take 1 bottle by mouth Every 12 (Twelve) Hours. 10/27/20   Devin Rene MD  "      Allergies:  Other    ROS:    Review of Systems   Constitutional: Negative for activity change, appetite change, chills, diaphoresis, fatigue, fever and unexpected weight change.   HENT: Negative for sore throat and trouble swallowing.    Respiratory: Negative for shortness of breath.    Gastrointestinal: Negative for abdominal distention, abdominal pain, anal bleeding, blood in stool, constipation, diarrhea, nausea, rectal pain and vomiting.   Endocrine: Negative for polydipsia, polyphagia and polyuria.   Genitourinary: Negative for difficulty urinating.   Musculoskeletal: Negative for arthralgias.   Skin: Negative for pallor.   Allergic/Immunologic: Negative for food allergies.   Neurological: Negative for weakness and light-headedness.   Psychiatric/Behavioral: Negative for behavioral problems.     Objective     Blood pressure 100/58, pulse 82, height 157.5 cm (62\"), weight 72.9 kg (160 lb 12.8 oz), not currently breastfeeding.    Physical Exam  Constitutional:       General: She is not in acute distress.     Appearance: She is well-developed. She is not diaphoretic.   HENT:      Head: Normocephalic and atraumatic.   Cardiovascular:      Rate and Rhythm: Normal rate and regular rhythm.      Heart sounds: Normal heart sounds. No murmur. No friction rub. No gallop.    Pulmonary:      Effort: No respiratory distress.      Breath sounds: Normal breath sounds. No wheezing or rales.   Chest:      Chest wall: No tenderness.   Abdominal:      General: Bowel sounds are normal. There is no distension.      Palpations: Abdomen is soft. There is no mass.      Tenderness: There is no abdominal tenderness. There is no guarding or rebound.      Hernia: No hernia is present.   Musculoskeletal: Normal range of motion.   Skin:     General: Skin is warm and dry.      Coloration: Skin is not pale.      Findings: No erythema or rash.   Neurological:      Mental Status: She is alert and oriented to person, place, and time. "   Psychiatric:         Behavior: Behavior normal.         Thought Content: Thought content normal.         Judgment: Judgment normal.          Assessment/Plan   Diagnoses and all orders for this visit:    1. Family history of GI malignancy (Primary)  -     Case Request; Standing  -     dextrose 5 % and sodium chloride 0.45 % infusion  -     Case Request    2. Encounter for screening for malignant neoplasm of colon  -     Case Request; Standing  -     dextrose 5 % and sodium chloride 0.45 % infusion  -     Case Request    Other orders  -     Follow Anesthesia Guidelines / Standing Orders; Future  -     Obtain Informed Consent; Future  -     Implement Anesthesia Orders Day of Procedure; Standing  -     Obtain Informed Consent; Standing  -     POC Glucose Once; Standing  -     Insert Peripheral IV; Standing  -     sodium-potassium-magnesium sulfates (SUPREP) 17.5-3.13-1.6 GM/177ML solution oral solution; Take 1 bottle by mouth Every 12 (Twelve) Hours.  Dispense: 1 bottle; Refill: 0        COLONOSCOPY (N/A)     Diagnosis Plan   1. Family history of GI malignancy  Case Request    dextrose 5 % and sodium chloride 0.45 % infusion    Case Request   2. Encounter for screening for malignant neoplasm of colon  Case Request    dextrose 5 % and sodium chloride 0.45 % infusion    Case Request       Anticipated Surgical Procedure:  Orders Placed This Encounter   Procedures   • Follow Anesthesia Guidelines / Standing Orders     Standing Status:   Future   • Obtain Informed Consent     Standing Status:   Future     Order Specific Question:   Informed Consent Given For     Answer:   colonoscopy       The risks, benefits, and alternatives of this procedure have been discussed with the patient or the responsible party- the patient understands and agrees to proceed.

## 2020-11-03 DIAGNOSIS — C50.911 INVASIVE DUCTAL CARCINOMA OF RIGHT BREAST (HCC): Primary | ICD-10-CM

## 2020-11-05 ENCOUNTER — INFUSION (OUTPATIENT)
Dept: ONCOLOGY | Facility: HOSPITAL | Age: 75
End: 2020-11-05

## 2020-11-05 ENCOUNTER — OFFICE VISIT (OUTPATIENT)
Dept: ONCOLOGY | Facility: CLINIC | Age: 75
End: 2020-11-05

## 2020-11-05 VITALS
DIASTOLIC BLOOD PRESSURE: 64 MMHG | RESPIRATION RATE: 18 BRPM | WEIGHT: 165.9 LBS | BODY MASS INDEX: 30.53 KG/M2 | HEART RATE: 82 BPM | SYSTOLIC BLOOD PRESSURE: 136 MMHG | TEMPERATURE: 97.3 F | HEIGHT: 62 IN

## 2020-11-05 DIAGNOSIS — Z45.2 ENCOUNTER FOR VENOUS ACCESS DEVICE CARE: ICD-10-CM

## 2020-11-05 DIAGNOSIS — C50.911 INVASIVE DUCTAL CARCINOMA OF RIGHT BREAST (HCC): Primary | ICD-10-CM

## 2020-11-05 DIAGNOSIS — D64.9 ANEMIA, UNSPECIFIED TYPE: ICD-10-CM

## 2020-11-05 DIAGNOSIS — D75.839 THROMBOCYTOSIS: ICD-10-CM

## 2020-11-05 DIAGNOSIS — D72.828 OTHER ELEVATED WHITE BLOOD CELL (WBC) COUNT: ICD-10-CM

## 2020-11-05 LAB
ALBUMIN SERPL-MCNC: 3.9 G/DL (ref 3.5–5.2)
ALBUMIN/GLOB SERPL: 1.1 G/DL
ALP SERPL-CCNC: 74 U/L (ref 39–117)
ALT SERPL W P-5'-P-CCNC: 43 U/L (ref 1–33)
ANION GAP SERPL CALCULATED.3IONS-SCNC: 14 MMOL/L (ref 5–15)
AST SERPL-CCNC: 33 U/L (ref 1–32)
BASOPHILS # BLD AUTO: 0.05 10*3/MM3 (ref 0–0.2)
BASOPHILS NFR BLD AUTO: 0.3 % (ref 0–1.5)
BILIRUB SERPL-MCNC: 0.2 MG/DL (ref 0–1.2)
BUN SERPL-MCNC: 32 MG/DL (ref 8–23)
BUN/CREAT SERPL: 28.3 (ref 7–25)
CALCIUM SPEC-SCNC: 9.8 MG/DL (ref 8.6–10.5)
CHLORIDE SERPL-SCNC: 102 MMOL/L (ref 98–107)
CO2 SERPL-SCNC: 20 MMOL/L (ref 22–29)
CREAT SERPL-MCNC: 1.13 MG/DL (ref 0.57–1)
DEPRECATED RDW RBC AUTO: 46.9 FL (ref 37–54)
EOSINOPHIL # BLD AUTO: 0.01 10*3/MM3 (ref 0–0.4)
EOSINOPHIL NFR BLD AUTO: 0.1 % (ref 0.3–6.2)
ERYTHROCYTE [DISTWIDTH] IN BLOOD BY AUTOMATED COUNT: 15.8 % (ref 12.3–15.4)
FERRITIN SERPL-MCNC: 151.1 NG/ML (ref 13–150)
FOLATE SERPL-MCNC: 5.69 NG/ML (ref 4.78–24.2)
GFR SERPL CREATININE-BSD FRML MDRD: 47 ML/MIN/1.73
GLOBULIN UR ELPH-MCNC: 3.5 GM/DL
GLUCOSE SERPL-MCNC: 268 MG/DL (ref 65–99)
HCT VFR BLD AUTO: 34.6 % (ref 34–46.6)
HGB BLD-MCNC: 11 G/DL (ref 12–15.9)
HOLD SPECIMEN: NORMAL
IMM GRANULOCYTES # BLD AUTO: 0.19 10*3/MM3 (ref 0–0.05)
IMM GRANULOCYTES NFR BLD AUTO: 1 % (ref 0–0.5)
IRON 24H UR-MRATE: 61 MCG/DL (ref 37–145)
IRON SATN MFR SERPL: 16 % (ref 20–50)
LYMPHOCYTES # BLD AUTO: 1.03 10*3/MM3 (ref 0.7–3.1)
LYMPHOCYTES NFR BLD AUTO: 5.2 % (ref 19.6–45.3)
MCH RBC QN AUTO: 26.5 PG (ref 26.6–33)
MCHC RBC AUTO-ENTMCNC: 31.8 G/DL (ref 31.5–35.7)
MCV RBC AUTO: 83.4 FL (ref 79–97)
MONOCYTES # BLD AUTO: 0.45 10*3/MM3 (ref 0.1–0.9)
MONOCYTES NFR BLD AUTO: 2.3 % (ref 5–12)
NEUTROPHILS NFR BLD AUTO: 18.12 10*3/MM3 (ref 1.7–7)
NEUTROPHILS NFR BLD AUTO: 91.1 % (ref 42.7–76)
NRBC BLD AUTO-RTO: 0 /100 WBC (ref 0–0.2)
PLATELET # BLD AUTO: 850 10*3/MM3 (ref 140–450)
PMV BLD AUTO: 9.1 FL (ref 6–12)
POTASSIUM SERPL-SCNC: 4.3 MMOL/L (ref 3.5–5.2)
PROT SERPL-MCNC: 7.4 G/DL (ref 6–8.5)
RBC # BLD AUTO: 4.15 10*6/MM3 (ref 3.77–5.28)
SODIUM SERPL-SCNC: 136 MMOL/L (ref 136–145)
TIBC SERPL-MCNC: 390 MCG/DL (ref 298–536)
TRANSFERRIN SERPL-MCNC: 262 MG/DL (ref 200–360)
VIT B12 BLD-MCNC: >2000 PG/ML (ref 211–946)
WBC # BLD AUTO: 19.85 10*3/MM3 (ref 3.4–10.8)

## 2020-11-05 PROCEDURE — 99214 OFFICE O/P EST MOD 30 MIN: CPT | Performed by: INTERNAL MEDICINE

## 2020-11-05 PROCEDURE — 82746 ASSAY OF FOLIC ACID SERUM: CPT | Performed by: INTERNAL MEDICINE

## 2020-11-05 PROCEDURE — 25010000002 DOCETAXEL 20 MG/ML SOLUTION 8 ML VIAL: Performed by: INTERNAL MEDICINE

## 2020-11-05 PROCEDURE — 25010000003 HEPARIN LOCK FLUSH PER 10 UNITS: Performed by: NURSE PRACTITIONER

## 2020-11-05 PROCEDURE — 96375 TX/PRO/DX INJ NEW DRUG ADDON: CPT | Performed by: INTERNAL MEDICINE

## 2020-11-05 PROCEDURE — 1123F ACP DISCUSS/DSCN MKR DOCD: CPT | Performed by: INTERNAL MEDICINE

## 2020-11-05 PROCEDURE — 25010000002 CYCLOPHOSPHAMIDE PER 100 MG: Performed by: INTERNAL MEDICINE

## 2020-11-05 PROCEDURE — 82607 VITAMIN B-12: CPT | Performed by: INTERNAL MEDICINE

## 2020-11-05 PROCEDURE — 96417 CHEMO IV INFUS EACH ADDL SEQ: CPT | Performed by: INTERNAL MEDICINE

## 2020-11-05 PROCEDURE — 25010000002 PALONOSETRON PER 25 MCG: Performed by: INTERNAL MEDICINE

## 2020-11-05 PROCEDURE — 83540 ASSAY OF IRON: CPT | Performed by: INTERNAL MEDICINE

## 2020-11-05 PROCEDURE — 80053 COMPREHEN METABOLIC PANEL: CPT

## 2020-11-05 PROCEDURE — G8731 PAIN NEG NO PLAN: HCPCS | Performed by: INTERNAL MEDICINE

## 2020-11-05 PROCEDURE — 85025 COMPLETE CBC W/AUTO DIFF WBC: CPT

## 2020-11-05 PROCEDURE — 96413 CHEMO IV INFUSION 1 HR: CPT | Performed by: INTERNAL MEDICINE

## 2020-11-05 PROCEDURE — G9903 PT SCRN TBCO ID AS NON USER: HCPCS | Performed by: INTERNAL MEDICINE

## 2020-11-05 PROCEDURE — 82728 ASSAY OF FERRITIN: CPT | Performed by: INTERNAL MEDICINE

## 2020-11-05 PROCEDURE — 84466 ASSAY OF TRANSFERRIN: CPT | Performed by: INTERNAL MEDICINE

## 2020-11-05 RX ORDER — HEPARIN SODIUM (PORCINE) LOCK FLUSH IV SOLN 100 UNIT/ML 100 UNIT/ML
500 SOLUTION INTRAVENOUS AS NEEDED
Status: DISCONTINUED | OUTPATIENT
Start: 2020-11-05 | End: 2020-11-05 | Stop reason: HOSPADM

## 2020-11-05 RX ORDER — SODIUM CHLORIDE 0.9 % (FLUSH) 0.9 %
20 SYRINGE (ML) INJECTION AS NEEDED
Status: CANCELLED | OUTPATIENT
Start: 2020-11-05

## 2020-11-05 RX ORDER — SODIUM CHLORIDE 9 MG/ML
250 INJECTION, SOLUTION INTRAVENOUS ONCE
Status: COMPLETED | OUTPATIENT
Start: 2020-11-05 | End: 2020-11-05

## 2020-11-05 RX ORDER — SODIUM CHLORIDE 0.9 % (FLUSH) 0.9 %
10 SYRINGE (ML) INJECTION AS NEEDED
Status: DISCONTINUED | OUTPATIENT
Start: 2020-11-05 | End: 2020-11-05 | Stop reason: HOSPADM

## 2020-11-05 RX ORDER — PALONOSETRON 0.05 MG/ML
0.25 INJECTION, SOLUTION INTRAVENOUS ONCE
Status: COMPLETED | OUTPATIENT
Start: 2020-11-05 | End: 2020-11-05

## 2020-11-05 RX ORDER — PALONOSETRON 0.05 MG/ML
0.25 INJECTION, SOLUTION INTRAVENOUS ONCE
Status: CANCELLED | OUTPATIENT
Start: 2020-11-05

## 2020-11-05 RX ORDER — SODIUM CHLORIDE 0.9 % (FLUSH) 0.9 %
20 SYRINGE (ML) INJECTION AS NEEDED
Status: CANCELLED | OUTPATIENT
Start: 2020-11-06

## 2020-11-05 RX ORDER — DIPHENHYDRAMINE HYDROCHLORIDE 50 MG/ML
50 INJECTION INTRAMUSCULAR; INTRAVENOUS AS NEEDED
Status: CANCELLED | OUTPATIENT
Start: 2020-11-05

## 2020-11-05 RX ORDER — SODIUM CHLORIDE 0.9 % (FLUSH) 0.9 %
20 SYRINGE (ML) INJECTION AS NEEDED
Status: DISCONTINUED | OUTPATIENT
Start: 2020-11-05 | End: 2020-11-05 | Stop reason: HOSPADM

## 2020-11-05 RX ORDER — FAMOTIDINE 10 MG/ML
20 INJECTION, SOLUTION INTRAVENOUS AS NEEDED
Status: CANCELLED | OUTPATIENT
Start: 2020-11-05

## 2020-11-05 RX ORDER — DIPHENHYDRAMINE HYDROCHLORIDE 50 MG/ML
50 INJECTION INTRAMUSCULAR; INTRAVENOUS AS NEEDED
Status: DISCONTINUED | OUTPATIENT
Start: 2020-11-05 | End: 2020-11-05 | Stop reason: HOSPADM

## 2020-11-05 RX ORDER — SODIUM CHLORIDE 9 MG/ML
250 INJECTION, SOLUTION INTRAVENOUS ONCE
Status: CANCELLED | OUTPATIENT
Start: 2020-11-05

## 2020-11-05 RX ORDER — SODIUM CHLORIDE 0.9 % (FLUSH) 0.9 %
10 SYRINGE (ML) INJECTION AS NEEDED
Status: CANCELLED | OUTPATIENT
Start: 2020-11-05

## 2020-11-05 RX ORDER — SODIUM CHLORIDE 0.9 % (FLUSH) 0.9 %
10 SYRINGE (ML) INJECTION AS NEEDED
Status: CANCELLED | OUTPATIENT
Start: 2020-11-06

## 2020-11-05 RX ORDER — FAMOTIDINE 10 MG/ML
20 INJECTION, SOLUTION INTRAVENOUS AS NEEDED
Status: DISCONTINUED | OUTPATIENT
Start: 2020-11-05 | End: 2020-11-05 | Stop reason: HOSPADM

## 2020-11-05 RX ORDER — HEPARIN SODIUM (PORCINE) LOCK FLUSH IV SOLN 100 UNIT/ML 100 UNIT/ML
500 SOLUTION INTRAVENOUS AS NEEDED
Status: CANCELLED | OUTPATIENT
Start: 2020-11-05

## 2020-11-05 RX ORDER — HEPARIN SODIUM (PORCINE) LOCK FLUSH IV SOLN 100 UNIT/ML 100 UNIT/ML
500 SOLUTION INTRAVENOUS AS NEEDED
Status: CANCELLED | OUTPATIENT
Start: 2020-11-06

## 2020-11-05 RX ADMIN — SODIUM CHLORIDE, PRESERVATIVE FREE 10 ML: 5 INJECTION INTRAVENOUS at 12:19

## 2020-11-05 RX ADMIN — HEPARIN 500 UNITS: 100 SYRINGE at 12:19

## 2020-11-05 RX ADMIN — DOCETAXEL 130 MG: 20 INJECTION, SOLUTION, CONCENTRATE INTRAVENOUS at 10:26

## 2020-11-05 RX ADMIN — SODIUM CHLORIDE 250 ML: 9 INJECTION, SOLUTION INTRAVENOUS at 09:53

## 2020-11-05 RX ADMIN — PALONOSETRON HYDROCHLORIDE 0.25 MG: 0.25 INJECTION, SOLUTION INTRAVENOUS at 09:54

## 2020-11-05 RX ADMIN — CYCLOPHOSPHAMIDE 1060 MG: 1 INJECTION, POWDER, FOR SOLUTION INTRAVENOUS; ORAL at 11:40

## 2020-11-05 NOTE — PROGRESS NOTES
DATE OF VISIT: 11/6/2020      REASON FOR VISIT: Triple negative right breast cancer on chemotherapy with Taxotere and Cytoxan, elevated liver function test, leukocytosis, anemia, thrombocytosis, chronic kidney disease,      HISTORY OF PRESENT ILLNESS:    75-year-old female with medical problem consisting of diabetes mellitus, hypertension, dyslipidemia, asthma, osteopenia, obstructive sleep apnea, chronic back pain and knee pain was initially seen in consultation on October 5, 2020 for evaluation of triple negative right breast cancer.  Patient was started on cycle 1 of chemotherapy on October 15, 2020.  Patient is here to get cycle 2 of chemotherapy today.  Complains of diarrhea that lasted for 7 to 10 days after last chemo.  Complains of nausea without vomiting.  Complains of severe back pain and joint pain that lasted between 7 to 10 days after chemotherapy from Neulasta.  Denies any tingling or numbness affecting upper or lower extremity.  Denies any bleeding.  Denies any new headache or dizziness.            PAST MEDICAL HISTORY:    Past Medical History:   Diagnosis Date   • Abnormal mammogram of right breast    • Acquired hypothyroidism     started synthroid 9/2015   • Allergic rhinitis, seasonal    • Breast cancer (CMS/HCC)    • Cervicalgia     right upper extremity radiculopathy   • Diabetes mellitus (CMS/HCC)    • Encounter for screening for malignant neoplasm of colon    • Essential hypertension    • Glaucoma    • Health maintenance examination     individual health exam   • Hypercholesterolemia    • Hyperglycemia     steroid-induced hyperglycemia in diabetic patient   • Lumbar disc disorder     w/right radiculopathy   • Menopause    • Mild persistent asthma     resumed dulera twice daily   • Obstructive sleep apnea syndrome 9/17/2019   • Osteopenia     improved DEXA 2/2013   • 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   • Sebaceous cyst     subepidermal cyst   • Shoulder pain     likely radicular pain due to  cervical DJD   • Spasm of back muscles     right neck and trapezius   • Spinal stenosis of lumbar region    • Strain of neck muscle     cervical strain   • Temporomandibular joint disorder     h/o   • Vitamin D deficiency     on oral calcium/vitamin D supplement       SOCIAL HISTORY:    Social History     Tobacco Use   • Smoking status: Never Smoker   • Smokeless tobacco: Never Used   Substance Use Topics   • Alcohol use: No   • Drug use: No       Surgical History :  Past Surgical History:   Procedure Laterality Date   • CHOLECYSTECTOMY     • COLONOSCOPY  12/17/2015    normal   • ENDOSCOPY AND COLONOSCOPY  08/2010   • HYSTERECTOMY     • OOPHORECTOMY     • TONSILLECTOMY     • VENOUS ACCESS DEVICE (PORT) INSERTION Left 10/8/2020    Procedure: INSERTION VENOUS ACCESS DEVICE (MEDIPORT)         (c-arm#1);  Surgeon: Duke Alexandra MD;  Location: [facility];  Service: General;  Laterality: Left;       ALLERGIES:    Allergies   Allergen Reactions   • Other Other (See Comments)     Coda-clear         FAMILY HISTORY:  Family History   Problem Relation Age of Onset   • Colon cancer Mother    • Heart attack Father    • Aneurysm Brother    • No Known Problems Son    • Alzheimer's disease Maternal Grandmother    • Heart attack Maternal Grandfather    • Alzheimer's disease Paternal Grandmother    • No Known Problems Son            REVIEW OF SYSTEMS:      CONSTITUTIONAL:  Complains of fatigue. Denies any fever, chills or weight loss.     EYES: No visual disturbances. No discharge. No new lesions    ENMT:  No epistaxis, mouth sores or difficulty swallowing.    RESPIRATORY: Positive for chronic intermittent shortness of breath due to asthma. No new cough or hemoptysis.    CARDIOVASCULAR:  No chest pain or palpitations.    GASTROINTESTINAL: Complains of diarrhea that lasted for 7 to 10 days after chemotherapy.  Complains of nausea without vomiting.  No abdominal pain  or blood in the stool.    GENITOURINARY: No Dysuria or  "Hematuria.    MUSCULOSKELETAL: Positive for chronic back pain and knee pain.  States she had a severe back pain after Neulasta with last round of chemo.    LYMPHATICS:  Denies any abnormal swollen glands anywhere in the body.    NEUROLOGICAL : No tingling or numbness. No headache or dizziness. No seizures or balance problems.    SKIN: No new skin lesions.    ENDOCRINE : No new hot or cold intolerance. No new polyuria . No polydipsia.        PHYSICAL EXAMINATION:      VITAL SIGNS:  /64   Pulse 82   Temp 97.3 °F (36.3 °C) (Temporal)   Resp 18   Ht 157.5 cm (62.01\")   Wt 75.3 kg (165 lb 14.4 oz)   BMI 30.34 kg/m²       11/05/20  0846   Weight: 75.3 kg (165 lb 14.4 oz)         ECOG performance status: 2    CONSTITUTIONAL:  Not in any distress.    EYES: Mild conjunctival Pallor. No Icterus. No Pterygium. Extraocular Movements intact.No ptosis.    ENMT:  Normocephalic, Atraumatic.No Facial Asymmetry noted.    NECK:  No adenopathy.Trachea midline. NO JVD.    RESPIRATORY:  Fair air entry bilateral. No rhonchi or wheezing.Fair respiratory effort.    CARDIOVASCULAR:  S1, S2. Regular rate and rhythm. No murmur or gallop appreciated.    ABDOMEN:  Soft, obese, nontender. Bowel sounds present in all four quadrants.  No Hepatosplenomegaly appreciated.    MUSCULOSKELETAL:  No edema.No Calf Tenderness.Decreased range of motion.    NEUROLOGIC:    No  Motor or sensory deficit appreciated. Cranial Nerves 2-12 grossly intact.    SKIN : No new skin lesion identified. Skin is warm and moist to touch.    LYMPHATICS: No new enlarged lymph nodes in neck or supraclavicular area.    PSYCHIATRY: Alert, awake and oriented ×3.  Appears anxious.  Normal judgment.  Makes good eye contact.        DIAGNOSTIC DATA:    Glucose   Date Value Ref Range Status   11/05/2020 268 (H) 65 - 99 mg/dL Final     Sodium   Date Value Ref Range Status   11/05/2020 136 136 - 145 mmol/L Final     Potassium   Date Value Ref Range Status   11/05/2020 4.3 3.5 " - 5.2 mmol/L Final     CO2   Date Value Ref Range Status   11/05/2020 20.0 (L) 22.0 - 29.0 mmol/L Final     Chloride   Date Value Ref Range Status   11/05/2020 102 98 - 107 mmol/L Final     Anion Gap   Date Value Ref Range Status   11/05/2020 14.0 5.0 - 15.0 mmol/L Final     Creatinine   Date Value Ref Range Status   11/05/2020 1.13 (H) 0.57 - 1.00 mg/dL Final     BUN   Date Value Ref Range Status   11/05/2020 32 (H) 8 - 23 mg/dL Final     BUN/Creatinine Ratio   Date Value Ref Range Status   11/05/2020 28.3 (H) 7.0 - 25.0 Final     Calcium   Date Value Ref Range Status   11/05/2020 9.8 8.6 - 10.5 mg/dL Final     eGFR Non  Amer   Date Value Ref Range Status   11/05/2020 47 (L) >60 mL/min/1.73 Final     Alkaline Phosphatase   Date Value Ref Range Status   11/05/2020 74 39 - 117 U/L Final     Total Protein   Date Value Ref Range Status   11/05/2020 7.4 6.0 - 8.5 g/dL Final     ALT (SGPT)   Date Value Ref Range Status   11/05/2020 43 (H) 1 - 33 U/L Final     AST (SGOT)   Date Value Ref Range Status   11/05/2020 33 (H) 1 - 32 U/L Final     Total Bilirubin   Date Value Ref Range Status   11/05/2020 0.2 0.0 - 1.2 mg/dL Final     Albumin   Date Value Ref Range Status   11/05/2020 3.90 3.50 - 5.20 g/dL Final     Globulin   Date Value Ref Range Status   11/05/2020 3.5 gm/dL Final     Lab Results   Component Value Date    WBC 19.85 (H) 11/05/2020    HGB 11.0 (L) 11/05/2020    HCT 34.6 11/05/2020    MCV 83.4 11/05/2020     (H) 11/05/2020     Lab Results   Component Value Date    NEUTROABS 18.12 (H) 11/05/2020    IRON 61 11/05/2020    TIBC 390 11/05/2020    LABIRON 16 (L) 11/05/2020    FERRITIN 151.10 (H) 11/05/2020    SJGLFFGO25 >2,000 (H) 11/05/2020    FOLATE 5.69 11/05/2020     No results found for: , LABCA2, AFPTM, HCGQUANT, , CHROMGRNA, [id number], CEA, REFLABREPO            ASSESSMENT AND PLAN:      1.  Triple negative right breast cancer, clinically T1 lesion with measurement around 1.2  cm.  -Patient was started on chemotherapy with Taxotere and Cytoxan cycle 1 with Neulasta growth support on October 15, 2020.  Patient tolerated well except for nausea and back pain from Neulasta.  -We will go ahead with cycle 2 of chemotherapy today.  -Since white blood cell count is elevated at 19,000 and patient had a back pain we will hold Neulasta with cycle 2 this time.  -We will ask patient to return to clinic in 3 weeks with repeat CBC and CMP on that day.      2.  Leukocytosis and thrombocytosis:  -Reactive.  Since white blood cell count is 19,000 today.  Will hold Neulasta with cycle 2.  -We will rule out any iron deficiency due to worsening thrombocytosis.  -Recommend aspirin 81 mg p.o. daily.    3.  Anemia: Problem is new to me  -Hemoglobin is decreased to 11.  That could be from chemotherapy versus developing nutritional deficiency.  -Anemia work-up done on November 5, 2020 shows iron deficiency with iron saturation of 16%.  Folate level is five.  We'll start patient on ferrous sulfate 1 tablet daily with stool softener if required.  We'll also start patient on folic acid 1 mg p.o. daily.  Prescription has been sent to her pharmacy    4.  Diabetes mellitus    5.  Abnormal uptake in rectal area:  -Patient is scheduled for colonoscopy on November 19, 2020.    6.  Elevated liver function test: AST and ALT is minimally elevated secondary to fatty liver.  Will monitor with CMP    7.  Health maintenance: Patient does not smoke    8. Advance Care Planning: For now patient remains full code and is able to make decisions.  Patient has health care surrogate mentioned on chart.             PHQ-9 Total Score: 1   -Patient is not homicidal or suicidal.  No acute intervention required.    Gale Tapia Marianna reports a pain score of 0.  Given her pain assessment as noted, treatment options were discussed and the following options were decided upon as a follow-up plan to address the patient's pain: No acute intervention  required.         Carlos Duong MD  11/6/2020  06:49 CST        Part of this note may be an electronic transcription/translation of spoken language to printed text using the Dragon Dictation System.

## 2020-11-06 ENCOUNTER — APPOINTMENT (OUTPATIENT)
Dept: ONCOLOGY | Facility: HOSPITAL | Age: 75
End: 2020-11-06

## 2020-11-06 ENCOUNTER — DOCUMENTATION (OUTPATIENT)
Dept: GENETICS | Facility: HOSPITAL | Age: 75
End: 2020-11-06

## 2020-11-06 ENCOUNTER — TELEPHONE (OUTPATIENT)
Dept: ONCOLOGY | Facility: HOSPITAL | Age: 75
End: 2020-11-06

## 2020-11-06 ENCOUNTER — OUTSIDE FACILITY SERVICE (OUTPATIENT)
Dept: ONCOLOGY | Facility: CLINIC | Age: 75
End: 2020-11-06

## 2020-11-06 DIAGNOSIS — Z80.0 FAMILY HISTORY OF GI MALIGNANCY: ICD-10-CM

## 2020-11-06 DIAGNOSIS — C50.911 INVASIVE DUCTAL CARCINOMA OF RIGHT BREAST (HCC): ICD-10-CM

## 2020-11-06 PROCEDURE — OUTSIDEPOS PR OUTSIDE POS PLACEHOLDER: Performed by: NURSE PRACTITIONER

## 2020-11-06 RX ORDER — FERROUS SULFATE 325(65) MG
325 TABLET ORAL
Qty: 30 TABLET | Refills: 3 | Status: SHIPPED | OUTPATIENT
Start: 2020-11-06 | End: 2021-03-16 | Stop reason: SDUPTHER

## 2020-11-06 RX ORDER — FOLIC ACID 1 MG/1
1 TABLET ORAL DAILY
Qty: 90 TABLET | Refills: 1 | Status: SHIPPED | OUTPATIENT
Start: 2020-11-06 | End: 2021-06-02

## 2020-11-06 RX ORDER — DOCUSATE SODIUM 100 MG/1
100 CAPSULE, LIQUID FILLED ORAL 2 TIMES DAILY PRN
Qty: 60 CAPSULE | Refills: 2 | Status: SHIPPED | OUTPATIENT
Start: 2020-11-06 | End: 2021-03-16 | Stop reason: SDUPTHER

## 2020-11-06 NOTE — TELEPHONE ENCOUNTER
Called and spoke with pt regarding lab results and taking medications prescribed by Dr. Duong, v/u obtained.

## 2020-11-06 NOTE — TELEPHONE ENCOUNTER
----- Message from Carlos Duong MD sent at 11/6/2020  6:51 AM CST -----  Please let patient know, I have sent prescription for ferrous sulfate, Colace and folic acid to her pharmacy.  Thank you

## 2020-11-06 NOTE — PROGRESS NOTES
Gale Cabral, a 75-year-old female, was seen for genetic counseling via telemedicine due to a personal history of breast cancer.  Ms. Cabral was recently diagnosed with a triple negative breast cancer of the right breast at age 75. Ms. Cabral had a OLGA/BSO at age 45. She was interested in discussing her risk for a hereditary cancer syndrome.  Ms. Cabral was interested in pursuing a multi-gene panel to evaluate her risk of cancer, therefore the CancerNext panel was ordered through [facility] which analyzes BRCA1/2 and 34 additional genes associated with an increased cancer risk. Results are expected within 2-3 weeks.     PERTINENT FAMILY HISTORY: (See attached pedigree)   Mother:  Colon cancer, 60s  Mat. Uncle:  Male breast cancer, 45  Mat. Great Aunt: Breast cancer, 60s/70s  Pat. Aunt:  Lung cancer  Pat. Aunt:  Lung cancer, 80s  Pat. Aunt:  Mouth cancer, 80s  Pat. 1st cousin: Breast cancer, 50s  Pat. 1st cousin: Pancreatic cancer, 74  Pat. 1st cousin: Melanoma, 20s  Pat. 1st cousin: Bone cancer, 70s    We do not have medical records regarding any of these diagnoses.      RISK ASSESSMENT:  Ms. Cabral’s personal and family history of cancer led to concern for a hereditary cancer syndrome. We discussed BRCA1/2 testing as well as the option of pursuing a panel that would test for other genes known to impact cancer risk in addition to BRCA1/2.   Ms. Cabral clearly meets NCCN guidelines criteria for BRCA1/2 testing based on her personal history of breast cancer and family history of male breast cancer. These risk assessments are based on the family history information provided at the time of the appointment, and could change in the future should new information be obtained.    GENETIC COUNSELING: (30 minutes) We reviewed the family history information in detail. Cases of cancer follow three general patterns: sporadic, familial, and hereditary.  While most cancer is sporadic, some cases appear to occur in family  clusters.  These cases are said to be familial and account for 10-20% of cancer cases.  Familial cases may be due to a combination of shared genes and environmental factors among family members.  In even fewer families, the cancer is said to be inherited, and the genes responsible for the cancer are known.      Family histories typical of hereditary cancer syndromes usually include multiple first- and second-degree relatives diagnosed with cancer types that define a syndrome.  These cases tend to be diagnosed at younger-than-expected ages and can be bilateral or multifocal.  The cancer in these families follows an autosomal dominant inheritance pattern, which indicates the likely presence of a mutation in a cancer susceptibility gene.  Children and siblings of an individual believed to carry this mutation have a 50% chance of inheriting that mutation, thereby inheriting the increased risk to develop cancer.  These mutations can be passed down from the maternal or the paternal lineage.    Hereditary breast cancer accounts for 5-10% of all cases of breast cancer.  A significant proportion of hereditary breast and ovarian cancer can be attributed to mutations in the BRCA1 and BRCA2 genes.  Mutations in these genes confer an increased risk for breast cancer, ovarian cancer, male breast cancer, prostate cancer, and pancreatic cancer. Women with a BRCA1 or BRCA2 mutation who have already been diagnosed with breast cancer have a 40-60% lifetime risk of a second breast cancer. Women with a BRCA1 or BRCA2 mutation have up to a 44% risk of ovarian cancer.  BRCA1 has been more significantly associated with triple negative breast cancers than other genes.      There are other genes that are known to be associated with an increased risk for cancer.  Some of these genes have well defined cancer risks and established management guidelines.  Other genes that can be tested for have been more recently described, and there may be less  data regarding the risks and therefore may not have established management guidelines. We discussed these limitations at length.  Based on Ms. Cabral’s desire to get as much information as possible regarding her personal risks and potential risks for her family, she opted to pursue testing through a panel that would look at several other genes known to increase the risk for cancer.    GENETIC TESTING:  The risks, benefits and limitations of genetic testing and implications for clinical management following testing were reviewed.  DNA test results can influence decisions regarding screening, prevention and surgical management.  Genetic testing can have significant psychological implications for both individuals and families.  Also discussed was the possibility of employment and insurance discrimination based on genetic test results and the laws in place to prevent this ([place]).    We discussed panel testing, which would involve testing for BRCA1/2 as well as 34 additional genes that are associated with increased cancer risk. The benefits and limitations of genetic testing were discussed and Ms. Cabral decided to pursue testing via the panel. The implications of a positive or negative test result were discussed. We discussed the possibility that, in some cases, genetic test results may be informative or may be ambiguous due to the identification of a genetic variant. These variants may or may not be associated with an increased cancer risk.  With multigene panel testing, it is not uncommon for a variant of uncertain significance (VUS) to be identified.  If a VUS is identified, testing family members is typically not recommended and screening recommendations are made based on the family history.  The laboratories that perform genetic testing work to reclassify the VUS and send out an amended report if and when a VUS is reclassified.  The majority of variant findings are ultimately reclassified to a negative result.   Given her personal and family history, a negative test result would not eliminate all cancer risk to her relatives, although the risk would not be as high as it would with positive genetic testing.      PLAN: Genetic testing via the CancerNext panel through [facility] was ordered and results are expected within 2-3 weeks. Ms. Cabral is welcome to contact us in the meantime with any questions she may have.      Keya Hollingsworth MS, [facility], [facility]  Licensed Certified Genetic Counselor

## 2020-11-16 ENCOUNTER — LAB (OUTPATIENT)
Dept: LAB | Facility: HOSPITAL | Age: 75
End: 2020-11-16

## 2020-11-16 DIAGNOSIS — Z01.818 PREOP TESTING: Primary | ICD-10-CM

## 2020-11-16 PROCEDURE — C9803 HOPD COVID-19 SPEC COLLECT: HCPCS

## 2020-11-16 PROCEDURE — U0003 INFECTIOUS AGENT DETECTION BY NUCLEIC ACID (DNA OR RNA); SEVERE ACUTE RESPIRATORY SYNDROME CORONAVIRUS 2 (SARS-COV-2) (CORONAVIRUS DISEASE [COVID-19]), AMPLIFIED PROBE TECHNIQUE, MAKING USE OF HIGH THROUGHPUT TECHNOLOGIES AS DESCRIBED BY CMS-2020-01-R: HCPCS

## 2020-11-17 LAB
COVID LABCORP PRIORITY: NORMAL
SARS-COV-2 RNA RESP QL NAA+PROBE: NOT DETECTED

## 2020-11-18 ENCOUNTER — ANESTHESIA EVENT (OUTPATIENT)
Dept: GASTROENTEROLOGY | Facility: HOSPITAL | Age: 75
End: 2020-11-18

## 2020-11-19 ENCOUNTER — ANESTHESIA (OUTPATIENT)
Dept: GASTROENTEROLOGY | Facility: HOSPITAL | Age: 75
End: 2020-11-19

## 2020-11-19 ENCOUNTER — HOSPITAL ENCOUNTER (OUTPATIENT)
Facility: HOSPITAL | Age: 75
Setting detail: HOSPITAL OUTPATIENT SURGERY
Discharge: HOME | End: 2020-11-19
Attending: INTERNAL MEDICINE | Admitting: INTERNAL MEDICINE

## 2020-11-19 VITALS
WEIGHT: 160 LBS | HEART RATE: 74 BPM | TEMPERATURE: 98.3 F | SYSTOLIC BLOOD PRESSURE: 115 MMHG | DIASTOLIC BLOOD PRESSURE: 59 MMHG | BODY MASS INDEX: 29.44 KG/M2 | HEIGHT: 62 IN | RESPIRATION RATE: 20 BRPM | OXYGEN SATURATION: 98 %

## 2020-11-19 DIAGNOSIS — Z12.11 ENCOUNTER FOR SCREENING FOR MALIGNANT NEOPLASM OF COLON: ICD-10-CM

## 2020-11-19 DIAGNOSIS — Z80.0 FAMILY HISTORY OF GI MALIGNANCY: ICD-10-CM

## 2020-11-19 LAB — GLUCOSE BLDC GLUCOMTR-MCNC: 104 MG/DL (ref 70–130)

## 2020-11-19 PROCEDURE — 25010000002 PROPOFOL 10 MG/ML EMULSION: Performed by: NURSE ANESTHETIST, CERTIFIED REGISTERED

## 2020-11-19 PROCEDURE — 45380 COLONOSCOPY AND BIOPSY: CPT | Performed by: INTERNAL MEDICINE

## 2020-11-19 PROCEDURE — 25010000003 HEPARIN LOCK FLUSH PER 10 UNITS: Performed by: INTERNAL MEDICINE

## 2020-11-19 PROCEDURE — 88305 TISSUE EXAM BY PATHOLOGIST: CPT

## 2020-11-19 PROCEDURE — 82962 GLUCOSE BLOOD TEST: CPT

## 2020-11-19 RX ORDER — LIDOCAINE HYDROCHLORIDE 20 MG/ML
INJECTION, SOLUTION INTRAVENOUS AS NEEDED
Status: DISCONTINUED | OUTPATIENT
Start: 2020-11-19 | End: 2020-11-19 | Stop reason: SURG

## 2020-11-19 RX ORDER — SODIUM CHLORIDE 9 MG/ML
10 INJECTION INTRAVENOUS ONCE
Status: COMPLETED | OUTPATIENT
Start: 2020-11-19 | End: 2020-11-19

## 2020-11-19 RX ORDER — DEXTROSE AND SODIUM CHLORIDE 5; .45 G/100ML; G/100ML
30 INJECTION, SOLUTION INTRAVENOUS CONTINUOUS PRN
Status: DISCONTINUED | OUTPATIENT
Start: 2020-11-19 | End: 2020-11-19 | Stop reason: HOSPADM

## 2020-11-19 RX ORDER — HEPARIN SODIUM (PORCINE) LOCK FLUSH IV SOLN 100 UNIT/ML 100 UNIT/ML
500 SOLUTION INTRAVENOUS ONCE
Status: COMPLETED | OUTPATIENT
Start: 2020-11-19 | End: 2020-11-19

## 2020-11-19 RX ORDER — PROPOFOL 10 MG/ML
VIAL (ML) INTRAVENOUS AS NEEDED
Status: DISCONTINUED | OUTPATIENT
Start: 2020-11-19 | End: 2020-11-19 | Stop reason: SURG

## 2020-11-19 RX ADMIN — SODIUM CHLORIDE 10 ML: 9 INJECTION, SOLUTION INTRAMUSCULAR; INTRAVENOUS; SUBCUTANEOUS at 14:26

## 2020-11-19 RX ADMIN — LIDOCAINE HYDROCHLORIDE 50 MG: 20 INJECTION, SOLUTION INTRAVENOUS at 14:04

## 2020-11-19 RX ADMIN — HEPARIN 500 UNITS: 100 SYRINGE at 14:27

## 2020-11-19 RX ADMIN — DEXTROSE AND SODIUM CHLORIDE 30 ML/HR: 5; 450 INJECTION, SOLUTION INTRAVENOUS at 13:49

## 2020-11-19 RX ADMIN — PROPOFOL 50 MG: 10 INJECTION, EMULSION INTRAVENOUS at 14:04

## 2020-11-19 RX ADMIN — PROPOFOL 20 MG: 10 INJECTION, EMULSION INTRAVENOUS at 14:09

## 2020-11-19 RX ADMIN — LIDOCAINE HYDROCHLORIDE 50 MG: 20 INJECTION, SOLUTION INTRAVENOUS at 14:02

## 2020-11-19 RX ADMIN — PROPOFOL 30 MG: 10 INJECTION, EMULSION INTRAVENOUS at 14:05

## 2020-11-19 NOTE — ANESTHESIA PREPROCEDURE EVALUATION
Anesthesia Evaluation     Patient summary reviewed   NPO Solid Status: > 8 hours  NPO Liquid Status: > 2 hours           Airway   Mallampati: II  TM distance: >3 FB  Neck ROM: full  Dental    (+) partials    Comment: Right lower partial.    Pulmonary    (+) asthma,sleep apnea,   (-) not a smoker  Cardiovascular     ECG reviewed    (+) hypertension, hyperlipidemia,     ROS comment: Sinus rhythm with 1st degree AV block  Left axis deviation  Left ventricular hypertrophy with QRS widening  Cannot rule out Septal infarct , age undetermined  Abnormal ECG  No previous ECGs available  Confirmed by MIGUELITO ENRIQUEZ ([id number]) on 10/6/2020 12:23:01 PM    Neuro/Psych  (+) psychiatric history Depression and Anxiety,     GI/Hepatic/Renal/Endo    (+) obesity,   diabetes mellitus type 2, thyroid problem hypothyroidism    Musculoskeletal     (+) neck pain (Cervical DDD),   Abdominal    Substance History - negative use     OB/GYN negative ob/gyn ROS         Other      history of cancer (Breast) active                    Anesthesia Plan    ASA 3     MAC     intravenous induction     Anesthetic plan, all risks, benefits, and alternatives have been provided, discussed and informed consent has been obtained with: patient.    Plan discussed with CRNA.

## 2020-11-19 NOTE — ANESTHESIA POSTPROCEDURE EVALUATION
Patient: Gale Cabral    Procedure Summary     Date: 11/19/20 Room / Location: [facility] ENDOSCOPY 1 / [facility] ENDOSCOPY    Anesthesia Start: 1400 Anesthesia Stop: 1414    Procedure: COLONOSCOPY (N/A ) Diagnosis:       Family history of GI malignancy      Encounter for screening for malignant neoplasm of colon      (Family history of GI malignancy [Z80.0])      (Encounter for screening for malignant neoplasm of colon [Z12.11])    Surgeon: Devin Rene MD Provider: Krystin Rondon CRNA    Anesthesia Type: MAC ASA Status: 3          Anesthesia Type: MAC    Vitals  No vitals data found for the desired time range.          Post Anesthesia Care and Evaluation    Patient location during evaluation: bedside  Patient participation: complete - patient participated  Level of consciousness: awake  Pain score: 0  Pain management: adequate  Airway patency: patent  Anesthetic complications: No anesthetic complications  PONV Status: none  Cardiovascular status: acceptable and hemodynamically stable  Respiratory status: acceptable, nonlabored ventilation and spontaneous ventilation  Hydration status: acceptable

## 2020-11-20 ENCOUNTER — DOCUMENTATION (OUTPATIENT)
Dept: GENETICS | Facility: HOSPITAL | Age: 75
End: 2020-11-20

## 2020-11-20 NOTE — PROGRESS NOTES
Gale Cabral, a 75-year-old female, was seen for genetic counseling via telemedicine due to a personal history of breast cancer.  Ms. Cabral was recently diagnosed with a triple negative breast cancer of the right breast at age 75. She is currently undergoing neoadjuvant chemotherapy. Ms. Cabral had a OLGA/BSO at age 45. She reports having colonoscopies every three years, and has not had polyps removed. She was interested in discussing her risk for a hereditary cancer syndrome.  Ms. Cabral was interested in pursuing a multi-gene panel to evaluate her risk of cancer, therefore the CancerNext panel was ordered through [facility] which analyzes BRCA1/2 and 34 additional genes associated with an increased cancer risk.  The genes on this panel include APC, ALY, AXIN2, BARD1, BMPR1A, BRCA1, BRCA2, BRIP1, CDH1, CDK4, CDKN2A, CHEK2, DICER1, EPCAM, GREM1, HOXB13, MLH1, MSH2, MSH3, MSH6, MUTYH, NBN, NF1, NTHL1, PALB2, PMS2, POLD1, POLE, PTEN, RAD51C, RAD51D, RECQL, SMAD4, SMARCA4, STK11, and TP53. Genetic testing was negative for pathogenic mutations in BRCA1/2 and 34 additional genes on the CancerNext panel.  These normal results were discussed with Ms. Cabral by telephone on 11/20/2020.      PERTINENT FAMILY HISTORY: (See attached pedigree)   Mother:  Colon cancer, 60s  Mat. Uncle:  Male breast cancer, 45  Mat. Great Aunt: Breast cancer, 60s/70s  Pat. Aunt:  Lung cancer  Pat. Aunt:  Lung cancer, 80s  Pat. Aunt:  Mouth cancer, 80s  Pat. 1st cousin: Breast cancer, 50s  Pat. 1st cousin: Pancreatic cancer, 74  Pat. 1st cousin: Melanoma, 20s  Pat. 1st cousin: Bone cancer, 70s    We do not have medical records regarding any of these diagnoses.      RISK ASSESSMENT:  Ms. Cabral’s personal and family history of cancer led to concern for a hereditary cancer syndrome. We discussed BRCA1/2 testing as well as the option of pursuing a panel that would test for other genes known to impact cancer risk in addition to BRCA1/2.    Ms. Cabral clearly meets NCCN guidelines criteria for BRCA1/2 testing based on her personal history of breast cancer and family history of male breast cancer. These risk assessments are based on the family history information provided at the time of the appointment, and could change in the future should new information be obtained.    GENETIC COUNSELING: We reviewed the family history information in detail. Cases of cancer follow three general patterns: sporadic, familial, and hereditary.  While most cancer is sporadic, some cases appear to occur in family clusters.  These cases are said to be familial and account for 10-20% of cancer cases.  Familial cases may be due to a combination of shared genes and environmental factors among family members.  In even fewer families, the cancer is said to be inherited, and the genes responsible for the cancer are known.      Family histories typical of hereditary cancer syndromes usually include multiple first- and second-degree relatives diagnosed with cancer types that define a syndrome.  These cases tend to be diagnosed at younger-than-expected ages and can be bilateral or multifocal.  The cancer in these families follows an autosomal dominant inheritance pattern, which indicates the likely presence of a mutation in a cancer susceptibility gene.  Children and siblings of an individual believed to carry this mutation have a 50% chance of inheriting that mutation, thereby inheriting the increased risk to develop cancer.  These mutations can be passed down from the maternal or the paternal lineage.    Hereditary breast cancer accounts for 5-10% of all cases of breast cancer.  A significant proportion of hereditary breast and ovarian cancer can be attributed to mutations in the BRCA1 and BRCA2 genes.  Mutations in these genes confer an increased risk for breast cancer, ovarian cancer, male breast cancer, prostate cancer, and pancreatic cancer. Women with a BRCA1 or BRCA2  mutation who have already been diagnosed with breast cancer have a 40-60% lifetime risk of a second breast cancer. Women with a BRCA1 or BRCA2 mutation have up to a 44% risk of ovarian cancer.  BRCA1 has been more significantly associated with triple negative breast cancers than other genes.      There are other genes that are known to be associated with an increased risk for cancer.  Some of these genes have well defined cancer risks and established management guidelines.  Other genes that can be tested for have been more recently described, and there may be less data regarding the risks and therefore may not have established management guidelines. We discussed these limitations at length.  Based on Ms. Cabral’s desire to get as much information as possible regarding her personal risks and potential risks for her family, she opted to pursue testing through a panel that would look at several other genes known to increase the risk for cancer.    GENETIC TESTING:  The risks, benefits and limitations of genetic testing and implications for clinical management following testing were reviewed.  DNA test results can influence decisions regarding screening, prevention and surgical management.  Genetic testing can have significant psychological implications for both individuals and families.  Also discussed was the possibility of employment and insurance discrimination based on genetic test results and the laws in place to prevent this ([place]AR[place]).    We discussed panel testing, which would involve testing for BRCA1/2 as well as 34 additional genes that are associated with increased cancer risk. The benefits and limitations of genetic testing were discussed and Ms. Cabral decided to pursue testing via the panel. The implications of a positive or negative test result were discussed. We discussed the possibility that, in some cases, genetic test results may be informative or may be ambiguous due to the identification of a  genetic variant. These variants may or may not be associated with an increased cancer risk.  With multigene panel testing, it is not uncommon for a variant of uncertain significance (VUS) to be identified.  If a VUS is identified, testing family members is typically not recommended and screening recommendations are made based on the family history.  The laboratories that perform genetic testing work to reclassify the VUS and send out an amended report if and when a VUS is reclassified.  The majority of variant findings are ultimately reclassified to a negative result.  Given her personal and family history, a negative test result would not eliminate all cancer risk to her relatives, although the risk would not be as high as it would with positive genetic testing.      TEST RESULTS:  Genetic testing was negative for known pathogenic mutations by sequencing, rearrangement testing, and RNA analysis for the genes on the CancerNext panel (see attached results). This negative result greatly lowers but does not eliminate the risk of a hereditary cancer syndrome for Ms. Cabral.  This assessment is based on the information provided at time of consultation.    CANCER SCREENING: Ms. Cabral’s surveillance and management should be determined by her oncology team. Despite the negative genetic test results, Ms. Cabral’s female relatives may have a somewhat increased lifetime risk for breast cancer based on family history. Female relatives could have a risk assessment performed using a family history-based model, such as the Tyrer-Cuzick model, to determine their individual risks.    Given the increased risk, options available to individuals with a high lifetime risk for breast cancer were briefly discussed.   Surveillance for individuals with a high lifetime risk of breast cancer (>20%, versus the average risk of 12%), based on NCCN guidelines, would consist of semi-annual clinical breast exams and monthly self-breast exams  starting by age 18 and annual mammography starting 10 years younger than the earliest diagnosis in a close relative, or starting by age 40, whichever is earliest.  According to an American Cancer Society expert panel, annual breast MRI should be offered to women whose lifetime risk of breast cancer is 20-25 percent or more, also starting by age 40 or earlier if indicated by family history.      PLAN: Genetic counseling remains available to Ms. Cabral and her family. She is welcome to contact us with any questions or concerns at [phone].      Keya Hollingsworth MS, [facility] [place], [facility]  Licensed Certified Genetic Counselor       Cc: Gale Duong MD

## 2020-11-24 LAB
LAB AP CASE REPORT: NORMAL
PATH REPORT.FINAL DX SPEC: NORMAL

## 2020-11-30 ENCOUNTER — INFUSION (OUTPATIENT)
Dept: ONCOLOGY | Facility: HOSPITAL | Age: 75
End: 2020-11-30

## 2020-11-30 ENCOUNTER — OFFICE VISIT (OUTPATIENT)
Dept: ONCOLOGY | Facility: CLINIC | Age: 75
End: 2020-11-30

## 2020-11-30 VITALS
WEIGHT: 163.8 LBS | TEMPERATURE: 98.4 F | HEIGHT: 62 IN | HEART RATE: 86 BPM | RESPIRATION RATE: 18 BRPM | DIASTOLIC BLOOD PRESSURE: 65 MMHG | SYSTOLIC BLOOD PRESSURE: 142 MMHG | BODY MASS INDEX: 30.14 KG/M2

## 2020-11-30 DIAGNOSIS — D72.828 OTHER ELEVATED WHITE BLOOD CELL (WBC) COUNT: ICD-10-CM

## 2020-11-30 DIAGNOSIS — C50.911 INVASIVE DUCTAL CARCINOMA OF RIGHT BREAST (HCC): Primary | ICD-10-CM

## 2020-11-30 DIAGNOSIS — C50.911 INVASIVE DUCTAL CARCINOMA OF RIGHT BREAST (HCC): ICD-10-CM

## 2020-11-30 DIAGNOSIS — D64.9 ANEMIA, UNSPECIFIED TYPE: ICD-10-CM

## 2020-11-30 DIAGNOSIS — Z45.2 ENCOUNTER FOR VENOUS ACCESS DEVICE CARE: ICD-10-CM

## 2020-11-30 DIAGNOSIS — D75.839 THROMBOCYTOSIS: ICD-10-CM

## 2020-11-30 LAB
ALBUMIN SERPL-MCNC: 4.1 G/DL (ref 3.5–5.2)
ALBUMIN/GLOB SERPL: 1.3 G/DL
ALP SERPL-CCNC: 68 U/L (ref 39–117)
ALT SERPL W P-5'-P-CCNC: 20 U/L (ref 1–33)
ANION GAP SERPL CALCULATED.3IONS-SCNC: 16 MMOL/L (ref 5–15)
AST SERPL-CCNC: 21 U/L (ref 1–32)
BASOPHILS # BLD AUTO: 0.04 10*3/MM3 (ref 0–0.2)
BASOPHILS NFR BLD AUTO: 0.2 % (ref 0–1.5)
BILIRUB SERPL-MCNC: 0.3 MG/DL (ref 0–1.2)
BUN SERPL-MCNC: 24 MG/DL (ref 8–23)
BUN/CREAT SERPL: 21.8 (ref 7–25)
CALCIUM SPEC-SCNC: 10 MG/DL (ref 8.6–10.5)
CHLORIDE SERPL-SCNC: 103 MMOL/L (ref 98–107)
CO2 SERPL-SCNC: 19 MMOL/L (ref 22–29)
CREAT SERPL-MCNC: 1.1 MG/DL (ref 0.57–1)
DEPRECATED RDW RBC AUTO: 51.5 FL (ref 37–54)
EOSINOPHIL # BLD AUTO: 0 10*3/MM3 (ref 0–0.4)
EOSINOPHIL NFR BLD AUTO: 0 % (ref 0.3–6.2)
ERYTHROCYTE [DISTWIDTH] IN BLOOD BY AUTOMATED COUNT: 17.6 % (ref 12.3–15.4)
GFR SERPL CREATININE-BSD FRML MDRD: 49 ML/MIN/1.73
GLOBULIN UR ELPH-MCNC: 3.2 GM/DL
GLUCOSE SERPL-MCNC: 235 MG/DL (ref 65–99)
HCT VFR BLD AUTO: 34.7 % (ref 34–46.6)
HGB BLD-MCNC: 11.3 G/DL (ref 12–15.9)
IMM GRANULOCYTES # BLD AUTO: 0.14 10*3/MM3 (ref 0–0.05)
IMM GRANULOCYTES NFR BLD AUTO: 0.7 % (ref 0–0.5)
LYMPHOCYTES # BLD AUTO: 1.12 10*3/MM3 (ref 0.7–3.1)
LYMPHOCYTES NFR BLD AUTO: 5.7 % (ref 19.6–45.3)
MCH RBC QN AUTO: 26.9 PG (ref 26.6–33)
MCHC RBC AUTO-ENTMCNC: 32.6 G/DL (ref 31.5–35.7)
MCV RBC AUTO: 82.6 FL (ref 79–97)
MONOCYTES # BLD AUTO: 0.4 10*3/MM3 (ref 0.1–0.9)
MONOCYTES NFR BLD AUTO: 2 % (ref 5–12)
NEUTROPHILS NFR BLD AUTO: 17.99 10*3/MM3 (ref 1.7–7)
NEUTROPHILS NFR BLD AUTO: 91.4 % (ref 42.7–76)
NRBC BLD AUTO-RTO: 0 /100 WBC (ref 0–0.2)
PLATELET # BLD AUTO: 495 10*3/MM3 (ref 140–450)
PMV BLD AUTO: 9.2 FL (ref 6–12)
POTASSIUM SERPL-SCNC: 4.3 MMOL/L (ref 3.5–5.2)
PROT SERPL-MCNC: 7.3 G/DL (ref 6–8.5)
RBC # BLD AUTO: 4.2 10*6/MM3 (ref 3.77–5.28)
SODIUM SERPL-SCNC: 138 MMOL/L (ref 136–145)
WBC # BLD AUTO: 19.69 10*3/MM3 (ref 3.4–10.8)

## 2020-11-30 PROCEDURE — 25010000002 CYCLOPHOSPHAMIDE PER 100 MG: Performed by: INTERNAL MEDICINE

## 2020-11-30 PROCEDURE — 85025 COMPLETE CBC W/AUTO DIFF WBC: CPT

## 2020-11-30 PROCEDURE — 96413 CHEMO IV INFUSION 1 HR: CPT | Performed by: INTERNAL MEDICINE

## 2020-11-30 PROCEDURE — G9903 PT SCRN TBCO ID AS NON USER: HCPCS | Performed by: INTERNAL MEDICINE

## 2020-11-30 PROCEDURE — 25010000002 PALONOSETRON PER 25 MCG: Performed by: INTERNAL MEDICINE

## 2020-11-30 PROCEDURE — 80053 COMPREHEN METABOLIC PANEL: CPT

## 2020-11-30 PROCEDURE — 25010000002 DOCETAXEL 20 MG/ML SOLUTION 8 ML VIAL: Performed by: INTERNAL MEDICINE

## 2020-11-30 PROCEDURE — 25010000003 HEPARIN LOCK FLUSH PER 10 UNITS: Performed by: NURSE PRACTITIONER

## 2020-11-30 PROCEDURE — 1123F ACP DISCUSS/DSCN MKR DOCD: CPT | Performed by: INTERNAL MEDICINE

## 2020-11-30 PROCEDURE — 99214 OFFICE O/P EST MOD 30 MIN: CPT | Performed by: INTERNAL MEDICINE

## 2020-11-30 PROCEDURE — 96417 CHEMO IV INFUS EACH ADDL SEQ: CPT | Performed by: INTERNAL MEDICINE

## 2020-11-30 PROCEDURE — 96375 TX/PRO/DX INJ NEW DRUG ADDON: CPT | Performed by: INTERNAL MEDICINE

## 2020-11-30 PROCEDURE — G8731 PAIN NEG NO PLAN: HCPCS | Performed by: INTERNAL MEDICINE

## 2020-11-30 RX ORDER — FAMOTIDINE 10 MG/ML
20 INJECTION, SOLUTION INTRAVENOUS AS NEEDED
Status: DISCONTINUED | OUTPATIENT
Start: 2020-11-30 | End: 2020-11-30 | Stop reason: HOSPADM

## 2020-11-30 RX ORDER — HEPARIN SODIUM (PORCINE) LOCK FLUSH IV SOLN 100 UNIT/ML 100 UNIT/ML
500 SOLUTION INTRAVENOUS AS NEEDED
Status: DISCONTINUED | OUTPATIENT
Start: 2020-11-30 | End: 2020-11-30 | Stop reason: HOSPADM

## 2020-11-30 RX ORDER — SODIUM CHLORIDE 9 MG/ML
250 INJECTION, SOLUTION INTRAVENOUS ONCE
Status: COMPLETED | OUTPATIENT
Start: 2020-11-30 | End: 2020-11-30

## 2020-11-30 RX ORDER — HEPARIN SODIUM (PORCINE) LOCK FLUSH IV SOLN 100 UNIT/ML 100 UNIT/ML
500 SOLUTION INTRAVENOUS AS NEEDED
Status: CANCELLED | OUTPATIENT
Start: 2020-11-30

## 2020-11-30 RX ORDER — SODIUM CHLORIDE 0.9 % (FLUSH) 0.9 %
10 SYRINGE (ML) INJECTION AS NEEDED
Status: DISCONTINUED | OUTPATIENT
Start: 2020-11-30 | End: 2020-11-30 | Stop reason: HOSPADM

## 2020-11-30 RX ORDER — PALONOSETRON 0.05 MG/ML
0.25 INJECTION, SOLUTION INTRAVENOUS ONCE
Status: CANCELLED | OUTPATIENT
Start: 2020-11-30

## 2020-11-30 RX ORDER — SODIUM CHLORIDE 0.9 % (FLUSH) 0.9 %
20 SYRINGE (ML) INJECTION AS NEEDED
Status: DISCONTINUED | OUTPATIENT
Start: 2020-11-30 | End: 2020-11-30 | Stop reason: HOSPADM

## 2020-11-30 RX ORDER — SODIUM CHLORIDE 0.9 % (FLUSH) 0.9 %
10 SYRINGE (ML) INJECTION AS NEEDED
Status: CANCELLED | OUTPATIENT
Start: 2020-11-30

## 2020-11-30 RX ORDER — FAMOTIDINE 10 MG/ML
20 INJECTION, SOLUTION INTRAVENOUS AS NEEDED
Status: CANCELLED | OUTPATIENT
Start: 2020-11-30

## 2020-11-30 RX ORDER — SODIUM CHLORIDE 9 MG/ML
250 INJECTION, SOLUTION INTRAVENOUS ONCE
Status: CANCELLED | OUTPATIENT
Start: 2020-11-30

## 2020-11-30 RX ORDER — DIPHENHYDRAMINE HYDROCHLORIDE 50 MG/ML
50 INJECTION INTRAMUSCULAR; INTRAVENOUS AS NEEDED
Status: CANCELLED | OUTPATIENT
Start: 2020-11-30

## 2020-11-30 RX ORDER — PALONOSETRON 0.05 MG/ML
0.25 INJECTION, SOLUTION INTRAVENOUS ONCE
Status: COMPLETED | OUTPATIENT
Start: 2020-11-30 | End: 2020-11-30

## 2020-11-30 RX ORDER — DIPHENHYDRAMINE HYDROCHLORIDE 50 MG/ML
50 INJECTION INTRAMUSCULAR; INTRAVENOUS AS NEEDED
Status: DISCONTINUED | OUTPATIENT
Start: 2020-11-30 | End: 2020-11-30 | Stop reason: HOSPADM

## 2020-11-30 RX ORDER — SODIUM CHLORIDE 0.9 % (FLUSH) 0.9 %
20 SYRINGE (ML) INJECTION AS NEEDED
Status: CANCELLED | OUTPATIENT
Start: 2020-11-30

## 2020-11-30 RX ADMIN — HEPARIN 500 UNITS: 100 SYRINGE at 12:01

## 2020-11-30 RX ADMIN — DOCETAXEL 130 MG: 20 INJECTION, SOLUTION, CONCENTRATE INTRAVENOUS at 10:00

## 2020-11-30 RX ADMIN — PALONOSETRON HYDROCHLORIDE 0.25 MG: 0.25 INJECTION, SOLUTION INTRAVENOUS at 09:13

## 2020-11-30 RX ADMIN — SODIUM CHLORIDE 1060 MG: 9 INJECTION, SOLUTION INTRAVENOUS at 11:13

## 2020-11-30 RX ADMIN — SODIUM CHLORIDE 250 ML: 9 INJECTION, SOLUTION INTRAVENOUS at 09:13

## 2020-11-30 RX ADMIN — SODIUM CHLORIDE, PRESERVATIVE FREE 10 ML: 5 INJECTION INTRAVENOUS at 12:01

## 2020-11-30 NOTE — PROGRESS NOTES
DATE OF VISIT: 11/30/2020      REASON FOR VISIT: Triple negative right breast cancer on chemotherapy with Taxotere and Cytoxan, leukocytosis, anemia, thrombocytosis, chronic kidney disease      HISTORY OF PRESENT ILLNESS:   75-year-old female with medical problem consisting of diabetes mellitus, hypertension, dyslipidemia, asthma, osteopenia, obstructive sleep apnea, chronic back pain and neck pain has been following up with [facility] since October 5, 2020 for triple negative right breast cancer.  Patient is here to get cycle 3 of chemotherapy today.  Had a colonoscopy done since last clinic visit for abnormal uptake in rectal area.  Complains of nausea without vomiting.  States her back pain and joint pain was better without Neulasta with cycle 2.  Denies any new tingling or numbness affecting upper or lower extremity.  Denies any bleeding.              PAST MEDICAL HISTORY:    Past Medical History:   Diagnosis Date   • Abnormal mammogram of right breast    • Acquired hypothyroidism     started synthroid 9/2015   • Allergic rhinitis, seasonal    • Breast cancer (CMS/HCC)    • Cervicalgia     right upper extremity radiculopathy   • Diabetes mellitus (CMS/HCC)    • Encounter for screening for malignant neoplasm of colon    • Essential hypertension    • Glaucoma    • Health maintenance examination     individual health exam   • Hypercholesterolemia    • Hyperglycemia     steroid-induced hyperglycemia in diabetic patient   • Lumbar disc disorder     w/right radiculopathy   • Menopause    • Mild persistent asthma     resumed dulera twice daily   • Obstructive sleep apnea syndrome 9/17/2019   • Osteopenia     improved DEXA 2/2013   • 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   • Sebaceous cyst     subepidermal cyst   • Shoulder pain     likely radicular pain due to cervical DJD   • Spasm of back muscles     right neck and trapezius   • Spinal stenosis of lumbar region    • Strain of neck muscle     cervical strain    • Temporomandibular joint disorder     h/o   • Vitamin D deficiency     on oral calcium/vitamin D supplement       SOCIAL HISTORY:    Social History     Tobacco Use   • Smoking status: Never Smoker   • Smokeless tobacco: Never Used   Substance Use Topics   • Alcohol use: No   • Drug use: No       Surgical History :  Past Surgical History:   Procedure Laterality Date   • CHOLECYSTECTOMY     • COLONOSCOPY  12/17/2015    normal   • COLONOSCOPY N/A 11/19/2020    Procedure: COLONOSCOPY;  Surgeon: Devin Rene MD;  Location: [facility] ENDOSCOPY;  Service: Gastroenterology;  Laterality: N/A;   • ENDOSCOPY AND COLONOSCOPY  08/2010   • HYSTERECTOMY     • OOPHORECTOMY     • TONSILLECTOMY     • VENOUS ACCESS DEVICE (PORT) INSERTION Left 10/8/2020    Procedure: INSERTION VENOUS ACCESS DEVICE (MEDIPORT)         (c-arm#1);  Surgeon: Duke Alexandra MD;  Location: [facility] OR;  Service: General;  Laterality: Left;       ALLERGIES:    Allergies   Allergen Reactions   • Other Other (See Comments)     Coda-clear         FAMILY HISTORY:  Family History   Problem Relation Age of Onset   • Colon cancer Mother    • Heart attack Father    • Aneurysm Brother    • No Known Problems Son    • Alzheimer's disease Maternal Grandmother    • Heart attack Maternal Grandfather    • Alzheimer's disease Paternal Grandmother    • No Known Problems Son            REVIEW OF SYSTEMS:      CONSTITUTIONAL:  Complains of fatigue.  Has lost 2 pounds since last clinic visit.  Denies any fever, chills .     EYES: No visual disturbances. No discharge. No new lesions    ENMT:  No epistaxis, mouth sores or difficulty swallowing.    RESPIRATORY: States her shortness of breath is improved.   No new cough or hemoptysis.    CARDIOVASCULAR:  No chest pain or palpitations.    GASTROINTESTINAL: Positive for intermittent diarrhea.  Positive for nausea without vomiting.  No abdominal pain  or blood in the stool.    GENITOURINARY: No Dysuria or  "Hematuria.    MUSCULOSKELETAL: Positive for chronic back pain and knee pain.    LYMPHATICS:  Denies any abnormal swollen glands anywhere in the body.    NEUROLOGICAL : No tingling or numbness. No headache or dizziness. No seizures or balance problems.    SKIN: No new skin lesions.    ENDOCRINE : No new hot or cold intolerance. No new polyuria . No polydipsia.        PHYSICAL EXAMINATION:      VITAL SIGNS:  /65   Pulse 86   Temp 98.4 °F (36.9 °C) (Temporal)   Resp 18   Ht 157.5 cm (62.01\")   Wt 74.3 kg (163 lb 12.8 oz)   BMI 29.95 kg/m²       11/30/20 0823   Weight: 74.3 kg (163 lb 12.8 oz)         ECOG performance status: 2    CONSTITUTIONAL:  Not in any distress.    EYES: Mild conjunctival Pallor. No Icterus. No Pterygium. Extraocular Movements intact.No ptosis.    ENMT:  Normocephalic, Atraumatic.No Facial Asymmetry noted.    NECK:  No adenopathy.Trachea midline. NO JVD.    RESPIRATORY:  Fair air entry bilateral. No rhonchi or wheezing.Fair respiratory effort.    CARDIOVASCULAR:  S1, S2. Regular rate and rhythm. No murmur or gallop appreciated.    BREAST: Breast exam was performed in presence of registered nurse Natalie.  No no mass was palpable in the right breast.  No evidence of any enlarged axillary adenopathy on right side.    ABDOMEN:  Soft, obese, nontender. Bowel sounds present in all four quadrants.  No Hepatosplenomegaly appreciated.    MUSCULOSKELETAL:  No edema.No Calf Tenderness.Decreased range of motion.    NEUROLOGIC:    No  Motor or sensory deficit appreciated. Cranial Nerves 2-12 grossly intact.    SKIN : No new skin lesion identified. Skin is warm and moist to touch.    LYMPHATICS: No new enlarged lymph nodes in neck or supraclavicular area.    PSYCHIATRY: Alert, awake and oriented ×3.  Appears anxious.  Normal judgment.  Makes good eye contact.        DIAGNOSTIC DATA:    Glucose   Date Value Ref Range Status   11/30/2020 235 (H) 65 - 99 mg/dL Final     Sodium   Date Value Ref Range " Status   11/30/2020 138 136 - 145 mmol/L Final     Potassium   Date Value Ref Range Status   11/30/2020 4.3 3.5 - 5.2 mmol/L Final     CO2   Date Value Ref Range Status   11/30/2020 19.0 (L) 22.0 - 29.0 mmol/L Final     Chloride   Date Value Ref Range Status   11/30/2020 103 98 - 107 mmol/L Final     Anion Gap   Date Value Ref Range Status   11/30/2020 16.0 (H) 5.0 - 15.0 mmol/L Final     Creatinine   Date Value Ref Range Status   11/30/2020 1.10 (H) 0.57 - 1.00 mg/dL Final     BUN   Date Value Ref Range Status   11/30/2020 24 (H) 8 - 23 mg/dL Final     BUN/Creatinine Ratio   Date Value Ref Range Status   11/30/2020 21.8 7.0 - 25.0 Final     Calcium   Date Value Ref Range Status   11/30/2020 10.0 8.6 - 10.5 mg/dL Final     eGFR Non  Amer   Date Value Ref Range Status   11/30/2020 49 (L) >60 mL/min/1.73 Final     Alkaline Phosphatase   Date Value Ref Range Status   11/30/2020 68 39 - 117 U/L Final     Total Protein   Date Value Ref Range Status   11/30/2020 7.3 6.0 - 8.5 g/dL Final     ALT (SGPT)   Date Value Ref Range Status   11/30/2020 20 1 - 33 U/L Final     AST (SGOT)   Date Value Ref Range Status   11/30/2020 21 1 - 32 U/L Final     Total Bilirubin   Date Value Ref Range Status   11/30/2020 0.3 0.0 - 1.2 mg/dL Final     Albumin   Date Value Ref Range Status   11/30/2020 4.10 3.50 - 5.20 g/dL Final     Globulin   Date Value Ref Range Status   11/30/2020 3.2 gm/dL Final     Lab Results   Component Value Date    WBC 19.69 (H) 11/30/2020    HGB 11.3 (L) 11/30/2020    HCT 34.7 11/30/2020    MCV 82.6 11/30/2020     (H) 11/30/2020     Lab Results   Component Value Date    NEUTROABS 17.99 (H) 11/30/2020    IRON 61 11/05/2020    TIBC 390 11/05/2020    LABIRON 16 (L) 11/05/2020    FERRITIN 151.10 (H) 11/05/2020    [id number] >2,000 (H) 11/05/2020    FOLATE 5.69 11/05/2020     No results found for: , LABCA2, AFPTM, HCGQUANT, , CHROMGRNA, [id number], CEA, REFLABREPO        PATHOLOGY:  * Cannot find  OR log *         RADIOLOGY DATA :  No radiology results for the last 7 days      ASSESSMENT AND PLAN:      1.  Triple negative right breast cancer, clinically T1 lesion measuring at 1.2 cm.  -Patient was started on cycle 1 of Taxotere and Cytoxan with Neulasta growth support on October 15, 2020.  -Neulasta was discontinued with cycle 2 due to excessive joint pain and leukocytosis.  -Since white blood cell count is again 19,000 today.  We will continue holding Neulasta with cycle 3 as well.  -We will ask patient to return to clinic in 3 weeks with repeat CBC and CMP for fourth and final cycle of Taxotere and Cytoxan.  -Upon next clinic visit we will refer her back to surgery team with Dr. Alexandra for surgery.  -Recommend returning to clinic 3 weeks after surgery to go over final pathology report.    2.  Leukocytosis and thrombocytosis:  -Reactive.  White blood cell count is 19,000.  Platelet count is 495,000.  -Recommend continue with aspirin 81 mg p.o. daily    3.  Anemia:  -Hemoglobin is 11.3.  -Patient is currently on ferrous sulfate 1 tablet p.o. daily with folic acid 1 mg p.o. daily  -We will repeat anemia work-up in January 2021.    4.  Diabetes mellitus    5.  Abnormal uptake in rectal area:  -Colonoscopy done on November 19, 2020 showed inflammatory polyp in rectal area without malignancy.    6.  Chronic kidney disease:  -Creatinine is stable at 1.10.  Will monitor with CMP    7.  Health maintenance: Patient does not smoke    8. Advance Care Planning: For now patient remains full code and is able to make decisions.  Patient has health care surrogate mentioned on chart.         PHQ-9 Total Score: 1   -Patient is not homicidal or suicidal.  No acute intervention required.    Gale Willie Cabral reports a pain score of 0.  Given her pain assessment as noted, treatment options were discussed and the following options were decided upon as a follow-up plan to address the patient's pain: No acute intervention required.          Carlos Duong MD  11/30/2020  16:51 CST        Part of this note may be an electronic transcription/translation of spoken language to printed text using the Dragon Dictation System.

## 2020-12-01 ENCOUNTER — OFFICE VISIT (OUTPATIENT)
Dept: GASTROENTEROLOGY | Facility: CLINIC | Age: 75
End: 2020-12-01

## 2020-12-01 VITALS
DIASTOLIC BLOOD PRESSURE: 54 MMHG | SYSTOLIC BLOOD PRESSURE: 129 MMHG | HEIGHT: 62 IN | HEART RATE: 76 BPM | WEIGHT: 165.4 LBS | BODY MASS INDEX: 30.44 KG/M2

## 2020-12-01 DIAGNOSIS — K62.1 HYPERPLASTIC RECTAL POLYP: Primary | ICD-10-CM

## 2020-12-01 PROCEDURE — 99212 OFFICE O/P EST SF 10 MIN: CPT | Performed by: NURSE PRACTITIONER

## 2020-12-01 NOTE — PROGRESS NOTES
Chief Complaint   Patient presents with   • Colon Polyps       Subjective    Gale Cabral is a 75 y.o. female. she is here today for follow-up.    History of Present Illness  75-year-old female presents for follow-up after colonoscopy.  She denies any abdominal pain, nausea, vomiting or change within her bowel habits.  She is currently undergoing treatments for breast cancer per Dr. Duong.  Reports occasional hemorrhoidal blood within her stool but states that has not occurred recently.  States she has had diarrhea with chemotherapy.  Reports she takes Imodium as needed which works well for her  Colonoscopy had adequate prep noted normal perianal digital rectal exam small polyp was found removed from the rectum.  Polyp found to be inflammatory repeat recommended in 3 years for surveillance.       The following portions of the patient's history were reviewed and updated as appropriate:   Past Medical History:   Diagnosis Date   • Abnormal mammogram of right breast    • Acquired hypothyroidism     started synthroid 9/2015   • Allergic rhinitis, seasonal    • Breast cancer (CMS/HCC)    • Cervicalgia     right upper extremity radiculopathy   • Diabetes mellitus (CMS/HCC)    • Encounter for screening for malignant neoplasm of colon    • Essential hypertension    • Glaucoma    • Health maintenance examination     individual health exam   • Hypercholesterolemia    • Hyperglycemia     steroid-induced hyperglycemia in diabetic patient   • Lumbar disc disorder     w/right radiculopathy   • Menopause    • Mild persistent asthma     resumed dulera twice daily   • Obstructive sleep apnea syndrome 9/17/2019   • Osteopenia     improved DEXA 2/2013   • 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   • Sebaceous cyst     subepidermal cyst   • Shoulder pain     likely radicular pain due to cervical DJD   • Spasm of back muscles     right neck and trapezius   • Spinal stenosis of lumbar region    • Strain of neck muscle     cervical  strain   • Temporomandibular joint disorder     h/o   • Vitamin D deficiency     on oral calcium/vitamin D supplement     Past Surgical History:   Procedure Laterality Date   • CHOLECYSTECTOMY     • COLONOSCOPY  2015    normal   • COLONOSCOPY N/A 2020    Procedure: COLONOSCOPY;  Surgeon: Devin Rene MD;  Location: [facility] ENDOSCOPY;  Service: Gastroenterology;  Laterality: N/A;   • ENDOSCOPY AND COLONOSCOPY  2010   • HYSTERECTOMY     • OOPHORECTOMY     • TONSILLECTOMY     • VENOUS ACCESS DEVICE (PORT) INSERTION Left 10/8/2020    Procedure: INSERTION VENOUS ACCESS DEVICE (MEDIPORT)         (c-arm#1);  Surgeon: Duke Alexandra MD;  Location: [facility] OR;  Service: General;  Laterality: Left;     Family History   Problem Relation Age of Onset   • Colon cancer Mother    • Heart attack Father    • Aneurysm Brother    • No Known Problems Son    • Alzheimer's disease Maternal Grandmother    • Heart attack Maternal Grandfather    • Alzheimer's disease Paternal Grandmother    • No Known Problems Son      OB History        2    Para   2    Term   2            AB        Living           SAB        TAB        Ectopic        Molar        Multiple        Live Births                  Prior to Admission medications    Medication Sig Start Date End Date Taking? Authorizing Provider   albuterol sulfate HFA (PROAIR HFA) 108 (90 Base) MCG/ACT inhaler Inhale 2 puffs 4 (Four) Times a Day As Needed for Wheezing. 3/25/19  Yes Darian Soliman MD   amLODIPine (NORVASC) 5 MG tablet Take 5 mg by mouth Every Night.   Yes Shivam Dolan MD   aspirin 81 MG EC tablet Take 81 mg by mouth Every Night.   Yes Shivam Dolan MD   atorvastatin (LIPITOR) 20 MG tablet Take 20 mg by mouth Every Night.   Yes ProviderShivam MD   Blood Glucose Monitoring Suppl (ONE TOUCH ULTRA 2) W/DEVICE kit    Yes Shivam Dolan MD   Calcium Carbonate-Vitamin D 600-200 MG-UNIT tablet Take 1 tablet by mouth Daily.   Yes  Shivam Dolan MD   citalopram (CeleXA) 20 MG tablet Take 10 mg by mouth Every Other Day.   Yes Shivam Dolan MD   Dapagliflozin Propanediol (Farxiga) 10 MG tablet Take 10 mg by mouth Every Morning. 6/17/20  Yes Darian Soliman MD   dexamethasone (DECADRON) 4 MG tablet Take 2 tablets in the morning daily on Days 2, 3 & 4.  Take with food. 10/7/20  Yes Carlos Duong MD   dexamethasone (DECADRON) 4 MG tablet Take 2 tablets oral twice a day for 3 consecutive days beginning the day before chemotherapy and continue for 6 doses. 10/12/20  Yes Carlos Duong MD   docusate sodium (COLACE) 100 MG capsule Take 1 capsule by mouth 2 (Two) Times a Day As Needed for Constipation. 11/6/20  Yes Carlos Duong MD   ferrous sulfate 325 (65 FE) MG tablet Take 1 tablet by mouth Daily With Breakfast. 11/6/20  Yes Carlos Duong MD   fluticasone (FLONASE) 50 MCG/ACT nasal spray 2 sprays into each nostril As Needed for rhinitis.   Yes Shivam Dolan MD   folic acid (FOLVITE) 1 MG tablet Take 1 tablet by mouth Daily. 11/6/20  Yes Carlos Duong MD   glucose blood (ONE TOUCH ULTRA TEST) test strip 1 each by Other route Daily. Check 1-2times daily  E11.9  90 day supply  One Touch Verio 5/28/19  Yes Darian Soliman MD   HYDROcodone-acetaminophen (NORCO) 5-325 MG per tablet Take 1 tablet by mouth Every 4 (Four) Hours As Needed (Pain). 10/8/20  Yes Duke Alexandra MD   LANCETS MICRO THIN 33G misc Check once daily  E11.9  Trueplus 5/28/19  Yes Darian Soliman MD   latanoprost (XALATAN) 0.005 % ophthalmic solution Administer 1 drop to both eyes Every Night.   Yes Shivam Dolan MD   levothyroxine (SYNTHROID, LEVOTHROID) 50 MCG tablet TAKE 1 TABLET BY MOUTH DAILY 12/3/19  Yes Darian Soliman MD   Loperamide HCl (IMODIUM PO) Take  by mouth.   Yes Shivam Dolan MD   losartan-hydrochlorothiazide (HYZAAR) 100-12.5 MG per tablet TAKE 1 TABLET BY MOUTH DAILY 12/3/19  Yes Darian Soliman MD   metFORMIN (GLUCOPHAGE) 500 MG  "tablet Take 500 mg by mouth 2 (Two) Times a Day With Meals.   Yes Provider, MD Shivam   mometasone-formoterol (DULERA 200) 200-5 MCG/ACT inhaler Inhale 2 puffs 2 (Two) Times a Day.   Yes Provider, MD Shivam   montelukast (SINGULAIR) 10 MG tablet Take 10 mg by mouth Every Night.   Yes Provider, MD Shivam   ondansetron (ZOFRAN) 4 MG tablet Take 1 tablet by mouth 4 (Four) Times a Day As Needed for Nausea or Vomiting. 10/7/20  Yes Carlos Duong MD     Allergies   Allergen Reactions   • Other Other (See Comments)     Coda-clear     Social History     Socioeconomic History   • Marital status:      Spouse name: Not on file   • Number of children: Not on file   • Years of education: Not on file   • Highest education level: Not on file   Tobacco Use   • Smoking status: Never Smoker   • Smokeless tobacco: Never Used   Substance and Sexual Activity   • Alcohol use: No   • Drug use: No   • Sexual activity: Defer       Review of Systems  Review of Systems   Constitutional: Negative for activity change, appetite change, chills, diaphoresis, fatigue, fever and unexpected weight change.   HENT: Negative for sore throat and trouble swallowing.    Respiratory: Negative for shortness of breath.    Gastrointestinal: Positive for diarrhea. Negative for abdominal distention, abdominal pain, anal bleeding, blood in stool, constipation, nausea, rectal pain and vomiting.   Musculoskeletal: Negative for arthralgias.   Skin: Negative for pallor.   Neurological: Negative for light-headedness.        /54 (BP Location: Left arm)   Pulse 76   Ht 157.5 cm (62\")   Wt 75 kg (165 lb 6.4 oz)   BMI 30.25 kg/m²     Objective    Physical Exam  Constitutional:       General: She is not in acute distress.     Appearance: Normal appearance. She is well-developed.   HENT:      Head: Normocephalic and atraumatic.   Neck:      Musculoskeletal: Normal range of motion and neck supple.      Thyroid: No thyromegaly. "   Cardiovascular:      Rate and Rhythm: Normal rate and regular rhythm.      Heart sounds: Normal heart sounds.   Pulmonary:      Effort: Pulmonary effort is normal.      Breath sounds: Normal breath sounds. No wheezing, rhonchi or rales.   Abdominal:      General: Bowel sounds are normal. There is no distension.      Palpations: Abdomen is soft. Abdomen is not rigid.      Tenderness: There is no abdominal tenderness. There is no guarding.      Hernia: No hernia is present.   Lymphadenopathy:      Cervical: No cervical adenopathy.   Skin:     General: Skin is warm and dry.      Coloration: Skin is not pale.      Findings: No rash.   Neurological:      Mental Status: She is alert and oriented to person, place, and time.   Psychiatric:         Speech: Speech normal.         Behavior: Behavior is cooperative.       Infusion on 11/30/2020   Component Date Value Ref Range Status   • Glucose 11/30/2020 235* 65 - 99 mg/dL Final   • BUN 11/30/2020 24* 8 - 23 mg/dL Final   • Creatinine 11/30/2020 1.10* 0.57 - 1.00 mg/dL Final   • Sodium 11/30/2020 138  136 - 145 mmol/L Final   • Potassium 11/30/2020 4.3  3.5 - 5.2 mmol/L Final   • Chloride 11/30/2020 103  98 - 107 mmol/L Final   • CO2 11/30/2020 19.0* 22.0 - 29.0 mmol/L Final   • Calcium 11/30/2020 10.0  8.6 - 10.5 mg/dL Final   • Total Protein 11/30/2020 7.3  6.0 - 8.5 g/dL Final   • Albumin 11/30/2020 4.10  3.50 - 5.20 g/dL Final   • ALT (SGPT) 11/30/2020 20  1 - 33 U/L Final   • AST (SGOT) 11/30/2020 21  1 - 32 U/L Final   • Alkaline Phosphatase 11/30/2020 68  39 - 117 U/L Final   • Total Bilirubin 11/30/2020 0.3  0.0 - 1.2 mg/dL Final   • eGFR Non African Amer 11/30/2020 49* >60 mL/min/1.73 Final   • Globulin 11/30/2020 3.2  gm/dL Final   • A/G Ratio 11/30/2020 1.3  g/dL Final   • BUN/Creatinine Ratio 11/30/2020 21.8  7.0 - 25.0 Final   • Anion Gap 11/30/2020 16.0* 5.0 - 15.0 mmol/L Final   • WBC 11/30/2020 19.69* 3.40 - 10.80 10*3/mm3 Final   • RBC 11/30/2020 4.20  3.77  - 5.28 10*6/mm3 Final   • Hemoglobin 11/30/2020 11.3* 12.0 - 15.9 g/dL Final   • Hematocrit 11/30/2020 34.7  34.0 - 46.6 % Final   • MCV 11/30/2020 82.6  79.0 - 97.0 fL Final   • MCH 11/30/2020 26.9  26.6 - 33.0 pg Final   • MCHC 11/30/2020 32.6  31.5 - 35.7 g/dL Final   • RDW 11/30/2020 17.6* 12.3 - 15.4 % Final   • RDW-SD 11/30/2020 51.5  37.0 - 54.0 fl Final   • MPV 11/30/2020 9.2  6.0 - 12.0 fL Final   • Platelets 11/30/2020 495* 140 - 450 10*3/mm3 Final   • Neutrophil % 11/30/2020 91.4* 42.7 - 76.0 % Final   • Lymphocyte % 11/30/2020 5.7* 19.6 - 45.3 % Final   • Monocyte % 11/30/2020 2.0* 5.0 - 12.0 % Final   • Eosinophil % 11/30/2020 0.0* 0.3 - 6.2 % Final   • Basophil % 11/30/2020 0.2  0.0 - 1.5 % Final   • Immature Grans % 11/30/2020 0.7* 0.0 - 0.5 % Final   • Neutrophils, Absolute 11/30/2020 17.99* 1.70 - 7.00 10*3/mm3 Final   • Lymphocytes, Absolute 11/30/2020 1.12  0.70 - 3.10 10*3/mm3 Final   • Monocytes, Absolute 11/30/2020 0.40  0.10 - 0.90 10*3/mm3 Final   • Eosinophils, Absolute 11/30/2020 0.00  0.00 - 0.40 10*3/mm3 Final   • Basophils, Absolute 11/30/2020 0.04  0.00 - 0.20 10*3/mm3 Final   • Immature Grans, Absolute 11/30/2020 0.14* 0.00 - 0.05 10*3/mm3 Final   • nRBC 11/30/2020 0.0  0.0 - 0.2 /100 WBC Final     Assessment/Plan      1. Hyperplastic rectal polyp    .       Orders placed during this encounter include:  No orders of the defined types were placed in this encounter.      * Surgery not found *    Review and/or summary of lab tests, radiology, procedures, medications. Review and summary of old records and obtaining of history. The risks and benefits of my recommendations, as well as other treatment options were discussed with the patient today. Questions were answered.    No orders of the defined types were placed in this encounter.      Follow-up: Return in about 3 years (around 12/1/2023) for Recheck.          This document has been electronically signed by YOHANA Proctor on December  1, 2020 14:28 CST             Results for orders placed or performed in visit on 11/30/20   CBC Auto Differential    Specimen: Blood   Result Value Ref Range    WBC 19.69 (H) 3.40 - 10.80 10*3/mm3    RBC 4.20 3.77 - 5.28 10*6/mm3    Hemoglobin 11.3 (L) 12.0 - 15.9 g/dL    Hematocrit 34.7 34.0 - 46.6 %    MCV 82.6 79.0 - 97.0 fL    MCH 26.9 26.6 - 33.0 pg    MCHC 32.6 31.5 - 35.7 g/dL    RDW 17.6 (H) 12.3 - 15.4 %    RDW-SD 51.5 37.0 - 54.0 fl    MPV 9.2 6.0 - 12.0 fL    Platelets 495 (H) 140 - 450 10*3/mm3    Neutrophil % 91.4 (H) 42.7 - 76.0 %    Lymphocyte % 5.7 (L) 19.6 - 45.3 %    Monocyte % 2.0 (L) 5.0 - 12.0 %    Eosinophil % 0.0 (L) 0.3 - 6.2 %    Basophil % 0.2 0.0 - 1.5 %    Immature Grans % 0.7 (H) 0.0 - 0.5 %    Neutrophils, Absolute 17.99 (H) 1.70 - 7.00 10*3/mm3    Lymphocytes, Absolute 1.12 0.70 - 3.10 10*3/mm3    Monocytes, Absolute 0.40 0.10 - 0.90 10*3/mm3    Eosinophils, Absolute 0.00 0.00 - 0.40 10*3/mm3    Basophils, Absolute 0.04 0.00 - 0.20 10*3/mm3    Immature Grans, Absolute 0.14 (H) 0.00 - 0.05 10*3/mm3    nRBC 0.0 0.0 - 0.2 /100 WBC   Comprehensive metabolic panel    Specimen: Blood   Result Value Ref Range    Glucose 235 (H) 65 - 99 mg/dL    BUN 24 (H) 8 - 23 mg/dL    Creatinine 1.10 (H) 0.57 - 1.00 mg/dL    Sodium 138 136 - 145 mmol/L    Potassium 4.3 3.5 - 5.2 mmol/L    Chloride 103 98 - 107 mmol/L    CO2 19.0 (L) 22.0 - 29.0 mmol/L    Calcium 10.0 8.6 - 10.5 mg/dL    Total Protein 7.3 6.0 - 8.5 g/dL    Albumin 4.10 3.50 - 5.20 g/dL    ALT (SGPT) 20 1 - 33 U/L    AST (SGOT) 21 1 - 32 U/L    Alkaline Phosphatase 68 39 - 117 U/L    Total Bilirubin 0.3 0.0 - 1.2 mg/dL    eGFR Non African Amer 49 (L) >60 mL/min/1.73    Globulin 3.2 gm/dL    A/G Ratio 1.3 g/dL    BUN/Creatinine Ratio 21.8 7.0 - 25.0    Anion Gap 16.0 (H) 5.0 - 15.0 mmol/L   Results for orders placed or performed during the hospital encounter of 11/19/20   Tissue Pathology Exam    Specimen: Large Intestine, Rectum; Polyp    Result Value Ref Range    Case Report       Surgical Pathology Report                         Case: [id number]                                  Authorizing Provider:  Devin Rene MD        Collected:           11/19/2020 02:15 PM          Ordering Location:     [facility]             Received:            11/20/2020 06:45 AM                                 [place] [facility]                                                     Pathologist:           Phoenix English MD                                                           Specimen:    Large Intestine, Rectum, rectum polyp                                                      Final Diagnosis       See Scanned Report       POC Glucose Once    Specimen: Blood   Result Value Ref Range    Glucose 104 70 - 130 mg/dL   Results for orders placed or performed in visit on 11/16/20   COVID [facility] Priority - Swab, Nasopharynx    Specimen: Nasopharynx; Swab   Result Value Ref Range    COVID [facility] PRIORITY Comment    COVID-19,[facility] ROUTINE, NP/OP SWAB IN TRANSPORT MEDIA OR ESWAB 72 HR TAT - Swab, Nasopharynx    Specimen: Nasopharynx; Swab   Result Value Ref Range    SARS-CoV-2, TALHA Not Detected Not Detected   Results for orders placed or performed in visit on 11/05/20   Iron and TIBC    Specimen: Blood   Result Value Ref Range    Iron 61 37 - 145 mcg/dL    Iron Saturation 16 (L) 20 - 50 %    Transferrin 262 200 - 360 mg/dL    TIBC 390 298 - 536 mcg/dL   Folate    Specimen: Blood   Result Value Ref Range    Folate 5.69 4.78 - 24.20 ng/mL   Ferritin    Specimen: Blood   Result Value Ref Range    Ferritin 151.10 (H) 13.00 - 150.00 ng/mL   Vitamin B12    Specimen: Blood   Result Value Ref Range    Vitamin B-12 >2,000 (H) 211 - 946 pg/mL   Results for orders placed or performed in visit on 11/05/20   Gold Top - SST   Result Value Ref Range    Extra Tube Hold for add-ons.    CBC Auto Differential    Specimen: Blood   Result Value Ref Range    WBC 19.85 (H) 3.40 -  10.80 10*3/mm3    RBC 4.15 3.77 - 5.28 10*6/mm3    Hemoglobin 11.0 (L) 12.0 - 15.9 g/dL    Hematocrit 34.6 34.0 - 46.6 %    MCV 83.4 79.0 - 97.0 fL    MCH 26.5 (L) 26.6 - 33.0 pg    MCHC 31.8 31.5 - 35.7 g/dL    RDW 15.8 (H) 12.3 - 15.4 %    RDW-SD 46.9 37.0 - 54.0 fl    MPV 9.1 6.0 - 12.0 fL    Platelets 850 (H) 140 - 450 10*3/mm3    Neutrophil % 91.1 (H) 42.7 - 76.0 %    Lymphocyte % 5.2 (L) 19.6 - 45.3 %    Monocyte % 2.3 (L) 5.0 - 12.0 %    Eosinophil % 0.1 (L) 0.3 - 6.2 %    Basophil % 0.3 0.0 - 1.5 %    Immature Grans % 1.0 (H) 0.0 - 0.5 %    Neutrophils, Absolute 18.12 (H) 1.70 - 7.00 10*3/mm3    Lymphocytes, Absolute 1.03 0.70 - 3.10 10*3/mm3    Monocytes, Absolute 0.45 0.10 - 0.90 10*3/mm3    Eosinophils, Absolute 0.01 0.00 - 0.40 10*3/mm3    Basophils, Absolute 0.05 0.00 - 0.20 10*3/mm3    Immature Grans, Absolute 0.19 (H) 0.00 - 0.05 10*3/mm3    nRBC 0.0 0.0 - 0.2 /100 WBC   Comprehensive metabolic panel    Specimen: Blood   Result Value Ref Range    Glucose 268 (H) 65 - 99 mg/dL    BUN 32 (H) 8 - 23 mg/dL    Creatinine 1.13 (H) 0.57 - 1.00 mg/dL    Sodium 136 136 - 145 mmol/L    Potassium 4.3 3.5 - 5.2 mmol/L    Chloride 102 98 - 107 mmol/L    CO2 20.0 (L) 22.0 - 29.0 mmol/L    Calcium 9.8 8.6 - 10.5 mg/dL    Total Protein 7.4 6.0 - 8.5 g/dL    Albumin 3.90 3.50 - 5.20 g/dL    ALT (SGPT) 43 (H) 1 - 33 U/L    AST (SGOT) 33 (H) 1 - 32 U/L    Alkaline Phosphatase 74 39 - 117 U/L    Total Bilirubin 0.2 0.0 - 1.2 mg/dL    eGFR Non African Amer 47 (L) >60 mL/min/1.73    Globulin 3.5 gm/dL    A/G Ratio 1.1 g/dL    BUN/Creatinine Ratio 28.3 (H) 7.0 - 25.0    Anion Gap 14.0 5.0 - 15.0 mmol/L   Results for orders placed or performed in visit on 10/12/20   [facility]   Result Value Ref Range    Extra Tube Hold for add-ons.      *Note: Due to a large number of results and/or encounters for the requested time period, some results have not been displayed. A complete set of results can be found in Results Review.

## 2020-12-01 NOTE — PATIENT INSTRUCTIONS
Colon Polyps    Polyps are tissue growths inside the body. Polyps can grow in many places, including the large intestine (colon). A polyp may be a round bump or a mushroom-shaped growth. You could have one polyp or several.  Most colon polyps are noncancerous (benign). However, some colon polyps can become cancerous over time. Finding and removing the polyps early can help prevent this.  What are the causes?  The exact cause of colon polyps is not known.  What increases the risk?  You are more likely to develop this condition if you:  · Have a family history of colon cancer or colon polyps.  · Are older than 50 or older than 45 if you are .  · Have inflammatory bowel disease, such as ulcerative colitis or Crohn's disease.  · Have certain hereditary conditions, such as:  ? Familial adenomatous polyposis.  ? Eng syndrome.  ? Turcot syndrome.  ? Peutz-Jeghers syndrome.  · Are overweight.  · Smoke cigarettes.  · Do not get enough exercise.  · Drink too much alcohol.  · Eat a diet that is high in fat and red meat and low in fiber.  · Had childhood cancer that was treated with abdominal radiation.  What are the signs or symptoms?  Most polyps do not cause symptoms.  If you have symptoms, they may include:  · Blood coming from your rectum when having a bowel movement.  · Blood in your stool. The stool may look dark red or black.  · Abdominal pain.  · A change in bowel habits, such as constipation or diarrhea.  How is this diagnosed?  This condition is diagnosed with a colonoscopy. This is a procedure in which a lighted, flexible scope is inserted into the anus and then passed into the colon to examine the area. Polyps are sometimes found when a colonoscopy is done as part of routine cancer screening tests.  How is this treated?  Treatment for this condition involves removing any polyps that are found. Most polyps can be removed during a colonoscopy. Those polyps will then be tested for cancer. Additional  treatment may be needed depending on the results of testing.  Follow these instructions at home:  Lifestyle  · Maintain a healthy weight, or lose weight if recommended by your health care provider.  · Exercise every day or as told by your health care provider.  · Do not use any products that contain nicotine or tobacco, such as cigarettes and e-cigarettes. If you need help quitting, ask your health care provider.  · If you drink alcohol, limit how much you have:  ? 0-1 drink a day for women.  ? 0-2 drinks a day for men.  · Be aware of how much alcohol is in your drink. In the [place] one drink equals one 12 oz bottle of beer (355 mL), one 5 oz glass of wine (148 mL), or one 1½ oz shot of hard liquor (44 mL).  Eating and drinking    · Eat foods that are high in fiber, such as fruits, vegetables, and whole grains.  · Eat foods that are high in calcium and vitamin D, such as milk, cheese, yogurt, eggs, liver, fish, and broccoli.  · Limit foods that are high in fat, such as fried foods and desserts.  · Limit the amount of red meat and processed meat you eat, such as hot dogs, sausage, juarez, and lunch meats.  General instructions  · Keep all follow-up visits as told by your health care provider. This is important.  ? This includes having regularly scheduled colonoscopies.  ? Talk to your health care provider about when you need a colonoscopy.  Contact a health care provider if:  · You have new or worsening bleeding during a bowel movement.  · You have new or increased blood in your stool.  · You have a change in bowel habits.  · You lose weight for no known reason.  Summary  · Polyps are tissue growths inside the body. Polyps can grow in many places, including the colon.  · Most colon polyps are noncancerous (benign), but some can become cancerous over time.  · This condition is diagnosed with a colonoscopy.  · Treatment for this condition involves removing any polyps that are found. Most polyps can be removed during a  colonoscopy.  This information is not intended to replace advice given to you by your health care provider. Make sure you discuss any questions you have with your health care provider.  Document Revised: 04/04/2019 Document Reviewed: 04/04/2019  Elsevier Patient Education © 2020 [employer]

## 2020-12-12 NOTE — PAT
Chlorhexidine scrub given with instruction sheet. Instructions reviewed in PAT, understanding verbalized.    Patient states she takes Aspirin for prevention only. Instructed patient to stop Aspirin today 10/5 for procedure 10/8.    Dr. Oliveros here to look at EKG. No orders received.  
Abdomen soft, non-tender and non-distended, no rebound, no guarding and no masses. no hepatosplenomegaly.

## 2020-12-16 ENCOUNTER — TELEPHONE (OUTPATIENT)
Dept: ONCOLOGY | Facility: CLINIC | Age: 75
End: 2020-12-16

## 2020-12-16 NOTE — TELEPHONE ENCOUNTER
PT CALLING TO TALK TO SOMEONE ABOUT UP COMING CHEMO TREATMENT.     PT RECENTLY HAS HAD ANOTHER HEALTH ISSUE AND  THAT FIXED HER PERFORATED COLON WARNED NOT TO DO ANYMORE CHEMO TREATMENTS FOR AWHILE.    [facility] C/B : [phone]

## 2020-12-17 RX ORDER — AMLODIPINE BESYLATE 5 MG/1
5 TABLET ORAL NIGHTLY
Qty: 30 TABLET | Refills: 4 | OUTPATIENT
Start: 2020-12-17

## 2020-12-17 RX ORDER — LEVOTHYROXINE SODIUM 0.05 MG/1
50 TABLET ORAL DAILY
Qty: 90 TABLET | Refills: 3 | OUTPATIENT
Start: 2020-12-17

## 2020-12-17 RX ORDER — LOSARTAN POTASSIUM AND HYDROCHLOROTHIAZIDE 12.5; 1 MG/1; MG/1
1 TABLET ORAL DAILY
Qty: 90 TABLET | Refills: 3 | OUTPATIENT
Start: 2020-12-17

## 2020-12-17 RX ORDER — MONTELUKAST SODIUM 10 MG/1
10 TABLET ORAL NIGHTLY
Qty: 30 TABLET | Refills: 2 | OUTPATIENT
Start: 2020-12-17

## 2020-12-17 NOTE — TELEPHONE ENCOUNTER
I can see her tomorrow with repeat CBC and CMP.  Also try to get some records from her recent surgery and hospitalization in [place].  Thank you

## 2020-12-18 ENCOUNTER — OFFICE VISIT (OUTPATIENT)
Dept: FAMILY MEDICINE CLINIC | Facility: CLINIC | Age: 75
End: 2020-12-18

## 2020-12-18 VITALS
WEIGHT: 160 LBS | BODY MASS INDEX: 29.44 KG/M2 | HEART RATE: 102 BPM | TEMPERATURE: 98 F | HEIGHT: 62 IN | DIASTOLIC BLOOD PRESSURE: 74 MMHG | SYSTOLIC BLOOD PRESSURE: 125 MMHG

## 2020-12-18 DIAGNOSIS — E11.9 TYPE 2 DIABETES MELLITUS WITHOUT COMPLICATION, WITHOUT LONG-TERM CURRENT USE OF INSULIN (HCC): Chronic | ICD-10-CM

## 2020-12-18 DIAGNOSIS — D84.821 IMMUNOSUPPRESSED DUE TO CHEMOTHERAPY (HCC): ICD-10-CM

## 2020-12-18 DIAGNOSIS — K57.20 PERFORATED DIVERTICULUM OF LARGE INTESTINE: ICD-10-CM

## 2020-12-18 DIAGNOSIS — Z09 HOSPITAL DISCHARGE FOLLOW-UP: Primary | ICD-10-CM

## 2020-12-18 DIAGNOSIS — Z79.899 IMMUNOSUPPRESSED DUE TO CHEMOTHERAPY (HCC): ICD-10-CM

## 2020-12-18 DIAGNOSIS — K65.9 PERITONITIS (HCC): ICD-10-CM

## 2020-12-18 DIAGNOSIS — C50.911 INVASIVE DUCTAL CARCINOMA OF RIGHT BREAST (HCC): ICD-10-CM

## 2020-12-18 DIAGNOSIS — T45.1X5A IMMUNOSUPPRESSED DUE TO CHEMOTHERAPY (HCC): ICD-10-CM

## 2020-12-18 PROCEDURE — 99443 PR PHYS/QHP TELEPHONE EVALUATION 21-30 MIN: CPT | Performed by: INTERNAL MEDICINE

## 2020-12-18 RX ORDER — DAPAGLIFLOZIN 10 MG/1
10 TABLET, FILM COATED ORAL EVERY MORNING
Qty: 90 TABLET | Refills: 1 | Status: SHIPPED | OUTPATIENT
Start: 2020-12-18 | End: 2021-08-17 | Stop reason: SDUPTHER

## 2020-12-18 NOTE — PROGRESS NOTES
Subjective      You have chosen to receive care through a telephone visit. Do you consent to use a telephone visit for your medical care today? Yes    Total visit time: 21 minutes.        History of Present Illness     Gale Cabral is a 75 y.o. female who receives care today via telephone visit for hospital follow up.  Current outpatient and discharge medications have been reconciled for the patient.  She has one remaining chemotherapy treatment to address breast cancer, although, her surgeon has recommended delaying until she has healed from most recent colostomy and complications as noted below.     She had recent colonoscopy by Dr. Peters 11/19/2020 and subsequently had onset of abdominal pain, for which she presented to ER and was admitted to [facility] in [place], [place] 12/04/2020.   CT of the adomen revealed free air and free fluid consistent with perforated viscus, suspicious for perforated diverticulitis.  Patient underwent laparoscopic sigmoidectomy and colectomy with diverting colostomy, which she hopes to have reversed in 6-8 weeks.  She was discharged from hospital on 12/12/2020.  She has been using Tylenol for the pain.  She had onset of shortness of breath and left-sided chest pain yesterday.  She has #10 hydrocodone prescribed by Dr. Alexandra after her port placement in October, which she can use if needed for pain. She is receiving Home Health care for the next month.           Diabetes has been at goal since discharge with blood sugar range 120-150.    Review of Systems   Constitutional: Negative for chills, fatigue and fever.   HENT: Negative for congestion, ear pain, postnasal drip, sinus pressure and sore throat.    Respiratory: Negative for cough, shortness of breath and wheezing.    Cardiovascular: Negative for chest pain, palpitations and leg swelling.   Gastrointestinal: Negative for abdominal pain, blood in stool, constipation, diarrhea, nausea and vomiting.   Endocrine:  "Negative for cold intolerance, heat intolerance, polydipsia and polyuria.   Genitourinary: Negative for dysuria, frequency, hematuria and urgency.   Skin: Negative for rash.   Neurological: Negative for syncope and weakness.        Objective     Visit Vitals  /74 Comment: glucose 120  30 minutes after meal   Pulse 102   Temp 98 °F (36.7 °C) (Oral)   Ht 157.5 cm (62\")   Wt 72.6 kg (160 lb)   Breastfeeding No   BMI 29.26 kg/m²     Physical Exam    Future Appointments   Date Time Provider Department Center   12/31/2020 11:00 AM Sharon Holloway APRN [facility] ONC [facility]   1/11/2021 11:30 AM Maral Fish APRN [facility] SM [facility] None   2/26/2021  3:30 PM Darian Soliman MD [facility] PC P[facility]       Assessment/Plan      Current outpatient and discharge medications have been reconciled for the patient.  Recommended patient continue to rest and heal.  She has Home Health following her for the next month.  She can continue using Tylenol for pain and also has the #10 [employer] 5/325 at home to use for more severe pain.  Continue oncology appointments and treatment to address breast cancer.  They are going to decide how long to hold chemotherapy to allow time for her to heal from surgery.    Continue current diabetic management which is stable.    Current outpatient and discharge medications have been reconciled for the patient.  Reviewed by: Darian Soliman MD      We will see patient back in two months for follow up on chronic medical issues with fasting labs one week prior or sooner if needed.     Scribed for Dr. Soliman by Lydia Davis [facility].     Diagnoses and all orders for this visit:    Hospital discharge follow-up    Peritonitis (CMS/HCC)    Perforated diverticulum of large intestine    Invasive ductal carcinoma of right breast (CMS/HCC)    Immunosuppressed due to chemotherapy    Type 2 diabetes mellitus without complication, without long-term current use of insulin (CMS/HCC)    Other orders  -     Dapagliflozin Propanediol " (Farxiga) 10 MG tablet; Take 10 mg by mouth Every Morning.        Infusion on 11/30/2020   Component Date Value Ref Range Status   • Glucose 11/30/2020 235* 65 - 99 mg/dL Final   • BUN 11/30/2020 24* 8 - 23 mg/dL Final   • Creatinine 11/30/2020 1.10* 0.57 - 1.00 mg/dL Final   • Sodium 11/30/2020 138  136 - 145 mmol/L Final   • Potassium 11/30/2020 4.3  3.5 - 5.2 mmol/L Final   • Chloride 11/30/2020 103  98 - 107 mmol/L Final   • CO2 11/30/2020 19.0* 22.0 - 29.0 mmol/L Final   • Calcium 11/30/2020 10.0  8.6 - 10.5 mg/dL Final   • Total Protein 11/30/2020 7.3  6.0 - 8.5 g/dL Final   • Albumin 11/30/2020 4.10  3.50 - 5.20 g/dL Final   • ALT (SGPT) 11/30/2020 20  1 - 33 U/L Final   • AST (SGOT) 11/30/2020 21  1 - 32 U/L Final   • Alkaline Phosphatase 11/30/2020 68  39 - 117 U/L Final   • Total Bilirubin 11/30/2020 0.3  0.0 - 1.2 mg/dL Final   • eGFR Non African Amer 11/30/2020 49* >60 mL/min/1.73 Final   • Globulin 11/30/2020 3.2  gm/dL Final   • A/G Ratio 11/30/2020 1.3  g/dL Final   • BUN/Creatinine Ratio 11/30/2020 21.8  7.0 - 25.0 Final   • Anion Gap 11/30/2020 16.0* 5.0 - 15.0 mmol/L Final   • WBC 11/30/2020 19.69* 3.40 - 10.80 10*3/mm3 Final   • RBC 11/30/2020 4.20  3.77 - 5.28 10*6/mm3 Final   • Hemoglobin 11/30/2020 11.3* 12.0 - 15.9 g/dL Final   • Hematocrit 11/30/2020 34.7  34.0 - 46.6 % Final   • MCV 11/30/2020 82.6  79.0 - 97.0 fL Final   • MCH 11/30/2020 26.9  26.6 - 33.0 pg Final   • MCHC 11/30/2020 32.6  31.5 - 35.7 g/dL Final   • RDW 11/30/2020 17.6* 12.3 - 15.4 % Final   • RDW-SD 11/30/2020 51.5  37.0 - 54.0 fl Final   • MPV 11/30/2020 9.2  6.0 - 12.0 fL Final   • Platelets 11/30/2020 495* 140 - 450 10*3/mm3 Final   • Neutrophil % 11/30/2020 91.4* 42.7 - 76.0 % Final   • Lymphocyte % 11/30/2020 5.7* 19.6 - 45.3 % Final   • Monocyte % 11/30/2020 2.0* 5.0 - 12.0 % Final   • Eosinophil % 11/30/2020 0.0* 0.3 - 6.2 % Final   • Basophil % 11/30/2020 0.2  0.0 - 1.5 % Final   • Immature Grans % 11/30/2020 0.7*  0.0 - 0.5 % Final   • Neutrophils, Absolute 11/30/2020 17.99* 1.70 - 7.00 10*3/mm3 Final   • Lymphocytes, Absolute 11/30/2020 1.12  0.70 - 3.10 10*3/mm3 Final   • Monocytes, Absolute 11/30/2020 0.40  0.10 - 0.90 10*3/mm3 Final   • Eosinophils, Absolute 11/30/2020 0.00  0.00 - 0.40 10*3/mm3 Final   • Basophils, Absolute 11/30/2020 0.04  0.00 - 0.20 10*3/mm3 Final   • Immature Grans, Absolute 11/30/2020 0.14* 0.00 - 0.05 10*3/mm3 Final   • nRBC 11/30/2020 0.0  0.0 - 0.2 /100 WBC Final   ]

## 2020-12-21 ENCOUNTER — APPOINTMENT (OUTPATIENT)
Dept: ONCOLOGY | Facility: HOSPITAL | Age: 75
End: 2020-12-21

## 2020-12-30 ENCOUNTER — TELEPHONE (OUTPATIENT)
Dept: ONCOLOGY | Facility: CLINIC | Age: 75
End: 2020-12-30

## 2020-12-30 NOTE — TELEPHONE ENCOUNTER
PATIENT CALLING    PATIENT HAS AN APPT TOMORROW AND WANTS TO KNOW IF SHE IS SUPPOSE TO HAVE LAB WORK DONE ALSO, WOULD LIKE HER  TABITHA TO ACCOMPANY HER TO HER APPT, SHE HAS BEEN REALLY WEAK AND WOULD LIKE HIM THERE, ALSO WANTS TO KNOW IF DR. HOLDER HER SURGEON WILL BE IN THE LOOP OF HER TREATMENT PLANS, PLEASE ADVISE?    PT CALL BACK # [phone]

## 2020-12-31 ENCOUNTER — OFFICE VISIT (OUTPATIENT)
Dept: ONCOLOGY | Facility: CLINIC | Age: 75
End: 2020-12-31

## 2020-12-31 VITALS
HEIGHT: 62 IN | HEART RATE: 101 BPM | TEMPERATURE: 97.5 F | SYSTOLIC BLOOD PRESSURE: 112 MMHG | WEIGHT: 148 LBS | BODY MASS INDEX: 27.23 KG/M2 | RESPIRATION RATE: 16 BRPM | DIASTOLIC BLOOD PRESSURE: 55 MMHG

## 2020-12-31 DIAGNOSIS — C50.911 INVASIVE DUCTAL CARCINOMA OF RIGHT BREAST (HCC): ICD-10-CM

## 2020-12-31 PROCEDURE — 99213 OFFICE O/P EST LOW 20 MIN: CPT | Performed by: NURSE PRACTITIONER

## 2020-12-31 PROCEDURE — G0463 HOSPITAL OUTPT CLINIC VISIT: HCPCS | Performed by: NURSE PRACTITIONER

## 2021-01-04 ENCOUNTER — OFFICE VISIT (OUTPATIENT)
Dept: SURGERY | Facility: CLINIC | Age: 76
End: 2021-01-04

## 2021-01-04 ENCOUNTER — APPOINTMENT (OUTPATIENT)
Dept: PREADMISSION TESTING | Facility: HOSPITAL | Age: 76
End: 2021-01-04

## 2021-01-04 VITALS
TEMPERATURE: 98.1 F | BODY MASS INDEX: 27.31 KG/M2 | DIASTOLIC BLOOD PRESSURE: 84 MMHG | HEART RATE: 104 BPM | WEIGHT: 148.4 LBS | HEIGHT: 62 IN | SYSTOLIC BLOOD PRESSURE: 142 MMHG

## 2021-01-04 VITALS — OXYGEN SATURATION: 93 % | HEART RATE: 94 BPM

## 2021-01-04 DIAGNOSIS — Z17.1 MALIGNANT NEOPLASM OF CENTRAL PORTION OF RIGHT BREAST IN FEMALE, ESTROGEN RECEPTOR NEGATIVE (HCC): Primary | ICD-10-CM

## 2021-01-04 DIAGNOSIS — C50.111 MALIGNANT NEOPLASM OF CENTRAL PORTION OF RIGHT BREAST IN FEMALE, ESTROGEN RECEPTOR NEGATIVE (HCC): Primary | ICD-10-CM

## 2021-01-04 DIAGNOSIS — Z17.1 MALIGNANT NEOPLASM OF CENTRAL PORTION OF RIGHT BREAST IN FEMALE, ESTROGEN RECEPTOR NEGATIVE (HCC): ICD-10-CM

## 2021-01-04 DIAGNOSIS — C50.111 MALIGNANT NEOPLASM OF CENTRAL PORTION OF RIGHT BREAST IN FEMALE, ESTROGEN RECEPTOR NEGATIVE (HCC): ICD-10-CM

## 2021-01-04 LAB
ALBUMIN SERPL-MCNC: 3.3 G/DL (ref 3.5–5.2)
ALBUMIN/GLOB SERPL: 0.8 G/DL
ALP SERPL-CCNC: 146 U/L (ref 39–117)
ALT SERPL W P-5'-P-CCNC: 37 U/L (ref 1–33)
ANION GAP SERPL CALCULATED.3IONS-SCNC: 13 MMOL/L (ref 5–15)
ANISOCYTOSIS BLD QL: NORMAL
AST SERPL-CCNC: 38 U/L (ref 1–32)
BASOPHILS # BLD AUTO: 0.1 10*3/MM3 (ref 0–0.2)
BASOPHILS NFR BLD AUTO: 0.7 % (ref 0–1.5)
BILIRUB SERPL-MCNC: 0.3 MG/DL (ref 0–1.2)
BUN SERPL-MCNC: 17 MG/DL (ref 8–23)
BUN/CREAT SERPL: 14.5 (ref 7–25)
CALCIUM SPEC-SCNC: 9.3 MG/DL (ref 8.6–10.5)
CHLORIDE SERPL-SCNC: 100 MMOL/L (ref 98–107)
CO2 SERPL-SCNC: 24 MMOL/L (ref 22–29)
CREAT SERPL-MCNC: 1.17 MG/DL (ref 0.57–1)
DEPRECATED RDW RBC AUTO: 53.2 FL (ref 37–54)
EOSINOPHIL # BLD AUTO: 0.21 10*3/MM3 (ref 0–0.4)
EOSINOPHIL NFR BLD AUTO: 1.4 % (ref 0.3–6.2)
ERYTHROCYTE [DISTWIDTH] IN BLOOD BY AUTOMATED COUNT: 17.5 % (ref 12.3–15.4)
GFR SERPL CREATININE-BSD FRML MDRD: 45 ML/MIN/1.73
GLOBULIN UR ELPH-MCNC: 4.1 GM/DL
GLUCOSE SERPL-MCNC: 112 MG/DL (ref 65–99)
HCT VFR BLD AUTO: 30.4 % (ref 34–46.6)
HGB BLD-MCNC: 9.2 G/DL (ref 12–15.9)
HYPOCHROMIA BLD QL: NORMAL
IMM GRANULOCYTES # BLD AUTO: 0.07 10*3/MM3 (ref 0–0.05)
IMM GRANULOCYTES NFR BLD AUTO: 0.5 % (ref 0–0.5)
LYMPHOCYTES # BLD AUTO: 1.36 10*3/MM3 (ref 0.7–3.1)
LYMPHOCYTES NFR BLD AUTO: 9 % (ref 19.6–45.3)
MCH RBC QN AUTO: 25.2 PG (ref 26.6–33)
MCHC RBC AUTO-ENTMCNC: 30.3 G/DL (ref 31.5–35.7)
MCV RBC AUTO: 83.3 FL (ref 79–97)
MONOCYTES # BLD AUTO: 0.95 10*3/MM3 (ref 0.1–0.9)
MONOCYTES NFR BLD AUTO: 6.3 % (ref 5–12)
NEUTROPHILS NFR BLD AUTO: 12.49 10*3/MM3 (ref 1.7–7)
NEUTROPHILS NFR BLD AUTO: 82.1 % (ref 42.7–76)
NRBC BLD AUTO-RTO: 0 /100 WBC (ref 0–0.2)
PLATELET # BLD AUTO: 1032 10*3/MM3 (ref 140–450)
PMV BLD AUTO: 8.7 FL (ref 6–12)
POTASSIUM SERPL-SCNC: 4.1 MMOL/L (ref 3.5–5.2)
PROT SERPL-MCNC: 7.4 G/DL (ref 6–8.5)
RBC # BLD AUTO: 3.65 10*6/MM3 (ref 3.77–5.28)
SMALL PLATELETS BLD QL SMEAR: NORMAL
SODIUM SERPL-SCNC: 137 MMOL/L (ref 136–145)
WBC # BLD AUTO: 15.18 10*3/MM3 (ref 3.4–10.8)
WBC MORPH BLD: NORMAL

## 2021-01-04 PROCEDURE — 36415 COLL VENOUS BLD VENIPUNCTURE: CPT

## 2021-01-04 PROCEDURE — 99213 OFFICE O/P EST LOW 20 MIN: CPT | Performed by: SURGERY

## 2021-01-04 PROCEDURE — 85025 COMPLETE CBC W/AUTO DIFF WBC: CPT

## 2021-01-04 PROCEDURE — 85007 BL SMEAR W/DIFF WBC COUNT: CPT

## 2021-01-04 PROCEDURE — 80053 COMPREHEN METABOLIC PANEL: CPT

## 2021-01-04 RX ORDER — BUPIVACAINE HCL/0.9 % NACL/PF 0.1 %
2 PLASTIC BAG, INJECTION (ML) EPIDURAL ONCE
Status: CANCELLED | OUTPATIENT
Start: 2021-01-11 | End: 2021-01-04

## 2021-01-04 RX ORDER — SODIUM CHLORIDE 0.9 % (FLUSH) 0.9 %
1-10 SYRINGE (ML) INJECTION AS NEEDED
Status: CANCELLED | OUTPATIENT
Start: 2021-01-11

## 2021-01-04 RX ORDER — SODIUM CHLORIDE 0.9 % (FLUSH) 0.9 %
3 SYRINGE (ML) INJECTION EVERY 12 HOURS SCHEDULED
Status: CANCELLED | OUTPATIENT
Start: 2021-01-11

## 2021-01-04 RX ORDER — SODIUM CHLORIDE, SODIUM LACTATE, POTASSIUM CHLORIDE, CALCIUM CHLORIDE 600; 310; 30; 20 MG/100ML; MG/100ML; MG/100ML; MG/100ML
100 INJECTION, SOLUTION INTRAVENOUS CONTINUOUS
Status: CANCELLED | OUTPATIENT
Start: 2021-01-11

## 2021-01-04 NOTE — PATIENT INSTRUCTIONS
"BMI for Adults  What is BMI?  Body mass index (BMI) is a number that is calculated from a person's weight and height. BMI can help estimate how much of a person's weight is composed of fat. BMI does not measure body fat directly. Rather, it is an alternative to procedures that directly measure body fat, which can be difficult and expensive.  BMI can help identify people who may be at higher risk for certain medical problems.  What are BMI measurements used for?  BMI is used as a screening tool to identify possible weight problems. It helps determine whether a person is obese, overweight, a healthy weight, or underweight.  BMI is useful for:  · Identifying a weight problem that may be related to a medical condition or may increase the risk for medical problems.  · Promoting changes, such as changes in diet and exercise, to help reach a healthy weight. BMI screening can be repeated to see if these changes are working.  How is BMI calculated?  BMI involves measuring your weight in relation to your height. Both height and weight are measured, and the BMI is calculated from those numbers. This can be done either in English ([place] or metric measurements. Note that charts and online BMI calculators are available to help you find your BMI quickly and easily without having to do these calculations yourself.  To calculate your BMI in English (U.S[place] measurements:    1. Measure your weight in pounds (lb).  2. Multiply the number of pounds by 703.  ? For example, for a person who weighs 180 lb, multiply that number by 703, which equals 126,540.  3. Measure your height in inches. Then multiply that number by itself to get a measurement called \"inches squared.\"  ? For example, for a person who is 70 inches tall, the \"inches squared\" measurement is 70 inches x 70 inches, which equals 4,900 inches squared.  4. Divide the total from step 2 (number of lb x 703) by the total from step 3 (inches squared): 126,540 ÷ 4,900 = 25.8. This is " "your BMI.  To calculate your BMI in metric measurements:  1. Measure your weight in kilograms (kg).  2. Measure your height in meters (m). Then multiply that number by itself to get a measurement called \"meters squared.\"  ? For example, for a person who is 1.75 m tall, the \"meters squared\" measurement is 1.75 m x 1.75 m, which is equal to 3.1 meters squared.  3. Divide the number of kilograms (your weight) by the meters squared number. In this example: 70 ÷ 3.1 = 22.6. This is your BMI.  What do the results mean?  BMI charts are used to identify whether you are underweight, normal weight, overweight, or obese. The following guidelines will be used:  · Underweight: BMI less than 18.5.  · Normal weight: BMI between 18.5 and 24.9.  · Overweight: BMI between 25 and 29.9.  · Obese: BMI of 30 or above.  Keep these notes in mind:  · Weight includes both fat and muscle, so someone with a muscular build, such as an athlete, may have a BMI that is higher than 24.9. In cases like these, BMI is not an accurate measure of body fat.  · To determine if excess body fat is the cause of a BMI of 25 or higher, further assessments may need to be done by a health care provider.  · BMI is usually interpreted in the same way for men and women.  Where to find more information  For more information about BMI, including tools to quickly calculate your BMI, go to these websites:  · Centers for Disease Control and Prevention: www.cdc.gov  · American Heart Association: www.heart.org  · National Heart, Lung, and Blood [place]: www.nhlbi.nih.gov  Summary  · Body mass index (BMI) is a number that is calculated from a person's weight and height.  · BMI may help estimate how much of a person's weight is composed of fat. BMI can help identify those who may be at higher risk for certain medical problems.  · BMI can be measured using English measurements or metric measurements.  · BMI charts are used to identify whether you are underweight, normal " weight, overweight, or obese.  This information is not intended to replace advice given to you by your health care provider. Make sure you discuss any questions you have with your health care provider.  Document Revised: 09/09/2020 Document Reviewed: 07/17/2020  Elsevier Patient Education © 2020 [employer]

## 2021-01-04 NOTE — DISCHARGE INSTRUCTIONS
[facility]  Pre-op Information and Guidelines    You will be called after 2 p.m. the day before your surgery (Friday for Monday surgery) and notified of your time for arrival and approximate surgery time.  If you have not received a call by 4P.M., please contact Same Day Surgery at [phone] of if outside [facility] call [phone].    Please Follow these Important Safety Guidelines:    • The morning of your procedure, take only the medications listed below with   A sip of water:_____________________________________________       ______________________________________________    • DO NOT eat or drink anything after 12:00 midnight the night before surgery  Specific instructions concerning drinking clear liquids will be discussed during  the pre-surgery instruction call the day before your surgery.    • If you take a blood thinner (ex. Plavix, Coumadin, aspirin), ask your doctor when to stop it before surgery  STOP DATE: _________________    • Only 2 visitors are allowed in patient rooms at a time  Your visitors will be asked to wait in the lobby until the admission process is complete with the exception of a parent with a child and patients in need of special assistance.    • YOU CANNOT DRIVE YOURSELF HOME  You must be accompanied by someone who will be responsible for driving you home after surgery and for your care at home.    • DO NOT chew gum, use breath mints, hard candy, or smoke the day of surgery  • DO NOT drink alcohol for at least 24 hours before your surgery  • DO NOT wear any jewelry and remove all body piercing before coming to the hospital  • DO NOT wear make-up to the hospital  • If you are having surgery on an extremity (arm/leg/foot) remove nail polish/artificial nails on the surgical side  • Clothing, glasses, contacts, dentures, and hairpieces must be removed before surgery  • Bathe the night before or the morning of your surgery and do not use powders/lotions on  skin.

## 2021-01-04 NOTE — PROGRESS NOTES
Chief Complaint   Patient presents with   • Follow-up     Discuss Surgery        HPI  Gale is 75 years old and underwent core needle breast biopsy right side in October demonstrated a triple negative breast cancer.  Initial size of the cancer was small, but it was elected to treat her with neoadjuvant chemotherapy prior to lumpectomy.  In December, however, prior to her last chemotherapy treatment, she developed perforated diverticulitis that was treated in [place], [place] with a laparoscopic Hurt's operation.  She states that she has been doing well since that time and presents today for possible consideration of colostomy closure for further treatment of breast cancer.  In speaking with Dr. Patterson, it is recommended that we proceed with lumpectomy and sentinel lymph node biopsy.    Past Medical History:   Diagnosis Date   • Abnormal mammogram of right breast    • Acquired hypothyroidism     started synthroid 9/2015   • Allergic rhinitis, seasonal    • Breast cancer (CMS/HCC)    • Cervicalgia     right upper extremity radiculopathy   • Diabetes mellitus (CMS/HCC)    • Encounter for screening for malignant neoplasm of colon    • Essential hypertension    • Glaucoma    • Health maintenance examination     individual health exam   • Hypercholesterolemia    • Hyperglycemia     steroid-induced hyperglycemia in diabetic patient   • Lumbar disc disorder     w/right radiculopathy   • Menopause    • Mild persistent asthma     resumed dulera twice daily   • Obstructive sleep apnea syndrome 9/17/2019   • Osteopenia     improved DEXA 2/2013   • 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   • Rupture of colon (CMS/HCC)    • Sebaceous cyst     subepidermal cyst   • Shoulder pain     likely radicular pain due to cervical DJD   • Spasm of back muscles     right neck and trapezius   • Spinal stenosis of lumbar region    • Strain of neck muscle     cervical strain   • Temporomandibular joint disorder     h/o   • Vitamin D  deficiency     on oral calcium/vitamin D supplement       Past Surgical History:   Procedure Laterality Date   • CHOLECYSTECTOMY     • COLONOSCOPY  12/17/2015    normal   • COLONOSCOPY N/A 11/19/2020    Procedure: COLONOSCOPY;  Surgeon: Devin Rene MD;  Location: [facility] ENDOSCOPY;  Service: Gastroenterology;  Laterality: N/A;   • COLOSTOMY     • ENDOSCOPY AND COLONOSCOPY  08/2010   • HYSTERECTOMY     • OOPHORECTOMY     • TONSILLECTOMY     • VENOUS ACCESS DEVICE (PORT) INSERTION Left 10/8/2020    Procedure: INSERTION VENOUS ACCESS DEVICE (MEDIPORT)         (c-arm#1);  Surgeon: Duke Alexandra MD;  Location: [facility] OR;  Service: General;  Laterality: Left;         Current Outpatient Medications:   •  albuterol sulfate HFA (PROAIR HFA) 108 (90 Base) MCG/ACT inhaler, Inhale 2 puffs 4 (Four) Times a Day As Needed for Wheezing., Disp: 8.5 inhaler, Rfl: 6  •  amLODIPine (NORVASC) 5 MG tablet, Take 5 mg by mouth Every Night., Disp: , Rfl:   •  aspirin 81 MG EC tablet, Take 81 mg by mouth Every Night., Disp: , Rfl:   •  atorvastatin (LIPITOR) 20 MG tablet, Take 20 mg by mouth Every Night., Disp: , Rfl:   •  Blood Glucose Monitoring Suppl (ONE TOUCH ULTRA 2) W/DEVICE kit, , Disp: , Rfl:   •  Calcium Carbonate-Vitamin D 600-200 MG-UNIT tablet, Take 1 tablet by mouth Daily., Disp: , Rfl:   •  citalopram (CeleXA) 20 MG tablet, Take 10 mg by mouth Every Other Day., Disp: , Rfl:   •  Dapagliflozin Propanediol (Farxiga) 10 MG tablet, Take 10 mg by mouth Every Morning., Disp: 90 tablet, Rfl: 1  •  dexamethasone (DECADRON) 4 MG tablet, Take 2 tablets in the morning daily on Days 2, 3 & 4.  Take with food., Disp: 6 tablet, Rfl: 3  •  dexamethasone (DECADRON) 4 MG tablet, Take 2 tablets oral twice a day for 3 consecutive days beginning the day before chemotherapy and continue for 6 doses., Disp: 12 tablet, Rfl: 3  •  docusate sodium (COLACE) 100 MG capsule, Take 1 capsule by mouth 2 (Two) Times a Day As Needed for Constipation.,  Disp: 60 capsule, Rfl: 2  •  ferrous sulfate 325 (65 FE) MG tablet, Take 1 tablet by mouth Daily With Breakfast., Disp: 30 tablet, Rfl: 3  •  folic acid (FOLVITE) 1 MG tablet, Take 1 tablet by mouth Daily., Disp: 90 tablet, Rfl: 1  •  glucose blood (ONE TOUCH ULTRA TEST) test strip, 1 each by Other route Daily. Check 1-2times daily  E11.9  90 day supply One Touch Verio, Disp: 150 each, Rfl: 3  •  LANCETS MICRO THIN 33G misc, Check once daily  E11.9  Trueplus, Disp: 100 each, Rfl: 3  •  latanoprost (XALATAN) 0.005 % ophthalmic solution, Administer 1 drop to both eyes Every Night., Disp: , Rfl:   •  levothyroxine (SYNTHROID, LEVOTHROID) 50 MCG tablet, TAKE 1 TABLET BY MOUTH DAILY, Disp: 90 tablet, Rfl: 3  •  losartan-hydrochlorothiazide (HYZAAR) 100-12.5 MG per tablet, TAKE 1 TABLET BY MOUTH DAILY, Disp: 90 tablet, Rfl: 3  •  metFORMIN (GLUCOPHAGE) 500 MG tablet, Take 500 mg by mouth 2 (Two) Times a Day With Meals., Disp: , Rfl:   •  montelukast (SINGULAIR) 10 MG tablet, Take 10 mg by mouth Every Night., Disp: , Rfl:   •  ondansetron (ZOFRAN) 4 MG tablet, Take 1 tablet by mouth 4 (Four) Times a Day As Needed for Nausea or Vomiting., Disp: 40 tablet, Rfl: 3  •  fluticasone (FLONASE) 50 MCG/ACT nasal spray, 2 sprays into each nostril As Needed for rhinitis., Disp: , Rfl:   •  HYDROcodone-acetaminophen (NORCO) 5-325 MG per tablet, Take 1 tablet by mouth Every 4 (Four) Hours As Needed (Pain)., Disp: 10 tablet, Rfl: 0  •  Loperamide HCl (IMODIUM PO), Take  by mouth., Disp: , Rfl:   •  mometasone-formoterol (DULERA 200) 200-5 MCG/ACT inhaler, Inhale 2 puffs 2 (Two) Times a Day., Disp: , Rfl:     Allergies   Allergen Reactions   • Other Confusion     Coda-clear       Family History   Problem Relation Age of Onset   • Colon cancer Mother    • Heart attack Father    • Aneurysm Brother    • No Known Problems Son    • Alzheimer's disease Maternal Grandmother    • Heart attack Maternal Grandfather    • Alzheimer's disease  Paternal Grandmother    • No Known Problems Son        Social History     Socioeconomic History   • Marital status:      Spouse name: Not on file   • Number of children: Not on file   • Years of education: Not on file   • Highest education level: Not on file   Tobacco Use   • Smoking status: Never Smoker   • Smokeless tobacco: Never Used   Substance and Sexual Activity   • Alcohol use: No   • Drug use: No   • Sexual activity: Defer       Review of Systems   Constitutional: Positive for activity change and appetite change. Negative for chills, fever and unexpected weight change.   HENT: Negative for hearing loss, nosebleeds and trouble swallowing.    Eyes: Negative for visual disturbance.   Respiratory: Negative for apnea, cough, choking, chest tightness, shortness of breath, wheezing and stridor.    Cardiovascular: Negative for chest pain, palpitations and leg swelling.   Gastrointestinal: Negative for abdominal distention, abdominal pain, blood in stool, constipation, diarrhea, nausea and vomiting.   Endocrine: Negative for cold intolerance, heat intolerance, polydipsia, polyphagia and polyuria.   Genitourinary: Negative for difficulty urinating, dysuria, frequency, hematuria and urgency.   Musculoskeletal: Negative for arthralgias, back pain, myalgias and neck pain.   Skin: Negative for color change, pallor and rash.   Allergic/Immunologic: Negative for immunocompromised state.   Neurological: Negative for dizziness, seizures, syncope, light-headedness, numbness and headaches.   Hematological: Negative for adenopathy.   Psychiatric/Behavioral: Negative for suicidal ideas. The patient is not nervous/anxious.        Physical Exam  Vitals signs reviewed.   Constitutional:       General: She is not in acute distress.     Appearance: She is well-developed. She is not diaphoretic.   HENT:      Head: Normocephalic and atraumatic.      Mouth/Throat:      Pharynx: No oropharyngeal exudate.   Eyes:      General: No  scleral icterus.     Pupils: Pupils are equal, round, and reactive to light.   Neck:      Musculoskeletal: Normal range of motion and neck supple.      Thyroid: No thyromegaly.      Vascular: No JVD.      Trachea: No tracheal deviation.   Cardiovascular:      Rate and Rhythm: Normal rate and regular rhythm.      Heart sounds: Normal heart sounds.   Pulmonary:      Effort: Pulmonary effort is normal. No respiratory distress.      Breath sounds: Normal breath sounds. No stridor. No wheezing or rales.   Chest:      Chest wall: No tenderness.      Breasts: Breasts are symmetrical.         Right: No inverted nipple, mass, nipple discharge, skin change or tenderness.         Left: No inverted nipple, mass, nipple discharge, skin change or tenderness.   Musculoskeletal: Normal range of motion.         General: No tenderness or deformity.   Lymphadenopathy:      Cervical: No cervical adenopathy.   Skin:     General: Skin is warm and dry.      Coloration: Skin is not pale.      Findings: No erythema or rash.   Neurological:      Mental Status: She is alert and oriented to person, place, and time.   Psychiatric:         Behavior: Behavior normal.         Judgment: Judgment normal.           ASSESSMENT    Diagnoses and all orders for this visit:    1. Malignant neoplasm of central portion of right breast in female, estrogen receptor negative (CMS/HCC) (Primary)  -     NM [place] Node Injection Only; Future  -     CBC and Differential; Future  -     Comprehensive metabolic panel; Future  -     lactated ringers infusion  -     ceFAZolin (ANCEF) 2 g in sodium chloride 0.9 % 100 mL IVPB  -     sodium chloride 0.9 % flush 3 mL  -     sodium chloride 0.9 % flush 1-10 mL  -     Case Request; Standing  -     Case Request  -     Mammo Stereotactic Breast Device Placement Initial Without Bx Right; Future    Other orders  -     Mammo Breast Placement Device Initial Without Biopsy; Standing  -     Follow Anesthesia Guidelines / Standing  Orders; Future  -     Provide Chlorhexidine Skin Prep Wipes and Instructions; Future  -     Follow Anesthesia Guidelines / Standing Orders; Standing  -     Insert Peripheral IV; Standing  -     Saline Lock & Maintain IV Access; Standing  -     Obtain Informed Consent; Standing  -     Place sequential compression device- to be placed on patient in Pre-op; Standing  -     Verify / Perform Chlorhexidine Skin Prep; Standing  -     No Lab Testing Needed; Standing        PLAN    1.  Stereotactic localization preoperatively  2.  Right lumpectomy  3.  [place] lymph node mapping and biopsy right axilla and axillary dissection as necessary.    Stereotactic localization:    The procedure was explained to the patient including the risks of bleeding, infection, poor localization.    Lumpectomy:    The following were discussed with the patient/family:      What are the indications that have led your doctor to the opinion that an operation is necessary?    You have been diagnosed with a breast cancer. NCCN guidelines suggest that cancers treated with lumpectomy be also treated with post operative radiation therapy to avoid a high (40%) recurrence rate.    What, if any, alternative treatments are available for your condition?    Alternative treatment to lumpectomy and radiation therapy is mastectomy (breast removal). Radiation therapy may still be needed post operatively depending on the findings.    What will be the likely result if you don't have the operation?    If you choose not to have the recommended procedure, your cancer may become larger or spread.    What are the basic procedures involved in the operation?    An incision is made on the breast and the abnormal area is removed. The tissue is sent to the lab for examination by a pathologist. This will determine if the cancer has been removed.  Your breast mass may need to be “needle localized” with a small wire in order to more easily find it. Needle localization is done  in surgery using ultrasound, or in xray using stereotactic techniques.    What are the risks?  Risks include but are not limited to:  • Injury to a blood vessel or excessive bleeding may occur. This rarely would require a blood transfusion.  • Infection, which may require the use of antibiotics. In rare cases, another surgical procedure may be necessary to remove the infection.  • Tobacco use, excessive alcohol use and obesity can increase the risk of any surgical procedure. Any of these factors may substantially affect healing and can result in an increase of major complications including pneumonia, wound infection, blood clots in the legs and lungs, or death.  • Bleeding with hematoma formation. A hematoma is a swollen area where blood has pooled. It may result in an area resembling a large bruise.  • Pain after surgery that may require you to take pain medication  • Scarring after surgery in the area of the incision  • Nipple retraction  • Seroma (fluid collections)  • Breast cancers can be multifocal (occurring in more than one place). They may not be detected by a physician on a physical examination, by mammogram or sonogram, or at the time your surgery is performed. Even though the area of concern is removed, a cancer can still appear in your breast following surgery. This is called interval carcinoma and it is independent of the removed breast tissue, but occurs in the remaining breast tissue. There may be up to a 25% risk of positive margins following lumpectomy for cancer; this would require further excision or mastectomy to treat.    How is the operation expected to improve your health or quality of life?    Operation and perhaps chemo and radiation therapy offer the best chance for cancer cure.    Is hospitalization necessary and, if so, how long can you expect to be hospitalized?    Most times, the surgery is performed on an outpatient basis.    What can you expect during your recovery period?    Most  patients recover rapidly. Pain is treated with ice packs, a good supportive bra, and with a combination of narcotic and non-narcotic pain medicine    When can you expect to resume normal activities?    Normal activity may resume in approximately 2-3 weeks.    Are there likely to be residual effects from the operation?    The breast texture and sensitivity may be altered by both surgery and radiation therapy. There may be some asymmetry, which if severe would require plastic surgery. Realize that most plastic surgery would be done on the opposite, non-radiated side.    [place] lymph node biopsy:    The following were discussed with the patient/family:      What are the indications that have led your doctor to the opinion that an operation is necessary?    Lymph nodes vary in size from the size of a pinhead to the size of an olive. They act as filters keeping bacteria from entering the blood stream. It is recommended in the treatment of certain cancers (breast, melanoma) that lymph nodes be sampled to determine if the cancer is spread. [place] lymph node biopsy is a technique that sample the first few lymph nodes draining the breast. A biopsy will remove all or part of the lymph node.    What, if any, alternative treatments are available for your condition?    Alternative to SLN biopsy is full axillary dissection.    What will be the likely result if you don't have the operation?    There would be a failure to adequately stage the cancer. Proper treatment may not be possible.    What are the basic procedures involved in the operation?    An injection of radioactive is given in the [facility]. Blue dye is given in  Surgery before operation. These dyes are used to localize the first lymph nodes. An incision is then made in the axilla, and all radioactive and blue tissue is removed. Any palpable lymph nodes are removed as well. The area will then be closed using dissolvable stitches and a dressing or  bandage will be applied.    What are the risks?    • Injury to a blood vessel or excessive bleeding. This may require a blood transfusion.  • Infection, which may require the use of antibiotics. In rare cases, another surgical procedure may be necessary to remove the infection.  • Tobacco use, excessive alcohol use and obesity can increase the risk of any surgical procedure or general anesthetic. Any of these factors may substantially affect healing and can result in an increase of major complications including pneumonia, wound infection, blood clots in the legs and lungs, or death.  • The sentinel lymph node may not be found 5-7% of the time. This would require a full axillary dissection. More than 2 positive sentinel lymph nodes may necessitate a full axillary dissection at a second procedure.  • Scarring after surgery at the incision site  • Possible injury to underlying structures such as a nerve or blood vessel  • Incomplete removal requiring re-excision (repeated surgery) of a lesion  • Removal of even a few lymph nodes may lead to lymphedema. The main symptom is swelling in an arm or leg that may be accompanied by pain or discomfort.  • Seroma or fluid collections requiring drainage.    How is the operation expected to improve your health or quality of life?    Proper cancer staging allows proper treatment and the best chances of sure.    Is hospitalization necessary and, if so, how long can you expect to be hospitalized?    This procedure is performed on an outpatient basis.    What can you expect during your recovery period?    Recovery is usually rapid. Depending on the extent of surgery, a drain may be rarely placed. Home drain care will be instructed by the nursing staff post operatively. Because of the dye used, the urine may be green for a day or so.    When can you expect to resume normal activities?    Normal activity can be resumed in 1-2 weeks.    Are there likely to be residual effects from the  operation?    Numbness and swelling may occur but usually resolve within several days.    Axillary dissection:    The following were discussed with the patient/family:    What are the indications that have led your doctor to the opinion that an operation is necessary?    Lymph nodes vary in size from the size of a pinhead to the size of an olive. They act as filters keeping bacteria from entering the blood stream. It is recommended in the treatment of certain cancers that lymph nodes be removed to determine if the cancer is spread. If several sentinel lymph nodes are positive or cannot be found, axillary dissection is indicated.    What, if any, alternative treatments are available for your condition?    There are no good alternatives to axillary dissection.    What will be the likely result if you don't have the operation?    There would be a failure to adequately stage the cancer. Proper treatment may not be possible.    What are the basic procedures involved in the operation?    An incision is then made in the axilla, and lymph nodes around the veins and chest wall are removed. Drains are placed.  The area is then be closed using dissolvable stitches and a dressing or bandage is applied.    What are the risks?    • Injury to a blood vessel or excessive bleeding. This may require a blood transfusion.  • Infection, which may require the use of antibiotics. In rare cases, another surgical procedure may be necessary to remove the infection.  • Tobacco use, excessive alcohol use and obesity can increase the risk of any surgical procedure or general anesthetic. Any of these factors may substantially affect healing and can result in an increase of major complications including pneumonia, wound infection, blood clots in the legs and lungs, or death.  • Scarring after surgery at the incision site  • Possible injury to underlying structures such as a nerve or blood vessel  • Removal of even a few lymph nodes may lead to  lymphedema. The main symptom is swelling in an arm or leg that may be accompanied by pain or discomfort.  • Seroma or fluid collections requiring drainage.    How is the operation expected to improve your health or quality of life?    Proper cancer staging allows proper treatment and the best chances of sure.    Is hospitalization necessary and, if so, how long can you expect to be hospitalized?    This procedure is performed on an outpatient basis.    What can you expect during your recovery period?    Recovery is usually rapid. Home drain care will be instructed by the nursing staff post operatively.    When can you expect to resume normal activities?    Normal activity can be resumed in 1-2 weeks.    Are there likely to be residual effects from the operation?    Numbness and swelling may occur but usually resolve within several days.      All questions were answered. The patient agrees to the procedure.  She was seen by our  our nurse navigator. She will be referred for radiation therapy after surgery. .             This document has been electronically signed by Duke Alexandra MD on January 4, 2021 18:23 CST

## 2021-01-04 NOTE — PAT
Chlorhexidine scrub given with instruction sheet. Instructions reviewed in PAT, understanding verbalized.    EKG from 10/5 was reviewed by Dr. Oliveros with anesthesia on 10/5 prior to previous procedure and was ok.    Patient states she takes Aspirin for prevention only, and instructed her to stop 7 days prior to procedure.

## 2021-01-04 NOTE — PROGRESS NOTES
DATE OF VISIT: 12/31/2020    REASON FOR VISIT:  Triple negative right breast cancer on chemotherapy with Taxotere and Cytoxan, leukocytosis, anemia, thrombocytosis, chronic kidney disease        HISTORY OF PRESENT ILLNESS:   75-year-old female with medical problem consisting of diabetes mellitus, hypertension, dyslipidemia, asthma, osteopenia, obstructive sleep apnea, chronic back pain and neck pain has been following up with [facility] since October 5, 2020 for triple negative right breast cancer.  Patient last dose of chemotherapy was on 11/30/2020 for cycle 3; since she was here last she underwent emergency surgery in [facility] for perforated colon on 12/4/2020 with diverting colostomy/     Dr. Duong reviewed hospital notes and has decided to send patient back to Dr. Alexandra for consideration of breast surgery at this time.     Patient states she is feeling better; states she has appt soon to discuss with colon surgeon about possible reversal of colostomy.    PAST MEDICAL HISTORY:    Past Medical History:   Diagnosis Date   • Abnormal mammogram of right breast    • Acquired hypothyroidism     started synthroid 9/2015   • Allergic rhinitis, seasonal    • Breast cancer (CMS/HCC)    • Cervicalgia     right upper extremity radiculopathy   • Diabetes mellitus (CMS/HCC)    • Encounter for screening for malignant neoplasm of colon    • Essential hypertension    • Glaucoma    • Health maintenance examination     individual health exam   • Hypercholesterolemia    • Hyperglycemia     steroid-induced hyperglycemia in diabetic patient   • Lumbar disc disorder     w/right radiculopathy   • Menopause    • Mild persistent asthma     resumed dulera twice daily   • Obstructive sleep apnea syndrome 9/17/2019   • Osteopenia     improved DEXA 2/2013   • 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   • Sebaceous cyst     subepidermal cyst   • Shoulder pain     likely radicular pain due to cervical DJD   • Spasm of back muscles      "right neck and trapezius   • Spinal stenosis of lumbar region    • Strain of neck muscle     cervical strain   • Temporomandibular joint disorder     h/o   • Vitamin D deficiency     on oral calcium/vitamin D supplement       SOCIAL HISTORY:    Social History     Tobacco Use   • Smoking status: Never Smoker   • Smokeless tobacco: Never Used   Substance Use Topics   • Alcohol use: No   • Drug use: No       Surgical History :  Past Surgical History:   Procedure Laterality Date   • CHOLECYSTECTOMY     • COLONOSCOPY  12/17/2015    normal   • COLONOSCOPY N/A 11/19/2020    Procedure: COLONOSCOPY;  Surgeon: Devin Rene MD;  Location: [facility] ENDOSCOPY;  Service: Gastroenterology;  Laterality: N/A;   • ENDOSCOPY AND COLONOSCOPY  08/2010   • HYSTERECTOMY     • OOPHORECTOMY     • TONSILLECTOMY     • VENOUS ACCESS DEVICE (PORT) INSERTION Left 10/8/2020    Procedure: INSERTION VENOUS ACCESS DEVICE (MEDIPORT)         (c-arm#1);  Surgeon: Duke Alexandra MD;  Location: [facility] OR;  Service: General;  Laterality: Left;       ALLERGIES:    Allergies   Allergen Reactions   • Other Confusion     Coda-clear       REVIEW OF SYSTEMS:      CONSTITUTIONAL:  No fever, chills, or night sweats.     HEENT:  No epistaxis, mouth sores, or difficulty swallowing.    RESPIRATORY:  No new shortness of breath or cough at present.    CARDIOVASCULAR:  No chest pain or palpitations.    GASTROINTESTINAL:  No abdominal pain, nausea, vomiting, or blood in the stool.    GENITOURINARY:  No dysuria or hematuria.    MUSCULOSKELETAL:  No any new back pain or arthralgias.     NEUROLOGICAL:  No tingling or numbness. No new headache or dizziness.     LYMPHATICS:  Denies any abnormal swollen and anywhere in the body.    SKIN:  Denies any new skin rash.    PHYSICAL EXAMINATION:      VITAL SIGNS:  /55   Pulse 101   Temp 97.5 °F (36.4 °C)   Resp 16   Ht 157.5 cm (62\")   Wt 67.1 kg (148 lb)   BMI 27.07 kg/m²     GENERAL:  Not in any distress.    HEENT:  " Normocephalic, Atraumatic.Mild Conjunctival pallor. No icterus. Extraocular Movements Intact. No Facial Asymmetry noted.    NECK:  No adenopathy. No JVD.    RESPIRATORY:  Fair air entry bilateral. No rhonchi or wheezing.    CARDIOVASCULAR:  S1, S2. Regular rate and rhythm. No murmur or gallop appreciated.    ABDOMEN:  Soft, obese, nontender. Bowel sounds present in all four quadrants.  No organomegaly appreciated.    EXTREMITIES:  No edema.No Calf Tenderness.    NEUROLOGIC:  Alert, awake and oriented ×3.  No  Motor or sensory deficit appreciated. Cranial Nerves 2-12 grossly intact.    SKIN : No new skin lesion identified  DIAGNOSTIC DATA:    Glucose   Date Value Ref Range Status   11/30/2020 235 (H) 65 - 99 mg/dL Final     Sodium   Date Value Ref Range Status   11/30/2020 138 136 - 145 mmol/L Final     Potassium   Date Value Ref Range Status   11/30/2020 4.3 3.5 - 5.2 mmol/L Final     CO2   Date Value Ref Range Status   11/30/2020 19.0 (L) 22.0 - 29.0 mmol/L Final     Chloride   Date Value Ref Range Status   11/30/2020 103 98 - 107 mmol/L Final     Anion Gap   Date Value Ref Range Status   11/30/2020 16.0 (H) 5.0 - 15.0 mmol/L Final     Creatinine   Date Value Ref Range Status   11/30/2020 1.10 (H) 0.57 - 1.00 mg/dL Final     BUN   Date Value Ref Range Status   11/30/2020 24 (H) 8 - 23 mg/dL Final     BUN/Creatinine Ratio   Date Value Ref Range Status   11/30/2020 21.8 7.0 - 25.0 Final     Calcium   Date Value Ref Range Status   11/30/2020 10.0 8.6 - 10.5 mg/dL Final     eGFR Non  Amer   Date Value Ref Range Status   11/30/2020 49 (L) >60 mL/min/1.73 Final     Alkaline Phosphatase   Date Value Ref Range Status   11/30/2020 68 39 - 117 U/L Final     Total Protein   Date Value Ref Range Status   11/30/2020 7.3 6.0 - 8.5 g/dL Final     ALT (SGPT)   Date Value Ref Range Status   11/30/2020 20 1 - 33 U/L Final     AST (SGOT)   Date Value Ref Range Status   11/30/2020 21 1 - 32 U/L Final     Total Bilirubin   Date  Value Ref Range Status   11/30/2020 0.3 0.0 - 1.2 mg/dL Final     Albumin   Date Value Ref Range Status   11/30/2020 4.10 3.50 - 5.20 g/dL Final     Globulin   Date Value Ref Range Status   11/30/2020 3.2 gm/dL Final     Lab Results   Component Value Date    WBC 19.69 (H) 11/30/2020    HGB 11.3 (L) 11/30/2020    HCT 34.7 11/30/2020    MCV 82.6 11/30/2020     (H) 11/30/2020     Lab Results   Component Value Date    NEUTROABS 17.99 (H) 11/30/2020    IRON 61 11/05/2020    TIBC 390 11/05/2020    LABIRON 16 (L) 11/05/2020    FERRITIN 151.10 (H) 11/05/2020    XRHFAKCA70 >2,000 (H) 11/05/2020    FOLATE 5.69 11/05/2020     No results found for: , LABCA2, AFPTM, HCGQUANT, , CHROMGRNA, [id number], CEA, REFLABREPO]    RADIOLOGY DATA :  No radiology results for the last 7 days      ASSESSMENT AND PLAN:       1. Triple negative right breast cancer, clinically T1 lesion measuring at 1.2 cm.  -Patient was started on cycle 1 of Taxotere and Cytoxan with Neulasta growth support on October 15, 2020.  -Neulasta was discontinued with cycle 2 due to excessive joint pain and leukocytosis.  -patient had cycle 3 on 11/30/2020. Planning for 4 cycles neoadjuvant however pt developed perforated colon and underwent emergency surgery on 12/4; at this time will send back to Dr. Alexandra to discuss breast surgery options.  - I did discuss with patient that she would see Dr. Duong back after breast surgery to discuss final pathology report.  -She has surgical questions regarding her colostomy referral; I have asked her to ask those questions when she sees Dr. Alexandra and if needed he can communicate with Dr. Lau.  -RTC tentatively scheduled for one month       This document has been signed by YOHANA Brown on January 4, 2021 13:10 CST

## 2021-01-04 NOTE — H&P (VIEW-ONLY)
Chief Complaint   Patient presents with   • Follow-up     Discuss Surgery        HPI  Gale is 75 years old and underwent core needle breast biopsy right side in October demonstrated a triple negative breast cancer.  Initial size of the cancer was small, but it was elected to treat her with neoadjuvant chemotherapy prior to lumpectomy.  In December, however, prior to her last chemotherapy treatment, she developed perforated diverticulitis that was treated in [place], [place] with a laparoscopic Hurt's operation.  She states that she has been doing well since that time and presents today for possible consideration of colostomy closure for further treatment of breast cancer.  In speaking with Dr. Patterson, it is recommended that we proceed with lumpectomy and sentinel lymph node biopsy.    Past Medical History:   Diagnosis Date   • Abnormal mammogram of right breast    • Acquired hypothyroidism     started synthroid 9/2015   • Allergic rhinitis, seasonal    • Breast cancer (CMS/HCC)    • Cervicalgia     right upper extremity radiculopathy   • Diabetes mellitus (CMS/HCC)    • Encounter for screening for malignant neoplasm of colon    • Essential hypertension    • Glaucoma    • Health maintenance examination     individual health exam   • Hypercholesterolemia    • Hyperglycemia     steroid-induced hyperglycemia in diabetic patient   • Lumbar disc disorder     w/right radiculopathy   • Menopause    • Mild persistent asthma     resumed dulera twice daily   • Obstructive sleep apnea syndrome 9/17/2019   • Osteopenia     improved DEXA 2/2013   • 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   • Rupture of colon (CMS/HCC)    • Sebaceous cyst     subepidermal cyst   • Shoulder pain     likely radicular pain due to cervical DJD   • Spasm of back muscles     right neck and trapezius   • Spinal stenosis of lumbar region    • Strain of neck muscle     cervical strain   • Temporomandibular joint disorder     h/o   • Vitamin D  deficiency     on oral calcium/vitamin D supplement       Past Surgical History:   Procedure Laterality Date   • CHOLECYSTECTOMY     • COLONOSCOPY  12/17/2015    normal   • COLONOSCOPY N/A 11/19/2020    Procedure: COLONOSCOPY;  Surgeon: Devin Rene MD;  Location: [facility] ENDOSCOPY;  Service: Gastroenterology;  Laterality: N/A;   • COLOSTOMY     • ENDOSCOPY AND COLONOSCOPY  08/2010   • HYSTERECTOMY     • OOPHORECTOMY     • TONSILLECTOMY     • VENOUS ACCESS DEVICE (PORT) INSERTION Left 10/8/2020    Procedure: INSERTION VENOUS ACCESS DEVICE (MEDIPORT)         (c-arm#1);  Surgeon: Duke Alexandra MD;  Location: [facility] OR;  Service: General;  Laterality: Left;         Current Outpatient Medications:   •  albuterol sulfate HFA (PROAIR HFA) 108 (90 Base) MCG/ACT inhaler, Inhale 2 puffs 4 (Four) Times a Day As Needed for Wheezing., Disp: 8.5 inhaler, Rfl: 6  •  amLODIPine (NORVASC) 5 MG tablet, Take 5 mg by mouth Every Night., Disp: , Rfl:   •  aspirin 81 MG EC tablet, Take 81 mg by mouth Every Night., Disp: , Rfl:   •  atorvastatin (LIPITOR) 20 MG tablet, Take 20 mg by mouth Every Night., Disp: , Rfl:   •  Blood Glucose Monitoring Suppl (ONE TOUCH ULTRA 2) W/DEVICE kit, , Disp: , Rfl:   •  Calcium Carbonate-Vitamin D 600-200 MG-UNIT tablet, Take 1 tablet by mouth Daily., Disp: , Rfl:   •  citalopram (CeleXA) 20 MG tablet, Take 10 mg by mouth Every Other Day., Disp: , Rfl:   •  Dapagliflozin Propanediol (Farxiga) 10 MG tablet, Take 10 mg by mouth Every Morning., Disp: 90 tablet, Rfl: 1  •  dexamethasone (DECADRON) 4 MG tablet, Take 2 tablets in the morning daily on Days 2, 3 & 4.  Take with food., Disp: 6 tablet, Rfl: 3  •  dexamethasone (DECADRON) 4 MG tablet, Take 2 tablets oral twice a day for 3 consecutive days beginning the day before chemotherapy and continue for 6 doses., Disp: 12 tablet, Rfl: 3  •  docusate sodium (COLACE) 100 MG capsule, Take 1 capsule by mouth 2 (Two) Times a Day As Needed for Constipation.,  Disp: 60 capsule, Rfl: 2  •  ferrous sulfate 325 (65 FE) MG tablet, Take 1 tablet by mouth Daily With Breakfast., Disp: 30 tablet, Rfl: 3  •  folic acid (FOLVITE) 1 MG tablet, Take 1 tablet by mouth Daily., Disp: 90 tablet, Rfl: 1  •  glucose blood (ONE TOUCH ULTRA TEST) test strip, 1 each by Other route Daily. Check 1-2times daily  E11.9  90 day supply One Touch Verio, Disp: 150 each, Rfl: 3  •  LANCETS MICRO THIN 33G misc, Check once daily  E11.9  Trueplus, Disp: 100 each, Rfl: 3  •  latanoprost (XALATAN) 0.005 % ophthalmic solution, Administer 1 drop to both eyes Every Night., Disp: , Rfl:   •  levothyroxine (SYNTHROID, LEVOTHROID) 50 MCG tablet, TAKE 1 TABLET BY MOUTH DAILY, Disp: 90 tablet, Rfl: 3  •  losartan-hydrochlorothiazide (HYZAAR) 100-12.5 MG per tablet, TAKE 1 TABLET BY MOUTH DAILY, Disp: 90 tablet, Rfl: 3  •  metFORMIN (GLUCOPHAGE) 500 MG tablet, Take 500 mg by mouth 2 (Two) Times a Day With Meals., Disp: , Rfl:   •  montelukast (SINGULAIR) 10 MG tablet, Take 10 mg by mouth Every Night., Disp: , Rfl:   •  ondansetron (ZOFRAN) 4 MG tablet, Take 1 tablet by mouth 4 (Four) Times a Day As Needed for Nausea or Vomiting., Disp: 40 tablet, Rfl: 3  •  fluticasone (FLONASE) 50 MCG/ACT nasal spray, 2 sprays into each nostril As Needed for rhinitis., Disp: , Rfl:   •  HYDROcodone-acetaminophen (NORCO) 5-325 MG per tablet, Take 1 tablet by mouth Every 4 (Four) Hours As Needed (Pain)., Disp: 10 tablet, Rfl: 0  •  Loperamide HCl (IMODIUM PO), Take  by mouth., Disp: , Rfl:   •  mometasone-formoterol (DULERA 200) 200-5 MCG/ACT inhaler, Inhale 2 puffs 2 (Two) Times a Day., Disp: , Rfl:     Allergies   Allergen Reactions   • Other Confusion     Coda-clear       Family History   Problem Relation Age of Onset   • Colon cancer Mother    • Heart attack Father    • Aneurysm Brother    • No Known Problems Son    • Alzheimer's disease Maternal Grandmother    • Heart attack Maternal Grandfather    • Alzheimer's disease  Paternal Grandmother    • No Known Problems Son        Social History     Socioeconomic History   • Marital status:      Spouse name: Not on file   • Number of children: Not on file   • Years of education: Not on file   • Highest education level: Not on file   Tobacco Use   • Smoking status: Never Smoker   • Smokeless tobacco: Never Used   Substance and Sexual Activity   • Alcohol use: No   • Drug use: No   • Sexual activity: Defer       Review of Systems   Constitutional: Positive for activity change and appetite change. Negative for chills, fever and unexpected weight change.   HENT: Negative for hearing loss, nosebleeds and trouble swallowing.    Eyes: Negative for visual disturbance.   Respiratory: Negative for apnea, cough, choking, chest tightness, shortness of breath, wheezing and stridor.    Cardiovascular: Negative for chest pain, palpitations and leg swelling.   Gastrointestinal: Negative for abdominal distention, abdominal pain, blood in stool, constipation, diarrhea, nausea and vomiting.   Endocrine: Negative for cold intolerance, heat intolerance, polydipsia, polyphagia and polyuria.   Genitourinary: Negative for difficulty urinating, dysuria, frequency, hematuria and urgency.   Musculoskeletal: Negative for arthralgias, back pain, myalgias and neck pain.   Skin: Negative for color change, pallor and rash.   Allergic/Immunologic: Negative for immunocompromised state.   Neurological: Negative for dizziness, seizures, syncope, light-headedness, numbness and headaches.   Hematological: Negative for adenopathy.   Psychiatric/Behavioral: Negative for suicidal ideas. The patient is not nervous/anxious.        Physical Exam  Vitals signs reviewed.   Constitutional:       General: She is not in acute distress.     Appearance: She is well-developed. She is not diaphoretic.   HENT:      Head: Normocephalic and atraumatic.      Mouth/Throat:      Pharynx: No oropharyngeal exudate.   Eyes:      General: No  scleral icterus.     Pupils: Pupils are equal, round, and reactive to light.   Neck:      Musculoskeletal: Normal range of motion and neck supple.      Thyroid: No thyromegaly.      Vascular: No JVD.      Trachea: No tracheal deviation.   Cardiovascular:      Rate and Rhythm: Normal rate and regular rhythm.      Heart sounds: Normal heart sounds.   Pulmonary:      Effort: Pulmonary effort is normal. No respiratory distress.      Breath sounds: Normal breath sounds. No stridor. No wheezing or rales.   Chest:      Chest wall: No tenderness.      Breasts: Breasts are symmetrical.         Right: No inverted nipple, mass, nipple discharge, skin change or tenderness.         Left: No inverted nipple, mass, nipple discharge, skin change or tenderness.   Musculoskeletal: Normal range of motion.         General: No tenderness or deformity.   Lymphadenopathy:      Cervical: No cervical adenopathy.   Skin:     General: Skin is warm and dry.      Coloration: Skin is not pale.      Findings: No erythema or rash.   Neurological:      Mental Status: She is alert and oriented to person, place, and time.   Psychiatric:         Behavior: Behavior normal.         Judgment: Judgment normal.           ASSESSMENT    Diagnoses and all orders for this visit:    1. Malignant neoplasm of central portion of right breast in female, estrogen receptor negative (CMS/HCC) (Primary)  -     NM [place] Node Injection Only; Future  -     CBC and Differential; Future  -     Comprehensive metabolic panel; Future  -     lactated ringers infusion  -     ceFAZolin (ANCEF) 2 g in sodium chloride 0.9 % 100 mL IVPB  -     sodium chloride 0.9 % flush 3 mL  -     sodium chloride 0.9 % flush 1-10 mL  -     Case Request; Standing  -     Case Request  -     Mammo Stereotactic Breast Device Placement Initial Without Bx Right; Future    Other orders  -     Mammo Breast Placement Device Initial Without Biopsy; Standing  -     Follow Anesthesia Guidelines / Standing  Orders; Future  -     Provide Chlorhexidine Skin Prep Wipes and Instructions; Future  -     Follow Anesthesia Guidelines / Standing Orders; Standing  -     Insert Peripheral IV; Standing  -     Saline Lock & Maintain IV Access; Standing  -     Obtain Informed Consent; Standing  -     Place sequential compression device- to be placed on patient in Pre-op; Standing  -     Verify / Perform Chlorhexidine Skin Prep; Standing  -     No Lab Testing Needed; Standing        PLAN    1.  Stereotactic localization preoperatively  2.  Right lumpectomy  3.  [place] lymph node mapping and biopsy right axilla and axillary dissection as necessary.    Stereotactic localization:    The procedure was explained to the patient including the risks of bleeding, infection, poor localization.    Lumpectomy:    The following were discussed with the patient/family:      What are the indications that have led your doctor to the opinion that an operation is necessary?    You have been diagnosed with a breast cancer. NCCN guidelines suggest that cancers treated with lumpectomy be also treated with post operative radiation therapy to avoid a high (40%) recurrence rate.    What, if any, alternative treatments are available for your condition?    Alternative treatment to lumpectomy and radiation therapy is mastectomy (breast removal). Radiation therapy may still be needed post operatively depending on the findings.    What will be the likely result if you don't have the operation?    If you choose not to have the recommended procedure, your cancer may become larger or spread.    What are the basic procedures involved in the operation?    An incision is made on the breast and the abnormal area is removed. The tissue is sent to the lab for examination by a pathologist. This will determine if the cancer has been removed.  Your breast mass may need to be “needle localized” with a small wire in order to more easily find it. Needle localization is done  in surgery using ultrasound, or in xray using stereotactic techniques.    What are the risks?  Risks include but are not limited to:  • Injury to a blood vessel or excessive bleeding may occur. This rarely would require a blood transfusion.  • Infection, which may require the use of antibiotics. In rare cases, another surgical procedure may be necessary to remove the infection.  • Tobacco use, excessive alcohol use and obesity can increase the risk of any surgical procedure. Any of these factors may substantially affect healing and can result in an increase of major complications including pneumonia, wound infection, blood clots in the legs and lungs, or death.  • Bleeding with hematoma formation. A hematoma is a swollen area where blood has pooled. It may result in an area resembling a large bruise.  • Pain after surgery that may require you to take pain medication  • Scarring after surgery in the area of the incision  • Nipple retraction  • Seroma (fluid collections)  • Breast cancers can be multifocal (occurring in more than one place). They may not be detected by a physician on a physical examination, by mammogram or sonogram, or at the time your surgery is performed. Even though the area of concern is removed, a cancer can still appear in your breast following surgery. This is called interval carcinoma and it is independent of the removed breast tissue, but occurs in the remaining breast tissue. There may be up to a 25% risk of positive margins following lumpectomy for cancer; this would require further excision or mastectomy to treat.    How is the operation expected to improve your health or quality of life?    Operation and perhaps chemo and radiation therapy offer the best chance for cancer cure.    Is hospitalization necessary and, if so, how long can you expect to be hospitalized?    Most times, the surgery is performed on an outpatient basis.    What can you expect during your recovery period?    Most  patients recover rapidly. Pain is treated with ice packs, a good supportive bra, and with a combination of narcotic and non-narcotic pain medicine    When can you expect to resume normal activities?    Normal activity may resume in approximately 2-3 weeks.    Are there likely to be residual effects from the operation?    The breast texture and sensitivity may be altered by both surgery and radiation therapy. There may be some asymmetry, which if severe would require plastic surgery. Realize that most plastic surgery would be done on the opposite, non-radiated side.    [place] lymph node biopsy:    The following were discussed with the patient/family:      What are the indications that have led your doctor to the opinion that an operation is necessary?    Lymph nodes vary in size from the size of a pinhead to the size of an olive. They act as filters keeping bacteria from entering the blood stream. It is recommended in the treatment of certain cancers (breast, melanoma) that lymph nodes be sampled to determine if the cancer is spread. [place] lymph node biopsy is a technique that sample the first few lymph nodes draining the breast. A biopsy will remove all or part of the lymph node.    What, if any, alternative treatments are available for your condition?    Alternative to SLN biopsy is full axillary dissection.    What will be the likely result if you don't have the operation?    There would be a failure to adequately stage the cancer. Proper treatment may not be possible.    What are the basic procedures involved in the operation?    An injection of radioactive is given in the [facility]. Blue dye is given in  Surgery before operation. These dyes are used to localize the first lymph nodes. An incision is then made in the axilla, and all radioactive and blue tissue is removed. Any palpable lymph nodes are removed as well. The area will then be closed using dissolvable stitches and a dressing or  bandage will be applied.    What are the risks?    • Injury to a blood vessel or excessive bleeding. This may require a blood transfusion.  • Infection, which may require the use of antibiotics. In rare cases, another surgical procedure may be necessary to remove the infection.  • Tobacco use, excessive alcohol use and obesity can increase the risk of any surgical procedure or general anesthetic. Any of these factors may substantially affect healing and can result in an increase of major complications including pneumonia, wound infection, blood clots in the legs and lungs, or death.  • The sentinel lymph node may not be found 5-7% of the time. This would require a full axillary dissection. More than 2 positive sentinel lymph nodes may necessitate a full axillary dissection at a second procedure.  • Scarring after surgery at the incision site  • Possible injury to underlying structures such as a nerve or blood vessel  • Incomplete removal requiring re-excision (repeated surgery) of a lesion  • Removal of even a few lymph nodes may lead to lymphedema. The main symptom is swelling in an arm or leg that may be accompanied by pain or discomfort.  • Seroma or fluid collections requiring drainage.    How is the operation expected to improve your health or quality of life?    Proper cancer staging allows proper treatment and the best chances of sure.    Is hospitalization necessary and, if so, how long can you expect to be hospitalized?    This procedure is performed on an outpatient basis.    What can you expect during your recovery period?    Recovery is usually rapid. Depending on the extent of surgery, a drain may be rarely placed. Home drain care will be instructed by the nursing staff post operatively. Because of the dye used, the urine may be green for a day or so.    When can you expect to resume normal activities?    Normal activity can be resumed in 1-2 weeks.    Are there likely to be residual effects from the  operation?    Numbness and swelling may occur but usually resolve within several days.    Axillary dissection:    The following were discussed with the patient/family:    What are the indications that have led your doctor to the opinion that an operation is necessary?    Lymph nodes vary in size from the size of a pinhead to the size of an olive. They act as filters keeping bacteria from entering the blood stream. It is recommended in the treatment of certain cancers that lymph nodes be removed to determine if the cancer is spread. If several sentinel lymph nodes are positive or cannot be found, axillary dissection is indicated.    What, if any, alternative treatments are available for your condition?    There are no good alternatives to axillary dissection.    What will be the likely result if you don't have the operation?    There would be a failure to adequately stage the cancer. Proper treatment may not be possible.    What are the basic procedures involved in the operation?    An incision is then made in the axilla, and lymph nodes around the veins and chest wall are removed. Drains are placed.  The area is then be closed using dissolvable stitches and a dressing or bandage is applied.    What are the risks?    • Injury to a blood vessel or excessive bleeding. This may require a blood transfusion.  • Infection, which may require the use of antibiotics. In rare cases, another surgical procedure may be necessary to remove the infection.  • Tobacco use, excessive alcohol use and obesity can increase the risk of any surgical procedure or general anesthetic. Any of these factors may substantially affect healing and can result in an increase of major complications including pneumonia, wound infection, blood clots in the legs and lungs, or death.  • Scarring after surgery at the incision site  • Possible injury to underlying structures such as a nerve or blood vessel  • Removal of even a few lymph nodes may lead to  lymphedema. The main symptom is swelling in an arm or leg that may be accompanied by pain or discomfort.  • Seroma or fluid collections requiring drainage.    How is the operation expected to improve your health or quality of life?    Proper cancer staging allows proper treatment and the best chances of sure.    Is hospitalization necessary and, if so, how long can you expect to be hospitalized?    This procedure is performed on an outpatient basis.    What can you expect during your recovery period?    Recovery is usually rapid. Home drain care will be instructed by the nursing staff post operatively.    When can you expect to resume normal activities?    Normal activity can be resumed in 1-2 weeks.    Are there likely to be residual effects from the operation?    Numbness and swelling may occur but usually resolve within several days.      All questions were answered. The patient agrees to the procedure.  She was seen by our  our nurse navigator. She will be referred for radiation therapy after surgery. .             This document has been electronically signed by Duke Alexandra MD on January 4, 2021 18:23 CST

## 2021-01-05 RX ORDER — LEVOTHYROXINE SODIUM 0.05 MG/1
50 TABLET ORAL DAILY
Qty: 90 TABLET | Refills: 3 | Status: SHIPPED | OUTPATIENT
Start: 2021-01-05 | End: 2022-03-03 | Stop reason: SDUPTHER

## 2021-01-05 RX ORDER — MONTELUKAST SODIUM 10 MG/1
10 TABLET ORAL NIGHTLY
Qty: 90 TABLET | Refills: 3 | Status: SHIPPED | OUTPATIENT
Start: 2021-01-05 | End: 2022-01-19 | Stop reason: SDUPTHER

## 2021-01-05 RX ORDER — AMLODIPINE BESYLATE 5 MG/1
5 TABLET ORAL NIGHTLY
Qty: 90 TABLET | Refills: 3 | Status: SHIPPED | OUTPATIENT
Start: 2021-01-05 | End: 2021-09-08

## 2021-01-05 NOTE — PAT
Spoke with Dr. Alexandra via telephone and he stated he was aware of patient's platelet level of 1,032 from CBC done on 1/4.     Dr. Alexandra also stated patient could continue taking Aspirin prior to surgery. Called patient to make her aware of this information, understanding verbalized.

## 2021-01-08 ENCOUNTER — LAB (OUTPATIENT)
Dept: LAB | Facility: HOSPITAL | Age: 76
End: 2021-01-08

## 2021-01-08 DIAGNOSIS — Z01.818 PREOP TESTING: Primary | ICD-10-CM

## 2021-01-08 PROCEDURE — C9803 HOPD COVID-19 SPEC COLLECT: HCPCS

## 2021-01-08 PROCEDURE — U0004 COV-19 TEST NON-CDC HGH THRU: HCPCS

## 2021-01-09 LAB — SARS-COV-2 ORF1AB RESP QL NAA+PROBE: NOT DETECTED

## 2021-01-10 ENCOUNTER — ANESTHESIA EVENT (OUTPATIENT)
Dept: PERIOP | Facility: HOSPITAL | Age: 76
End: 2021-01-10

## 2021-01-11 ENCOUNTER — HOSPITAL ENCOUNTER (OUTPATIENT)
Dept: MAMMOGRAPHY | Facility: HOSPITAL | Age: 76
Discharge: HOME | End: 2021-01-11

## 2021-01-11 ENCOUNTER — HOSPITAL ENCOUNTER (OUTPATIENT)
Dept: NUCLEAR MEDICINE | Facility: HOSPITAL | Age: 76
Setting detail: HOSPITAL OUTPATIENT SURGERY
Discharge: HOME | End: 2021-01-11

## 2021-01-11 ENCOUNTER — HOSPITAL ENCOUNTER (OUTPATIENT)
Facility: HOSPITAL | Age: 76
Setting detail: HOSPITAL OUTPATIENT SURGERY
Discharge: HOME | End: 2021-01-11
Attending: SURGERY | Admitting: SURGERY

## 2021-01-11 ENCOUNTER — ANESTHESIA (OUTPATIENT)
Dept: PERIOP | Facility: HOSPITAL | Age: 76
End: 2021-01-11

## 2021-01-11 VITALS
DIASTOLIC BLOOD PRESSURE: 59 MMHG | HEART RATE: 91 BPM | TEMPERATURE: 97.4 F | WEIGHT: 144.18 LBS | OXYGEN SATURATION: 95 % | SYSTOLIC BLOOD PRESSURE: 127 MMHG | RESPIRATION RATE: 18 BRPM | BODY MASS INDEX: 26.53 KG/M2 | HEIGHT: 62 IN

## 2021-01-11 DIAGNOSIS — C50.111 MALIGNANT NEOPLASM OF CENTRAL PORTION OF RIGHT BREAST IN FEMALE, ESTROGEN RECEPTOR NEGATIVE (HCC): Primary | ICD-10-CM

## 2021-01-11 DIAGNOSIS — Z17.1 MALIGNANT NEOPLASM OF CENTRAL PORTION OF RIGHT BREAST IN FEMALE, ESTROGEN RECEPTOR NEGATIVE (HCC): ICD-10-CM

## 2021-01-11 DIAGNOSIS — Z17.1 MALIGNANT NEOPLASM OF CENTRAL PORTION OF RIGHT BREAST IN FEMALE, ESTROGEN RECEPTOR NEGATIVE (HCC): Primary | ICD-10-CM

## 2021-01-11 DIAGNOSIS — C50.111 MALIGNANT NEOPLASM OF CENTRAL PORTION OF RIGHT BREAST IN FEMALE, ESTROGEN RECEPTOR NEGATIVE (HCC): ICD-10-CM

## 2021-01-11 LAB
GLUCOSE BLDC GLUCOMTR-MCNC: 133 MG/DL (ref 70–130)
GLUCOSE BLDC GLUCOMTR-MCNC: 137 MG/DL (ref 70–130)

## 2021-01-11 PROCEDURE — 25010000002 FENTANYL CITRATE (PF) 100 MCG/2ML SOLUTION: Performed by: NURSE ANESTHETIST, CERTIFIED REGISTERED

## 2021-01-11 PROCEDURE — A9541 TC99M SULFUR COLLOID: HCPCS | Performed by: SURGERY

## 2021-01-11 PROCEDURE — 25010000002 PROPOFOL 10 MG/ML EMULSION: Performed by: NURSE ANESTHETIST, CERTIFIED REGISTERED

## 2021-01-11 PROCEDURE — 38500 BIOPSY/REMOVAL LYMPH NODES: CPT | Performed by: SURGERY

## 2021-01-11 PROCEDURE — 0 TECHNETIUM FILTERED SULFUR COLLOID: Performed by: SURGERY

## 2021-01-11 PROCEDURE — 25010000002 HYDROMORPHONE 1 MG/ML SOLUTION: Performed by: NURSE ANESTHETIST, CERTIFIED REGISTERED

## 2021-01-11 PROCEDURE — C1769 GUIDE WIRE: HCPCS

## 2021-01-11 PROCEDURE — 19301 PARTIAL MASTECTOMY: CPT | Performed by: SURGERY

## 2021-01-11 PROCEDURE — 38900 IO MAP OF SENT LYMPH NODE: CPT | Performed by: SURGERY

## 2021-01-11 PROCEDURE — 76098 X-RAY EXAM SURGICAL SPECIMEN: CPT | Performed by: SURGERY

## 2021-01-11 PROCEDURE — 88307 TISSUE EXAM BY PATHOLOGIST: CPT

## 2021-01-11 PROCEDURE — 25010000002 MIDAZOLAM PER 1 MG: Performed by: NURSE ANESTHETIST, CERTIFIED REGISTERED

## 2021-01-11 PROCEDURE — S0260 H&P FOR SURGERY: HCPCS | Performed by: SURGERY

## 2021-01-11 PROCEDURE — 25010000003 METHYLENE BLUE 50 MG/10ML SOLUTION: Performed by: SURGERY

## 2021-01-11 PROCEDURE — 38792 RA TRACER ID OF SENTINL NODE: CPT

## 2021-01-11 PROCEDURE — 19283 PERQ DEV BREAST 1ST STRTCTC: CPT | Performed by: SURGERY

## 2021-01-11 PROCEDURE — 82962 GLUCOSE BLOOD TEST: CPT

## 2021-01-11 PROCEDURE — 25010000002 PHENYLEPHRINE PER 1 ML: Performed by: NURSE ANESTHETIST, CERTIFIED REGISTERED

## 2021-01-11 PROCEDURE — 25010000002 ONDANSETRON PER 1 MG: Performed by: NURSE ANESTHETIST, CERTIFIED REGISTERED

## 2021-01-11 PROCEDURE — 25010000003 HEPARIN LOCK FLUSH PER 10 UNITS: Performed by: SURGERY

## 2021-01-11 PROCEDURE — 25010000002 CEFAZOLIN PER 500 MG: Performed by: SURGERY

## 2021-01-11 DEVICE — CLIP LIGAT VASC HORIZON TI SM/WD RD 6CT: Type: IMPLANTABLE DEVICE | Site: BREAST | Status: FUNCTIONAL

## 2021-01-11 RX ORDER — HEPARIN SODIUM (PORCINE) LOCK FLUSH IV SOLN 100 UNIT/ML 100 UNIT/ML
5 SOLUTION INTRAVENOUS AS NEEDED
Status: DISCONTINUED | OUTPATIENT
Start: 2021-01-11 | End: 2021-01-11 | Stop reason: HOSPADM

## 2021-01-11 RX ORDER — SODIUM CHLORIDE 0.9 % (FLUSH) 0.9 %
1-10 SYRINGE (ML) INJECTION AS NEEDED
Status: DISCONTINUED | OUTPATIENT
Start: 2021-01-11 | End: 2021-01-11 | Stop reason: HOSPADM

## 2021-01-11 RX ORDER — SODIUM CHLORIDE, SODIUM LACTATE, POTASSIUM CHLORIDE, CALCIUM CHLORIDE 600; 310; 30; 20 MG/100ML; MG/100ML; MG/100ML; MG/100ML
100 INJECTION, SOLUTION INTRAVENOUS CONTINUOUS
Status: DISCONTINUED | OUTPATIENT
Start: 2021-01-11 | End: 2021-01-11 | Stop reason: HOSPADM

## 2021-01-11 RX ORDER — ISOSULFAN BLUE 50 MG/5ML
INJECTION, SOLUTION SUBCUTANEOUS AS NEEDED
Status: DISCONTINUED | OUTPATIENT
Start: 2021-01-11 | End: 2021-01-11 | Stop reason: HOSPADM

## 2021-01-11 RX ORDER — LIDOCAINE HYDROCHLORIDE 20 MG/ML
INJECTION, SOLUTION INFILTRATION; PERINEURAL AS NEEDED
Status: DISCONTINUED | OUTPATIENT
Start: 2021-01-11 | End: 2021-01-11 | Stop reason: SURG

## 2021-01-11 RX ORDER — SODIUM CHLORIDE 0.9 % (FLUSH) 0.9 %
10 SYRINGE (ML) INJECTION AS NEEDED
Status: DISCONTINUED | OUTPATIENT
Start: 2021-01-11 | End: 2021-01-11 | Stop reason: HOSPADM

## 2021-01-11 RX ORDER — MIDAZOLAM HYDROCHLORIDE 1 MG/ML
INJECTION INTRAMUSCULAR; INTRAVENOUS AS NEEDED
Status: DISCONTINUED | OUTPATIENT
Start: 2021-01-11 | End: 2021-01-11 | Stop reason: SURG

## 2021-01-11 RX ORDER — BUPIVACAINE HCL/0.9 % NACL/PF 0.1 %
2 PLASTIC BAG, INJECTION (ML) EPIDURAL ONCE
Status: COMPLETED | OUTPATIENT
Start: 2021-01-11 | End: 2021-01-11

## 2021-01-11 RX ORDER — HYDROCODONE BITARTRATE AND ACETAMINOPHEN 7.5; 325 MG/1; MG/1
1 TABLET ORAL EVERY 6 HOURS PRN
Qty: 18 TABLET | Refills: 0 | Status: SHIPPED | OUTPATIENT
Start: 2021-01-11 | End: 2022-03-16

## 2021-01-11 RX ORDER — PROPOFOL 10 MG/ML
VIAL (ML) INTRAVENOUS AS NEEDED
Status: DISCONTINUED | OUTPATIENT
Start: 2021-01-11 | End: 2021-01-11 | Stop reason: SURG

## 2021-01-11 RX ORDER — FENTANYL CITRATE 50 UG/ML
INJECTION, SOLUTION INTRAMUSCULAR; INTRAVENOUS AS NEEDED
Status: DISCONTINUED | OUTPATIENT
Start: 2021-01-11 | End: 2021-01-11 | Stop reason: SURG

## 2021-01-11 RX ORDER — LIDOCAINE HYDROCHLORIDE AND EPINEPHRINE 10; 10 MG/ML; UG/ML
20 INJECTION, SOLUTION INFILTRATION; PERINEURAL ONCE
Status: COMPLETED | OUTPATIENT
Start: 2021-01-11 | End: 2021-01-11

## 2021-01-11 RX ORDER — SODIUM CHLORIDE 0.9 % (FLUSH) 0.9 %
3 SYRINGE (ML) INJECTION EVERY 12 HOURS SCHEDULED
Status: DISCONTINUED | OUTPATIENT
Start: 2021-01-11 | End: 2021-01-11 | Stop reason: HOSPADM

## 2021-01-11 RX ORDER — ONDANSETRON 2 MG/ML
INJECTION INTRAMUSCULAR; INTRAVENOUS AS NEEDED
Status: DISCONTINUED | OUTPATIENT
Start: 2021-01-11 | End: 2021-01-11 | Stop reason: SURG

## 2021-01-11 RX ADMIN — HEPARIN SODIUM (PORCINE) LOCK FLUSH IV SOLN 100 UNIT/ML 500 UNITS: 100 SOLUTION at 14:50

## 2021-01-11 RX ADMIN — MIDAZOLAM HYDROCHLORIDE 1 MG: 2 INJECTION, SOLUTION INTRAMUSCULAR; INTRAVENOUS at 09:58

## 2021-01-11 RX ADMIN — FENTANYL CITRATE 50 MCG: 50 INJECTION, SOLUTION INTRAMUSCULAR; INTRAVENOUS at 11:13

## 2021-01-11 RX ADMIN — PHENYLEPHRINE HYDROCHLORIDE 100 MCG: 10 INJECTION INTRAVENOUS at 10:20

## 2021-01-11 RX ADMIN — PROPOFOL 100 MG: 10 INJECTION, EMULSION INTRAVENOUS at 10:05

## 2021-01-11 RX ADMIN — Medication 2 G: at 10:10

## 2021-01-11 RX ADMIN — FENTANYL CITRATE 50 MCG: 50 INJECTION, SOLUTION INTRAMUSCULAR; INTRAVENOUS at 10:02

## 2021-01-11 RX ADMIN — SODIUM CHLORIDE, POTASSIUM CHLORIDE, SODIUM LACTATE AND CALCIUM CHLORIDE 100 ML/HR: 600; 310; 30; 20 INJECTION, SOLUTION INTRAVENOUS at 13:02

## 2021-01-11 RX ADMIN — HYDROMORPHONE HYDROCHLORIDE 0.5 MG: 1 INJECTION, SOLUTION INTRAMUSCULAR; INTRAVENOUS; SUBCUTANEOUS at 13:00

## 2021-01-11 RX ADMIN — FENTANYL CITRATE 50 MCG: 50 INJECTION, SOLUTION INTRAMUSCULAR; INTRAVENOUS at 10:39

## 2021-01-11 RX ADMIN — PROPOFOL 30 MG: 10 INJECTION, EMULSION INTRAVENOUS at 10:39

## 2021-01-11 RX ADMIN — LIDOCAINE HYDROCHLORIDE,EPINEPHRINE BITARTRATE 20 ML: 10; .01 INJECTION, SOLUTION INFILTRATION; PERINEURAL at 08:03

## 2021-01-11 RX ADMIN — FENTANYL CITRATE 25 MCG: 50 INJECTION, SOLUTION INTRAMUSCULAR; INTRAVENOUS at 11:30

## 2021-01-11 RX ADMIN — PROPOFOL 30 MG: 10 INJECTION, EMULSION INTRAVENOUS at 10:14

## 2021-01-11 RX ADMIN — FENTANYL CITRATE 50 MCG: 50 INJECTION, SOLUTION INTRAMUSCULAR; INTRAVENOUS at 10:50

## 2021-01-11 RX ADMIN — SODIUM CHLORIDE, POTASSIUM CHLORIDE, SODIUM LACTATE AND CALCIUM CHLORIDE 100 ML/HR: 600; 310; 30; 20 INJECTION, SOLUTION INTRAVENOUS at 08:47

## 2021-01-11 RX ADMIN — LIDOCAINE HYDROCHLORIDE 50 MG: 20 INJECTION, SOLUTION INFILTRATION; PERINEURAL at 10:05

## 2021-01-11 RX ADMIN — HYDROMORPHONE HYDROCHLORIDE 0.5 MG: 1 INJECTION, SOLUTION INTRAMUSCULAR; INTRAVENOUS; SUBCUTANEOUS at 12:50

## 2021-01-11 RX ADMIN — ONDANSETRON 4 MG: 2 INJECTION INTRAMUSCULAR; INTRAVENOUS at 11:39

## 2021-01-11 RX ADMIN — TECHNETIUM TC 99M SULFUR COLLOID 1 DOSE: KIT at 07:35

## 2021-01-11 NOTE — INTERVAL H&P NOTE
H&P reviewed. The patient was examined and there are no changes to the H&P.      Temp:  [97.1 °F (36.2 °C)] 97.1 °F (36.2 °C)  Heart Rate:  [89] 89  Resp:  [15] 15  BP: (151)/(66) 151/66

## 2021-01-11 NOTE — ANESTHESIA PROCEDURE NOTES
Airway  Urgency: elective    Date/Time: 1/11/2021 10:07 AM  Airway not difficult    General Information and Staff    Patient location during procedure: OR  CRNA: Franky Pineda CRNA    Indications and Patient Condition  Indications for airway management: airway protection    Preoxygenated: yes  MILS maintained throughout  Mask difficulty assessment: 0 - not attempted    Final Airway Details  Final airway type: supraglottic airway      Successful airway: I-gel  Size 4    Number of attempts at approach: 1  Assessment: lips, teeth, and gum same as pre-op

## 2021-01-11 NOTE — BRIEF OP NOTE
BREAST LUMPECTOMY WITH SENTINEL NODE BIOPSY AND NEEDLE LOCALIZATION  Progress Note    Gale Cabral  1/11/2021    Pre-op Diagnosis:   Malignant neoplasm of central portion of right breast in female, estrogen receptor negative (CMS/HCC) [C50.111, Z17.1]       Post-Op Diagnosis Codes:     * Malignant neoplasm of central portion of right breast in female, estrogen receptor negative (CMS/HCC) [C50.111, Z17.1]    Procedure:  1.  Needle localization of right breast tumor (dictated separately)   2.  Right breast lumpectomy   3.  X-ray examination of the surgical specimen   4.  [place] lymph node mapping and biopsy of the right axilla   5.  Flap closure of the right breast excision site with tissue 5 cm x 5 cm x 3 cm superior and inferior to the incision     Surgeon(s):  Duke Alexandra MD    Anesthesia: General    Staff:   Circulator: Abbie Og RN; Belén Fontana RN; Danica Nolan RN  Scrub Person: Ángel Napoles; Brigitte Smart  Other: Tammy Minaya         Estimated Blood Loss: minimal    Urine Voided: * No values recorded between 1/11/2021  9:57 AM and 1/11/2021 12:20 PM *    Specimens:                Specimens     ID Source Type Tests Collected By Collected At Frozen?      A Breast, Right Tissue · TISSUE PATHOLOGY EXAM   Duke Alexandra MD 1/11/21 1100 No     Description: **[facility]**, **MD TO LOOK AT** - RIGHT BREAST CANCER 1000 POSITION, 4CM FROM NIPPLE    Comment: **[employer]**, **MD TO LOOK AT**  SPECIMEN OUT AT 1100    B [place] Lymph Node Tissue · TISSUE PATHOLOGY EXAM   Duke Alexandra MD 1/11/21 1130 No     Description: SLN RIGHT AXILLA                Drains: * No LDAs found *    Findings: Tumor completely excised as well as associated microcalcifications and mammotome clip.  [place] lymph nodes sent for permanent section    Complications: None       Tammy Minaya was responsible for performing the following activities: Retraction, Suction, Irrigation, Suturing, Closing and Placing  Dressing and their skilled assistance was necessary for the success of this case.    Duke Alexandra MD     Date: 1/11/2021  Time: 12:37 CST

## 2021-01-11 NOTE — DISCHARGE INSTRUCTIONS
Dr. Duke Alexandra  [address] [place] [address] [phone] (office)  [phone] (hospital)  [phone] (cell)    Right lumpectomy with sentinel lymph node mapping and biopsy  Postoperative Discharge Instructions        1. Keep 6 inch ace wrap in place and dressings dry postoperatively until seen in the office.  Keep all dressings dry until seen in the office in 48 hours.  2. A responsible adult should accompany the patient on discharge and for 24 hours following surgery.  3. No heavy lifting (>5 lbs) or strenuous upper arm activity, and no raising of arms over the head until followup appointment.  4. For 24 hours postoperatively, or while taking pain or nausea medications - Do not drive, operate machinery or drink alcohol.  5. Call for any of the following symptoms:    Persistent bleeding   Excessive bruising or swelling   Excessive sleepiness or trouble breathing   Severe pain   Persistent nausea or vomiting   Fever greater than 101.      General Anesthesia, Adult, Care After  This sheet gives you information about how to care for yourself after your procedure. Your health care provider may also give you more specific instructions. If you have problems or questions, contact your health care provider.  What can I expect after the procedure?  After the procedure, the following side effects are common:  · Pain or discomfort at the IV site.  · Nausea.  · Vomiting.  · Sore throat.  · Trouble concentrating.  · Feeling cold or chills.  · Weak or tired.  · Sleepiness and fatigue.  · Soreness and body aches. These side effects can affect parts of the body that were not involved in surgery.  Follow these instructions at home:    For at least 24 hours after the procedure:  · Have a responsible adult stay with you. It is important to have someone help care for you until you are awake and alert.  · Rest as needed.  · Do not:  ? Participate in activities in which you could fall or become  injured.  ? Drive.  ? Use heavy machinery.  ? Drink alcohol.  ? Take sleeping pills or medicines that cause drowsiness.  ? Make important decisions or sign legal documents.  ? Take care of children on your own.  Eating and drinking  · Follow any instructions from your health care provider about eating or drinking restrictions.  · When you feel hungry, start by eating small amounts of foods that are soft and easy to digest (bland), such as toast. Gradually return to your regular diet.  · Drink enough fluid to keep your urine pale yellow.  · If you vomit, rehydrate by drinking water, juice, or clear broth.  General instructions  · If you have sleep apnea, surgery and certain medicines can increase your risk for breathing problems. Follow instructions from your health care provider about wearing your sleep device:  ? Anytime you are sleeping, including during daytime naps.  ? While taking prescription pain medicines, sleeping medicines, or medicines that make you drowsy.  · Return to your normal activities as told by your health care provider. Ask your health care provider what activities are safe for you.  · Take over-the-counter and prescription medicines only as told by your health care provider.  · If you smoke, do not smoke without supervision.  · Keep all follow-up visits as told by your health care provider. This is important.  Contact a health care provider if:  · You have nausea or vomiting that does not get better with medicine.  · You cannot eat or drink without vomiting.  · You have pain that does not get better with medicine.  · You are unable to pass urine.  · You develop a skin rash.  · You have a fever.  · You have redness around your IV site that gets worse.  Get help right away if:  · You have difficulty breathing.  · You have chest pain.  · You have blood in your urine or stool, or you vomit blood.  Summary  · After the procedure, it is common to have a sore throat or nausea. It is also common to  feel tired.  · Have a responsible adult stay with you for the first 24 hours after general anesthesia. It is important to have someone help care for you until you are awake and alert.  · When you feel hungry, start by eating small amounts of foods that are soft and easy to digest (bland), such as toast. Gradually return to your regular diet.  · Drink enough fluid to keep your urine pale yellow.  · Return to your normal activities as told by your health care provider. Ask your health care provider what activities are safe for you.  This information is not intended to replace advice given to you by your health care provider. Make sure you discuss any questions you have with your health care provider.  Document Revised: 12/21/2018 Document Reviewed: 08/03/2018  Elsevier Patient Education © 2020 [employer]

## 2021-01-11 NOTE — OP NOTE
BREAST LUMPECTOMY WITH SENTINEL NODE BIOPSY AND NEEDLE LOCALIZATION  Procedure Note    Gale Cabral  1/11/2021    PREOPERATIVE DIAGNOSIS:  Carcinoma of the right breast 10 o'clock position 4 cm from the nipple.     POSTOPERATIVE DIAGNOSIS:  Same    PROCEDURES:     1. Stereotactic wire localization of tumor (dictated separately)   2. Excision of breast cancer 10:00 o'clock position 4 cm from the nipple              3. Superior and inferior breast flap closure 5 cm x 5 cm x 3 cm in size   4. [place] lymph node mapping and biopsy right axilla   5. X-ray examination of the surgical specimen.    ANESTHESIA: General    STAFF:   Circulator: Abbie Og RN; Belén Fontana, KATERYNA; Danica Nolan RN  Scrub Person: Ángel Napoles; Brigitte Smart  Other: Tammy Minaya was responsible for performing the following activities: Retraction, Suction, Irrigation, Suturing, Closing and Placing Dressing and their skilled assistance was necessary for the success of this case.     ESTIMATED BLOOD LOSS: minimal    SPECIMENS:                ID Type Source Tests Collected by Time   A : **FAXIGRAM**, **MD TO LOOK AT** - RIGHT BREAST CANCER 1000 POSITION, 4CM FROM NIPPLE Tissue Breast, Right TISSUE PATHOLOGY EXAM Duke Alexandra MD 1/11/2021 1100   B : SLN RIGHT AXILLA Tissue [place] Lymph Node TISSUE PATHOLOGY EXAM Duke Alexandra MD 1/11/2021 1130         DRAINS: * No LDAs found *    FINDINGS:  Tumor completely excised. [place] lymph nodes discovered and sent for permanent section.     COMPLICATIONS: None    INDICATION:  This woman is 75 years old and has a biopsy proven breast cancer in the quadrant of the right breast.  As this was a triple negative tumor, she underwent preoperative neoadjuvant chemotherapy.  Her surgery was delayed because she developed perforated diverticulitis at the end of her chemotherapy treatment and required urgent surgery with colostomy.  She was taken to the operating room for  lumpectomy with sentinel lymph node mapping and biopsy.    DESCRIPTION: Following stereotactic localization of the tumor, sentinel lymph node mapping and biopsy was performed. The subdermal space was injected with 1 mL of radionucleotide tracer. She was then brought to the operating room and placed supine on the operating table. After adequate general anesthesia by an LMA, the right breast, chest wall, and axilla were prepped and draped in a sterile manner.  A briefing and timeout were performed and all parties were in agreement.    The area around the tumor was further injected with 1 cc of Lymphazurin in the subdermal space.    Ultrasound was then performed of the right breast to localize the position of the end of the wire.    A circumareolar incision was made using first a 42 nipple cutter and then cutting 1/2 cm around this with a 15 blade knife.  The resulting small skin island was then dissected free of the underlying tissue in the dermal layer.     The wire was traced into the breast tissue and the area localized with a wire and with blue dye was completely excised using electrocautery.  Bleeding was well controlled.      The sentinel lymph node tissue was found in the axilla through the incision using a gamma probe.  All tissue with the highest radioactivity of 350 as well as all tissue with 10% of the highest activity was resected using electrocautery.  The sentinel lymph nodes were sent for permanent section.      Superior and inferior breast flaps were then raised 5 cm x 5 cm x 3 cm by resecting the breast tissue off the pectoralis fascia.  This created 2 large flaps which could be reapproximated in the midline with interrupted 2-0 Vicryl sutures.  The more superior breast tissue was reapproximated with interrupted 3-0 Vicryl sutures, and the skin closed with interrupted 3-0 subcutaneous Vicryl sutures, and a continuous 4-0 subcuticular Vicryl suture on skin around the nipple.    X-ray examination of the  surgical specimen demonstrated that the area had been completely excised including the previously placed the mammotome clip as well as surrounding calcifications.  Examination of the specimen by the pathologist demonstrated no residual tumor and a grossly negative margin.    The procedure was terminated. She tolerated it well. Sponge and needle counts were correct, and she was transferred to PACU in satisfactory condition.                  This document has been electronically signed by Duke Alexandra MD on January 11, 2021 12:21 CST      Date: 1/11/2021  Time: 12:21 CST

## 2021-01-11 NOTE — ANESTHESIA PREPROCEDURE EVALUATION
Anesthesia Evaluation     no history of anesthetic complications:  NPO Solid Status: > 8 hours  NPO Liquid Status: > 2 hours           Airway   Mallampati: II  TM distance: >3 FB  Neck ROM: full  Possible difficult intubation and Small opening  Dental    (+) poor dentition        Pulmonary    (+) asthma,sleep apnea on CPAP, decreased breath sounds,   (-) not a smoker, no home oxygen  Cardiovascular   Exercise tolerance: poor (<4 METS)    ECG reviewed  Rhythm: regular  Rate: normal    (+) hypertension well controlled 2 medications or greater, murmur, hyperlipidemia,   (-) valvular problems/murmurs, dysrhythmias, angina, cardiac stents, DVT    ROS comment: Sinus rhythm with 1st degree AV block  Left axis deviation  Left ventricular hypertrophy with QRS widening  Cannot rule out Septal infarct , age undetermined  Abnormal ECG  No previous ECGs available  Confirmed by [facility],    Neuro/Psych  (+) psychiatric history Anxiety and Depression,     (-) seizures, TIA, CVA, headaches, weakness, numbness  GI/Hepatic/Renal/Endo    (+)   diabetes mellitus (glu 133) type 2 well controlled, thyroid problem hypothyroidism  (-) GERD, hepatitis, liver disease, no renal disease    Musculoskeletal     Abdominal    Substance History   (-) alcohol use, drug use     OB/GYN          Other   blood dyscrasia,   history of cancer (breast CA) active    ROS/Med Hx Other: Thrombocytosis and surgeon aware                  Anesthesia Plan    ASA 3     general   (2nd IV and possible arterial line since loud murmur that patient is unaware of  lundberg)  intravenous induction     Anesthetic plan, all risks, benefits, and alternatives have been provided, discussed and informed consent has been obtained with: patient.

## 2021-01-11 NOTE — ANESTHESIA PROCEDURE NOTES
Arterial Line      Patient location during procedure: OR   Line placed for hemodynamic monitoring.  Performed By   CRNA: Franky Pineda CRNA  Preanesthetic Checklist  Completed: patient identified, site marked, surgical consent, pre-op evaluation, timeout performed, IV checked, risks and benefits discussed and monitors and equipment checked  Arterial Line Prep   Sterile Tech: cap, gloves, gown and mask  Prep: ChloraPrep and alcohol swabs  Patient monitoring: blood pressure monitoring, continuous pulse oximetry and EKG  Arterial Line Procedure   Laterality:left  Location:  radial artery  Catheter size: 20 G   Guidance: palpation technique  Number of attempts: 1  Successful placement: yes  Post Assessment   Dressing Type: secured with tape and occlusive dressing applied.   Complications no  Circ/Move/Sens Assessment: normal.   Patient Tolerance: patient tolerated the procedure well with no apparent complications

## 2021-01-11 NOTE — ANESTHESIA POSTPROCEDURE EVALUATION
Patient: Gale Cabral    Procedure Summary     Date: 01/11/21 Room / Location: [facility] OR 09 /  [facility] OR    Anesthesia Start: 0958 Anesthesia Stop: 1219    Procedure: RIGHT BREAST LUMPECTOMY WITH SENTINEL NODE BIOPSY AND NEEDLE LOCALIZATION                     (injection @07:00 a.m. / nl done @ 07:00 a.m. in mammo. room) (Right Breast) Diagnosis:       Malignant neoplasm of central portion of right breast in female, estrogen receptor negative (CMS/HCC)      (Malignant neoplasm of central portion of right breast in female, estrogen receptor negative (CMS/HCC) [C50.111, Z17.1])    Surgeon: Duke Alexandra MD Provider: Memo Lyman MD    Anesthesia Type: general ASA Status: 3          Anesthesia Type: general    Vitals  No vitals data found for the desired time range.          Post Anesthesia Care and Evaluation    Patient location during evaluation: PACU  Patient participation: waiting for patient participation  Level of consciousness: responsive to verbal stimuli  Pain management: adequate  Airway patency: patent  Anesthetic complications: No anesthetic complications    Cardiovascular status: acceptable  Respiratory status: acceptable  Hydration status: acceptable    Comments: ---------------------------               01/11/21                      1221         ---------------------------   BP:          151/66         Pulse:         89           Resp:          15           Temp:   97.1 °F (36.2 °C)   SpO2:          99%         ---------------------------

## 2021-01-13 ENCOUNTER — OFFICE VISIT (OUTPATIENT)
Dept: SURGERY | Facility: CLINIC | Age: 76
End: 2021-01-13

## 2021-01-13 VITALS
SYSTOLIC BLOOD PRESSURE: 126 MMHG | OXYGEN SATURATION: 99 % | HEART RATE: 92 BPM | WEIGHT: 151 LBS | BODY MASS INDEX: 27.79 KG/M2 | DIASTOLIC BLOOD PRESSURE: 60 MMHG | TEMPERATURE: 92 F | HEIGHT: 62 IN

## 2021-01-13 DIAGNOSIS — C50.111 MALIGNANT NEOPLASM OF CENTRAL PORTION OF RIGHT BREAST IN FEMALE, ESTROGEN RECEPTOR NEGATIVE (HCC): Primary | ICD-10-CM

## 2021-01-13 DIAGNOSIS — Z17.1 MALIGNANT NEOPLASM OF CENTRAL PORTION OF RIGHT BREAST IN FEMALE, ESTROGEN RECEPTOR NEGATIVE (HCC): Primary | ICD-10-CM

## 2021-01-13 PROCEDURE — 99024 POSTOP FOLLOW-UP VISIT: CPT | Performed by: NURSE PRACTITIONER

## 2021-01-13 NOTE — PATIENT INSTRUCTIONS
"BMI for Adults  What is BMI?  Body mass index (BMI) is a number that is calculated from a person's weight and height. BMI can help estimate how much of a person's weight is composed of fat. BMI does not measure body fat directly. Rather, it is an alternative to procedures that directly measure body fat, which can be difficult and expensive.  BMI can help identify people who may be at higher risk for certain medical problems.  What are BMI measurements used for?  BMI is used as a screening tool to identify possible weight problems. It helps determine whether a person is obese, overweight, a healthy weight, or underweight.  BMI is useful for:  · Identifying a weight problem that may be related to a medical condition or may increase the risk for medical problems.  · Promoting changes, such as changes in diet and exercise, to help reach a healthy weight. BMI screening can be repeated to see if these changes are working.  How is BMI calculated?  BMI involves measuring your weight in relation to your height. Both height and weight are measured, and the BMI is calculated from those numbers. This can be done either in English (U[place] or metric measurements. Note that charts and online BMI calculators are available to help you find your BMI quickly and easily without having to do these calculations yourself.  To calculate your BMI in English (U.S[place] measurements:    1. Measure your weight in pounds (lb).  2. Multiply the number of pounds by 703.  ? For example, for a person who weighs 180 lb, multiply that number by 703, which equals 126,540.  3. Measure your height in inches. Then multiply that number by itself to get a measurement called \"inches squared.\"  ? For example, for a person who is 70 inches tall, the \"inches squared\" measurement is 70 inches x 70 inches, which equals 4,900 inches squared.  4. Divide the total from step 2 (number of lb x 703) by the total from step 3 (inches squared): 126,540 ÷ 4,900 = 25.8. This is " "your BMI.  To calculate your BMI in metric measurements:  1. Measure your weight in kilograms (kg).  2. Measure your height in meters (m). Then multiply that number by itself to get a measurement called \"meters squared.\"  ? For example, for a person who is 1.75 m tall, the \"meters squared\" measurement is 1.75 m x 1.75 m, which is equal to 3.1 meters squared.  3. Divide the number of kilograms (your weight) by the meters squared number. In this example: 70 ÷ 3.1 = 22.6. This is your BMI.  What do the results mean?  BMI charts are used to identify whether you are underweight, normal weight, overweight, or obese. The following guidelines will be used:  · Underweight: BMI less than 18.5.  · Normal weight: BMI between 18.5 and 24.9.  · Overweight: BMI between 25 and 29.9.  · Obese: BMI of 30 or above.  Keep these notes in mind:  · Weight includes both fat and muscle, so someone with a muscular build, such as an athlete, may have a BMI that is higher than 24.9. In cases like these, BMI is not an accurate measure of body fat.  · To determine if excess body fat is the cause of a BMI of 25 or higher, further assessments may need to be done by a health care provider.  · BMI is usually interpreted in the same way for men and women.  Where to find more information  For more information about BMI, including tools to quickly calculate your BMI, go to these websites:  · Centers for Disease Control and Prevention: www.cdc.gov  · American Heart Association: www.heart.org  · National Heart, Lung, and Blood [place]: www.nhlbi.nih.gov  Summary  · Body mass index (BMI) is a number that is calculated from a person's weight and height.  · BMI may help estimate how much of a person's weight is composed of fat. BMI can help identify those who may be at higher risk for certain medical problems.  · BMI can be measured using English measurements or metric measurements.  · BMI charts are used to identify whether you are underweight, normal " weight, overweight, or obese.  This information is not intended to replace advice given to you by your health care provider. Make sure you discuss any questions you have with your health care provider.  Document Revised: 09/09/2020 Document Reviewed: 07/17/2020  Elsevier Patient Education © 2020 Elsevier Inc.

## 2021-01-13 NOTE — PROGRESS NOTES
CHIEF COMPLAINT:   Chief Complaint   Patient presents with   • Post-op       HPI: This patient presents for a post-operative visit after undergoing a right lumpectomy with sentinel node biopsy. Patient reports no problems. Minimal pain. Minimal bruising. Denies fever or chills.      PATHOLOGY: Final pathology is in process    Physical Exam  Incisions healing with no infection, cellulitis or hematoma.      ASSESSMENT:    Diagnoses and all orders for this visit:    1. Malignant neoplasm of central portion of right breast in female, estrogen receptor negative (CMS/HCC) (Primary)        PLAN:    1. The patient will follow-up in 5 days to review pathology and discuss plan of treatement.    2. May shower.   3. Encouraged to wear bra for added support.                This document has been electronically signed by YOHANA Lugo on January 13, 2021 09:47 CST

## 2021-01-14 LAB
LAB AP CASE REPORT: NORMAL
LAB AP CLINICAL INFORMATION: NORMAL
PATH REPORT.FINAL DX SPEC: NORMAL

## 2021-01-15 ENCOUNTER — OFFICE VISIT (OUTPATIENT)
Dept: SURGERY | Facility: CLINIC | Age: 76
End: 2021-01-15

## 2021-01-15 VITALS
TEMPERATURE: 98.4 F | DIASTOLIC BLOOD PRESSURE: 56 MMHG | WEIGHT: 148 LBS | SYSTOLIC BLOOD PRESSURE: 130 MMHG | HEART RATE: 96 BPM | BODY MASS INDEX: 27.23 KG/M2 | HEIGHT: 62 IN | OXYGEN SATURATION: 98 %

## 2021-01-15 DIAGNOSIS — C50.911 INVASIVE DUCTAL CARCINOMA OF RIGHT BREAST (HCC): Primary | ICD-10-CM

## 2021-01-15 PROCEDURE — 99024 POSTOP FOLLOW-UP VISIT: CPT | Performed by: NURSE PRACTITIONER

## 2021-01-15 NOTE — PATIENT INSTRUCTIONS
"BMI for Adults  What is BMI?  Body mass index (BMI) is a number that is calculated from a person's weight and height. BMI can help estimate how much of a person's weight is composed of fat. BMI does not measure body fat directly. Rather, it is an alternative to procedures that directly measure body fat, which can be difficult and expensive.  BMI can help identify people who may be at higher risk for certain medical problems.  What are BMI measurements used for?  BMI is used as a screening tool to identify possible weight problems. It helps determine whether a person is obese, overweight, a healthy weight, or underweight.  BMI is useful for:  · Identifying a weight problem that may be related to a medical condition or may increase the risk for medical problems.  · Promoting changes, such as changes in diet and exercise, to help reach a healthy weight. BMI screening can be repeated to see if these changes are working.  How is BMI calculated?  BMI involves measuring your weight in relation to your height. Both height and weight are measured, and the BMI is calculated from those numbers. This can be done either in English (U.S[place] or metric measurements. Note that charts and online BMI calculators are available to help you find your BMI quickly and easily without having to do these calculations yourself.  To calculate your BMI in English ([place].S[place] measurements:    1. Measure your weight in pounds (lb).  2. Multiply the number of pounds by 703.  ? For example, for a person who weighs 180 lb, multiply that number by 703, which equals 126,540.  3. Measure your height in inches. Then multiply that number by itself to get a measurement called \"inches squared.\"  ? For example, for a person who is 70 inches tall, the \"inches squared\" measurement is 70 inches x 70 inches, which equals 4,900 inches squared.  4. Divide the total from step 2 (number of lb x 703) by the total from step 3 (inches squared): 126,540 ÷ 4,900 = 25.8. This is " "your BMI.  To calculate your BMI in metric measurements:  1. Measure your weight in kilograms (kg).  2. Measure your height in meters (m). Then multiply that number by itself to get a measurement called \"meters squared.\"  ? For example, for a person who is 1.75 m tall, the \"meters squared\" measurement is 1.75 m x 1.75 m, which is equal to 3.1 meters squared.  3. Divide the number of kilograms (your weight) by the meters squared number. In this example: 70 ÷ 3.1 = 22.6. This is your BMI.  What do the results mean?  BMI charts are used to identify whether you are underweight, normal weight, overweight, or obese. The following guidelines will be used:  · Underweight: BMI less than 18.5.  · Normal weight: BMI between 18.5 and 24.9.  · Overweight: BMI between 25 and 29.9.  · Obese: BMI of 30 or above.  Keep these notes in mind:  · Weight includes both fat and muscle, so someone with a muscular build, such as an athlete, may have a BMI that is higher than 24.9. In cases like these, BMI is not an accurate measure of body fat.  · To determine if excess body fat is the cause of a BMI of 25 or higher, further assessments may need to be done by a health care provider.  · BMI is usually interpreted in the same way for men and women.  Where to find more information  For more information about BMI, including tools to quickly calculate your BMI, go to these websites:  · Centers for Disease Control and Prevention: www.cdc.gov  · American Heart Association: www.heart.org  · National Heart, Lung, and Blood [place]: www.nhlbi.nih.gov  Summary  · Body mass index (BMI) is a number that is calculated from a person's weight and height.  · BMI may help estimate how much of a person's weight is composed of fat. BMI can help identify those who may be at higher risk for certain medical problems.  · BMI can be measured using English measurements or metric measurements.  · BMI charts are used to identify whether you are underweight, normal " weight, overweight, or obese.  This information is not intended to replace advice given to you by your health care provider. Make sure you discuss any questions you have with your health care provider.  Document Revised: 09/09/2020 Document Reviewed: 07/17/2020  Elsevier Patient Education © 2020 [employer] Inc.

## 2021-01-15 NOTE — PROGRESS NOTES
CHIEF COMPLAINT:   Chief Complaint   Patient presents with   • Post-op       HPI: This patient presents for a post-operative visit after undergoing a right lumpectomy with sentinel node biopsy. Patient reports no problems. Minimal pain. Minimal bruising. She denies fever or chills.      PATHOLOGY:       Pathology discussed with patient along with Dr. Alexandra. He states that an axillary dissection will be necessary in approximately one month after incision has more healing time. He suggests ultrasound of right axilla in 2 weeks.       Physical Exam  Incisions healing with no infection, cellulitis or hematoma.      ASSESSMENT:    Diagnoses and all orders for this visit:    1. Invasive ductal carcinoma of right breast (CMS/HCC) (Primary)  -     US Axilla Right; Future        PLAN:    1. The patient will follow-up in 2 weeks for axillary ultrasound per Dr. Alexandra request.  Will at that time evaluate patient for further surgical intervention.     2. May shower.   3. She has follow up arranged with medical oncology but may be postponed based upon Dr. Alexandra's decision with further surgical intervention.                This document has been electronically signed by YOHANA Lugo on January 15, 2021 15:06 CST

## 2021-02-01 ENCOUNTER — APPOINTMENT (OUTPATIENT)
Dept: ONCOLOGY | Facility: CLINIC | Age: 76
End: 2021-02-01

## 2021-02-01 ENCOUNTER — HOSPITAL ENCOUNTER (OUTPATIENT)
Dept: ULTRASOUND IMAGING | Facility: HOSPITAL | Age: 76
Discharge: HOME | End: 2021-02-01
Admitting: NURSE PRACTITIONER

## 2021-02-01 ENCOUNTER — OFFICE VISIT (OUTPATIENT)
Dept: SURGERY | Facility: CLINIC | Age: 76
End: 2021-02-01

## 2021-02-01 VITALS
HEART RATE: 88 BPM | DIASTOLIC BLOOD PRESSURE: 72 MMHG | SYSTOLIC BLOOD PRESSURE: 130 MMHG | HEIGHT: 62 IN | WEIGHT: 142.8 LBS | TEMPERATURE: 97 F | BODY MASS INDEX: 26.28 KG/M2

## 2021-02-01 DIAGNOSIS — C50.911 INVASIVE DUCTAL CARCINOMA OF RIGHT BREAST (HCC): ICD-10-CM

## 2021-02-01 DIAGNOSIS — C50.911 INVASIVE DUCTAL CARCINOMA OF RIGHT BREAST (HCC): Primary | ICD-10-CM

## 2021-02-01 PROCEDURE — 76882 US LMTD JT/FCL EVL NVASC XTR: CPT

## 2021-02-01 PROCEDURE — 99024 POSTOP FOLLOW-UP VISIT: CPT | Performed by: NURSE PRACTITIONER

## 2021-02-01 NOTE — PROGRESS NOTES
CHIEF COMPLAINT:   Chief Complaint   Patient presents with   • Follow-up     Right axilla U/S results       HPI: This patient presents for a post-operative visit after undergoing a left lumpectomy biopsy and sentinel node biopsy. She has been treated with neoadjuvant chemotherapy prior to lumpectomy.  Patient reports no problems. Minimal pain. Minimal bruising. She recently had ultrasound of right axilla as well that suggested enlarged but otherwise benign lymph node.       PATHOLOGY:         Physical Exam  Incisions healing with no infection, cellulitis or hematoma.     ASSESSMENT:    Diagnoses and all orders for this visit:    1. Invasive ductal carcinoma of right breast (CMS/HCC) (Primary)        PLAN:  1. The patient will follow-up in one week to schedule further axillary dissection per Dr. Alexandra.                      This document has been electronically signed by YOHANA Lugo on February 1, 2021 15:02 CST

## 2021-02-01 NOTE — PATIENT INSTRUCTIONS
"BMI for Adults  What is BMI?  Body mass index (BMI) is a number that is calculated from a person's weight and height. BMI can help estimate how much of a person's weight is composed of fat. BMI does not measure body fat directly. Rather, it is an alternative to procedures that directly measure body fat, which can be difficult and expensive.  BMI can help identify people who may be at higher risk for certain medical problems.  What are BMI measurements used for?  BMI is used as a screening tool to identify possible weight problems. It helps determine whether a person is obese, overweight, a healthy weight, or underweight.  BMI is useful for:  · Identifying a weight problem that may be related to a medical condition or may increase the risk for medical problems.  · Promoting changes, such as changes in diet and exercise, to help reach a healthy weight. BMI screening can be repeated to see if these changes are working.  How is BMI calculated?  BMI involves measuring your weight in relation to your height. Both height and weight are measured, and the BMI is calculated from those numbers. This can be done either in English (U[place] or metric measurements. Note that charts and online BMI calculators are available to help you find your BMI quickly and easily without having to do these calculations yourself.  To calculate your BMI in English (U[place] measurements:    1. Measure your weight in pounds (lb).  2. Multiply the number of pounds by 703.  ? For example, for a person who weighs 180 lb, multiply that number by 703, which equals 126,540.  3. Measure your height in inches. Then multiply that number by itself to get a measurement called \"inches squared.\"  ? For example, for a person who is 70 inches tall, the \"inches squared\" measurement is 70 inches x 70 inches, which equals 4,900 inches squared.  4. Divide the total from step 2 (number of lb x 703) by the total from step 3 (inches squared): 126,540 ÷ 4,900 = 25.8. This is " "your BMI.  To calculate your BMI in metric measurements:  1. Measure your weight in kilograms (kg).  2. Measure your height in meters (m). Then multiply that number by itself to get a measurement called \"meters squared.\"  ? For example, for a person who is 1.75 m tall, the \"meters squared\" measurement is 1.75 m x 1.75 m, which is equal to 3.1 meters squared.  3. Divide the number of kilograms (your weight) by the meters squared number. In this example: 70 ÷ 3.1 = 22.6. This is your BMI.  What do the results mean?  BMI charts are used to identify whether you are underweight, normal weight, overweight, or obese. The following guidelines will be used:  · Underweight: BMI less than 18.5.  · Normal weight: BMI between 18.5 and 24.9.  · Overweight: BMI between 25 and 29.9.  · Obese: BMI of 30 or above.  Keep these notes in mind:  · Weight includes both fat and muscle, so someone with a muscular build, such as an athlete, may have a BMI that is higher than 24.9. In cases like these, BMI is not an accurate measure of body fat.  · To determine if excess body fat is the cause of a BMI of 25 or higher, further assessments may need to be done by a health care provider.  · BMI is usually interpreted in the same way for men and women.  Where to find more information  For more information about BMI, including tools to quickly calculate your BMI, go to these websites:  · Centers for Disease Control and Prevention: www.cdc.gov  · American Heart Association: www.heart.org  · National Heart, Lung, and Blood [place]: www.nhlbi.nih.gov  Summary  · Body mass index (BMI) is a number that is calculated from a person's weight and height.  · BMI may help estimate how much of a person's weight is composed of fat. BMI can help identify those who may be at higher risk for certain medical problems.  · BMI can be measured using English measurements or metric measurements.  · BMI charts are used to identify whether you are underweight, normal " weight, overweight, or obese.  This information is not intended to replace advice given to you by your health care provider. Make sure you discuss any questions you have with your health care provider.  Document Revised: 09/09/2020 Document Reviewed: 07/17/2020  Elsevier Patient Education © 2020 [employer]

## 2021-02-07 NOTE — PROGRESS NOTES
CC. Breast cancer       HPI  Gale is 75 years old and underwent core needle breast biopsy right side in October demonstrated a triple negative breast cancer.  Initial size of the cancer was small, but it was elected to treat her with neoadjuvant chemotherapy prior to lumpectomy.  In December, however, prior to her last chemotherapy treatment, she developed perforated diverticulitis that was treated in [place], [place] with a laparoscopic Hurt's operation.  She states that she has been doing well since that time and presents today for possible consideration of colostomy closure for further treatment of breast cancer.  In speaking with Dr. Patterson, it was recommended that we proceed with lumpectomy and sentinel lymph node biopsy.  Lumpectomy removed the tumor with a negative margin. Despite reomoval of all radioactive tissue, no SLN could be found. Here for consideration of repeat SLN bx or axillary dissection.   Past Medical History:   Diagnosis Date   • Abnormal mammogram of right breast    • Acquired hypothyroidism     started synthroid 9/2015   • Allergic rhinitis, seasonal    • Breast cancer (CMS/HCC)    • Cervicalgia     right upper extremity radiculopathy   • Diabetes mellitus (CMS/HCC)    • Encounter for screening for malignant neoplasm of colon    • Essential hypertension    • Glaucoma    • Health maintenance examination     individual health exam   • Hypercholesterolemia    • Hyperglycemia     steroid-induced hyperglycemia in diabetic patient   • Lumbar disc disorder     w/right radiculopathy   • Malignant neoplasm of upper-outer quadrant of right breast in female, estrogen receptor positive (CMS/HCC) 2/9/2021    Added automatically from request for surgery [id number]   • Menopause    • Mild persistent asthma     resumed dulera twice daily   • Obstructive sleep apnea syndrome 9/17/2019   • Osteopenia     improved DEXA 2/2013   • 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   • Rupture of colon (CMS/HCC)    •  Sebaceous cyst     subepidermal cyst   • Shoulder pain     likely radicular pain due to cervical DJD   • Spasm of back muscles     right neck and trapezius   • Spinal stenosis of lumbar region    • Strain of neck muscle     cervical strain   • Temporomandibular joint disorder     h/o   • Vitamin D deficiency     on oral calcium/vitamin D supplement       Past Surgical History:   Procedure Laterality Date   • BREAST LUMPECTOMY WITH SENTINEL NODE BIOPSY Right 1/11/2021    Procedure: RIGHT BREAST LUMPECTOMY WITH SENTINEL NODE BIOPSY AND NEEDLE LOCALIZATION                     (injection @07:00 a.m. / nl done @ 07:00 a.m. in mammo. room);  Surgeon: Duke Alexandra MD;  Location: [facility] OR;  Service: General;  Laterality: Right;   • CHOLECYSTECTOMY     • COLONOSCOPY  12/17/2015    normal   • COLONOSCOPY N/A 11/19/2020    Procedure: COLONOSCOPY;  Surgeon: Devin Rene MD;  Location: [facility] ENDOSCOPY;  Service: Gastroenterology;  Laterality: N/A;   • COLOSTOMY     • ENDOSCOPY AND COLONOSCOPY  08/2010   • HYSTERECTOMY     • OOPHORECTOMY     • TONSILLECTOMY     • VENOUS ACCESS DEVICE (PORT) INSERTION Left 10/8/2020    Procedure: INSERTION VENOUS ACCESS DEVICE (MEDIPORT)         (c-arm#1);  Surgeon: Duke Alexandra MD;  Location: [facility] OR;  Service: General;  Laterality: Left;         Current Outpatient Medications:   •  albuterol sulfate HFA (PROAIR HFA) 108 (90 Base) MCG/ACT inhaler, Inhale 2 puffs 4 (Four) Times a Day As Needed for Wheezing., Disp: 8.5 inhaler, Rfl: 6  •  amLODIPine (NORVASC) 5 MG tablet, Take 1 tablet by mouth Every Night., Disp: 90 tablet, Rfl: 3  •  aspirin 81 MG EC tablet, Take 81 mg by mouth Every Night., Disp: , Rfl:   •  atorvastatin (LIPITOR) 20 MG tablet, Take 20 mg by mouth Every Night., Disp: , Rfl:   •  Blood Glucose Monitoring Suppl (ONE TOUCH ULTRA 2) W/DEVICE kit, , Disp: , Rfl:   •  Calcium Carbonate-Vitamin D 600-200 MG-UNIT tablet, Take 1 tablet by mouth Daily., Disp: , Rfl:   •   citalopram (CeleXA) 20 MG tablet, Take 10 mg by mouth Every Other Day., Disp: , Rfl:   •  Dapagliflozin Propanediol (Farxiga) 10 MG tablet, Take 10 mg by mouth Every Morning., Disp: 90 tablet, Rfl: 1  •  docusate sodium (COLACE) 100 MG capsule, Take 1 capsule by mouth 2 (Two) Times a Day As Needed for Constipation., Disp: 60 capsule, Rfl: 2  •  ferrous sulfate 325 (65 FE) MG tablet, Take 1 tablet by mouth Daily With Breakfast., Disp: 30 tablet, Rfl: 3  •  fluticasone (FLONASE) 50 MCG/ACT nasal spray, 2 sprays into each nostril As Needed for rhinitis., Disp: , Rfl:   •  folic acid (FOLVITE) 1 MG tablet, Take 1 tablet by mouth Daily., Disp: 90 tablet, Rfl: 1  •  glucose blood (ONE TOUCH ULTRA TEST) test strip, 1 each by Other route Daily. Check 1-2times daily  E11.9  90 day supply One Touch Verio, Disp: 150 each, Rfl: 3  •  HYDROcodone-acetaminophen (NORCO) 7.5-325 MG per tablet, Take 1 tablet by mouth Every 6 (Six) Hours As Needed for Moderate Pain  or Severe Pain  (Pain)., Disp: 18 tablet, Rfl: 0  •  LANCETS MICRO THIN 33G misc, Check once daily  E11.9  Trueplus, Disp: 100 each, Rfl: 3  •  latanoprost (XALATAN) 0.005 % ophthalmic solution, Administer 1 drop to both eyes Every Night., Disp: , Rfl:   •  levothyroxine (SYNTHROID, LEVOTHROID) 50 MCG tablet, Take 1 tablet by mouth Daily., Disp: 90 tablet, Rfl: 3  •  Loperamide HCl (IMODIUM PO), Take  by mouth., Disp: , Rfl:   •  losartan-hydrochlorothiazide (HYZAAR) 100-12.5 MG per tablet, TAKE 1 TABLET BY MOUTH DAILY, Disp: 90 tablet, Rfl: 3  •  metFORMIN (GLUCOPHAGE) 500 MG tablet, Take 500 mg by mouth 2 (Two) Times a Day With Meals., Disp: , Rfl:   •  mometasone-formoterol (DULERA 200) 200-5 MCG/ACT inhaler, Inhale 2 puffs 2 (Two) Times a Day., Disp: , Rfl:   •  montelukast (SINGULAIR) 10 MG tablet, Take 1 tablet by mouth Every Night., Disp: 90 tablet, Rfl: 3  •  ondansetron (ZOFRAN) 4 MG tablet, Take 1 tablet by mouth 4 (Four) Times a Day As Needed for Nausea or  Vomiting., Disp: 40 tablet, Rfl: 3    Allergies   Allergen Reactions   • Other Confusion     Coda-clear       Family History   Problem Relation Age of Onset   • Colon cancer Mother    • Heart attack Father    • Aneurysm Brother    • No Known Problems Son    • Alzheimer's disease Maternal Grandmother    • Heart attack Maternal Grandfather    • Alzheimer's disease Paternal Grandmother    • No Known Problems Son        Social History     Socioeconomic History   • Marital status:      Spouse name: Not on file   • Number of children: Not on file   • Years of education: Not on file   • Highest education level: Not on file   Tobacco Use   • Smoking status: Never Smoker   • Smokeless tobacco: Never Used   Substance and Sexual Activity   • Alcohol use: No   • Drug use: No   • Sexual activity: Defer       Review of Systems   Constitutional: Negative for appetite change, chills, fever and unexpected weight change.   HENT: Negative for hearing loss, nosebleeds and trouble swallowing.    Eyes: Negative for visual disturbance.   Respiratory: Negative for apnea, cough, choking, chest tightness, shortness of breath, wheezing and stridor.    Cardiovascular: Negative for chest pain, palpitations and leg swelling.   Gastrointestinal: Negative for abdominal distention, abdominal pain, blood in stool, constipation, diarrhea, nausea and vomiting.   Endocrine: Negative for cold intolerance, heat intolerance, polydipsia, polyphagia and polyuria.   Genitourinary: Negative for difficulty urinating, dysuria, frequency, hematuria and urgency.   Musculoskeletal: Negative for arthralgias, back pain, myalgias and neck pain.   Skin: Negative for color change, pallor and rash.   Allergic/Immunologic: Negative for immunocompromised state.   Neurological: Negative for dizziness, seizures, syncope, light-headedness, numbness and headaches.   Hematological: Negative for adenopathy.   Psychiatric/Behavioral: Negative for suicidal ideas. The  patient is not nervous/anxious.        Physical Exam  Vitals signs and nursing note reviewed.   Constitutional:       Appearance: Normal appearance.   HENT:      Head: Normocephalic and atraumatic.      Nose: Nose normal.   Eyes:      Extraocular Movements: Extraocular movements intact.      Pupils: Pupils are equal, round, and reactive to light.   Neck:      Musculoskeletal: Normal range of motion and neck supple.   Cardiovascular:      Rate and Rhythm: Normal rate and regular rhythm.   Pulmonary:      Effort: Pulmonary effort is normal. No respiratory distress.   Chest:      Breasts: Breasts are symmetrical.         Right: No inverted nipple, mass, nipple discharge, skin change or tenderness.         Left: No inverted nipple, mass, nipple discharge, skin change or tenderness.       Musculoskeletal: Normal range of motion.   Skin:     General: Skin is warm and dry.   Neurological:      Mental Status: She is alert.           ASSESSMENT    Diagnoses and all orders for this visit:    1. Malignant neoplasm of upper-outer quadrant of right breast in female, estrogen receptor positive (CMS/HCC) (Primary)  -     Case Request; Standing  -     ceFAZolin (ANCEF) 2 g in sodium chloride 0.9 % 100 mL IVPB  -     lactated ringers infusion  -     Case Request  -     NM sentinel node injection only; Future    Other orders  -     Follow Anesthesia Guidelines / Standing Orders; Future  -     Provide Chlorhexidine Skin Prep Wipes and Instructions; Future  -     Follow Anesthesia Guidelines / Standing Orders; Standing  -     Outpatient In A Bed; Standing  -     Obtain Informed Consent; Standing  -     Verify / Perform Chlorhexidine Skin Prep; Standing        PLAN    1.  Plan sentinel lymph node mapping and biopsy again to see whether or not lymph node can be found in the right axilla.  If he cannot, and axillary dissection will be necessary.    The following were discussed with the patient/family:    SLN bx:    What are the  indications that have led your doctor to the opinion that an operation is necessary?    Lymph nodes vary in size from the size of a pinhead to the size of an olive. They act as filters keeping bacteria from entering the blood stream. It is recommended in the treatment of certain cancers (breast, melanoma) that lymph nodes be sampled to determine if the cancer is spread. Dalhart lymph node biopsy is a technique that sample the first few lymph nodes draining the breast. A biopsy will remove all or part of the lymph node.    What, if any, alternative treatments are available for your condition?    Alternative to SLN biopsy is full axillary dissection.    What will be the likely result if you don't have the operation?    There would be a failure to adequately stage the cancer. Proper treatment may not be possible.    What are the basic procedures involved in the operation?    An injection of radioactive is given in the [facility]. Blue dye is given in  Surgery before operation. These dyes are used to localize the first lymph nodes. An incision is then made in the axilla, and all radioactive and blue tissue is removed. Any palpable lymph nodes are removed as well. The area will then be closed using dissolvable stitches and a dressing or bandage will be applied.    What are the risks?    • Injury to a blood vessel or excessive bleeding. This may require a blood transfusion.  • Infection, which may require the use of antibiotics. In rare cases, another surgical procedure may be necessary to remove the infection.  • Tobacco use, excessive alcohol use and obesity can increase the risk of any surgical procedure or general anesthetic. Any of these factors may substantially affect healing and can result in an increase of major complications including pneumonia, wound infection, blood clots in the legs and lungs, or death.  • The sentinel lymph node may not be found 5-7% of the time. This would require a full  axillary dissection. More than 2 positive sentinel lymph nodes may necessitate a full axillary dissection at a second procedure.  • Scarring after surgery at the incision site  • Possible injury to underlying structures such as a nerve or blood vessel  • Incomplete removal requiring re-excision (repeated surgery) of a lesion  • Removal of even a few lymph nodes may lead to lymphedema. The main symptom is swelling in an arm or leg that may be accompanied by pain or discomfort.  • Seroma or fluid collections requiring drainage.      Axillary dissection:  How is the operation expected to improve your health or quality of life?    Proper cancer staging allows proper treatment and the best chances of sure.    Is hospitalization necessary and, if so, how long can you expect to be hospitalized?    This procedure is performed on an outpatient basis.    What can you expect during your recovery period?    Recovery is usually rapid. Depending on the extent of surgery, a drain may be rarely placed. Home drain care will be instructed by the nursing staff post operatively. Because of the dye used, the urine may be green for a day or so.    When can you expect to resume normal activities?    Normal activity can be resumed in 1-2 weeks.    Are there likely to be residual effects from the operation?    Numbness and swelling may occur but usually resolve within several days.    The following were discussed with the patient/family:    What are the indications that have led your doctor to the opinion that an operation is necessary?    Lymph nodes vary in size from the size of a pinhead to the size of an olive. They act as filters keeping bacteria from entering the blood stream. It is recommended in the treatment of certain cancers that lymph nodes be removed to determine if the cancer is spread. If several sentinel lymph nodes are positive or cannot be found, axillary dissection is indicated.    What, if any, alternative treatments are  available for your condition?    There are no good alternatives to axillary dissection.    What will be the likely result if you don't have the operation?    There would be a failure to adequately stage the cancer. Proper treatment may not be possible.    What are the basic procedures involved in the operation?    An incision is then made in the axilla, and lymph nodes around the veins and chest wall are removed. Drains are placed.  The area is then be closed using dissolvable stitches and a dressing or bandage is applied.    What are the risks?    • Injury to a blood vessel or excessive bleeding. This may require a blood transfusion.  • Infection, which may require the use of antibiotics. In rare cases, another surgical procedure may be necessary to remove the infection.  • Tobacco use, excessive alcohol use and obesity can increase the risk of any surgical procedure or general anesthetic. Any of these factors may substantially affect healing and can result in an increase of major complications including pneumonia, wound infection, blood clots in the legs and lungs, or death.  • Scarring after surgery at the incision site  • Possible injury to underlying structures such as a nerve or blood vessel  • Removal of even a few lymph nodes may lead to lymphedema. The main symptom is swelling in an arm or leg that may be accompanied by pain or discomfort.  • Seroma or fluid collections requiring drainage.    How is the operation expected to improve your health or quality of life?    Proper cancer staging allows proper treatment and the best chances of sure.    Is hospitalization necessary and, if so, how long can you expect to be hospitalized?    This procedure is performed on an outpatient basis.    What can you expect during your recovery period?    Recovery is usually rapid. Home drain care will be instructed by the nursing staff post operatively.    When can you expect to resume normal activities?    Normal activity  can be resumed in 1-2 weeks.    Are there likely to be residual effects from the operation?    Numbness and swelling may occur but usually resolve within several days.      All questions were answered. The patient agrees to the procedure.  .              This document has been electronically signed by Duke Alexandra MD on February 9, 2021 13:25 CST

## 2021-02-08 ENCOUNTER — OFFICE VISIT (OUTPATIENT)
Dept: SURGERY | Facility: CLINIC | Age: 76
End: 2021-02-08

## 2021-02-08 VITALS
OXYGEN SATURATION: 97 % | SYSTOLIC BLOOD PRESSURE: 120 MMHG | DIASTOLIC BLOOD PRESSURE: 60 MMHG | WEIGHT: 143.8 LBS | BODY MASS INDEX: 26.46 KG/M2 | TEMPERATURE: 98.5 F | HEIGHT: 62 IN | HEART RATE: 100 BPM

## 2021-02-08 DIAGNOSIS — C50.411 MALIGNANT NEOPLASM OF UPPER-OUTER QUADRANT OF RIGHT BREAST IN FEMALE, ESTROGEN RECEPTOR POSITIVE (HCC): Primary | ICD-10-CM

## 2021-02-08 DIAGNOSIS — Z17.0 MALIGNANT NEOPLASM OF UPPER-OUTER QUADRANT OF RIGHT BREAST IN FEMALE, ESTROGEN RECEPTOR POSITIVE (HCC): Primary | ICD-10-CM

## 2021-02-08 PROBLEM — C50.919 BREAST CANCER: Status: ACTIVE | Noted: 2021-02-08

## 2021-02-08 PROCEDURE — 99024 POSTOP FOLLOW-UP VISIT: CPT | Performed by: SURGERY

## 2021-02-08 RX ORDER — BUPIVACAINE HCL/0.9 % NACL/PF 0.1 %
2 PLASTIC BAG, INJECTION (ML) EPIDURAL ONCE
Status: CANCELLED | OUTPATIENT
Start: 2021-02-25 | End: 2021-02-08

## 2021-02-08 RX ORDER — SODIUM CHLORIDE, SODIUM LACTATE, POTASSIUM CHLORIDE, CALCIUM CHLORIDE 600; 310; 30; 20 MG/100ML; MG/100ML; MG/100ML; MG/100ML
100 INJECTION, SOLUTION INTRAVENOUS CONTINUOUS
Status: CANCELLED | OUTPATIENT
Start: 2021-02-25

## 2021-02-09 ENCOUNTER — IMMUNIZATION (OUTPATIENT)
Dept: VACCINE CLINIC | Facility: HOSPITAL | Age: 76
End: 2021-02-09

## 2021-02-09 ENCOUNTER — APPOINTMENT (OUTPATIENT)
Dept: PREADMISSION TESTING | Facility: HOSPITAL | Age: 76
End: 2021-02-09

## 2021-02-09 VITALS
DIASTOLIC BLOOD PRESSURE: 68 MMHG | HEIGHT: 62 IN | SYSTOLIC BLOOD PRESSURE: 132 MMHG | RESPIRATION RATE: 16 BRPM | OXYGEN SATURATION: 98 % | WEIGHT: 145 LBS | BODY MASS INDEX: 26.68 KG/M2 | HEART RATE: 83 BPM

## 2021-02-09 PROBLEM — Z17.0 MALIGNANT NEOPLASM OF UPPER-OUTER QUADRANT OF RIGHT BREAST IN FEMALE, ESTROGEN RECEPTOR POSITIVE (HCC): Status: ACTIVE | Noted: 2021-02-09

## 2021-02-09 PROBLEM — C50.411 MALIGNANT NEOPLASM OF UPPER-OUTER QUADRANT OF RIGHT BREAST IN FEMALE, ESTROGEN RECEPTOR POSITIVE (HCC): Status: ACTIVE | Noted: 2021-02-09

## 2021-02-09 LAB
ANION GAP SERPL CALCULATED.3IONS-SCNC: 13 MMOL/L (ref 5–15)
BUN SERPL-MCNC: 14 MG/DL (ref 8–23)
BUN/CREAT SERPL: 16.9 (ref 7–25)
CALCIUM SPEC-SCNC: 10 MG/DL (ref 8.6–10.5)
CHLORIDE SERPL-SCNC: 103 MMOL/L (ref 98–107)
CO2 SERPL-SCNC: 21 MMOL/L (ref 22–29)
CREAT SERPL-MCNC: 0.83 MG/DL (ref 0.57–1)
GFR SERPL CREATININE-BSD FRML MDRD: 67 ML/MIN/1.73
GLUCOSE SERPL-MCNC: 99 MG/DL (ref 65–99)
POTASSIUM SERPL-SCNC: 4.4 MMOL/L (ref 3.5–5.2)
SODIUM SERPL-SCNC: 137 MMOL/L (ref 136–145)

## 2021-02-09 PROCEDURE — 0001A: CPT | Performed by: THORACIC SURGERY (CARDIOTHORACIC VASCULAR SURGERY)

## 2021-02-09 PROCEDURE — 91300 HC SARSCOV02 VAC 30MCG/0.3ML IM: CPT | Performed by: THORACIC SURGERY (CARDIOTHORACIC VASCULAR SURGERY)

## 2021-02-09 PROCEDURE — 80048 BASIC METABOLIC PNL TOTAL CA: CPT

## 2021-02-09 PROCEDURE — 36415 COLL VENOUS BLD VENIPUNCTURE: CPT

## 2021-02-09 NOTE — PAT
Chlorhexidine scrub given with instruction sheet. Instructions reviewed in PAT, understanding verbalized.    EKG has previously been reviewed by anesthesia with patient's prior procedure.

## 2021-02-18 ENCOUNTER — APPOINTMENT (OUTPATIENT)
Dept: NUCLEAR MEDICINE | Facility: HOSPITAL | Age: 76
End: 2021-02-18

## 2021-02-22 ENCOUNTER — APPOINTMENT (OUTPATIENT)
Dept: ONCOLOGY | Facility: CLINIC | Age: 76
End: 2021-02-22

## 2021-02-22 ENCOUNTER — LAB (OUTPATIENT)
Dept: LAB | Facility: OTHER | Age: 76
End: 2021-02-22

## 2021-02-22 ENCOUNTER — LAB (OUTPATIENT)
Dept: LAB | Facility: HOSPITAL | Age: 76
End: 2021-02-22

## 2021-02-22 DIAGNOSIS — E55.9 VITAMIN D DEFICIENCY: Chronic | ICD-10-CM

## 2021-02-22 DIAGNOSIS — E03.9 ACQUIRED HYPOTHYROIDISM: Chronic | ICD-10-CM

## 2021-02-22 DIAGNOSIS — I10 ESSENTIAL HYPERTENSION: Chronic | ICD-10-CM

## 2021-02-22 DIAGNOSIS — Z01.818 PREOP TESTING: ICD-10-CM

## 2021-02-22 DIAGNOSIS — E11.9 TYPE 2 DIABETES MELLITUS WITHOUT COMPLICATION, WITHOUT LONG-TERM CURRENT USE OF INSULIN (HCC): ICD-10-CM

## 2021-02-22 DIAGNOSIS — E78.00 HYPERCHOLESTEROLEMIA: Chronic | ICD-10-CM

## 2021-02-22 LAB
25(OH)D3 SERPL-MCNC: 31.2 NG/ML
ALBUMIN SERPL-MCNC: 4.2 G/DL (ref 3.5–5)
ALBUMIN/GLOB SERPL: 1.2 G/DL (ref 1.1–1.8)
ALP SERPL-CCNC: 74 U/L (ref 38–126)
ALT SERPL W P-5'-P-CCNC: 20 U/L
ANION GAP SERPL CALCULATED.3IONS-SCNC: 10 MMOL/L (ref 5–15)
ANISOCYTOSIS BLD QL: ABNORMAL
AST SERPL-CCNC: 28 U/L (ref 14–36)
BILIRUB SERPL-MCNC: 0.3 MG/DL (ref 0.2–1.3)
BUN SERPL-MCNC: 18 MG/DL (ref 7–23)
BUN/CREAT SERPL: 19.8 (ref 7–25)
CALCIUM SPEC-SCNC: 10.3 MG/DL (ref 8.4–10.2)
CHLORIDE SERPL-SCNC: 105 MMOL/L (ref 101–112)
CHOLEST SERPL-MCNC: 190 MG/DL (ref 150–200)
CO2 SERPL-SCNC: 25 MMOL/L (ref 22–30)
CREAT SERPL-MCNC: 0.91 MG/DL (ref 0.52–1.04)
DEPRECATED RDW RBC AUTO: 55.8 FL (ref 37–54)
EOSINOPHIL # BLD MANUAL: 0.4 10*3/MM3 (ref 0–0.4)
EOSINOPHIL NFR BLD MANUAL: 5 % (ref 0.3–6.2)
ERYTHROCYTE [DISTWIDTH] IN BLOOD BY AUTOMATED COUNT: 18.7 % (ref 12.3–15.4)
GFR SERPL CREATININE-BSD FRML MDRD: 61 ML/MIN/1.73 (ref 39–90)
GLOBULIN UR ELPH-MCNC: 3.5 GM/DL (ref 2.3–3.5)
GLUCOSE SERPL-MCNC: 127 MG/DL (ref 70–99)
HBA1C MFR BLD: 6.29 % (ref 4.8–5.6)
HCT VFR BLD AUTO: 37.1 % (ref 34–46.6)
HDLC SERPL-MCNC: 34 MG/DL (ref 40–59)
HGB BLD-MCNC: 10.6 G/DL (ref 12–15.9)
HYPOCHROMIA BLD QL: ABNORMAL
LDLC SERPL CALC-MCNC: 113 MG/DL
LDLC/HDLC SERPL: 3.15 {RATIO} (ref 0–3.22)
LYMPHOCYTES # BLD MANUAL: 1.45 10*3/MM3 (ref 0.7–3.1)
LYMPHOCYTES NFR BLD MANUAL: 18 % (ref 19.6–45.3)
LYMPHOCYTES NFR BLD MANUAL: 8 % (ref 5–12)
MCH RBC QN AUTO: 23.8 PG (ref 26.6–33)
MCHC RBC AUTO-ENTMCNC: 28.6 G/DL (ref 31.5–35.7)
MCV RBC AUTO: 83.2 FL (ref 79–97)
MONOCYTES # BLD AUTO: 0.64 10*3/MM3 (ref 0.1–0.9)
NEUTROPHILS # BLD AUTO: 5.54 10*3/MM3 (ref 1.7–7)
NEUTROPHILS NFR BLD MANUAL: 67 % (ref 42.7–76)
NEUTS BAND NFR BLD MANUAL: 2 % (ref 0–5)
PLATELET # BLD AUTO: 584 10*3/MM3 (ref 140–450)
PMV BLD AUTO: 9.1 FL (ref 6–12)
POTASSIUM SERPL-SCNC: 4.2 MMOL/L (ref 3.4–5)
PROT SERPL-MCNC: 7.7 G/DL (ref 6.3–8.6)
RBC # BLD AUTO: 4.46 10*6/MM3 (ref 3.77–5.28)
SARS-COV-2 ORF1AB RESP QL NAA+PROBE: NOT DETECTED
SMALL PLATELETS BLD QL SMEAR: ABNORMAL
SODIUM SERPL-SCNC: 140 MMOL/L (ref 137–145)
T4 FREE SERPL-MCNC: 1.14 NG/DL (ref 0.93–1.7)
TRIGL SERPL-MCNC: 244 MG/DL
TSH SERPL DL<=0.05 MIU/L-ACNC: 3.48 UIU/ML (ref 0.27–4.2)
VLDLC SERPL-MCNC: 43 MG/DL (ref 5–40)
WBC # BLD AUTO: 8.03 10*3/MM3 (ref 3.4–10.8)
WBC MORPH BLD: NORMAL

## 2021-02-22 PROCEDURE — 36415 COLL VENOUS BLD VENIPUNCTURE: CPT | Performed by: INTERNAL MEDICINE

## 2021-02-22 PROCEDURE — 84443 ASSAY THYROID STIM HORMONE: CPT

## 2021-02-22 PROCEDURE — 84439 ASSAY OF FREE THYROXINE: CPT

## 2021-02-22 PROCEDURE — 85025 COMPLETE CBC W/AUTO DIFF WBC: CPT | Performed by: INTERNAL MEDICINE

## 2021-02-22 PROCEDURE — 83036 HEMOGLOBIN GLYCOSYLATED A1C: CPT

## 2021-02-22 PROCEDURE — 80053 COMPREHEN METABOLIC PANEL: CPT | Performed by: INTERNAL MEDICINE

## 2021-02-22 PROCEDURE — 80061 LIPID PANEL: CPT | Performed by: INTERNAL MEDICINE

## 2021-02-22 PROCEDURE — 82306 VITAMIN D 25 HYDROXY: CPT

## 2021-02-22 PROCEDURE — U0004 COV-19 TEST NON-CDC HGH THRU: HCPCS

## 2021-02-22 PROCEDURE — C9803 HOPD COVID-19 SPEC COLLECT: HCPCS

## 2021-02-24 ENCOUNTER — ANESTHESIA EVENT (OUTPATIENT)
Dept: PERIOP | Facility: HOSPITAL | Age: 76
End: 2021-02-24

## 2021-02-25 ENCOUNTER — HOSPITAL ENCOUNTER (OUTPATIENT)
Facility: HOSPITAL | Age: 76
Setting detail: HOSPITAL OUTPATIENT SURGERY
Discharge: HOME | End: 2021-02-25
Attending: SURGERY | Admitting: SURGERY

## 2021-02-25 ENCOUNTER — ANESTHESIA (OUTPATIENT)
Dept: PERIOP | Facility: HOSPITAL | Age: 76
End: 2021-02-25

## 2021-02-25 ENCOUNTER — HOSPITAL ENCOUNTER (OUTPATIENT)
Dept: NUCLEAR MEDICINE | Facility: HOSPITAL | Age: 76
Discharge: HOME | End: 2021-02-25

## 2021-02-25 VITALS
SYSTOLIC BLOOD PRESSURE: 116 MMHG | WEIGHT: 143.74 LBS | HEIGHT: 62 IN | OXYGEN SATURATION: 95 % | HEART RATE: 78 BPM | DIASTOLIC BLOOD PRESSURE: 57 MMHG | BODY MASS INDEX: 26.45 KG/M2 | RESPIRATION RATE: 18 BRPM | TEMPERATURE: 96.9 F

## 2021-02-25 DIAGNOSIS — Z17.0 MALIGNANT NEOPLASM OF UPPER-OUTER QUADRANT OF RIGHT BREAST IN FEMALE, ESTROGEN RECEPTOR POSITIVE (HCC): ICD-10-CM

## 2021-02-25 DIAGNOSIS — C50.411 MALIGNANT NEOPLASM OF UPPER-OUTER QUADRANT OF RIGHT BREAST IN FEMALE, ESTROGEN RECEPTOR POSITIVE (HCC): ICD-10-CM

## 2021-02-25 LAB
GLUCOSE BLDC GLUCOMTR-MCNC: 106 MG/DL (ref 70–130)
GLUCOSE BLDC GLUCOMTR-MCNC: 126 MG/DL (ref 70–130)

## 2021-02-25 PROCEDURE — 38745 REMOVE ARMPIT LYMPH NODES: CPT | Performed by: SURGERY

## 2021-02-25 PROCEDURE — 82962 GLUCOSE BLOOD TEST: CPT

## 2021-02-25 PROCEDURE — 25010000002 PROPOFOL 10 MG/ML EMULSION: Performed by: NURSE ANESTHETIST, CERTIFIED REGISTERED

## 2021-02-25 PROCEDURE — A9541 TC99M SULFUR COLLOID: HCPCS | Performed by: SURGERY

## 2021-02-25 PROCEDURE — 25010000002 CEFAZOLIN PER 500 MG: Performed by: SURGERY

## 2021-02-25 PROCEDURE — 25010000002 ONDANSETRON PER 1 MG: Performed by: NURSE ANESTHETIST, CERTIFIED REGISTERED

## 2021-02-25 PROCEDURE — 88307 TISSUE EXAM BY PATHOLOGIST: CPT

## 2021-02-25 PROCEDURE — 88331 PATH CONSLTJ SURG 1 BLK 1SPC: CPT

## 2021-02-25 PROCEDURE — 38792 RA TRACER ID OF SENTINL NODE: CPT

## 2021-02-25 PROCEDURE — 0 TECHNETIUM FILTERED SULFUR COLLOID: Performed by: SURGERY

## 2021-02-25 PROCEDURE — 25010000002 MIDAZOLAM PER 1 MG: Performed by: NURSE ANESTHETIST, CERTIFIED REGISTERED

## 2021-02-25 PROCEDURE — 25010000002 FENTANYL CITRATE (PF) 100 MCG/2ML SOLUTION: Performed by: NURSE ANESTHETIST, CERTIFIED REGISTERED

## 2021-02-25 PROCEDURE — 25010000002 DEXAMETHASONE PER 1 MG: Performed by: NURSE ANESTHETIST, CERTIFIED REGISTERED

## 2021-02-25 PROCEDURE — 25010000003 HEPARIN LOCK FLUSH PER 10 UNITS: Performed by: SURGERY

## 2021-02-25 PROCEDURE — 25010000002 PHENYLEPHRINE PER 1 ML: Performed by: NURSE ANESTHETIST, CERTIFIED REGISTERED

## 2021-02-25 DEVICE — CLIP LIGAT VASC HORIZON TI SM/WD RD 6CT: Type: IMPLANTABLE DEVICE | Site: AXILLA | Status: FUNCTIONAL

## 2021-02-25 RX ORDER — MIDAZOLAM HYDROCHLORIDE 1 MG/ML
INJECTION INTRAMUSCULAR; INTRAVENOUS AS NEEDED
Status: DISCONTINUED | OUTPATIENT
Start: 2021-02-25 | End: 2021-02-25 | Stop reason: SURG

## 2021-02-25 RX ORDER — SODIUM CHLORIDE, SODIUM LACTATE, POTASSIUM CHLORIDE, CALCIUM CHLORIDE 600; 310; 30; 20 MG/100ML; MG/100ML; MG/100ML; MG/100ML
100 INJECTION, SOLUTION INTRAVENOUS CONTINUOUS
Status: DISCONTINUED | OUTPATIENT
Start: 2021-02-25 | End: 2021-02-25 | Stop reason: HOSPADM

## 2021-02-25 RX ORDER — NALOXONE HCL 0.4 MG/ML
0.4 VIAL (ML) INJECTION AS NEEDED
Status: DISCONTINUED | OUTPATIENT
Start: 2021-02-25 | End: 2021-02-25 | Stop reason: HOSPADM

## 2021-02-25 RX ORDER — SODIUM CHLORIDE 0.9 % (FLUSH) 0.9 %
10 SYRINGE (ML) INJECTION EVERY 12 HOURS SCHEDULED
Status: DISCONTINUED | OUTPATIENT
Start: 2021-02-25 | End: 2021-02-25 | Stop reason: HOSPADM

## 2021-02-25 RX ORDER — ACETAMINOPHEN 325 MG/1
650 TABLET ORAL ONCE AS NEEDED
Status: DISCONTINUED | OUTPATIENT
Start: 2021-02-25 | End: 2021-02-25 | Stop reason: HOSPADM

## 2021-02-25 RX ORDER — PROMETHAZINE HYDROCHLORIDE 25 MG/1
25 TABLET ORAL ONCE AS NEEDED
Status: DISCONTINUED | OUTPATIENT
Start: 2021-02-25 | End: 2021-02-25 | Stop reason: HOSPADM

## 2021-02-25 RX ORDER — HYDROCODONE BITARTRATE AND ACETAMINOPHEN 5; 325 MG/1; MG/1
1 TABLET ORAL EVERY 4 HOURS PRN
COMMUNITY
End: 2021-02-26 | Stop reason: DRUGHIGH

## 2021-02-25 RX ORDER — ACETAMINOPHEN 650 MG/1
650 SUPPOSITORY RECTAL ONCE AS NEEDED
Status: DISCONTINUED | OUTPATIENT
Start: 2021-02-25 | End: 2021-02-25 | Stop reason: HOSPADM

## 2021-02-25 RX ORDER — HEPARIN SODIUM (PORCINE) LOCK FLUSH IV SOLN 100 UNIT/ML 100 UNIT/ML
5 SOLUTION INTRAVENOUS AS NEEDED
Status: DISCONTINUED | OUTPATIENT
Start: 2021-02-25 | End: 2021-02-25 | Stop reason: HOSPADM

## 2021-02-25 RX ORDER — EPHEDRINE SULFATE 50 MG/ML
5 INJECTION, SOLUTION INTRAVENOUS ONCE AS NEEDED
Status: DISCONTINUED | OUTPATIENT
Start: 2021-02-25 | End: 2021-02-25 | Stop reason: HOSPADM

## 2021-02-25 RX ORDER — FLUMAZENIL 0.1 MG/ML
0.2 INJECTION INTRAVENOUS AS NEEDED
Status: DISCONTINUED | OUTPATIENT
Start: 2021-02-25 | End: 2021-02-25 | Stop reason: HOSPADM

## 2021-02-25 RX ORDER — PROPOFOL 10 MG/ML
VIAL (ML) INTRAVENOUS AS NEEDED
Status: DISCONTINUED | OUTPATIENT
Start: 2021-02-25 | End: 2021-02-25 | Stop reason: SURG

## 2021-02-25 RX ORDER — SODIUM CHLORIDE, SODIUM GLUCONATE, SODIUM ACETATE, POTASSIUM CHLORIDE, AND MAGNESIUM CHLORIDE 526; 502; 368; 37; 30 MG/100ML; MG/100ML; MG/100ML; MG/100ML; MG/100ML
INJECTION, SOLUTION INTRAVENOUS CONTINUOUS PRN
Status: DISCONTINUED | OUTPATIENT
Start: 2021-02-25 | End: 2021-02-25 | Stop reason: SURG

## 2021-02-25 RX ORDER — SODIUM CHLORIDE 0.9 % (FLUSH) 0.9 %
10 SYRINGE (ML) INJECTION AS NEEDED
Status: DISCONTINUED | OUTPATIENT
Start: 2021-02-25 | End: 2021-02-25 | Stop reason: HOSPADM

## 2021-02-25 RX ORDER — ONDANSETRON 2 MG/ML
4 INJECTION INTRAMUSCULAR; INTRAVENOUS ONCE AS NEEDED
Status: DISCONTINUED | OUTPATIENT
Start: 2021-02-25 | End: 2021-02-25 | Stop reason: HOSPADM

## 2021-02-25 RX ORDER — DEXAMETHASONE SODIUM PHOSPHATE 4 MG/ML
INJECTION, SOLUTION INTRA-ARTICULAR; INTRALESIONAL; INTRAMUSCULAR; INTRAVENOUS; SOFT TISSUE AS NEEDED
Status: DISCONTINUED | OUTPATIENT
Start: 2021-02-25 | End: 2021-02-25 | Stop reason: SURG

## 2021-02-25 RX ORDER — ONDANSETRON 2 MG/ML
INJECTION INTRAMUSCULAR; INTRAVENOUS AS NEEDED
Status: DISCONTINUED | OUTPATIENT
Start: 2021-02-25 | End: 2021-02-25 | Stop reason: SURG

## 2021-02-25 RX ORDER — FENTANYL CITRATE 50 UG/ML
INJECTION, SOLUTION INTRAMUSCULAR; INTRAVENOUS AS NEEDED
Status: DISCONTINUED | OUTPATIENT
Start: 2021-02-25 | End: 2021-02-25 | Stop reason: SURG

## 2021-02-25 RX ORDER — ISOSULFAN BLUE 50 MG/5ML
INJECTION, SOLUTION SUBCUTANEOUS AS NEEDED
Status: DISCONTINUED | OUTPATIENT
Start: 2021-02-25 | End: 2021-02-25 | Stop reason: HOSPADM

## 2021-02-25 RX ORDER — SODIUM CHLORIDE 0.9 % (FLUSH) 0.9 %
20 SYRINGE (ML) INJECTION AS NEEDED
Status: DISCONTINUED | OUTPATIENT
Start: 2021-02-25 | End: 2021-02-25 | Stop reason: HOSPADM

## 2021-02-25 RX ORDER — PROMETHAZINE HYDROCHLORIDE 25 MG/1
25 SUPPOSITORY RECTAL ONCE AS NEEDED
Status: DISCONTINUED | OUTPATIENT
Start: 2021-02-25 | End: 2021-02-25 | Stop reason: HOSPADM

## 2021-02-25 RX ORDER — BUPIVACAINE HCL/0.9 % NACL/PF 0.1 %
2 PLASTIC BAG, INJECTION (ML) EPIDURAL ONCE
Status: COMPLETED | OUTPATIENT
Start: 2021-02-25 | End: 2021-02-25

## 2021-02-25 RX ORDER — DIPHENHYDRAMINE HYDROCHLORIDE 50 MG/ML
12.5 INJECTION INTRAMUSCULAR; INTRAVENOUS
Status: DISCONTINUED | OUTPATIENT
Start: 2021-02-25 | End: 2021-02-25 | Stop reason: HOSPADM

## 2021-02-25 RX ADMIN — SODIUM CHLORIDE, POTASSIUM CHLORIDE, SODIUM LACTATE AND CALCIUM CHLORIDE 100 ML/HR: 600; 310; 30; 20 INJECTION, SOLUTION INTRAVENOUS at 06:21

## 2021-02-25 RX ADMIN — FENTANYL CITRATE 25 MCG: 50 INJECTION INTRAMUSCULAR; INTRAVENOUS at 08:53

## 2021-02-25 RX ADMIN — PHENYLEPHRINE HYDROCHLORIDE 100 MCG: 10 INJECTION INTRAVENOUS at 09:15

## 2021-02-25 RX ADMIN — MIDAZOLAM HYDROCHLORIDE 1 MG: 2 INJECTION, SOLUTION INTRAMUSCULAR; INTRAVENOUS at 07:30

## 2021-02-25 RX ADMIN — MIDAZOLAM HYDROCHLORIDE 1 MG: 2 INJECTION, SOLUTION INTRAMUSCULAR; INTRAVENOUS at 07:26

## 2021-02-25 RX ADMIN — HEPARIN 500 UNITS: 100 SYRINGE at 11:32

## 2021-02-25 RX ADMIN — DEXAMETHASONE SODIUM PHOSPHATE 4 MG: 4 INJECTION, SOLUTION INTRAMUSCULAR; INTRAVENOUS at 07:45

## 2021-02-25 RX ADMIN — PHENYLEPHRINE HYDROCHLORIDE 100 MCG: 10 INJECTION INTRAVENOUS at 09:42

## 2021-02-25 RX ADMIN — SODIUM CHLORIDE, PRESERVATIVE FREE 10 ML: 5 INJECTION INTRAVENOUS at 11:32

## 2021-02-25 RX ADMIN — TECHNETIUM TC 99M SULFUR COLLOID 1 DOSE: KIT at 07:19

## 2021-02-25 RX ADMIN — ONDANSETRON 4 MG: 2 INJECTION INTRAMUSCULAR; INTRAVENOUS at 09:07

## 2021-02-25 RX ADMIN — PROPOFOL 30 MG: 10 INJECTION, EMULSION INTRAVENOUS at 08:38

## 2021-02-25 RX ADMIN — FENTANYL CITRATE 25 MCG: 50 INJECTION INTRAMUSCULAR; INTRAVENOUS at 08:47

## 2021-02-25 RX ADMIN — Medication 2 G: at 07:39

## 2021-02-25 RX ADMIN — FENTANYL CITRATE 25 MCG: 50 INJECTION INTRAMUSCULAR; INTRAVENOUS at 08:00

## 2021-02-25 RX ADMIN — PHENYLEPHRINE HYDROCHLORIDE 100 MCG: 10 INJECTION INTRAVENOUS at 08:25

## 2021-02-25 RX ADMIN — PROPOFOL 50 MG: 10 INJECTION, EMULSION INTRAVENOUS at 08:00

## 2021-02-25 RX ADMIN — SODIUM CHLORIDE, SODIUM GLUCONATE, SODIUM ACETATE, POTASSIUM CHLORIDE, AND MAGNESIUM CHLORIDE: 526; 502; 368; 37; 30 INJECTION, SOLUTION INTRAVENOUS at 09:15

## 2021-02-25 RX ADMIN — PROPOFOL 20 MG: 10 INJECTION, EMULSION INTRAVENOUS at 08:47

## 2021-02-25 RX ADMIN — PHENYLEPHRINE HYDROCHLORIDE 100 MCG: 10 INJECTION INTRAVENOUS at 07:52

## 2021-02-25 RX ADMIN — PHENYLEPHRINE HYDROCHLORIDE 100 MCG: 10 INJECTION INTRAVENOUS at 07:56

## 2021-02-25 RX ADMIN — FENTANYL CITRATE 25 MCG: 50 INJECTION INTRAMUSCULAR; INTRAVENOUS at 07:40

## 2021-02-25 RX ADMIN — PROPOFOL 150 MG: 10 INJECTION, EMULSION INTRAVENOUS at 07:35

## 2021-02-25 NOTE — ANESTHESIA PREPROCEDURE EVALUATION
Anesthesia Evaluation     Patient summary reviewed and Nursing notes reviewed   no history of anesthetic complications:  NPO Solid Status: > 8 hours  NPO Liquid Status: > 2 hours           Airway   Mallampati: II  TM distance: >3 FB  Neck ROM: full  Possible difficult intubation and Small opening  Dental    (+) poor dentition        Pulmonary    (+) asthma,sleep apnea on CPAP, decreased breath sounds,   (-) not a smoker, no home oxygen  Cardiovascular   Exercise tolerance: poor (<4 METS)    ECG reviewed  Rhythm: regular  Rate: normal    (+) hypertension well controlled 2 medications or greater, murmur, hyperlipidemia,   (-) valvular problems/murmurs, dysrhythmias, angina, cardiac stents, DVT    ROS comment: Sinus rhythm with 1st degree AV block  Left axis deviation  Left ventricular hypertrophy with QRS widening  Cannot rule out Septal infarct , age undetermined  Abnormal ECG  No previous ECGs available  Confirmed by [facility],    Neuro/Psych  (+) psychiatric history Anxiety and Depression,     (-) seizures, TIA, CVA, headaches, weakness, numbness  GI/Hepatic/Renal/Endo    (+)   diabetes mellitus (glu 133) type 2 well controlled, thyroid problem hypothyroidism  (-) GERD, hepatitis, liver disease, no renal disease    Musculoskeletal     Abdominal    Substance History   (-) alcohol use, drug use     OB/GYN          Other   blood dyscrasia,   history of cancer (breast CA) active    ROS/Med Hx Other: Hx of asthma- albuterol use once every couple months.       Phys Exam Other: Previous airway:  Final airway type: supraglottic airway       Successful airway: I-gel  Size 4   Number of attempts at approach: 1  Assessment: lips, teeth, and gum same as pre-op                  Anesthesia Plan    ASA 3     general     intravenous induction     Anesthetic plan, all risks, benefits, and alternatives have been provided, discussed and informed consent has been obtained with: patient.

## 2021-02-25 NOTE — ANESTHESIA POSTPROCEDURE EVALUATION
Patient: Gale Cabral    Procedure Summary     Date: 02/25/21 Room / Location: [facility] OR 03 / [facility] OR    Anesthesia Start: 0727 Anesthesia Stop: 1010    Procedure: RIGHT AXILLARY SENTINEL LYMPH NODE MAPPING AND BIOPSY  POSSIBLE AXILLARY DISSECTION                        ( injection @07:00 a.m.) (Right ) Diagnosis:       Malignant neoplasm of upper-outer quadrant of right breast in female, estrogen receptor positive (CMS/HCC)      (Malignant neoplasm of upper-outer quadrant of right breast in female, estrogen receptor positive (CMS/HCC) [C50.411, Z17.0])    Surgeon: Duke Alexandra MD Provider: Araseli Trevizo DO    Anesthesia Type: general ASA Status: 3          Anesthesia Type: general    Vitals  Vitals Value Taken Time   /62 02/25/21 1001   Temp 98.1 °F (36.7 °C) 02/25/21 1001   Pulse 80 02/25/21 1001   Resp 18 02/25/21 1001   SpO2 99 % 02/25/21 1001           Post Anesthesia Care and Evaluation    Patient location during evaluation: PACU  Patient participation: complete - patient participated  Level of consciousness: awake  Pain score: 0  Pain management: adequate  Airway patency: patent  Anesthetic complications: No anesthetic complications  PONV Status: none  Cardiovascular status: acceptable and hemodynamically stable  Respiratory status: acceptable, face mask and spontaneous ventilation  Hydration status: acceptable    Comments: ---------------------------               02/25/21                      1001         ---------------------------   BP:          148/62         Pulse:         80           Resp:          18           Temp:   98.1 °F (36.7 °C)   SpO2:          99%         ---------------------------

## 2021-02-25 NOTE — ANESTHESIA PROCEDURE NOTES
Airway  Urgency: elective    Date/Time: 2/25/2021 7:36 AM  Airway not difficult    General Information and Staff    Patient location during procedure: OR    Indications and Patient Condition  Indications for airway management: airway protection    Preoxygenated: yes  MILS maintained throughout  Mask difficulty assessment: 0 - not attempted    Final Airway Details  Final airway type: supraglottic airway      Successful airway: I-gel  Size 4    Number of attempts at approach: 1  Assessment: lips, teeth, and gum same as pre-op

## 2021-02-26 ENCOUNTER — OFFICE VISIT (OUTPATIENT)
Dept: FAMILY MEDICINE CLINIC | Facility: CLINIC | Age: 76
End: 2021-02-26

## 2021-02-26 VITALS
DIASTOLIC BLOOD PRESSURE: 59 MMHG | WEIGHT: 143 LBS | HEIGHT: 62 IN | HEART RATE: 74 BPM | TEMPERATURE: 97 F | SYSTOLIC BLOOD PRESSURE: 128 MMHG | BODY MASS INDEX: 26.31 KG/M2

## 2021-02-26 DIAGNOSIS — C50.411 MALIGNANT NEOPLASM OF UPPER-OUTER QUADRANT OF RIGHT BREAST IN FEMALE, ESTROGEN RECEPTOR POSITIVE (HCC): Chronic | ICD-10-CM

## 2021-02-26 DIAGNOSIS — E11.69 HYPERLIPIDEMIA ASSOCIATED WITH TYPE 2 DIABETES MELLITUS (HCC): Chronic | ICD-10-CM

## 2021-02-26 DIAGNOSIS — E55.9 VITAMIN D DEFICIENCY: Chronic | ICD-10-CM

## 2021-02-26 DIAGNOSIS — I10 ESSENTIAL HYPERTENSION: Chronic | ICD-10-CM

## 2021-02-26 DIAGNOSIS — Z17.0 MALIGNANT NEOPLASM OF UPPER-OUTER QUADRANT OF RIGHT BREAST IN FEMALE, ESTROGEN RECEPTOR POSITIVE (HCC): Chronic | ICD-10-CM

## 2021-02-26 DIAGNOSIS — E78.5 HYPERLIPIDEMIA ASSOCIATED WITH TYPE 2 DIABETES MELLITUS (HCC): Chronic | ICD-10-CM

## 2021-02-26 DIAGNOSIS — E03.9 ACQUIRED HYPOTHYROIDISM: Chronic | ICD-10-CM

## 2021-02-26 DIAGNOSIS — G47.33 OBSTRUCTIVE SLEEP APNEA SYNDROME: Chronic | ICD-10-CM

## 2021-02-26 DIAGNOSIS — E11.9 TYPE 2 DIABETES MELLITUS WITHOUT COMPLICATION, WITHOUT LONG-TERM CURRENT USE OF INSULIN (HCC): Primary | Chronic | ICD-10-CM

## 2021-02-26 PROBLEM — E66.9 OBESITY (BMI 30.0-34.9): Chronic | Status: RESOLVED | Noted: 2017-05-09 | Resolved: 2021-02-26

## 2021-02-26 PROCEDURE — 99443 PR PHYS/QHP TELEPHONE EVALUATION 21-30 MIN: CPT | Performed by: INTERNAL MEDICINE

## 2021-02-26 NOTE — PROGRESS NOTES
You have chosen to receive care through a telephone visit. Do you consent to use a telephone visit for your medical care today? Yes    Visit time: 34 minutes.      Chief Complaint  Follow-up (6 month)    Subjective          History of Present Illness     Gale Cabral receives care via telephone visit through [facility] for 6-month follow up on type 2 diabetes, hypertension, hyperlipidemia/low HDL, asthma, and hypothyroidism among other issues.  Patient continues using CPAP to treat mild obstructive sleep apnea.  Despite her multiple serious medical issues over the past few months, she feels she has been coping well and anxiety is currently managed with the Celexa.     Patient underwent neoadjuvant chemotherapy followed by right lumpectomy 01/2021 with sentinel lymph node biopsy, which was repeated yesterday by Dr. Alexandra.  She will be seeing oncology next week to discuss a treatment plan for the right breast cancer.       Patient had colonoscopy by Dr. Peters 11/19/2020 revealing one polyp.  In December, however, prior to her last chemotherapy treatment, she developed perforated diverticulitis that was treated in [place], [place] with a  laparoscopic sigmoidectomy and colectomy with diverting colostomy.  Colostomy reversal has been delayed with the breast cancer treatment.     Weight is down 25 pounds since her follow up visit last summer giving her BMI of 26.1.  With weight loss, diabetes is at goal.      She is scheduled for her second COVID vaccine next week.      The patient's relevant past medical, surgical, and social history was reviewed in Epic.   Lab results are reviewed with the patient today. CBC reveals hemoglobin is slightly low at 10.6, more than likely due to recent surgeries.   Fasting glucose 127.  A1c 6.29.  Normal renal and liver function.  Thyroid function at goal with current dose of Synthroid.  LDL cholesterol above goal at 113 with Lipitor.     "  Objective   Vital Signs:   /59 Comment: ...106 yesteday  Pulse 74   Temp 97 °F (36.1 °C) (Oral)   Ht 157.5 cm (62\")   Wt 64.9 kg (143 lb)   BMI 26.16 kg/m²       Physical Exam   Result Review :     CMP    CMP 1/4/21 2/9/21 2/22/21   Glucose 112 (A) 99 127 (A)   BUN 17 14 18   Creatinine 1.17 (A) 0.83 0.91   eGFR Non  Am 45 (A) 67 61   Sodium 137 137 140   Potassium 4.1 4.4 4.2   Chloride 100 103 105   Calcium 9.3 10.0 10.3 (A)   Albumin 3.30 (A)  4.20   Total Bilirubin 0.3  0.3   Alkaline Phosphatase 146 (A)  74   AST (SGOT) 38 (A)  28   ALT (SGPT) 37 (A)  20   (A) Abnormal value            CBC w/diff    CBC w/Diff 11/30/20 1/4/21 2/22/21   WBC 19.69 (A) 15.18 (A) 8.03   RBC 4.20 3.65 (A) 4.46   Hemoglobin 11.3 (A) 9.2 (A) 10.6 (A)   Hematocrit 34.7 30.4 (A) 37.1   MCV 82.6 83.3 83.2   MCH 26.9 25.2 (A) 23.8 (A)   MCHC 32.6 30.3 (A) 28.6 (A)   RDW 17.6 (A) 17.5 (A) 18.7 (A)   Platelets 495 (A) 1,032 (A) 584 (A)   Neutrophil Rel % 91.4 (A) 82.1 (A)    Immature Granulocyte Rel % 0.7 (A) 0.5    Lymphocyte Rel % 5.7 (A) 9.0 (A)    Monocyte Rel % 2.0 (A) 6.3    Eosinophil Rel % 0.0 (A) 1.4    Basophil Rel % 0.2 0.7    (A) Abnormal value            Lipid Panel    Lipid Panel 6/3/20 6/3/20 2/22/21    0748 0748    Total Cholesterol   190   Triglycerides 168 (A)  244 (A)   HDL Cholesterol   34 (A)   VLDL Cholesterol   43 (A)   LDL Cholesterol   116 (A) 113 (A)   LDL/HDL Ratio   3.15   (A) Abnormal value            TSH    TSH 2/22/21   TSH 3.480           A1C Last 3 Results    HGBA1C Last 3 Results 6/3/20 2/22/21   Hemoglobin A1C 7.41 (A) 6.29 (A)   (A) Abnormal value            Data reviewed: Consultant notes Surgery note, Dr. Alexandra          Assessment and Plan    Diagnoses and all orders for this visit:    1. Type 2 diabetes mellitus without complication, without long-term current use of insulin (CMS/[facility]) (Primary)  -     CBC Auto Differential; Future  -     Comprehensive Metabolic Panel; Future  -  "    Hemoglobin A1c; Future  -     Microalbumin / Creatinine Urine Ratio - Urine, Clean Catch; Future    2. Hyperlipidemia associated with type 2 diabetes mellitus (CMS/HCC)  -     LDL Cholesterol, Direct; Future    3. Essential hypertension  -     Comprehensive Metabolic Panel; Future    4. Vitamin D deficiency  -     Vitamin D 25 Hydroxy; Future    5. Acquired hypothyroidism  -     TSH; Future  -     T4, free; Future    6. Malignant neoplasm of upper-outer quadrant of right breast in female, estrogen receptor positive (CMS/HCC)  -     CBC Auto Differential; Future  -     Comprehensive Metabolic Panel; Future    7. Obstructive sleep apnea syndrome - Nasal CPAP. Dr. Pulido  -     CBC Auto Differential; Future           Follow up with Dr. Alexandra next week to review sentinel lymph node results and with oncology next week to discuss treatment plan.      Continue Celexa. She feels NATA is currently managed with Celexa, but will notify us if she feels the Celexa if no longer managing her symptoms.     Continue the current diabetic medications and management.  Diabetes at goal with weight loss in combination of metformin and Farxiga.  Try to maintain the weight loss.     Continue Lipitor 20 mg daily. Intensify low-cholesterol, low-fat diet.  Patient may need to hold or decrease the Lipitor if she requires additional chemotherapy.  Oncology will give some guidance next week.      Continue OTC vitamin D 1000 IU daily.  Continue the calcium supplement.  We will plan to repeat DEXA when some of her more serious health issues are addressed.         Continue current dose of Synthroid.  Hypothyroidism at goal.      Continue the nasal CPAP to treat sleep apnea.  Keep follow-up appointments with sleep medicine.     Continue other medications and vitamin and mineral supplements to treat additional medical problems which we addressed today.      Return in six months for follow up with fasting labs one week prior.        Scribed for   [facility].     Follow Up   Return in about 6 months (around 8/26/2021) for Follow up in six months with labs one week prior., Next scheduled follow up - labs 1 week prior.  Patient was given instructions and counseling regarding her condition or for health maintenance advice. Please see specific information pulled into the AVS if appropriate.

## 2021-03-01 ENCOUNTER — OFFICE VISIT (OUTPATIENT)
Dept: SURGERY | Facility: CLINIC | Age: 76
End: 2021-03-01

## 2021-03-01 ENCOUNTER — IMMUNIZATION (OUTPATIENT)
Dept: VACCINE CLINIC | Facility: HOSPITAL | Age: 76
End: 2021-03-01

## 2021-03-01 VITALS
BODY MASS INDEX: 26.54 KG/M2 | WEIGHT: 144.2 LBS | DIASTOLIC BLOOD PRESSURE: 70 MMHG | SYSTOLIC BLOOD PRESSURE: 122 MMHG | TEMPERATURE: 97 F | HEART RATE: 94 BPM | HEIGHT: 62 IN

## 2021-03-01 DIAGNOSIS — Z98.890 STATUS POST RIGHT BREAST LUMPECTOMY: Primary | ICD-10-CM

## 2021-03-01 DIAGNOSIS — Z17.1 MALIGNANT NEOPLASM OF CENTRAL PORTION OF RIGHT BREAST IN FEMALE, ESTROGEN RECEPTOR NEGATIVE (HCC): ICD-10-CM

## 2021-03-01 DIAGNOSIS — C50.111 MALIGNANT NEOPLASM OF CENTRAL PORTION OF RIGHT BREAST IN FEMALE, ESTROGEN RECEPTOR NEGATIVE (HCC): ICD-10-CM

## 2021-03-01 PROCEDURE — 99024 POSTOP FOLLOW-UP VISIT: CPT | Performed by: NURSE PRACTITIONER

## 2021-03-01 PROCEDURE — 91300 HC SARSCOV02 VAC 30MCG/0.3ML IM: CPT | Performed by: THORACIC SURGERY (CARDIOTHORACIC VASCULAR SURGERY)

## 2021-03-01 PROCEDURE — 0002A: CPT | Performed by: THORACIC SURGERY (CARDIOTHORACIC VASCULAR SURGERY)

## 2021-03-01 NOTE — PROGRESS NOTES
CHIEF COMPLAINT:   Chief Complaint   Patient presents with   • Post-op Follow-up     Post operative [place] lymph node mapping and biopsy right axilla 2-25-21       HPI: This patient presents for a post-operative visit after undergoing a right sentinel node mapping and biopsy and right axillary lymph node dissection. Patient reports no problems. Minimal pain. Minimal bruising. She has emptied about 75cc from her OWEN drain in 24 hours.  She denies fever or chills. She has an upcoming appointment with Dr. Duong.      PATHOLOGY:  Final pathology has not yet resulted.        Physical Exam  Incisions healing with no infection, cellulitis or hematoma. OWEN in place with sanguineous drainage noted. Site is clean dry and intact.       ASSESSMENT:    Diagnoses and all orders for this visit:    1. Status post right breast lumpectomy (Primary)    2. Malignant neoplasm of central portion of right breast in female, estrogen receptor negative (CMS/HCC)        PLAN:    1. The patient will follow-up on Wednesday to review pathology and assess drain status unless concerns arise sooner.    2. Encouraged to continue to record output from drains as directed.    3. Continue follow up with Dr. Duong as scheduled              This document has been electronically signed by YOHANA Lugo on March 1, 2021 13:54 CST

## 2021-03-02 LAB
LAB AP CASE REPORT: NORMAL
PATH REPORT.FINAL DX SPEC: NORMAL

## 2021-03-03 ENCOUNTER — APPOINTMENT (OUTPATIENT)
Dept: ONCOLOGY | Facility: CLINIC | Age: 76
End: 2021-03-03

## 2021-03-03 ENCOUNTER — OFFICE VISIT (OUTPATIENT)
Dept: SURGERY | Facility: CLINIC | Age: 76
End: 2021-03-03

## 2021-03-03 VITALS
WEIGHT: 143 LBS | BODY MASS INDEX: 26.31 KG/M2 | TEMPERATURE: 97.5 F | HEART RATE: 81 BPM | SYSTOLIC BLOOD PRESSURE: 120 MMHG | DIASTOLIC BLOOD PRESSURE: 64 MMHG | HEIGHT: 62 IN

## 2021-03-03 DIAGNOSIS — Z17.1 MALIGNANT NEOPLASM OF CENTRAL PORTION OF RIGHT BREAST IN FEMALE, ESTROGEN RECEPTOR NEGATIVE (HCC): Primary | ICD-10-CM

## 2021-03-03 DIAGNOSIS — C50.111 MALIGNANT NEOPLASM OF CENTRAL PORTION OF RIGHT BREAST IN FEMALE, ESTROGEN RECEPTOR NEGATIVE (HCC): Primary | ICD-10-CM

## 2021-03-03 PROCEDURE — 99024 POSTOP FOLLOW-UP VISIT: CPT | Performed by: NURSE PRACTITIONER

## 2021-03-03 RX ORDER — LOSARTAN POTASSIUM AND HYDROCHLOROTHIAZIDE 12.5; 1 MG/1; MG/1
1 TABLET ORAL DAILY
Qty: 90 TABLET | Refills: 3 | Status: SHIPPED | OUTPATIENT
Start: 2021-03-03 | End: 2022-04-29 | Stop reason: SDUPTHER

## 2021-03-03 RX ORDER — ACETAMINOPHEN 500 MG
500 TABLET ORAL EVERY 6 HOURS PRN
COMMUNITY

## 2021-03-03 NOTE — PATIENT INSTRUCTIONS
MyPlate from USDA    MyPlate is an outline of a general healthy diet based on the 2010 Dietary Guidelines for Americans, from the U.S. Department of Agriculture (USDA). It sets guidelines for how much food you should eat from each food group based on your age, sex, and level of physical activity.  What are tips for following MyPlate?  To follow MyPlate recommendations:  · Eat a wide variety of fruits and vegetables, grains, and protein foods.  · Serve smaller portions and eat less food throughout the day.  · Limit portion sizes to avoid overeating.  · Enjoy your food.  · Get at least 150 minutes of exercise every week. This is about 30 minutes each day, 5 or more days per week.  It can be difficult to have every meal look like MyPlate. Think about MyPlate as eating guidelines for an entire day, rather than each individual meal.  Fruits and vegetables  · Make half of your plate fruits and vegetables.  · Eat many different colors of fruits and vegetables each day.  · For a 2,000 calorie daily food plan, eat:  ? 2½ cups of vegetables every day.  ? 2 cups of fruit every day.  · 1 cup is equal to:  ? 1 cup raw or cooked vegetables.  ? 1 cup raw fruit.  ? 1 medium-sized orange, apple, or banana.  ? 1 cup 100% fruit or vegetable juice.  ? 2 cups raw leafy greens, such as lettuce, spinach, or kale.  ? ½ cup dried fruit.  Grains  · One fourth of your plate should be grains.  · Make at least half of the grains you eat each day whole grains.  · For a 2,000 calorie daily food plan, eat 6 oz of grains every day.  · 1 oz is equal to:  ? 1 slice bread.  ? 1 cup cereal.  ? ½ cup cooked rice, cereal, or pasta.  Protein  · One fourth of your plate should be protein.  · Eat a wide variety of protein foods, including meat, poultry, fish, eggs, beans, nuts, and tofu.  · For a 2,000 calorie daily food plan, eat 5½ oz of protein every day.  · 1 oz is equal to:  ? 1 oz meat, poultry, or fish.  ? ¼ cup cooked beans.  ? 1 egg.  ? ½ oz nuts  or seeds.  ? 1 Tbsp peanut butter.  Dairy  · Drink fat-free or low-fat (1%) milk.  · Eat or drink dairy as a side to meals.  · For a 2,000 calorie daily food plan, eat or drink 3 cups of dairy every day.  · 1 cup is equal to:  ? 1 cup milk, yogurt, cottage cheese, or soy milk (soy beverage).  ? 2 oz processed cheese.  ? 1½ oz natural cheese.  Fats, oils, salt, and sugars  · Only small amounts of oils are recommended.  · Avoid foods that are high in calories and low in nutritional value (empty calories), like foods high in fat or added sugars.  · Choose foods that are low in salt (sodium). Choose foods that have less than 140 milligrams (mg) of sodium per serving.  · Drink water instead of sugary drinks. Drink enough water each day to keep your urine pale yellow.  Where to find support  · Work with your health care provider or a nutrition specialist (dietitian) to develop a customized eating plan that is right for you.  · Download an hebert (mobile application) to help you track your daily food intake.  Where to find more information  · Go to ChooseMyPlate.gov for more information.  Summary  · MyPlate is a general guideline for healthy eating from the USDA. It is based on the 2010 Dietary Guidelines for Americans.  · In general, fruits and vegetables should take up ½ of your plate, grains should take up ¼ of your plate, and protein should take up ¼ of your plate.  This information is not intended to replace advice given to you by your health care provider. Make sure you discuss any questions you have with your health care provider.  Document Revised: 05/21/2020 Document Reviewed: 03/19/2018  Elsevier Patient Education © 2020 [employer]

## 2021-03-03 NOTE — PROGRESS NOTES
CHIEF COMPLAINT:   Chief Complaint   Patient presents with   • Follow-up     [place] lymph node mapping and biopsy right axilla 2-25-21       HPI: This patient presents for a post-operative visit after undergoing a right sentinel node mapping and biopsy and right axillary lymph node dissection. Patient reports no problems. Minimal pain. Minimal bruising. She has again emptied about 75cc from her OWEN drain in the past 24 hours.  She denies fever or chills. She has an upcoming appointment with Dr. Duong in 2 weeks as recommended per Dr. Alexandra.      PATHOLOGY:       Physical Exam  Incisions healing with no infection, cellulitis or hematoma. OWEN left in place with small amount of serosanguineous drainage within. No signs of infection.        ASSESSMENT:    Diagnoses and all orders for this visit:    1. Malignant neoplasm of central portion of right breast in female, estrogen receptor negative (CMS/HCC) (Primary)        PLAN:  1. The patient will follow-up in 2 days to remove drain if output has decreased as anticipated.   2. Keep scheduled follow up with medical oncology.                  This document has been electronically signed by YOHANA Lugo on March 3, 2021 13:58 CST

## 2021-03-08 ENCOUNTER — OFFICE VISIT (OUTPATIENT)
Dept: SURGERY | Facility: CLINIC | Age: 76
End: 2021-03-08

## 2021-03-08 VITALS
DIASTOLIC BLOOD PRESSURE: 59 MMHG | WEIGHT: 145.6 LBS | SYSTOLIC BLOOD PRESSURE: 122 MMHG | BODY MASS INDEX: 26.79 KG/M2 | HEIGHT: 62 IN | HEART RATE: 81 BPM

## 2021-03-08 DIAGNOSIS — Z17.1 MALIGNANT NEOPLASM OF CENTRAL PORTION OF RIGHT BREAST IN FEMALE, ESTROGEN RECEPTOR NEGATIVE (HCC): Primary | ICD-10-CM

## 2021-03-08 DIAGNOSIS — C50.111 MALIGNANT NEOPLASM OF CENTRAL PORTION OF RIGHT BREAST IN FEMALE, ESTROGEN RECEPTOR NEGATIVE (HCC): Primary | ICD-10-CM

## 2021-03-08 PROCEDURE — 99024 POSTOP FOLLOW-UP VISIT: CPT | Performed by: NURSE PRACTITIONER

## 2021-03-08 NOTE — PROGRESS NOTES
CHIEF COMPLAINT:   Chief Complaint   Patient presents with   • Drain Removal From Right Breast       HPI: This patient presents for a post-operative visit after undergoing a right sentinel node mapping and biopsy and right axillary lymph node dissection. Patient reports no problems. Minimal pain. Minimal bruising. She has again emptied about 65cc from her OWEN drain in the past 24 hours.  She denies fever or chills. She has an upcoming appointment with Dr. Duong next week. She states she has discussed with [facility] option to have treatments done closer to her home in [place].         PATHOLOGY:         Physical Exam  Incisions healing with no infection, cellulitis or hematoma. OWEN drain pulled and appropriate dressing applied. Patient tolerated well.      ASSESSMENT:    Diagnoses and all orders for this visit:    1. Malignant neoplasm of central portion of right breast in female, estrogen receptor negative (CMS/HCC) (Primary)        PLAN:    1. The patient will follow-up in 1 week for recheck after seeing Dr. Duong.    2. May shower.               This document has been electronically signed by YOHANA Lugo on March 8, 2021 11:06 CST

## 2021-03-15 ENCOUNTER — OFFICE VISIT (OUTPATIENT)
Dept: SURGERY | Facility: CLINIC | Age: 76
End: 2021-03-15

## 2021-03-15 ENCOUNTER — LAB (OUTPATIENT)
Dept: ONCOLOGY | Facility: HOSPITAL | Age: 76
End: 2021-03-15

## 2021-03-15 ENCOUNTER — OFFICE VISIT (OUTPATIENT)
Dept: ONCOLOGY | Facility: CLINIC | Age: 76
End: 2021-03-15

## 2021-03-15 VITALS
WEIGHT: 145.9 LBS | TEMPERATURE: 97.6 F | SYSTOLIC BLOOD PRESSURE: 135 MMHG | HEART RATE: 70 BPM | RESPIRATION RATE: 16 BRPM | BODY MASS INDEX: 26.85 KG/M2 | DIASTOLIC BLOOD PRESSURE: 62 MMHG | HEIGHT: 62 IN

## 2021-03-15 VITALS
WEIGHT: 146 LBS | TEMPERATURE: 97.6 F | HEIGHT: 62 IN | DIASTOLIC BLOOD PRESSURE: 70 MMHG | SYSTOLIC BLOOD PRESSURE: 132 MMHG | BODY MASS INDEX: 26.87 KG/M2 | HEART RATE: 87 BPM

## 2021-03-15 DIAGNOSIS — D64.9 ANEMIA, UNSPECIFIED TYPE: ICD-10-CM

## 2021-03-15 DIAGNOSIS — Z45.2 ENCOUNTER FOR VENOUS ACCESS DEVICE CARE: Primary | ICD-10-CM

## 2021-03-15 DIAGNOSIS — C50.911 INVASIVE DUCTAL CARCINOMA OF RIGHT BREAST (HCC): Primary | ICD-10-CM

## 2021-03-15 DIAGNOSIS — C50.911 INVASIVE DUCTAL CARCINOMA OF RIGHT BREAST (HCC): ICD-10-CM

## 2021-03-15 DIAGNOSIS — D75.839 THROMBOCYTOSIS: ICD-10-CM

## 2021-03-15 DIAGNOSIS — C50.111 MALIGNANT NEOPLASM OF CENTRAL PORTION OF RIGHT BREAST IN FEMALE, ESTROGEN RECEPTOR NEGATIVE (HCC): Primary | ICD-10-CM

## 2021-03-15 DIAGNOSIS — Z17.1 MALIGNANT NEOPLASM OF CENTRAL PORTION OF RIGHT BREAST IN FEMALE, ESTROGEN RECEPTOR NEGATIVE (HCC): Primary | ICD-10-CM

## 2021-03-15 PROBLEM — C50.919 BREAST CANCER: Status: RESOLVED | Noted: 2021-02-08 | Resolved: 2021-03-15

## 2021-03-15 LAB
ALBUMIN SERPL-MCNC: 4.1 G/DL (ref 3.5–5.2)
ALBUMIN/GLOB SERPL: 1.2 G/DL
ALP SERPL-CCNC: 71 U/L (ref 39–117)
ALT SERPL W P-5'-P-CCNC: 23 U/L (ref 1–33)
ANION GAP SERPL CALCULATED.3IONS-SCNC: 11 MMOL/L (ref 5–15)
AST SERPL-CCNC: 28 U/L (ref 1–32)
BASOPHILS # BLD AUTO: 0.15 10*3/MM3 (ref 0–0.2)
BASOPHILS NFR BLD AUTO: 2.2 % (ref 0–1.5)
BILIRUB SERPL-MCNC: 0.2 MG/DL (ref 0–1.2)
BUN SERPL-MCNC: 15 MG/DL (ref 8–23)
BUN/CREAT SERPL: 17.9 (ref 7–25)
CALCIUM SPEC-SCNC: 9.9 MG/DL (ref 8.6–10.5)
CHLORIDE SERPL-SCNC: 103 MMOL/L (ref 98–107)
CO2 SERPL-SCNC: 24 MMOL/L (ref 22–29)
CREAT SERPL-MCNC: 0.84 MG/DL (ref 0.57–1)
DEPRECATED RDW RBC AUTO: 54.2 FL (ref 37–54)
EOSINOPHIL # BLD AUTO: 0.41 10*3/MM3 (ref 0–0.4)
EOSINOPHIL NFR BLD AUTO: 5.9 % (ref 0.3–6.2)
ERYTHROCYTE [DISTWIDTH] IN BLOOD BY AUTOMATED COUNT: 18.5 % (ref 12.3–15.4)
FERRITIN SERPL-MCNC: 49.06 NG/ML (ref 13–150)
GFR SERPL CREATININE-BSD FRML MDRD: 66 ML/MIN/1.73
GLOBULIN UR ELPH-MCNC: 3.3 GM/DL
GLUCOSE SERPL-MCNC: 108 MG/DL (ref 65–99)
HCT VFR BLD AUTO: 37.9 % (ref 34–46.6)
HGB BLD-MCNC: 11.5 G/DL (ref 12–15.9)
HOLD SPECIMEN: NORMAL
IMM GRANULOCYTES # BLD AUTO: 0.01 10*3/MM3 (ref 0–0.05)
IMM GRANULOCYTES NFR BLD AUTO: 0.1 % (ref 0–0.5)
IRON 24H UR-MRATE: 77 MCG/DL (ref 37–145)
IRON SATN MFR SERPL: 18 % (ref 20–50)
LYMPHOCYTES # BLD AUTO: 1.41 10*3/MM3 (ref 0.7–3.1)
LYMPHOCYTES NFR BLD AUTO: 20.3 % (ref 19.6–45.3)
MCH RBC QN AUTO: 24.6 PG (ref 26.6–33)
MCHC RBC AUTO-ENTMCNC: 30.3 G/DL (ref 31.5–35.7)
MCV RBC AUTO: 81 FL (ref 79–97)
MONOCYTES # BLD AUTO: 0.64 10*3/MM3 (ref 0.1–0.9)
MONOCYTES NFR BLD AUTO: 9.2 % (ref 5–12)
NEUTROPHILS NFR BLD AUTO: 4.31 10*3/MM3 (ref 1.7–7)
NEUTROPHILS NFR BLD AUTO: 62.3 % (ref 42.7–76)
NRBC BLD AUTO-RTO: 0 /100 WBC (ref 0–0.2)
PLATELET # BLD AUTO: 457 10*3/MM3 (ref 140–450)
PMV BLD AUTO: 9 FL (ref 6–12)
POTASSIUM SERPL-SCNC: 3.7 MMOL/L (ref 3.5–5.2)
PROT SERPL-MCNC: 7.4 G/DL (ref 6–8.5)
RBC # BLD AUTO: 4.68 10*6/MM3 (ref 3.77–5.28)
SODIUM SERPL-SCNC: 138 MMOL/L (ref 136–145)
TIBC SERPL-MCNC: 428 MCG/DL (ref 298–536)
TRANSFERRIN SERPL-MCNC: 287 MG/DL (ref 200–360)
WBC # BLD AUTO: 6.93 10*3/MM3 (ref 3.4–10.8)

## 2021-03-15 PROCEDURE — 82746 ASSAY OF FOLIC ACID SERUM: CPT | Performed by: INTERNAL MEDICINE

## 2021-03-15 PROCEDURE — 85025 COMPLETE CBC W/AUTO DIFF WBC: CPT | Performed by: INTERNAL MEDICINE

## 2021-03-15 PROCEDURE — 36415 COLL VENOUS BLD VENIPUNCTURE: CPT | Performed by: INTERNAL MEDICINE

## 2021-03-15 PROCEDURE — 99024 POSTOP FOLLOW-UP VISIT: CPT | Performed by: NURSE PRACTITIONER

## 2021-03-15 PROCEDURE — 36591 DRAW BLOOD OFF VENOUS DEVICE: CPT | Performed by: INTERNAL MEDICINE

## 2021-03-15 PROCEDURE — 80053 COMPREHEN METABOLIC PANEL: CPT | Performed by: INTERNAL MEDICINE

## 2021-03-15 PROCEDURE — 82728 ASSAY OF FERRITIN: CPT | Performed by: INTERNAL MEDICINE

## 2021-03-15 PROCEDURE — 25010000003 HEPARIN LOCK FLUSH PER 10 UNITS: Performed by: NURSE PRACTITIONER

## 2021-03-15 PROCEDURE — 99214 OFFICE O/P EST MOD 30 MIN: CPT | Performed by: INTERNAL MEDICINE

## 2021-03-15 PROCEDURE — 83540 ASSAY OF IRON: CPT | Performed by: INTERNAL MEDICINE

## 2021-03-15 PROCEDURE — G9903 PT SCRN TBCO ID AS NON USER: HCPCS | Performed by: INTERNAL MEDICINE

## 2021-03-15 PROCEDURE — 1126F AMNT PAIN NOTED NONE PRSNT: CPT | Performed by: INTERNAL MEDICINE

## 2021-03-15 PROCEDURE — 1123F ACP DISCUSS/DSCN MKR DOCD: CPT | Performed by: INTERNAL MEDICINE

## 2021-03-15 PROCEDURE — 82607 VITAMIN B-12: CPT | Performed by: INTERNAL MEDICINE

## 2021-03-15 PROCEDURE — 84466 ASSAY OF TRANSFERRIN: CPT | Performed by: INTERNAL MEDICINE

## 2021-03-15 RX ORDER — HEPARIN SODIUM (PORCINE) LOCK FLUSH IV SOLN 100 UNIT/ML 100 UNIT/ML
500 SOLUTION INTRAVENOUS AS NEEDED
Status: DISCONTINUED | OUTPATIENT
Start: 2021-03-15 | End: 2021-03-15 | Stop reason: HOSPADM

## 2021-03-15 RX ORDER — CITALOPRAM 20 MG/1
10 TABLET ORAL DAILY
Qty: 90 TABLET | Refills: 3 | Status: SHIPPED | OUTPATIENT
Start: 2021-03-15 | End: 2022-05-20 | Stop reason: SDUPTHER

## 2021-03-15 RX ORDER — SODIUM CHLORIDE 0.9 % (FLUSH) 0.9 %
20 SYRINGE (ML) INJECTION AS NEEDED
Status: CANCELLED | OUTPATIENT
Start: 2021-03-15

## 2021-03-15 RX ORDER — SODIUM CHLORIDE 0.9 % (FLUSH) 0.9 %
10 SYRINGE (ML) INJECTION AS NEEDED
Status: CANCELLED | OUTPATIENT
Start: 2021-03-15

## 2021-03-15 RX ORDER — HEPARIN SODIUM (PORCINE) LOCK FLUSH IV SOLN 100 UNIT/ML 100 UNIT/ML
500 SOLUTION INTRAVENOUS AS NEEDED
Status: CANCELLED | OUTPATIENT
Start: 2021-03-15

## 2021-03-15 RX ADMIN — HEPARIN 500 UNITS: 100 SYRINGE at 11:16

## 2021-03-15 NOTE — PATIENT INSTRUCTIONS
Capecitabine tablets  What is this medicine?  CAPECITABINE (ka pe SITE a been) is a chemotherapy drug. It slows the growth of cancer cells. This medicine is used to treat breast cancer, and also colon or rectal cancer.  This medicine may be used for other purposes; ask your health care provider or pharmacist if you have questions.  COMMON BRAND NAME(S): Xeloda  What should I tell my health care provider before I take this medicine?  They need to know if you have any of these conditions:  · bleeding disorders  · dehydration  · dihydropyrimidine dehydrogenase (DPD) deficiency  · heart disease  · infection (especially a virus infection such as chickenpox, cold sores, or herpes)  · kidney disease  · liver disease  · low blood counts, like low white cell, platelet, or red cell counts  · an unusual or allergic reaction to capecitabine, fluorouracil, other medicines, foods, dyes, or preservatives  · pregnant or trying to get pregnant  · breast-feeding  How should I use this medicine?  Take this medicine by mouth with a glass of water, within 30 minutes of the end of a meal. Do not cut, crush or chew this medicine. Follow the directions on the prescription label. Take your medicine at regular intervals. Do not take it more often than directed. Do not stop taking except on your doctor's advice.  Your doctor may want you to take a combination of 150 mg and 500 mg tablets for each dose. It is very important that you know how to correctly take your dose. Taking the wrong tablets could result in an overdose (too much medication) or underdose (too little medication).  Talk to your pediatrician regarding the use of this medicine in children. Special care may be needed.  Overdosage: If you think you have taken too much of this medicine contact a poison control center or emergency room at once.  NOTE: This medicine is only for you. Do not share this medicine with others.  What if I miss a dose?  If you miss a dose, do not take the  missed dose at all. Do not take double or extra doses. Instead, continue with your next scheduled dose and check with your doctor.  What may interact with this medicine?  This medicine may interact with the following medications:  · allopurinol  · leucovorin  · phenytoin  · warfarin  This list may not describe all possible interactions. Give your health care provider a list of all the medicines, herbs, non-prescription drugs, or dietary supplements you use. Also tell them if you smoke, drink alcohol, or use illegal drugs. Some items may interact with your medicine.  What should I watch for while using this medicine?  Visit your doctor for checks on your progress. This drug may make you feel generally unwell. This is not uncommon, as chemotherapy can affect healthy cells as well as cancer cells. Report any side effects. Continue your course of treatment even though you feel ill unless your doctor tells you to stop.  In some cases, you may be given additional medicines to help with side effects. Follow all directions for their use.  Call your doctor or health care professional for advice if you get a fever, chills or sore throat, or other symptoms of a cold or flu. Do not treat yourself. This drug decreases your body's ability to fight infections. Try to avoid being around people who are sick.  This medicine may increase your risk to bruise or bleed. Call your doctor or health care professional if you notice any unusual bleeding.  Be careful brushing and flossing your teeth or using a toothpick because you may get an infection or bleed more easily. If you have any dental work done, tell your dentist you are receiving this medicine.  Avoid taking products that contain aspirin, acetaminophen, ibuprofen, naproxen, or ketoprofen unless instructed by your doctor. These medicines may hide a fever.  Do not become pregnant while taking this medicine or for 6 months after stopping it. Women should inform their doctor if they  wish to become pregnant or think they might be pregnant. There is a potential for serious side effects to an unborn child. Talk to your health care professional or pharmacist for more information. Do not breast-feed an infant while taking this medicine or for 2 weeks after stopping it.  Men are advised not to father a child while taking this medicine or for 3 months after stopping it.  This medicine may make it more difficult to get pregnant or father a child. Talk with your doctor or health care professional if you are concerned about your fertility.  What side effects may I notice from receiving this medicine?  Side effects that you should report to your doctor or health care professional as soon as possible:  · allergic reactions like skin rash, itching or hives, swelling of the face, lips, or tongue  · diarrhea  · low blood counts - this medicine may decrease the number of white blood cells, red blood cells and platelets. You may be at increased risk for infections and bleeding  · nausea, vomiting  · redness, blistering, peeling or loosening of the skin, including inside the mouth (this can be added for any serious or exfoliative rash that could lead to hospitalization)  · redness, swelling, or sores on hands or feet  · signs and symptoms of kidney injury like trouble passing urine or change in the amount of urine  · signs and symptoms of liver injury like dark yellow or brown urine; general ill feeling or flu-like symptoms; light-colored stools; loss of appetite; nausea; right upper belly pain; unusually weak or tired; yellowing of the eyes or skin  · signs of decreased platelets or bleeding - bruising, pinpoint red spots on the skin, black, tarry stools, blood in the urine  · signs of decreased red blood cells - unusually weak or tired, fainting spells, lightheadedness  · signs of infection - fever or chills, cough, sore throat, pain or difficulty passing urine  Side effects that usually do not require medical  attention (report to your doctor or health care professional if they continue or are bothersome):  · changes in vision  · constipation  · loss of appetite  · mouth sores  · pain, tingling, numbness in the hands or feet  This list may not describe all possible side effects. Call your doctor for medical advice about side effects. You may report side effects to FDA at 8-948-KQB-4079.  Where should I keep my medicine?  Keep out of the reach of children.  Store at room temperature between 15 and 30 degrees C (59 and 86 degrees F). Keep container tightly closed. Throw away any unused medicine after the expiration date.  NOTE: This sheet is a summary. It may not cover all possible information. If you have questions about this medicine, talk to your doctor, pharmacist, or health care provider.  © 2021 [employer] (2019-04-02 21:22:45)

## 2021-03-15 NOTE — PROGRESS NOTES
CHIEF COMPLAINT:   Chief Complaint   Patient presents with   • Follow-up     [place] lymph node mapping and biopsy Rt Axilla, Rt. Axillary lymph node dissection 2-25-21       HPI: This patient presents for a post-operative visit after undergoing a right sentinel node mapping and biopsy and right axillary lymph node dissection. Patient reports no problems. Minimal pain. Minimal bruising. She saw Dr. Duong today to review pathology and plan and she is to begin radiation treatments in 2 weeks.      PATHOLOGY:       Physical Exam  Incisions healing with no infection, cellulitis or hematoma.     ASSESSMENT:    Diagnoses and all orders for this visit:    1. Malignant neoplasm of central portion of right breast in female, estrogen receptor negative (CMS/HCC) (Primary)        PLAN:  1. The patient will follow-up in one month or sooner if needed.   2. Continue follow up with Dr. Duong as directed.    3. May return to normal activity without restrictions.                  This document has been electronically signed by YOHANA Lugo on March 15, 2021 13:09 CDT

## 2021-03-15 NOTE — PROGRESS NOTES
"DATE OF VISIT: 3/16/2021      REASON FOR VISIT: Triple negative right breast cancer, anemia, thrombocytosis, chronic kidney disease      HISTORY OF PRESENT ILLNESS:   75-year-old female with medical problem consisting of diabetes mellitus, hypertension, dyslipidemia, asthma, osteopenia, obstructive sleep apnea, chronic back pain and neck pain has been following up with [email or web address] since October 5, 2020 for triple negative right breast cancer.  Patient received 3 cycles of chemotherapy with Taxotere and Cytoxan between October 15, 2020 and November 30, 2020.  Unfortunately patient required surgery after cycle 3 of chemotherapy with colon resection and colostomy.  Patient underwent lumpectomy followed by axillary dissection since last clinic visit.  Patient is here to discuss the result of pathology report and further recommendation.  Denies any nausea or vomiting or diarrhea.  Denies any recent infection.  Denies any bleeding.  Denies any new lymph node enlargement.          Past Medical History, Past Surgical History, Social History, Family History have been reviewed and are without significant changes except as mentioned.    Review of Systems   Constitutional: Positive for fatigue.        Has lost 18 pounds since November 30, 2020.   Respiratory: Positive for shortness of breath.    Gastrointestinal: Negative for blood in stool, diarrhea, nausea and vomiting.   Musculoskeletal: Positive for arthralgias and back pain.   Hematological: Negative for adenopathy.      A comprehensive 14 point review of systems was performed and was negative except as mentioned.    Medications:  The current medication list was reviewed in the EMR    ALLERGIES:    Allergies   Allergen Reactions   • Other Confusion     Coda-clear       Objective      Vitals:    03/15/21 1017   BP: 135/62   Pulse: 70   Resp: 16   Temp: 97.6 °F (36.4 °C)   TempSrc: Temporal   Weight: 66.2 kg (145 lb 14.4 oz)   Height: 157.5 cm (62.01\")   PainSc: 0-No " pain     Current Status 11/30/2020   ECOG score 0       Physical Exam  Cardiovascular:      Rate and Rhythm: Normal rate and regular rhythm.      Comments: Port-A-Cath present  Pulmonary:      Breath sounds: Normal breath sounds.   Abdominal:      General: Abdomen is flat.      Palpations: Abdomen is soft.      Tenderness: There is no abdominal tenderness.      Comments: Colostomy present.   Neurological:      Mental Status: She is alert and oriented to person, place, and time.           RECENT LABS:  Glucose   Date Value Ref Range Status   03/15/2021 108 (H) 65 - 99 mg/dL Final     Sodium   Date Value Ref Range Status   03/15/2021 138 136 - 145 mmol/L Final     Potassium   Date Value Ref Range Status   03/15/2021 3.7 3.5 - 5.2 mmol/L Final     CO2   Date Value Ref Range Status   03/15/2021 24.0 22.0 - 29.0 mmol/L Final     Chloride   Date Value Ref Range Status   03/15/2021 103 98 - 107 mmol/L Final     Anion Gap   Date Value Ref Range Status   03/15/2021 11.0 5.0 - 15.0 mmol/L Final     Creatinine   Date Value Ref Range Status   03/15/2021 0.84 0.57 - 1.00 mg/dL Final     BUN   Date Value Ref Range Status   03/15/2021 15 8 - 23 mg/dL Final     BUN/Creatinine Ratio   Date Value Ref Range Status   03/15/2021 17.9 7.0 - 25.0 Final     Calcium   Date Value Ref Range Status   03/15/2021 9.9 8.6 - 10.5 mg/dL Final     eGFR Non  Amer   Date Value Ref Range Status   03/15/2021 66 >60 mL/min/1.73 Final     Alkaline Phosphatase   Date Value Ref Range Status   03/15/2021 71 39 - 117 U/L Final     Total Protein   Date Value Ref Range Status   03/15/2021 7.4 6.0 - 8.5 g/dL Final     ALT (SGPT)   Date Value Ref Range Status   03/15/2021 23 1 - 33 U/L Final     AST (SGOT)   Date Value Ref Range Status   03/15/2021 28 1 - 32 U/L Final     Total Bilirubin   Date Value Ref Range Status   03/15/2021 0.2 0.0 - 1.2 mg/dL Final     Albumin   Date Value Ref Range Status   03/15/2021 4.10 3.50 - 5.20 g/dL Final     Globulin   Date  Value Ref Range Status   03/15/2021 3.3 gm/dL Final     Lab Results   Component Value Date    WBC 6.93 03/15/2021    HGB 11.5 (L) 03/15/2021    HCT 37.9 03/15/2021    MCV 81.0 03/15/2021     (H) 03/15/2021     Lab Results   Component Value Date    NEUTROABS 4.31 03/15/2021    IRON 77 03/15/2021    IRON 61 11/05/2020    TIBC 428 03/15/2021    TIBC 390 11/05/2020    LABIRON 18 (L) 03/15/2021    LABIRON 16 (L) 11/05/2020    FERRITIN 49.06 03/15/2021    FERRITIN 151.10 (H) 11/05/2020    PSNUFRPX99 549 03/15/2021    MQLHJUHS54 >2,000 (H) 11/05/2020    FOLATE >20.00 03/15/2021    FOLATE 5.69 11/05/2020     No results found for: , LABCA2, AFPTM, HCGQUANT, , CHROMGRNA, 3ZJPK08RON, CEA, REFLABREPO      PATHOLOGY:  Pathology report from January 11, 2021 showed:                  Pathology report from February 25, 2021 showed:                * Cannot find OR log *         RADIOLOGY DATA :  No radiology results for the last 7 days        Assessment/Plan     1.  Triple negative right breast cancer, [id number] with 1 lymph node showing 1.9 cm deposit with extracapsular extension and tumor deposit lymphatic:  -Patient received neoadjuvant chemotherapy with Taxotere and Cytoxan for 3 cycles between October 15, 2020 and November 30, 2020.  -After cycle 3 unfortunately patient developed bowel obstruction requiring surgery and colostomy at hospital in [place].  -Patient did not want to do cycle 4 prior to surgery and was subsequently sent back to Dr. Alexandra underwent lumpectomy on January 11 2021 that showed 1 cm residual cancer.  -Patient had axillary dissection done on February 25, 2021 that showed 1.9 cm lymph node involvement with extracapsular extension and tumor deposit in the lymphatic channel.  -Result of pathology report were discussed with patient and her .  Patient had a minimal or no response to chemotherapy with Taxotere and Cytoxan in neoadjuvant setting.  PET/CT done prior to starting  chemotherapy had a shotty axillary lymph node without any significant uptake.  Patient did have enlarged lymph node at the time of surgery again not responding to chemotherapy preoperatively  -Due to no response to chemotherapy.  Recommend getting radiation treatment done in adjuvant setting followed by Xeloda 2 weeks on 1 week off for 8 cycles.  -Side effect of Xeloda were discussed with patient and her family.  -Patient wants to do radiation treatment in [facility].  Referral has been placed today.  -We will ask patient to return to clinic in 7 weeks with repeat CBC, CMP, iron studies to be done on that day.  -Upon next clinic visit we will start arranging for Xeloda in adjuvant setting which was discussed with patient and her .    2.  Anemia:  -Patient remains on ferrous sulfate 1 tablet p.o. daily with folic acid 1 mg p.o. daily  -Hemoglobin earlier today is 11.5.  -Vitamin B12 level is 562, will start patient on B12 1000 mcg 3 times a week as well.  -We will repeat anemia work-up in June 2021    3.  Thrombocytosis:  -Reactive  -Platelet count is 457,000.  Will monitor with CBC    4.  Genetic testing:  -Genetic testing done in November 2020 was negative for BCR A 1, 2    5.  Health maintenance: Patient does not smoke    6 Advance Care Planning: For now patient remains full code and is able to make decisions.  Patient has health care surrogate mentioned on chart.             PHQ-9 Total Score:       Gale Cabral reports a pain score of 0.  Given her pain assessment as noted, treatment options were discussed and the following options were decided upon as a follow-up plan to address the patient's pain: No acute intervention required.         Carlos Duong MD  3/16/2021  06:46 CDT        Part of this note may be an electronic transcription/translation of spoken language to printed text using the Dragon Dictation System.          CC:

## 2021-03-16 ENCOUNTER — TELEPHONE (OUTPATIENT)
Dept: ONCOLOGY | Facility: HOSPITAL | Age: 76
End: 2021-03-16

## 2021-03-16 ENCOUNTER — TELEPHONE (OUTPATIENT)
Dept: ONCOLOGY | Facility: CLINIC | Age: 76
End: 2021-03-16

## 2021-03-16 LAB
FOLATE SERPL-MCNC: >20 NG/ML (ref 4.78–24.2)
VIT B12 BLD-MCNC: 549 PG/ML (ref 211–946)

## 2021-03-16 RX ORDER — FERROUS SULFATE 325(65) MG
325 TABLET ORAL
Qty: 30 TABLET | Refills: 3 | Status: SHIPPED | OUTPATIENT
Start: 2021-03-16 | End: 2021-07-16

## 2021-03-16 RX ORDER — DOCUSATE SODIUM 100 MG/1
100 CAPSULE, LIQUID FILLED ORAL 2 TIMES DAILY PRN
Qty: 60 CAPSULE | Refills: 2 | Status: SHIPPED | OUTPATIENT
Start: 2021-03-16 | End: 2021-10-13

## 2021-03-16 RX ORDER — LANOLIN ALCOHOL/MO/W.PET/CERES
CREAM (GRAM) TOPICAL
Qty: 36 TABLET | Refills: 1 | Status: SHIPPED | OUTPATIENT
Start: 2021-03-16 | End: 2022-03-16

## 2021-03-16 NOTE — TELEPHONE ENCOUNTER
Informed patient of information.   She verbalized understanding.     She is requesting a refill - ferrous sulfate.

## 2021-03-16 NOTE — TELEPHONE ENCOUNTER
Informed pt of referral to radiation oncology in [place] as requested by pt. Appt set up fro 3/26/2021 with Dr. Emile Morris at 11:00am. Pt confirmed appt time and stated understanding. Encouraged to reach out to me anytime with concerns or questions.

## 2021-03-16 NOTE — TELEPHONE ENCOUNTER
----- Message from Carlos Duong MD sent at 3/16/2021  6:48 AM CDT -----  Please let patient know, she needs to continue taking ferrous sulfate, folic acid daily.  Her B12 level is decreased to 549.  Recommend starting B12 3 times a week on Monday, Wednesday and Friday.  I have sent prescription for B12 to her pharmacy.  Thank you

## 2021-03-17 ENCOUNTER — TELEPHONE (OUTPATIENT)
Dept: ONCOLOGY | Facility: HOSPITAL | Age: 76
End: 2021-03-17

## 2021-03-17 NOTE — TELEPHONE ENCOUNTER
Called and spoke with pt.  After verifying , lab results given to pt along with instructions for taking medications ordered by Dr. Duong.  V/u obtained.

## 2021-04-01 ENCOUNTER — TELEPHONE (OUTPATIENT)
Dept: ONCOLOGY | Facility: CLINIC | Age: 76
End: 2021-04-01

## 2021-04-01 NOTE — TELEPHONE ENCOUNTER
"Called pt this am to f/u on plan of care and offer any additional navigation support as needed. Pt stated she is set up for radiation in [place] and was \"very happy\" with her care. Plan is start radiation Monday or the next Monday per pt. Reminded pt of f/u appt with med onc here at the center and to let us know of any delays or changes and we would be glad to move appt if needed. Pt had question regarding dental care to which I directed pt to discuss with her radiation oncologist prior to planning any dental care. Pt stated thankfulness for f/u and denied further questions or concerns today.   "

## 2021-04-02 ENCOUNTER — OFFICE VISIT (OUTPATIENT)
Dept: FAMILY MEDICINE CLINIC | Facility: CLINIC | Age: 76
End: 2021-04-02

## 2021-04-02 VITALS — HEIGHT: 62 IN | BODY MASS INDEX: 26.87 KG/M2 | WEIGHT: 146 LBS

## 2021-04-02 DIAGNOSIS — Z00.00 MEDICARE ANNUAL WELLNESS VISIT, SUBSEQUENT: Primary | ICD-10-CM

## 2021-04-02 DIAGNOSIS — Z23 NEED FOR DIPHTHERIA-TETANUS-PERTUSSIS (TDAP) VACCINE: ICD-10-CM

## 2021-04-02 DIAGNOSIS — Z11.59 ENCOUNTER FOR HEPATITIS C SCREENING TEST FOR LOW RISK PATIENT: ICD-10-CM

## 2021-04-02 PROCEDURE — G0439 PPPS, SUBSEQ VISIT: HCPCS | Performed by: INTERNAL MEDICINE

## 2021-04-02 PROCEDURE — 1126F AMNT PAIN NOTED NONE PRSNT: CPT | Performed by: INTERNAL MEDICINE

## 2021-04-02 PROCEDURE — 1159F MED LIST DOCD IN RCRD: CPT | Performed by: INTERNAL MEDICINE

## 2021-04-02 NOTE — PATIENT INSTRUCTIONS
Medicare Wellness  Personal Prevention Plan of Service     Date of Office Visit:  2021  Encounter Provider:  Darian Soliman MD  Place of Service:  [facility]  Patient Name: Gale Cabral  :  1945    As part of the Medicare Wellness portion of your visit today, we are providing you with this personalized preventive plan of services (PPPS). This plan is based upon recommendations of the [place] Preventive Services Task Force (USPSTF) and the Advisory Committee on Immunization Practices (ACIP).    This lists the preventive care services that should be considered, and provides dates of when you are due. Items listed as completed are up-to-date and do not require any further intervention.    Health Maintenance   Topic Date Due   • TDAP/TD VACCINES (1 - Tdap) Never done   • ZOSTER VACCINE (2 of 3) 2013   • HEPATITIS C SCREENING  Never done   • DIABETIC FOOT EXAM  Never done   • DIABETIC EYE EXAM  2018   • ANNUAL WELLNESS VISIT  2020   • DXA SCAN  2021   • URINE MICROALBUMIN  2021   • INFLUENZA VACCINE  2021   • HEMOGLOBIN A1C  2021   • MAMMOGRAM  2021   • LIPID PANEL  2022   • COLONOSCOPY  2030   • COVID-19 Vaccine  Completed   • Pneumococcal Vaccine 65+  Completed   • MENINGOCOCCAL VACCINE  Aged Out       No orders of the defined types were placed in this encounter.      Return in about 1 year (around 2022) for Medicare Wellness.          Calorie Counting for Weight Loss  Calories are units of energy. Your body needs a certain amount of calories from food to keep you going throughout the day. When you eat more calories than your body needs, your body stores the extra calories as fat. When you eat fewer calories than your body needs, your body burns fat to get the energy it needs.  Calorie counting means keeping track of how many calories you eat and drink each day. Calorie counting can be helpful if  you need to lose weight. If you make sure to eat fewer calories than your body needs, you should lose weight. Ask your health care provider what a healthy weight is for you.  For calorie counting to work, you will need to eat the right number of calories in a day in order to lose a healthy amount of weight per week. A dietitian can help you determine how many calories you need in a day and will give you suggestions on how to reach your calorie goal.  · A healthy amount of weight to lose per week is usually 1-2 lb (0.5-0.9 kg). This usually means that your daily calorie intake should be reduced by 500-750 calories.  · Eating 1,200 - 1,500 calories per day can help most women lose weight.  · Eating 1,500 - 1,800 calories per day can help most men lose weight.  What is my plan?  My goal is to have __________ calories per day.  If I have this many calories per day, I should lose around __________ pounds per week.  What do I need to know about calorie counting?  In order to meet your daily calorie goal, you will need to:  · Find out how many calories are in each food you would like to eat. Try to do this before you eat.  · Decide how much of the food you plan to eat.  · Write down what you ate and how many calories it had. Doing this is called keeping a food log.  To successfully lose weight, it is important to balance calorie counting with a healthy lifestyle that includes regular activity. Aim for 150 minutes of moderate exercise (such as walking) or 75 minutes of vigorous exercise (such as running) each week.  Where do I find calorie information?    The number of calories in a food can be found on a Nutrition Facts label. If a food does not have a Nutrition Facts label, try to look up the calories online or ask your dietitian for help.  Remember that calories are listed per serving. If you choose to have more than one serving of a food, you will have to multiply the calories per serving by the amount of servings you  "plan to eat. For example, the label on a package of bread might say that a serving size is 1 slice and that there are 90 calories in a serving. If you eat 1 slice, you will have eaten 90 calories. If you eat 2 slices, you will have eaten 180 calories.  How do I keep a food log?  Immediately after each meal, record the following information in your food log:  · What you ate. Don't forget to include toppings, sauces, and other extras on the food.  · How much you ate. This can be measured in cups, ounces, or number of items.  · How many calories each food and drink had.  · The total number of calories in the meal.  Keep your food log near you, such as in a small notebook in your pocket, or use a mobile hebert or website. Some programs will calculate calories for you and show you how many calories you have left for the day to meet your goal.  What are some calorie counting tips?    · Use your calories on foods and drinks that will fill you up and not leave you hungry:  ? Some examples of foods that fill you up are nuts and nut butters, vegetables, lean proteins, and high-fiber foods like whole grains. High-fiber foods are foods with more than 5 g fiber per serving.  ? Drinks such as sodas, specialty coffee drinks, alcohol, and juices have a lot of calories, yet do not fill you up.  · Eat nutritious foods and avoid empty calories. Empty calories are calories you get from foods or beverages that do not have many vitamins or protein, such as candy, sweets, and soda. It is better to have a nutritious high-calorie food (such as an avocado) than a food with few nutrients (such as a bag of chips).  · Know how many calories are in the foods you eat most often. This will help you calculate calorie counts faster.  · Pay attention to calories in drinks. Low-calorie drinks include water and unsweetened drinks.  · Pay attention to nutrition labels for \"low fat\" or \"fat free\" foods. These foods sometimes have the same amount of calories " or more calories than the full fat versions. They also often have added sugar, starch, or salt, to make up for flavor that was removed with the fat.  · Find a way of tracking calories that works for you. Get creative. Try different apps or programs if writing down calories does not work for you.  What are some portion control tips?  · Know how many calories are in a serving. This will help you know how many servings of a certain food you can have.  · Use a measuring cup to measure serving sizes. You could also try weighing out portions on a kitchen scale. With time, you will be able to estimate serving sizes for some foods.  · Take some time to put servings of different foods on your favorite plates, bowls, and cups so you know what a serving looks like.  · Try not to eat straight from a bag or box. Doing this can lead to overeating. Put the amount you would like to eat in a cup or on a plate to make sure you are eating the right portion.  · Use smaller plates, glasses, and bowls to prevent overeating.  · Try not to multitask (for example, watch TV or use your computer) while eating. If it is time to eat, sit down at a table and enjoy your food. This will help you to know when you are full. It will also help you to be aware of what you are eating and how much you are eating.  What are tips for following this plan?  Reading food labels  · Check the calorie count compared to the serving size. The serving size may be smaller than what you are used to eating.  · Check the source of the calories. Make sure the food you are eating is high in vitamins and protein and low in saturated and trans fats.  Shopping  · Read nutrition labels while you shop. This will help you make healthy decisions before you decide to purchase your food.  · Make a grocery list and stick to it.  Cooking  · Try to cook your favorite foods in a healthier way. For example, try baking instead of frying.  · Use low-fat dairy products.  Meal  "planning  · Use more fruits and vegetables. Half of your plate should be fruits and vegetables.  · Include lean proteins like poultry and fish.  How do I count calories when eating out?  · Ask for smaller portion sizes.  · Consider sharing an entree and sides instead of getting your own entree.  · If you get your own entree, eat only half. Ask for a box at the beginning of your meal and put the rest of your entree in it so you are not tempted to eat it.  · If calories are listed on the menu, choose the lower calorie options.  · Choose dishes that include vegetables, fruits, whole grains, low-fat dairy products, and lean protein.  · Choose items that are boiled, broiled, grilled, or steamed. Stay away from items that are buttered, battered, fried, or served with cream sauce. Items labeled \"crispy\" are usually fried, unless stated otherwise.  · Choose water, low-fat milk, unsweetened iced tea, or other drinks without added sugar. If you want an alcoholic beverage, choose a lower calorie option such as a glass of wine or light beer.  · Ask for dressings, sauces, and syrups on the side. These are usually high in calories, so you should limit the amount you eat.  · If you want a salad, choose a garden salad and ask for grilled meats. Avoid extra toppings like juarez, cheese, or fried items. Ask for the dressing on the side, or ask for olive oil and vinegar or lemon to use as dressing.  · Estimate how many servings of a food you are given. For example, a serving of cooked rice is ½ cup or about the size of half a baseball. Knowing serving sizes will help you be aware of how much food you are eating at restaurants. The list below tells you how big or small some common portion sizes are based on everyday objects:  ? 1 oz--4 stacked dice.  ? 3 oz--1 deck of cards.  ? 1 tsp--1 die.  ? 1 Tbsp--½ a ping-pong ball.  ? 2 Tbsp--1 ping-pong ball.  ? ½ cup--½ baseball.  ? 1 cup--1 baseball.  Summary  · Calorie counting means keeping " track of how many calories you eat and drink each day. If you eat fewer calories than your body needs, you should lose weight.  · A healthy amount of weight to lose per week is usually 1-2 lb (0.5-0.9 kg). This usually means reducing your daily calorie intake by 500-750 calories.  · The number of calories in a food can be found on a Nutrition Facts label. If a food does not have a Nutrition Facts label, try to look up the calories online or ask your dietitian for help.  · Use your calories on foods and drinks that will fill you up, and not on foods and drinks that will leave you hungry.  · Use smaller plates, glasses, and bowls to prevent overeating.  This information is not intended to replace advice given to you by your health care provider. Make sure you discuss any questions you have with your health care provider.  Document Revised: 09/06/2019 Document Reviewed: 11/17/2017  [employer] Patient Education © 2020 [employer]

## 2021-04-02 NOTE — PROGRESS NOTES
You have chosen to receive care through a telephone visit. Do you consent to use a telephone visit for your medical care today? Yes    The ABCs of the Annual Wellness Visit  Subsequent Medicare Wellness Visit    Chief Complaint   Patient presents with   • Medicare Wellness-subsequent       Subjective   History of Present Illness:  Gale Cabral is a 75 y.o. female who presents for a Subsequent Medicare Wellness Visit.    HEALTH RISK ASSESSMENT    Recent Hospitalizations:  Recently treated at the following:  Other: [facility] 12/2020    Current Medical Providers:  Patient Care Team:  Darian Soliman MD as PCP - General  [place], Pilar Hagan RN as Nurse Navigator (Oncology)  Carlos Duong MD as Consulting Physician (Hematology and Oncology)  Sharon Holloway APRN as Nurse Practitioner (Oncology)    Smoking Status:  Social History     Tobacco Use   Smoking Status Never Smoker   Smokeless Tobacco Never Used       Alcohol Consumption:  Social History     Substance and Sexual Activity   Alcohol Use No       Depression Screen:   PHQ-2/PHQ-9 Depression Screening 4/2/2021   Little interest or pleasure in doing things 0   Feeling down, depressed, or hopeless 0   Trouble falling or staying asleep, or sleeping too much -   Feeling tired or having little energy -   Poor appetite or overeating -   Feeling bad about yourself - or that you are a failure or have let yourself or your family down -   Trouble concentrating on things, such as reading the newspaper or watching television -   Moving or speaking so slowly that other people could have noticed. Or the opposite - being so fidgety or restless that you have been moving around a lot more than usual -   Thoughts that you would be better off dead, or of hurting yourself in some way -   Total Score 0   If you checked off any problems, how difficult have these problems made it for you to do your work, take care of things at home, or get along with other people? -       Fall  Risk Screen:  DAX Fall Risk Assessment was completed, and patient is at LOW risk for falls.Assessment completed on:4/2/2021    Health Habits and Functional and Cognitive Screening:  Functional & Cognitive Status 4/2/2021   Do you have difficulty preparing food and eating? No   Do you have difficulty bathing yourself, getting dressed or grooming yourself? No   Do you have difficulty using the toilet? No   Do you have difficulty moving around from place to place? No   Do you have trouble with steps or getting out of a bed or a chair? No   Current Diet Limited Junk Food   Dental Exam Not up to date   Eye Exam Up to date   Exercise (times per week) 4 times per week   Current Exercise Activities Include Housecleaning   Do you need help using the phone?  No   Are you deaf or do you have serious difficulty hearing?  No   Do you need help with transportation? No   Do you need help shopping? No   Do you need help preparing meals?  No   Do you need help with housework?  No   Do you need help with laundry? No   Do you need help taking your medications? No   Do you need help managing money? No   Do you ever drive or ride in a car without wearing a seat belt? No   Have you felt unusual stress, anger or loneliness in the last month? No   Who do you live with? Spouse   If you need help, do you have trouble finding someone available to you? No   Have you been bothered in the last four weeks by sexual problems? No   Do you have difficulty concentrating, remembering or making decisions? No         Does the patient have evidence of cognitive impairment? No    Asprin use counseling:Taking ASA appropriately as indicated    Age-appropriate Screening Schedule:  Refer to the list below for future screening recommendations based on patient's age, sex and/or medical conditions. Orders for these recommended tests are listed in the plan section. The patient has been provided with a written plan.    Health Maintenance   Topic Date Due   •  TDAP/TD VACCINES (1 - Tdap) Never done   • ZOSTER VACCINE (2 of 3) 04/22/2013   • DIABETIC FOOT EXAM  Never done   • DIABETIC EYE EXAM  03/01/2018   • DXA SCAN  03/20/2021   • URINE MICROALBUMIN  06/03/2021   • INFLUENZA VACCINE  08/01/2021   • HEMOGLOBIN A1C  08/22/2021   • MAMMOGRAM  09/11/2021   • LIPID PANEL  02/22/2022   • COLONOSCOPY  11/19/2030          The following portions of the patient's history were reviewed and updated as appropriate:   She  has a past medical history of Abnormal mammogram of right breast, Acquired hypothyroidism, Allergic rhinitis, seasonal, Breast cancer (CMS/HCC), Cervicalgia, Diabetes mellitus (CMS/HCC), Encounter for screening for malignant neoplasm of colon, Essential hypertension, Glaucoma, Health maintenance examination, Hypercholesterolemia, Hyperglycemia, Hyperlipidemia associated with type 2 diabetes mellitus (CMS/HCC) (2/26/2021), Lumbar disc disorder, Malignant neoplasm of upper-outer quadrant of right breast in female, estrogen receptor positive (CMS/HCC) (2/9/2021), Menopause, Mild persistent asthma, Obstructive sleep apnea syndrome (9/17/2019), Osteopenia, 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  She does not have any pertinent problems on file.  She  has a past surgical history that includes endoscopy and colonoscopy (08/2010); Colonoscopy (12/17/2015); Hysterectomy; Oophorectomy; Cholecystectomy; Tonsillectomy; Venous Access Device (Port) (Left, 10/8/2020); Colonoscopy (N/A, 11/19/2020); Colostomy; breast lumpectomy with sentinel node biopsy (Right, 1/11/2021); and [place] Node Biopsy (Right, 2/25/2021).  Her family history includes Alzheimer's disease in her maternal grandmother and paternal grandmother; Aneurysm in her brother; Colon cancer in her mother; Heart attack  in her father and maternal grandfather; No Known Problems in her son and son.  She  reports that she has never smoked. She has never used smokeless tobacco. She reports that she does not drink alcohol and does not use drugs.  Current Outpatient Medications   Medication Sig Dispense Refill   • acetaminophen (TYLENOL) 500 MG tablet Take 500 mg by mouth Every 6 (Six) Hours As Needed for Mild Pain .     • albuterol sulfate HFA (PROAIR HFA) 108 (90 Base) MCG/ACT inhaler Inhale 2 puffs 4 (Four) Times a Day As Needed for Wheezing. 8.5 inhaler 6   • amLODIPine (NORVASC) 5 MG tablet Take 1 tablet by mouth Every Night. 90 tablet 3   • aspirin 81 MG EC tablet Take 81 mg by mouth Every Night.     • atorvastatin (LIPITOR) 20 MG tablet Take 20 mg by mouth Every Night.     • Blood Glucose Monitoring Suppl (ONE TOUCH ULTRA 2) W/DEVICE kit      • Calcium Carbonate-Vitamin D 600-200 MG-UNIT tablet Take 1 tablet by mouth Daily.     • citalopram (CeleXA) 20 MG tablet Take 0.5 tablets by mouth Daily. 90 tablet 3   • Dapagliflozin Propanediol (Farxiga) 10 MG tablet Take 10 mg by mouth Every Morning. 90 tablet 1   • docusate sodium (COLACE) 100 MG capsule Take 1 capsule by mouth 2 (Two) Times a Day As Needed for Constipation. 60 capsule 2   • ferrous sulfate 325 (65 FE) MG tablet Take 1 tablet by mouth Daily With Breakfast. 30 tablet 3   • fluticasone (FLONASE) 50 MCG/ACT nasal spray 2 sprays into each nostril As Needed for rhinitis.     • folic acid (FOLVITE) 1 MG tablet Take 1 tablet by mouth Daily. 90 tablet 1   • glucose blood (ONE TOUCH ULTRA TEST) test strip 1 each by Other route Daily. Check 1-2times daily  E11.9  90 day supply  One Touch Verio 150 each 3   • HYDROcodone-acetaminophen (NORCO) 7.5-325 MG per tablet Take 1 tablet by mouth Every 6 (Six) Hours As Needed for Moderate Pain  or Severe Pain  (Pain). 18 tablet 0   • LANCETS MICRO THIN 33G misc Check once daily  E11.9  Trueplus 100 each 3   • latanoprost (XALATAN) 0.005 %  ophthalmic solution Administer 1 drop to both eyes Every Night.     • levothyroxine (SYNTHROID, LEVOTHROID) 50 MCG tablet Take 1 tablet by mouth Daily. 90 tablet 3   • Loperamide HCl (IMODIUM PO) Take  by mouth Daily As Needed (Diarrhea).     • losartan-hydrochlorothiazide (HYZAAR) 100-12.5 MG per tablet Take 1 tablet by mouth Daily. 90 tablet 3   • metFORMIN (GLUCOPHAGE) 500 MG tablet Take 500 mg by mouth 2 (Two) Times a Day With Meals.     • mometasone-formoterol (DULERA 200) 200-5 MCG/ACT inhaler Inhale 2 puffs 2 (Two) Times a Day.     • montelukast (SINGULAIR) 10 MG tablet Take 1 tablet by mouth Every Night. 90 tablet 3   • ondansetron (ZOFRAN) 4 MG tablet Take 1 tablet by mouth 4 (Four) Times a Day As Needed for Nausea or Vomiting. 40 tablet 3   • vitamin B-12 (CYANOCOBALAMIN) 1000 MCG tablet Take 1 tablet by mouth on Monday, Wednesday and Friday 36 tablet 1     No current facility-administered medications for this visit.     Current Outpatient Medications on File Prior to Visit   Medication Sig   • acetaminophen (TYLENOL) 500 MG tablet Take 500 mg by mouth Every 6 (Six) Hours As Needed for Mild Pain .   • albuterol sulfate HFA (PROAIR HFA) 108 (90 Base) MCG/ACT inhaler Inhale 2 puffs 4 (Four) Times a Day As Needed for Wheezing.   • amLODIPine (NORVASC) 5 MG tablet Take 1 tablet by mouth Every Night.   • aspirin 81 MG EC tablet Take 81 mg by mouth Every Night.   • atorvastatin (LIPITOR) 20 MG tablet Take 20 mg by mouth Every Night.   • Blood Glucose Monitoring Suppl (ONE TOUCH ULTRA 2) W/DEVICE kit    • Calcium Carbonate-Vitamin D 600-200 MG-UNIT tablet Take 1 tablet by mouth Daily.   • citalopram (CeleXA) 20 MG tablet Take 0.5 tablets by mouth Daily.   • Dapagliflozin Propanediol (Farxiga) 10 MG tablet Take 10 mg by mouth Every Morning.   • docusate sodium (COLACE) 100 MG capsule Take 1 capsule by mouth 2 (Two) Times a Day As Needed for Constipation.   • ferrous sulfate 325 (65 FE) MG tablet Take 1 tablet by  mouth Daily With Breakfast.   • fluticasone (FLONASE) 50 MCG/ACT nasal spray 2 sprays into each nostril As Needed for rhinitis.   • folic acid (FOLVITE) 1 MG tablet Take 1 tablet by mouth Daily.   • glucose blood (ONE TOUCH ULTRA TEST) test strip 1 each by Other route Daily. Check 1-2times daily  E11.9  90 day supply  One Touch Verio   • HYDROcodone-acetaminophen (NORCO) 7.5-325 MG per tablet Take 1 tablet by mouth Every 6 (Six) Hours As Needed for Moderate Pain  or Severe Pain  (Pain).   • LANCETS MICRO THIN 33G misc Check once daily  E11.9  Trueplus   • latanoprost (XALATAN) 0.005 % ophthalmic solution Administer 1 drop to both eyes Every Night.   • levothyroxine (SYNTHROID, LEVOTHROID) 50 MCG tablet Take 1 tablet by mouth Daily.   • Loperamide HCl (IMODIUM PO) Take  by mouth Daily As Needed (Diarrhea).   • losartan-hydrochlorothiazide (HYZAAR) 100-12.5 MG per tablet Take 1 tablet by mouth Daily.   • metFORMIN (GLUCOPHAGE) 500 MG tablet Take 500 mg by mouth 2 (Two) Times a Day With Meals.   • mometasone-formoterol (DULERA 200) 200-5 MCG/ACT inhaler Inhale 2 puffs 2 (Two) Times a Day.   • montelukast (SINGULAIR) 10 MG tablet Take 1 tablet by mouth Every Night.   • ondansetron (ZOFRAN) 4 MG tablet Take 1 tablet by mouth 4 (Four) Times a Day As Needed for Nausea or Vomiting.   • vitamin B-12 (CYANOCOBALAMIN) 1000 MCG tablet Take 1 tablet by mouth on Monday, Wednesday and Friday     No current facility-administered medications on file prior to visit.     She is allergic to other..    Outpatient Medications Prior to Visit   Medication Sig Dispense Refill   • acetaminophen (TYLENOL) 500 MG tablet Take 500 mg by mouth Every 6 (Six) Hours As Needed for Mild Pain .     • albuterol sulfate HFA (PROAIR HFA) 108 (90 Base) MCG/ACT inhaler Inhale 2 puffs 4 (Four) Times a Day As Needed for Wheezing. 8.5 inhaler 6   • amLODIPine (NORVASC) 5 MG tablet Take 1 tablet by mouth Every Night. 90 tablet 3   • aspirin 81 MG EC tablet Take  81 mg by mouth Every Night.     • atorvastatin (LIPITOR) 20 MG tablet Take 20 mg by mouth Every Night.     • Blood Glucose Monitoring Suppl (ONE TOUCH ULTRA 2) W/DEVICE kit      • Calcium Carbonate-Vitamin D 600-200 MG-UNIT tablet Take 1 tablet by mouth Daily.     • citalopram (CeleXA) 20 MG tablet Take 0.5 tablets by mouth Daily. 90 tablet 3   • Dapagliflozin Propanediol (Farxiga) 10 MG tablet Take 10 mg by mouth Every Morning. 90 tablet 1   • docusate sodium (COLACE) 100 MG capsule Take 1 capsule by mouth 2 (Two) Times a Day As Needed for Constipation. 60 capsule 2   • ferrous sulfate 325 (65 FE) MG tablet Take 1 tablet by mouth Daily With Breakfast. 30 tablet 3   • fluticasone (FLONASE) 50 MCG/ACT nasal spray 2 sprays into each nostril As Needed for rhinitis.     • folic acid (FOLVITE) 1 MG tablet Take 1 tablet by mouth Daily. 90 tablet 1   • glucose blood (ONE TOUCH ULTRA TEST) test strip 1 each by Other route Daily. Check 1-2times daily  E11.9  90 day supply  One Touch Verio 150 each 3   • HYDROcodone-acetaminophen (NORCO) 7.5-325 MG per tablet Take 1 tablet by mouth Every 6 (Six) Hours As Needed for Moderate Pain  or Severe Pain  (Pain). 18 tablet 0   • LANCETS MICRO THIN 33G misc Check once daily  E11.9  Trueplus 100 each 3   • latanoprost (XALATAN) 0.005 % ophthalmic solution Administer 1 drop to both eyes Every Night.     • levothyroxine (SYNTHROID, LEVOTHROID) 50 MCG tablet Take 1 tablet by mouth Daily. 90 tablet 3   • Loperamide HCl (IMODIUM PO) Take  by mouth Daily As Needed (Diarrhea).     • losartan-hydrochlorothiazide (HYZAAR) 100-12.5 MG per tablet Take 1 tablet by mouth Daily. 90 tablet 3   • metFORMIN (GLUCOPHAGE) 500 MG tablet Take 500 mg by mouth 2 (Two) Times a Day With Meals.     • mometasone-formoterol (DULERA 200) 200-5 MCG/ACT inhaler Inhale 2 puffs 2 (Two) Times a Day.     • montelukast (SINGULAIR) 10 MG tablet Take 1 tablet by mouth Every Night. 90 tablet 3   • ondansetron (ZOFRAN) 4 MG  "tablet Take 1 tablet by mouth 4 (Four) Times a Day As Needed for Nausea or Vomiting. 40 tablet 3   • vitamin B-12 (CYANOCOBALAMIN) 1000 MCG tablet Take 1 tablet by mouth on Monday, Wednesday and Friday 36 tablet 1     No facility-administered medications prior to visit.       Patient Active Problem List   Diagnosis   • Vitamin D deficiency   • Type 2 diabetes mellitus without complication (CMS/HCC)   • Temporomandibular joint disorder   • Strain of neck muscle   • Spinal stenosis of lumbar region   • Spasm of back muscles   • Shoulder pain   • Sebaceous cyst   • Osteopenia of spine   • Mild persistent asthma   • Menopause   • Lumbar disc disorder   • Hyperglycemia   • Health maintenance examination   • Glaucoma   • Essential hypertension   • Encounter for screening for malignant neoplasm of colon   • Cervicalgia   • Allergic rhinitis, seasonal   • Acquired hypothyroidism   • Postcholecystectomy diarrhea   • Obstructive sleep apnea syndrome - Nasal CPAP. Prem   • Invasive ductal carcinoma of right breast (CMS/HCC)   • Encounter for venous access device care   • Family history of GI malignancy   • Malignant neoplasm of upper-outer quadrant of right breast in female, estrogen receptor positive (CMS/HCC)   • Hyperlipidemia associated with type 2 diabetes mellitus (CMS/HCC)       Advanced Care Planning:  ACP discussion was held with the patient during this visit. Patient does not have an advance directive, information provided.    Review of Systems    Compared to one year ago, the patient feels her physical health is worse.  Compared to one year ago, the patient feels her mental health is the same.    Reviewed chart for potential of high risk medication in the elderly: yes  Reviewed chart for potential of harmful drug interactions in the elderly:yes    Objective         Vitals:    04/02/21 1619   Weight: 66.2 kg (146 lb)   Height: 157.5 cm (62\")   PainSc: 0-No pain     No blood pressure obtained today with this " telephone visit due to coronavirus pandemic.    Body mass index is 26.7 kg/m².  Discussed the patient's BMI with her. The BMI is in the acceptable range.    Physical Exam    Lab Results   Component Value Date    TRIG 244 (H) 02/22/2021    HDL 34 (L) 02/22/2021     (H) 02/22/2021    VLDL 43 (H) 02/22/2021    HGBA1C 6.29 (H) 02/22/2021        Assessment/Plan   Medicare Risks and Personalized Health Plan  CMS Preventative Services Quick Reference  Advance Directive Discussion  Immunizations Discussed/Encouraged (specific immunizations; adacel Tdap and Shingrix )    The above risks/problems have been discussed with the patient.  The patient is about to begin radiation treatment for breast cancer.  Little/no response to initial chemotherapy.  Additional chemo planned following XRT.  Dr. Duong  The patient wants to wait until late summer/fall before repeating DEXA or considering Shingrix vaccine.  She had Zostavax vaccine 8 years ago.  We will include hepatitis C screen with her next labs.  Pertinent information has been shared with the patient in the After Visit Summary.  Follow up plans and orders are seen below in the Assessment/Plan Section.    There are no diagnoses linked to this encounter.  Follow Up:  Return in about 1 year (around 4/2/2022) for Medicare Wellness.     An After Visit Summary and PPPS were given to the patient.

## 2021-04-06 RX ORDER — ATORVASTATIN CALCIUM 20 MG/1
20 TABLET, FILM COATED ORAL NIGHTLY
Qty: 90 TABLET | Refills: 3 | Status: SHIPPED | OUTPATIENT
Start: 2021-04-06 | End: 2021-09-08

## 2021-04-15 ENCOUNTER — INFUSION (OUTPATIENT)
Dept: ONCOLOGY | Facility: HOSPITAL | Age: 76
End: 2021-04-15

## 2021-04-15 ENCOUNTER — OFFICE VISIT (OUTPATIENT)
Dept: SURGERY | Facility: CLINIC | Age: 76
End: 2021-04-15

## 2021-04-15 VITALS
HEIGHT: 62 IN | SYSTOLIC BLOOD PRESSURE: 118 MMHG | DIASTOLIC BLOOD PRESSURE: 62 MMHG | HEART RATE: 89 BPM | BODY MASS INDEX: 27.16 KG/M2 | TEMPERATURE: 97.4 F | WEIGHT: 147.6 LBS

## 2021-04-15 DIAGNOSIS — Z17.1 MALIGNANT NEOPLASM OF CENTRAL PORTION OF RIGHT BREAST IN FEMALE, ESTROGEN RECEPTOR NEGATIVE (HCC): Primary | ICD-10-CM

## 2021-04-15 DIAGNOSIS — C50.111 MALIGNANT NEOPLASM OF CENTRAL PORTION OF RIGHT BREAST IN FEMALE, ESTROGEN RECEPTOR NEGATIVE (HCC): Primary | ICD-10-CM

## 2021-04-15 DIAGNOSIS — Z45.2 ENCOUNTER FOR VENOUS ACCESS DEVICE CARE: Primary | ICD-10-CM

## 2021-04-15 PROCEDURE — 96523 IRRIG DRUG DELIVERY DEVICE: CPT | Performed by: INTERNAL MEDICINE

## 2021-04-15 PROCEDURE — 25010000002 HEPARIN LOCK FLUSH PER 10 UNITS: Performed by: NURSE PRACTITIONER

## 2021-04-15 PROCEDURE — 99024 POSTOP FOLLOW-UP VISIT: CPT | Performed by: NURSE PRACTITIONER

## 2021-04-15 RX ORDER — HEPARIN SODIUM (PORCINE) LOCK FLUSH IV SOLN 100 UNIT/ML 100 UNIT/ML
500 SOLUTION INTRAVENOUS AS NEEDED
Status: DISCONTINUED | OUTPATIENT
Start: 2021-04-15 | End: 2021-04-15 | Stop reason: HOSPADM

## 2021-04-15 RX ORDER — SODIUM CHLORIDE 0.9 % (FLUSH) 0.9 %
20 SYRINGE (ML) INJECTION AS NEEDED
Status: DISCONTINUED | OUTPATIENT
Start: 2021-04-15 | End: 2021-04-15 | Stop reason: HOSPADM

## 2021-04-15 RX ORDER — SODIUM CHLORIDE 0.9 % (FLUSH) 0.9 %
20 SYRINGE (ML) INJECTION AS NEEDED
Status: CANCELLED | OUTPATIENT
Start: 2021-04-15

## 2021-04-15 RX ORDER — HEPARIN SODIUM (PORCINE) LOCK FLUSH IV SOLN 100 UNIT/ML 100 UNIT/ML
500 SOLUTION INTRAVENOUS AS NEEDED
Status: CANCELLED | OUTPATIENT
Start: 2021-06-01

## 2021-04-15 RX ORDER — SODIUM CHLORIDE 0.9 % (FLUSH) 0.9 %
10 SYRINGE (ML) INJECTION AS NEEDED
Status: CANCELLED | OUTPATIENT
Start: 2021-06-01

## 2021-04-15 RX ORDER — SODIUM CHLORIDE 0.9 % (FLUSH) 0.9 %
10 SYRINGE (ML) INJECTION AS NEEDED
Status: DISCONTINUED | OUTPATIENT
Start: 2021-04-15 | End: 2021-04-15 | Stop reason: HOSPADM

## 2021-04-15 RX ADMIN — HEPARIN 500 UNITS: 100 SYRINGE at 14:21

## 2021-04-15 NOTE — PROGRESS NOTES
CHIEF COMPLAINT:   Chief Complaint   Patient presents with   • Follow-up     [place] lymph node mapping and biopsy Rt. Axillary lymph node dissection 2-25-21       HPI: This patient presents for a post-operative visit after undergoing a right sentinel lymph node mapping and axillary lymph node dissection per Dr. Alexandra for right breast cancer.  Patient reports no problems.  She denies fever or chills.  She claims everything is healing well and denies pain.  She has currently completed her fourth radiation treatment.  She states she sees Dr. Duong again on June 1 to discuss chemo treatment.  She also has left lower colostomy which she would like surgically reversed at the completion of chemotherapy.    PATHOLOGY:       Physical Exam  Incisions healed with no infection, cellulitis or hematoma.  No new masses lesions or areas of concern.  No edema of right upper extremity.    ASSESSMENT:    Diagnoses and all orders for this visit:    1. Malignant neoplasm of central portion of right breast in female, estrogen receptor negative (CMS/HCC) (Primary)        PLAN:  1. The patient will follow-up in 6 weeks for recheck after seeing Dr. Duong or sooner if concerns arise.                  This document has been electronically signed by YOHANA Lugo on April 15, 2021 14:29 CDT

## 2021-06-01 ENCOUNTER — OFFICE VISIT (OUTPATIENT)
Dept: ONCOLOGY | Facility: CLINIC | Age: 76
End: 2021-06-01

## 2021-06-01 ENCOUNTER — OFFICE VISIT (OUTPATIENT)
Dept: SURGERY | Facility: CLINIC | Age: 76
End: 2021-06-01

## 2021-06-01 ENCOUNTER — LAB (OUTPATIENT)
Dept: ONCOLOGY | Facility: HOSPITAL | Age: 76
End: 2021-06-01

## 2021-06-01 VITALS
DIASTOLIC BLOOD PRESSURE: 64 MMHG | TEMPERATURE: 98.6 F | SYSTOLIC BLOOD PRESSURE: 120 MMHG | BODY MASS INDEX: 27.71 KG/M2 | HEIGHT: 62 IN | WEIGHT: 150.6 LBS | HEART RATE: 91 BPM | OXYGEN SATURATION: 98 % | RESPIRATION RATE: 22 BRPM

## 2021-06-01 VITALS
WEIGHT: 150.5 LBS | DIASTOLIC BLOOD PRESSURE: 65 MMHG | HEIGHT: 62 IN | HEART RATE: 69 BPM | RESPIRATION RATE: 18 BRPM | BODY MASS INDEX: 27.7 KG/M2 | SYSTOLIC BLOOD PRESSURE: 142 MMHG | TEMPERATURE: 97.8 F

## 2021-06-01 DIAGNOSIS — D64.9 ANEMIA, UNSPECIFIED TYPE: ICD-10-CM

## 2021-06-01 DIAGNOSIS — C50.111 MALIGNANT NEOPLASM OF CENTRAL PORTION OF RIGHT BREAST IN FEMALE, ESTROGEN RECEPTOR NEGATIVE (HCC): Primary | ICD-10-CM

## 2021-06-01 DIAGNOSIS — R79.89 ELEVATED LFTS: ICD-10-CM

## 2021-06-01 DIAGNOSIS — D75.839 THROMBOCYTOSIS: ICD-10-CM

## 2021-06-01 DIAGNOSIS — C50.911 INVASIVE DUCTAL CARCINOMA OF RIGHT BREAST (HCC): Primary | ICD-10-CM

## 2021-06-01 DIAGNOSIS — L98.9 SKIN LESION: ICD-10-CM

## 2021-06-01 DIAGNOSIS — Z17.1 MALIGNANT NEOPLASM OF CENTRAL PORTION OF RIGHT BREAST IN FEMALE, ESTROGEN RECEPTOR NEGATIVE (HCC): Primary | ICD-10-CM

## 2021-06-01 DIAGNOSIS — Z45.2 ENCOUNTER FOR VENOUS ACCESS DEVICE CARE: ICD-10-CM

## 2021-06-01 PROBLEM — C50.411 MALIGNANT NEOPLASM OF UPPER-OUTER QUADRANT OF RIGHT BREAST IN FEMALE, ESTROGEN RECEPTOR POSITIVE: Chronic | Status: RESOLVED | Noted: 2021-02-09 | Resolved: 2021-06-01

## 2021-06-01 PROBLEM — Z17.0 MALIGNANT NEOPLASM OF UPPER-OUTER QUADRANT OF RIGHT BREAST IN FEMALE, ESTROGEN RECEPTOR POSITIVE (HCC): Chronic | Status: RESOLVED | Noted: 2021-02-09 | Resolved: 2021-06-01

## 2021-06-01 LAB
ALBUMIN SERPL-MCNC: 4.1 G/DL (ref 3.5–5.2)
ALBUMIN/GLOB SERPL: 1.4 G/DL
ALP SERPL-CCNC: 76 U/L (ref 39–117)
ALT SERPL W P-5'-P-CCNC: 45 U/L (ref 1–33)
ANION GAP SERPL CALCULATED.3IONS-SCNC: 8 MMOL/L (ref 5–15)
AST SERPL-CCNC: 44 U/L (ref 1–32)
BASOPHILS # BLD AUTO: 0.11 10*3/MM3 (ref 0–0.2)
BASOPHILS NFR BLD AUTO: 1.9 % (ref 0–1.5)
BILIRUB SERPL-MCNC: 0.2 MG/DL (ref 0–1.2)
BUN SERPL-MCNC: 13 MG/DL (ref 8–23)
BUN/CREAT SERPL: 14.1 (ref 7–25)
CALCIUM SPEC-SCNC: 9.6 MG/DL (ref 8.6–10.5)
CHLORIDE SERPL-SCNC: 102 MMOL/L (ref 98–107)
CO2 SERPL-SCNC: 28 MMOL/L (ref 22–29)
CREAT SERPL-MCNC: 0.92 MG/DL (ref 0.57–1)
DEPRECATED RDW RBC AUTO: 54.8 FL (ref 37–54)
EOSINOPHIL # BLD AUTO: 0.32 10*3/MM3 (ref 0–0.4)
EOSINOPHIL NFR BLD AUTO: 5.6 % (ref 0.3–6.2)
ERYTHROCYTE [DISTWIDTH] IN BLOOD BY AUTOMATED COUNT: 18.3 % (ref 12.3–15.4)
FERRITIN SERPL-MCNC: 37.15 NG/ML (ref 13–150)
GFR SERPL CREATININE-BSD FRML MDRD: 60 ML/MIN/1.73
GLOBULIN UR ELPH-MCNC: 3 GM/DL
GLUCOSE SERPL-MCNC: 133 MG/DL (ref 65–99)
HCT VFR BLD AUTO: 39.2 % (ref 34–46.6)
HGB BLD-MCNC: 12.3 G/DL (ref 12–15.9)
IMM GRANULOCYTES # BLD AUTO: 0.01 10*3/MM3 (ref 0–0.05)
IMM GRANULOCYTES NFR BLD AUTO: 0.2 % (ref 0–0.5)
IRON 24H UR-MRATE: 63 MCG/DL (ref 37–145)
IRON SATN MFR SERPL: 16 % (ref 20–50)
LYMPHOCYTES # BLD AUTO: 0.76 10*3/MM3 (ref 0.7–3.1)
LYMPHOCYTES NFR BLD AUTO: 13.3 % (ref 19.6–45.3)
MCH RBC QN AUTO: 25.9 PG (ref 26.6–33)
MCHC RBC AUTO-ENTMCNC: 31.4 G/DL (ref 31.5–35.7)
MCV RBC AUTO: 82.5 FL (ref 79–97)
MONOCYTES # BLD AUTO: 0.72 10*3/MM3 (ref 0.1–0.9)
MONOCYTES NFR BLD AUTO: 12.6 % (ref 5–12)
NEUTROPHILS NFR BLD AUTO: 3.79 10*3/MM3 (ref 1.7–7)
NEUTROPHILS NFR BLD AUTO: 66.4 % (ref 42.7–76)
NRBC BLD AUTO-RTO: 0 /100 WBC (ref 0–0.2)
PLATELET # BLD AUTO: 341 10*3/MM3 (ref 140–450)
PMV BLD AUTO: 8.6 FL (ref 6–12)
POTASSIUM SERPL-SCNC: 4.1 MMOL/L (ref 3.5–5.2)
PROT SERPL-MCNC: 7.1 G/DL (ref 6–8.5)
RBC # BLD AUTO: 4.75 10*6/MM3 (ref 3.77–5.28)
SODIUM SERPL-SCNC: 138 MMOL/L (ref 136–145)
TIBC SERPL-MCNC: 393 MCG/DL (ref 298–536)
TRANSFERRIN SERPL-MCNC: 264 MG/DL (ref 200–360)
WBC # BLD AUTO: 5.71 10*3/MM3 (ref 3.4–10.8)

## 2021-06-01 PROCEDURE — 1126F AMNT PAIN NOTED NONE PRSNT: CPT | Performed by: INTERNAL MEDICINE

## 2021-06-01 PROCEDURE — 99214 OFFICE O/P EST MOD 30 MIN: CPT | Performed by: INTERNAL MEDICINE

## 2021-06-01 PROCEDURE — G9903 PT SCRN TBCO ID AS NON USER: HCPCS | Performed by: INTERNAL MEDICINE

## 2021-06-01 PROCEDURE — 11104 PUNCH BX SKIN SINGLE LESION: CPT | Performed by: NURSE PRACTITIONER

## 2021-06-01 PROCEDURE — 83540 ASSAY OF IRON: CPT | Performed by: INTERNAL MEDICINE

## 2021-06-01 PROCEDURE — 25010000002 HEPARIN LOCK FLUSH PER 10 UNITS: Performed by: NURSE PRACTITIONER

## 2021-06-01 PROCEDURE — 1123F ACP DISCUSS/DSCN MKR DOCD: CPT | Performed by: INTERNAL MEDICINE

## 2021-06-01 PROCEDURE — 80053 COMPREHEN METABOLIC PANEL: CPT

## 2021-06-01 PROCEDURE — 88305 TISSUE EXAM BY PATHOLOGIST: CPT

## 2021-06-01 PROCEDURE — 82728 ASSAY OF FERRITIN: CPT | Performed by: INTERNAL MEDICINE

## 2021-06-01 PROCEDURE — 99213 OFFICE O/P EST LOW 20 MIN: CPT | Performed by: NURSE PRACTITIONER

## 2021-06-01 PROCEDURE — 85025 COMPLETE CBC W/AUTO DIFF WBC: CPT

## 2021-06-01 PROCEDURE — 84466 ASSAY OF TRANSFERRIN: CPT | Performed by: INTERNAL MEDICINE

## 2021-06-01 PROCEDURE — 36591 DRAW BLOOD OFF VENOUS DEVICE: CPT | Performed by: INTERNAL MEDICINE

## 2021-06-01 RX ORDER — SODIUM CHLORIDE 0.9 % (FLUSH) 0.9 %
10 SYRINGE (ML) INJECTION AS NEEDED
Status: CANCELLED | OUTPATIENT
Start: 2021-06-01

## 2021-06-01 RX ORDER — HEPARIN SODIUM (PORCINE) LOCK FLUSH IV SOLN 100 UNIT/ML 100 UNIT/ML
500 SOLUTION INTRAVENOUS AS NEEDED
Status: CANCELLED | OUTPATIENT
Start: 2021-06-01

## 2021-06-01 RX ORDER — CAPECITABINE 500 MG/1
1000 TABLET, FILM COATED ORAL 2 TIMES DAILY
Qty: 84 TABLET | Refills: 1 | Status: SHIPPED | OUTPATIENT
Start: 2021-06-14 | End: 2021-07-23

## 2021-06-01 RX ORDER — HEPARIN SODIUM (PORCINE) LOCK FLUSH IV SOLN 100 UNIT/ML 100 UNIT/ML
500 SOLUTION INTRAVENOUS AS NEEDED
Status: DISCONTINUED | OUTPATIENT
Start: 2021-06-01 | End: 2021-06-01 | Stop reason: HOSPADM

## 2021-06-01 RX ORDER — SODIUM CHLORIDE 0.9 % (FLUSH) 0.9 %
20 SYRINGE (ML) INJECTION AS NEEDED
Status: CANCELLED | OUTPATIENT
Start: 2021-06-01

## 2021-06-01 RX ADMIN — HEPARIN 500 UNITS: 100 SYRINGE at 13:34

## 2021-06-01 NOTE — PATIENT INSTRUCTIONS
"BMI for Adults  What is BMI?  Body mass index (BMI) is a number that is calculated from a person's weight and height. BMI can help estimate how much of a person's weight is composed of fat. BMI does not measure body fat directly. Rather, it is an alternative to procedures that directly measure body fat, which can be difficult and expensive.  BMI can help identify people who may be at higher risk for certain medical problems.  What are BMI measurements used for?  BMI is used as a screening tool to identify possible weight problems. It helps determine whether a person is obese, overweight, a healthy weight, or underweight.  BMI is useful for:  · Identifying a weight problem that may be related to a medical condition or may increase the risk for medical problems.  · Promoting changes, such as changes in diet and exercise, to help reach a healthy weight. BMI screening can be repeated to see if these changes are working.  How is BMI calculated?  BMI involves measuring your weight in relation to your height. Both height and weight are measured, and the BMI is calculated from those numbers. This can be done either in English ([place] or metric measurements. Note that charts and online BMI calculators are available to help you find your BMI quickly and easily without having to do these calculations yourself.  To calculate your BMI in English ([place] measurements:    1. Measure your weight in pounds (lb).  2. Multiply the number of pounds by 703.  ? For example, for a person who weighs 180 lb, multiply that number by 703, which equals 126,540.  3. Measure your height in inches. Then multiply that number by itself to get a measurement called \"inches squared.\"  ? For example, for a person who is 70 inches tall, the \"inches squared\" measurement is 70 inches x 70 inches, which equals 4,900 inches squared.  4. Divide the total from step 2 (number of lb x 703) by the total from step 3 (inches squared): 126,540 ÷ 4,900 = 25.8. This is " "your BMI.  To calculate your BMI in metric measurements:  1. Measure your weight in kilograms (kg).  2. Measure your height in meters (m). Then multiply that number by itself to get a measurement called \"meters squared.\"  ? For example, for a person who is 1.75 m tall, the \"meters squared\" measurement is 1.75 m x 1.75 m, which is equal to 3.1 meters squared.  3. Divide the number of kilograms (your weight) by the meters squared number. In this example: 70 ÷ 3.1 = 22.6. This is your BMI.  What do the results mean?  BMI charts are used to identify whether you are underweight, normal weight, overweight, or obese. The following guidelines will be used:  · Underweight: BMI less than 18.5.  · Normal weight: BMI between 18.5 and 24.9.  · Overweight: BMI between 25 and 29.9.  · Obese: BMI of 30 or above.  Keep these notes in mind:  · Weight includes both fat and muscle, so someone with a muscular build, such as an athlete, may have a BMI that is higher than 24.9. In cases like these, BMI is not an accurate measure of body fat.  · To determine if excess body fat is the cause of a BMI of 25 or higher, further assessments may need to be done by a health care provider.  · BMI is usually interpreted in the same way for men and women.  Where to find more information  For more information about BMI, including tools to quickly calculate your BMI, go to these websites:  · Centers for Disease Control and Prevention: www.cdc.gov  · American Heart Association: www.heart.org  · National Heart, Lung, and Blood [place]: www.nhlbi.nih.gov  Summary  · Body mass index (BMI) is a number that is calculated from a person's weight and height.  · BMI may help estimate how much of a person's weight is composed of fat. BMI can help identify those who may be at higher risk for certain medical problems.  · BMI can be measured using English measurements or metric measurements.  · BMI charts are used to identify whether you are underweight, normal " weight, overweight, or obese.  This information is not intended to replace advice given to you by your health care provider. Make sure you discuss any questions you have with your health care provider.  Document Revised: 09/09/2020 Document Reviewed: 07/17/2020  [employer]vier Patient Education © 2021 [employer]

## 2021-06-01 NOTE — PROGRESS NOTES
DATE OF VISIT: 6/2/2021      REASON FOR VISIT: Triple negative right breast cancer, anemia, elevated liver function test, chronic kidney disease      HISTORY OF PRESENT ILLNESS:   75-year-old female with medical problem consisting of diabetes mellitus, hypertension, dyslipidemia, asthma, osteopenia, obstructive sleep apnea, chronic back pain has been following up with [facility] since October 5, 2020 for triple negative right breast cancer.  Patient recently completed radiation treatment and plan of extremity on May 24, 2021.  Patient is here to discuss further treatment recommendation.  Denies any new lymph node enlargement.  Denies any bleeding.  Denies any nausea or vomiting or diarrhea.            Oncology History:    1.  Triple negative right breast cancer, [id number] with 1 lymph node showing 1.9 cm deposit with extracapsular extension and tumor deposit lymphatic:  -Patient received neoadjuvant chemotherapy with Taxotere and Cytoxan for 3 cycles between October 15, 2020 and November 30, 2020.  -After cycle 3 unfortunately patient developed bowel obstruction requiring surgery and colostomy at hospital in [place].  -Patient did not want to do cycle 4 prior to surgery and was subsequently sent back to Dr. Alexandra underwent lumpectomy on January 11 2021 that showed 1 cm residual cancer.  -Patient had axillary dissection done on February 25, 2021 that showed 1.9 cm lymph node involvement with extracapsular extension and tumor deposit in the lymphatic channel.  -Result of pathology report were discussed with patient and her .  Patient had a minimal or no response to chemotherapy with Taxotere and Cytoxan in neoadjuvant setting.  PET/CT done prior to starting chemotherapy had a shotty axillary lymph node without any significant uptake.  Patient did have enlarged lymph node at the time of surgery again not responding to chemotherapy preoperatively  -Had a radiation treatment that completed on May 24,  "2021 at [facility].            Past Medical History, Past Surgical History, Social History, Family History have been reviewed and are without significant changes except as mentioned.    Review of Systems   Constitutional: Positive for fatigue.   Gastrointestinal: Negative for blood in stool.   Musculoskeletal: Positive for arthralgias and back pain.   Hematological: Negative for adenopathy.      A comprehensive 14 point review of systems was performed and was negative except as mentioned.    Medications:  The current medication list was reviewed in the EMR    ALLERGIES:    Allergies   Allergen Reactions   • Other Confusion     Coda-clear       Objective      Vitals:    06/01/21 1350   BP: 142/65   Pulse: 69   Resp: 18   Temp: 97.8 °F (36.6 °C)   TempSrc: Temporal   Weight: 68.3 kg (150 lb 8 oz)   Height: 157.5 cm (62.01\")   PainSc: 0-No pain     Current Status 6/1/2021   ECOG score 0       Physical Exam  Cardiovascular:      Rate and Rhythm: Normal rate and regular rhythm.   Pulmonary:      Breath sounds: Normal breath sounds.   Abdominal:      Comments: Colostomy present.   Neurological:      Mental Status: She is alert and oriented to person, place, and time.           RECENT LABS:  Glucose   Date Value Ref Range Status   06/01/2021 133 (H) 65 - 99 mg/dL Final     Sodium   Date Value Ref Range Status   06/01/2021 138 136 - 145 mmol/L Final     Potassium   Date Value Ref Range Status   06/01/2021 4.1 3.5 - 5.2 mmol/L Final     CO2   Date Value Ref Range Status   06/01/2021 28.0 22.0 - 29.0 mmol/L Final     Chloride   Date Value Ref Range Status   06/01/2021 102 98 - 107 mmol/L Final     Anion Gap   Date Value Ref Range Status   06/01/2021 8.0 5.0 - 15.0 mmol/L Final     Creatinine   Date Value Ref Range Status   06/01/2021 0.92 0.57 - 1.00 mg/dL Final     BUN   Date Value Ref Range Status   06/01/2021 13 8 - 23 mg/dL Final     BUN/Creatinine Ratio   Date Value Ref Range Status   06/01/2021 14.1 7.0 - " 25.0 Final     Calcium   Date Value Ref Range Status   06/01/2021 9.6 8.6 - 10.5 mg/dL Final     eGFR Non  Amer   Date Value Ref Range Status   06/01/2021 60 (L) >60 mL/min/1.73 Final     Alkaline Phosphatase   Date Value Ref Range Status   06/01/2021 76 39 - 117 U/L Final     Total Protein   Date Value Ref Range Status   06/01/2021 7.1 6.0 - 8.5 g/dL Final     ALT (SGPT)   Date Value Ref Range Status   06/01/2021 45 (H) 1 - 33 U/L Final     AST (SGOT)   Date Value Ref Range Status   06/01/2021 44 (H) 1 - 32 U/L Final     Total Bilirubin   Date Value Ref Range Status   06/01/2021 0.2 0.0 - 1.2 mg/dL Final     Albumin   Date Value Ref Range Status   06/01/2021 4.10 3.50 - 5.20 g/dL Final     Globulin   Date Value Ref Range Status   06/01/2021 3.0 gm/dL Final     Lab Results   Component Value Date    WBC 5.71 06/01/2021    HGB 12.3 06/01/2021    HCT 39.2 06/01/2021    MCV 82.5 06/01/2021     06/01/2021     Lab Results   Component Value Date    NEUTROABS 3.79 06/01/2021    IRON 63 06/01/2021    IRON 77 03/15/2021    IRON 61 11/05/2020    TIBC 393 06/01/2021    TIBC 428 03/15/2021    TIBC 390 11/05/2020    LABIRON 16 (L) 06/01/2021    LABIRON 18 (L) 03/15/2021    LABIRON 16 (L) 11/05/2020    FERRITIN 37.15 06/01/2021    FERRITIN 49.06 03/15/2021    FERRITIN 151.10 (H) 11/05/2020    [id number] 549 03/15/2021    [id number] >2,000 (H) 11/05/2020    FOLATE >20.00 03/15/2021    FOLATE 5.69 11/05/2020     No results found for: , LABCA2, AFPTM, HCGQUANT, , CHROMGRNA, [id number], CEA, REFLABREPO      PATHOLOGY:  * Cannot find OR log *         RADIOLOGY DATA :  No radiology results for the last 7 days        Assessment/Plan     1.  Triple negative right breast cancer, [id number] with 1 lymph node showing 1.9 cm deposit with extracapsular extension and tumor deposit in lymphatics:  -Please review oncology history for prior treatment details  -Patient completed radiation treatment to right breast and  axillary area on May 24, 2021 at [facility]  -Recommend starting Xeloda in adjuvant setting due to no response to preoperative chemotherapy  -We will start patient on Xeloda 1000 mg per metered squared 2 weeks on 1 week off for at least 6 cycles or up to 8 cycles depending on tolerance.  -Side effect of Xeloda were again discussed with patient.  Patient was provided information about Xeloda last clinic visit she does not have any question or concern  -We will recommend starting Xeloda on Pushpa 15, 2021.  We will ask patient to return to clinic on July 6, 2021.  Patient was instructed not to take second cycle of Xeloda until next clinic visit  -Patient has enough prescription for Zofran.  -Patient was encouraged to call our office with any unusual side effect from Xeloda    2.  Anemia:  -Hemoglobin is 12.3 today  -Patient remains on ferrous sulfate 1 tablet p.o. daily, B12 3 times a week and folic acid 1 mg p.o. daily      3.  Thrombocytosis:  -Resolved    4.  Genetic testing  -Genetic testing done in November 2020 was negative for BRCA 1 and 2    5.  Elevated liver function test:  -AST and ALT is minimally elevated.  -She does have a history of chronic elevated liver function test.  Will monitor with CMP    6.  Health maintenance: Patient does not smoke    7. Advance Care Planning: For now patient remains full code and is able to make decisions.  Patient has health care surrogate mentioned on chart.                 PHQ-9 Total Score: 0   -Patient is not homicidal or suicidal.  No acute intervention required.    Gale Cabral reports a pain score of 0.  Given her pain assessment as noted, treatment options were discussed and the following options were decided upon as a follow-up plan to address the patient's pain: continuation of current treatment plan for pain.         Carlos Duong MD  6/2/2021  06:58 CDT        Part of this note may be an electronic transcription/translation of spoken language to  printed text using the Dragon Dictation System.          CC:

## 2021-06-01 NOTE — PROGRESS NOTES
CHIEF COMPLAINT:   Chief Complaint   Patient presents with   • Post-op     6 week       HPI: This patient presents for a post-operative visit after undergoing right axillary dissection for right breast cancer per Dr. Alexandra. She states she has completed radiation therapy and is scheduled to begin oral chemotherapy in 4 weeks. Patient reports no problems. She still has left lower colostomy for which Dr. Alexandra states she is considered for reversal at completion of chemotherapy.  She does have concern of skin lesion on right wrist.  She states she has had this area frozen off in the past and it did not appear to resolve. She denies pain or drainage from the area but denies it will not heal.      PATHOLOGY:           Physical Exam  Vitals reviewed.   Constitutional:       Appearance: Normal appearance.   HENT:      Head: Normocephalic.   Skin:     General: Skin is warm and dry.      Findings: Lesion present.          Neurological:      General: No focal deficit present.      Mental Status: She is alert and oriented to person, place, and time.   Psychiatric:         Mood and Affect: Mood normal.         Behavior: Behavior normal.         Thought Content: Thought content normal.         Judgment: Judgment normal.       Breast and axillary incisions healing with no infection, cellulitis or hematoma.      Procedure:  Consent obtained. After prepping the skin with alcohol and locally anesthetizing the area with 5 cc of 1% xylocaine with epinephrine, a punch biopsy performed. Hemostasis is accomplished with gelfoam and pressure. Tolerated well.    ASSESSMENT:    Diagnoses and all orders for this visit:    1. Malignant neoplasm of central portion of right breast in female, estrogen receptor negative (CMS/HCC) (Primary)    2. Skin lesion  -     Tissue Pathology Exam; Future  -     Tissue Pathology Exam        PLAN:  1. Will notify patient of punch biopsy results and if further intervention is necessary.  2. Continue follow up with  medical oncology as scheduled.    3. Follow up in general surgery in 2 months or sooner based upon punch biopsy results.                This document has been electronically signed by YOHANA Lugo on June 1, 2021 15:35 CDT

## 2021-06-02 ENCOUNTER — TELEPHONE (OUTPATIENT)
Dept: ONCOLOGY | Facility: HOSPITAL | Age: 76
End: 2021-06-02

## 2021-06-02 RX ORDER — FOLIC ACID 1 MG/1
1 TABLET ORAL DAILY
Qty: 90 TABLET | Refills: 1 | Status: SHIPPED | OUTPATIENT
Start: 2021-06-02 | End: 2022-03-16

## 2021-06-02 NOTE — TELEPHONE ENCOUNTER
----- Message from Carlos Duong MD sent at 6/2/2021  6:55 AM CDT -----  Please let patient know, she needs to continue taking ferrous sulfate 1 tablet p.o. daily with folic acid daily and B12 3 times a week.  Thank you

## 2021-06-04 LAB
LAB AP CASE REPORT: NORMAL
LAB AP CLINICAL INFORMATION: NORMAL
PATH REPORT.FINAL DX SPEC: NORMAL

## 2021-06-15 ENCOUNTER — TELEPHONE (OUTPATIENT)
Dept: ONCOLOGY | Facility: CLINIC | Age: 76
End: 2021-06-15

## 2021-06-15 NOTE — TELEPHONE ENCOUNTER
Pt called with questions regarding starting oral Xeloda today. Confirmed prescription dosing as written and directions for administration with pt. Pt stated thankfulness and understanding. Encouraged to reach our and f/u with any further concerns or questions r/t cancer care.

## 2021-06-21 ENCOUNTER — DOCUMENTATION (OUTPATIENT)
Dept: ONCOLOGY | Facility: HOSPITAL | Age: 76
End: 2021-06-21

## 2021-06-21 ENCOUNTER — TELEPHONE (OUTPATIENT)
Dept: ONCOLOGY | Facility: HOSPITAL | Age: 76
End: 2021-06-21

## 2021-06-21 NOTE — TELEPHONE ENCOUNTER
Spoke with the patient at length regarding the start of the xeloda.Start date was 6/15/2021.See the education progress note.

## 2021-06-21 NOTE — PROGRESS NOTES
Oral Chemotherapy Teaching      Patient Name/:  Gale Cabral   1945  Oral Chemotherapy Regimen:  Xeloda 2 weeks on 1 week off  Date Started Medication: 06/15/2021    Initial Teaching Follow Up Comments     Safety     Storage instructions (away from children; away from heat/cold, sunlight, or moisture), handling - use of gloves (caregivers), washing hands after touching pills, managing waste     “How are you storing your medications?”, reminders on storage, proper handling (caregivers using gloves, washing hands, away from children, managing waste, etc.), disposal of medication with D/C or dosage change    Instructed/Reviewed on the handling of the xeloda and to try to handle the the med herself and wash her hands after handling.     Adherence      patient and/or caregiver on how to take medication, take with/without food, assess their adherence potential, stress importance of adherence, ways to manage adherence (pill boxes, phone reminders, calendars), what to do if miss a dose   “How are you taking your medication?” “How are you remembering to take your medication?”, “How many doses have you missed?”, determine reasons for non-adherence (not remembering, side effects, etc), ways to improve, overadherence? Remind patient of ways to improve/maintain adherence   Reviewed how to take the medication am and pm.    Instructed to not start med until after seeing Dr. Duong on 2021 for cycle 2. We discussed the possibility of starting cycle 2 the am after her appointment since her appointment is in the afternoon. We will discuss this with Dr. Duong the day of appt.       Side Effects/Adverse Reactions      patient on potential side effects, s/s, ways to manage, when to call MD/seek help     Determine if patient experiencing side effects, ways to manage  Patient is denying any side effects at this time. Using cream on her hands and feet as preventative care.     Miscellaneous     Food interactions,  DDIs, financial issues Determine if patient started any new medications since being placed on oral chemo (analyze for DDI) None at this time     Additional Notes:  Reviewed her follow up appointment. We clarified on how to  her new script at the hospital pharmacy. The pharmacy stated that it would be ready for her to  on the day of the follow up appointment. She has verbalized understanding to all of the above.

## 2021-06-30 ENCOUNTER — TELEPHONE (OUTPATIENT)
Dept: ONCOLOGY | Facility: CLINIC | Age: 76
End: 2021-06-30

## 2021-07-02 ENCOUNTER — INFUSION (OUTPATIENT)
Dept: ONCOLOGY | Facility: HOSPITAL | Age: 76
End: 2021-07-02

## 2021-07-02 ENCOUNTER — TELEPHONE (OUTPATIENT)
Dept: ONCOLOGY | Facility: HOSPITAL | Age: 76
End: 2021-07-02

## 2021-07-02 VITALS
HEART RATE: 111 BPM | SYSTOLIC BLOOD PRESSURE: 140 MMHG | TEMPERATURE: 97.8 F | RESPIRATION RATE: 18 BRPM | DIASTOLIC BLOOD PRESSURE: 59 MMHG

## 2021-07-02 DIAGNOSIS — I95.9 HYPOTENSION, UNSPECIFIED HYPOTENSION TYPE: Primary | ICD-10-CM

## 2021-07-02 DIAGNOSIS — K52.1 CHEMOTHERAPY-INDUCED DIARRHEA: Primary | ICD-10-CM

## 2021-07-02 DIAGNOSIS — Z45.2 ENCOUNTER FOR VENOUS ACCESS DEVICE CARE: ICD-10-CM

## 2021-07-02 DIAGNOSIS — T45.1X5A CHEMOTHERAPY-INDUCED DIARRHEA: Primary | ICD-10-CM

## 2021-07-02 DIAGNOSIS — R19.7 DIARRHEA, UNSPECIFIED TYPE: ICD-10-CM

## 2021-07-02 LAB
ANION GAP SERPL CALCULATED.3IONS-SCNC: 15 MMOL/L (ref 5–15)
BUN SERPL-MCNC: 19 MG/DL (ref 8–23)
BUN/CREAT SERPL: 16.2 (ref 7–25)
CALCIUM SPEC-SCNC: 9.6 MG/DL (ref 8.6–10.5)
CHLORIDE SERPL-SCNC: 101 MMOL/L (ref 98–107)
CO2 SERPL-SCNC: 20 MMOL/L (ref 22–29)
CREAT SERPL-MCNC: 1.17 MG/DL (ref 0.57–1)
GFR SERPL CREATININE-BSD FRML MDRD: 45 ML/MIN/1.73
GLUCOSE SERPL-MCNC: 232 MG/DL (ref 65–99)
HOLD SPECIMEN: NORMAL
POTASSIUM SERPL-SCNC: 3.5 MMOL/L (ref 3.5–5.2)
SODIUM SERPL-SCNC: 136 MMOL/L (ref 136–145)

## 2021-07-02 PROCEDURE — 96374 THER/PROPH/DIAG INJ IV PUSH: CPT | Performed by: NURSE PRACTITIONER

## 2021-07-02 PROCEDURE — 25010000002 HEPARIN LOCK FLUSH PER 10 UNITS: Performed by: NURSE PRACTITIONER

## 2021-07-02 PROCEDURE — 36415 COLL VENOUS BLD VENIPUNCTURE: CPT

## 2021-07-02 PROCEDURE — 96361 HYDRATE IV INFUSION ADD-ON: CPT | Performed by: NURSE PRACTITIONER

## 2021-07-02 PROCEDURE — 25010000002 ONDANSETRON PER 1 MG: Performed by: NURSE PRACTITIONER

## 2021-07-02 PROCEDURE — 80048 BASIC METABOLIC PNL TOTAL CA: CPT

## 2021-07-02 RX ORDER — SODIUM CHLORIDE 0.9 % (FLUSH) 0.9 %
10 SYRINGE (ML) INJECTION AS NEEDED
Status: DISCONTINUED | OUTPATIENT
Start: 2021-07-02 | End: 2021-07-02 | Stop reason: HOSPADM

## 2021-07-02 RX ORDER — SODIUM CHLORIDE 9 MG/ML
1000 INJECTION, SOLUTION INTRAVENOUS ONCE
Status: COMPLETED | OUTPATIENT
Start: 2021-07-02 | End: 2021-07-02

## 2021-07-02 RX ORDER — HEPARIN SODIUM (PORCINE) LOCK FLUSH IV SOLN 100 UNIT/ML 100 UNIT/ML
500 SOLUTION INTRAVENOUS AS NEEDED
Status: CANCELLED | OUTPATIENT
Start: 2021-07-06

## 2021-07-02 RX ORDER — SODIUM CHLORIDE 9 MG/ML
1000 INJECTION, SOLUTION INTRAVENOUS ONCE
Status: CANCELLED | OUTPATIENT
Start: 2021-07-06

## 2021-07-02 RX ORDER — SODIUM CHLORIDE 0.9 % (FLUSH) 0.9 %
20 SYRINGE (ML) INJECTION AS NEEDED
Status: DISCONTINUED | OUTPATIENT
Start: 2021-07-02 | End: 2021-07-02 | Stop reason: HOSPADM

## 2021-07-02 RX ORDER — SODIUM CHLORIDE 0.9 % (FLUSH) 0.9 %
20 SYRINGE (ML) INJECTION AS NEEDED
Status: CANCELLED | OUTPATIENT
Start: 2021-07-02

## 2021-07-02 RX ORDER — SODIUM CHLORIDE 0.9 % (FLUSH) 0.9 %
10 SYRINGE (ML) INJECTION AS NEEDED
Status: CANCELLED | OUTPATIENT
Start: 2021-07-06

## 2021-07-02 RX ORDER — HEPARIN SODIUM (PORCINE) LOCK FLUSH IV SOLN 100 UNIT/ML 100 UNIT/ML
500 SOLUTION INTRAVENOUS AS NEEDED
Status: DISCONTINUED | OUTPATIENT
Start: 2021-07-02 | End: 2021-07-02 | Stop reason: HOSPADM

## 2021-07-02 RX ORDER — DIPHENOXYLATE HYDROCHLORIDE AND ATROPINE SULFATE 2.5; .025 MG/1; MG/1
1 TABLET ORAL 4 TIMES DAILY PRN
Qty: 20 TABLET | Refills: 0 | Status: SHIPPED | OUTPATIENT
Start: 2021-07-02 | End: 2021-08-11

## 2021-07-02 RX ADMIN — SODIUM CHLORIDE, PRESERVATIVE FREE 10 ML: 5 INJECTION INTRAVENOUS at 15:53

## 2021-07-02 RX ADMIN — ONDANSETRON 8 MG: 2 INJECTION INTRAMUSCULAR; INTRAVENOUS at 14:12

## 2021-07-02 RX ADMIN — HEPARIN 500 UNITS: 100 SYRINGE at 15:53

## 2021-07-02 RX ADMIN — SODIUM CHLORIDE 1000 ML: 9 INJECTION, SOLUTION INTRAVENOUS at 13:44

## 2021-07-02 NOTE — TELEPHONE ENCOUNTER
Patient had left a message regarding side effects from the xeloda. She is currently on the off week of the med. She is complaining of  Diarrhea and nausea. She had been taking immodium and was not having any relief from it. Sharon MCCAIN has called in lomotil to her local pharmacy. She currently has zofran for the nausea. Encouraged her to take the zofran. Encouraged her to increase her fluid intact. Instructed her to go to the ER if she was unable to keep fluids down. She verbalized understanding. Reviewed follow up appointment.

## 2021-07-06 ENCOUNTER — LAB (OUTPATIENT)
Dept: ONCOLOGY | Facility: HOSPITAL | Age: 76
End: 2021-07-06

## 2021-07-06 ENCOUNTER — OFFICE VISIT (OUTPATIENT)
Dept: SURGERY | Facility: CLINIC | Age: 76
End: 2021-07-06

## 2021-07-06 ENCOUNTER — OFFICE VISIT (OUTPATIENT)
Dept: ONCOLOGY | Facility: CLINIC | Age: 76
End: 2021-07-06

## 2021-07-06 VITALS
BODY MASS INDEX: 26.85 KG/M2 | WEIGHT: 145.9 LBS | RESPIRATION RATE: 18 BRPM | DIASTOLIC BLOOD PRESSURE: 59 MMHG | TEMPERATURE: 97.4 F | HEIGHT: 62 IN | SYSTOLIC BLOOD PRESSURE: 132 MMHG | HEART RATE: 88 BPM

## 2021-07-06 VITALS
BODY MASS INDEX: 26.87 KG/M2 | WEIGHT: 146 LBS | DIASTOLIC BLOOD PRESSURE: 64 MMHG | TEMPERATURE: 97 F | SYSTOLIC BLOOD PRESSURE: 130 MMHG | HEART RATE: 80 BPM | HEIGHT: 62 IN

## 2021-07-06 DIAGNOSIS — C50.911 INVASIVE DUCTAL CARCINOMA OF RIGHT BREAST (HCC): Primary | ICD-10-CM

## 2021-07-06 DIAGNOSIS — Z45.2 ENCOUNTER FOR VENOUS ACCESS DEVICE CARE: ICD-10-CM

## 2021-07-06 DIAGNOSIS — L98.9 SKIN LESION: Primary | ICD-10-CM

## 2021-07-06 DIAGNOSIS — R19.7 DIARRHEA, UNSPECIFIED TYPE: ICD-10-CM

## 2021-07-06 DIAGNOSIS — R79.89 ELEVATED LFTS: Chronic | ICD-10-CM

## 2021-07-06 DIAGNOSIS — D64.9 ANEMIA, UNSPECIFIED TYPE: ICD-10-CM

## 2021-07-06 DIAGNOSIS — D64.9 ANEMIA, UNSPECIFIED TYPE: Chronic | ICD-10-CM

## 2021-07-06 DIAGNOSIS — C50.911 INVASIVE DUCTAL CARCINOMA OF RIGHT BREAST (HCC): Primary | Chronic | ICD-10-CM

## 2021-07-06 LAB
ALBUMIN SERPL-MCNC: 3.4 G/DL (ref 3.5–5.2)
ALBUMIN/GLOB SERPL: 1.2 G/DL
ALP SERPL-CCNC: 64 U/L (ref 39–117)
ALT SERPL W P-5'-P-CCNC: 20 U/L (ref 1–33)
ANION GAP SERPL CALCULATED.3IONS-SCNC: 9 MMOL/L (ref 5–15)
AST SERPL-CCNC: 23 U/L (ref 1–32)
BASOPHILS # BLD AUTO: 0.08 10*3/MM3 (ref 0–0.2)
BASOPHILS NFR BLD AUTO: 1.2 % (ref 0–1.5)
BILIRUB SERPL-MCNC: 0.3 MG/DL (ref 0–1.2)
BUN SERPL-MCNC: 9 MG/DL (ref 8–23)
BUN/CREAT SERPL: 10.5 (ref 7–25)
CALCIUM SPEC-SCNC: 9 MG/DL (ref 8.6–10.5)
CHLORIDE SERPL-SCNC: 107 MMOL/L (ref 98–107)
CO2 SERPL-SCNC: 18 MMOL/L (ref 22–29)
CREAT SERPL-MCNC: 0.86 MG/DL (ref 0.57–1)
DEPRECATED RDW RBC AUTO: 48.6 FL (ref 37–54)
EOSINOPHIL # BLD AUTO: 0.32 10*3/MM3 (ref 0–0.4)
EOSINOPHIL NFR BLD AUTO: 4.9 % (ref 0.3–6.2)
ERYTHROCYTE [DISTWIDTH] IN BLOOD BY AUTOMATED COUNT: 18 % (ref 12.3–15.4)
GFR SERPL CREATININE-BSD FRML MDRD: 65 ML/MIN/1.73
GLOBULIN UR ELPH-MCNC: 2.8 GM/DL
GLUCOSE SERPL-MCNC: 125 MG/DL (ref 65–99)
HCT VFR BLD AUTO: 35.6 % (ref 34–46.6)
HGB BLD-MCNC: 11.9 G/DL (ref 12–15.9)
HOLD SPECIMEN: NORMAL
IMM GRANULOCYTES # BLD AUTO: 0.05 10*3/MM3 (ref 0–0.05)
IMM GRANULOCYTES NFR BLD AUTO: 0.8 % (ref 0–0.5)
LYMPHOCYTES # BLD AUTO: 1.32 10*3/MM3 (ref 0.7–3.1)
LYMPHOCYTES NFR BLD AUTO: 20.2 % (ref 19.6–45.3)
MCH RBC QN AUTO: 27.8 PG (ref 26.6–33)
MCHC RBC AUTO-ENTMCNC: 33.4 G/DL (ref 31.5–35.7)
MCV RBC AUTO: 83.2 FL (ref 79–97)
MONOCYTES # BLD AUTO: 1.25 10*3/MM3 (ref 0.1–0.9)
MONOCYTES NFR BLD AUTO: 19.1 % (ref 5–12)
NEUTROPHILS NFR BLD AUTO: 3.51 10*3/MM3 (ref 1.7–7)
NEUTROPHILS NFR BLD AUTO: 53.8 % (ref 42.7–76)
NRBC BLD AUTO-RTO: 0 /100 WBC (ref 0–0.2)
PLATELET # BLD AUTO: 343 10*3/MM3 (ref 140–450)
PMV BLD AUTO: 8.4 FL (ref 6–12)
POTASSIUM SERPL-SCNC: 3.5 MMOL/L (ref 3.5–5.2)
PROT SERPL-MCNC: 6.2 G/DL (ref 6–8.5)
RBC # BLD AUTO: 4.28 10*6/MM3 (ref 3.77–5.28)
SODIUM SERPL-SCNC: 134 MMOL/L (ref 136–145)
WBC # BLD AUTO: 6.53 10*3/MM3 (ref 3.4–10.8)

## 2021-07-06 PROCEDURE — 17110 DESTRUCTION B9 LES UP TO 14: CPT | Performed by: NURSE PRACTITIONER

## 2021-07-06 PROCEDURE — 85025 COMPLETE CBC W/AUTO DIFF WBC: CPT

## 2021-07-06 PROCEDURE — 99214 OFFICE O/P EST MOD 30 MIN: CPT | Performed by: INTERNAL MEDICINE

## 2021-07-06 PROCEDURE — 80053 COMPREHEN METABOLIC PANEL: CPT

## 2021-07-06 PROCEDURE — 36591 DRAW BLOOD OFF VENOUS DEVICE: CPT | Performed by: INTERNAL MEDICINE

## 2021-07-06 PROCEDURE — 25010000002 HEPARIN LOCK FLUSH PER 10 UNITS: Performed by: NURSE PRACTITIONER

## 2021-07-06 PROCEDURE — 36415 COLL VENOUS BLD VENIPUNCTURE: CPT

## 2021-07-06 RX ORDER — POTASSIUM CHLORIDE 20 MEQ/1
TABLET, EXTENDED RELEASE ORAL
COMMUNITY
Start: 2021-07-05 | End: 2021-09-08

## 2021-07-06 RX ORDER — HEPARIN SODIUM (PORCINE) LOCK FLUSH IV SOLN 100 UNIT/ML 100 UNIT/ML
500 SOLUTION INTRAVENOUS AS NEEDED
Status: CANCELLED | OUTPATIENT
Start: 2021-07-06

## 2021-07-06 RX ORDER — SODIUM CHLORIDE 0.9 % (FLUSH) 0.9 %
10 SYRINGE (ML) INJECTION AS NEEDED
Status: CANCELLED | OUTPATIENT
Start: 2021-07-06

## 2021-07-06 RX ORDER — SODIUM CHLORIDE 0.9 % (FLUSH) 0.9 %
20 SYRINGE (ML) INJECTION AS NEEDED
Status: CANCELLED | OUTPATIENT
Start: 2021-07-06

## 2021-07-06 RX ORDER — HEPARIN SODIUM (PORCINE) LOCK FLUSH IV SOLN 100 UNIT/ML 100 UNIT/ML
500 SOLUTION INTRAVENOUS AS NEEDED
Status: DISCONTINUED | OUTPATIENT
Start: 2021-07-06 | End: 2021-07-06 | Stop reason: HOSPADM

## 2021-07-06 RX ADMIN — HEPARIN 500 UNITS: 100 SYRINGE at 12:47

## 2021-07-06 NOTE — PROGRESS NOTES
DATE OF VISIT: 7/6/2021      REASON FOR VISIT: Triple negative right breast cancer, anemia, elevated liver function test, hypokalemia, diarrhea, hyponatremia      HISTORY OF PRESENT ILLNESS:   75-year-old female with medical problem consisting of diabetes mellitus, hypertension, dyslipidemia, asthma, osteopenia, obstructive sleep apnea, chronic back pain has been following up with [facility] since October 5, 2020 for triple negative right breast cancer.  Patient was started on adjuvant Xeloda on Pushpa 15, 2021.  Patient is scheduled to start cycle 2 of Xeloda today.  States she was admitted to [facility] on July 4, 2021 due to hypotension hypokalemia requiring to stay in hospital overnight.  Complains of diarrhea, states diarrhea is improved since hospital discharge.  Denies any bleeding.  Denies any new lymph node enlargement.          Oncology history:    1.  Triple negative right breast cancer, [id number] with 1 lymph node showing 1.9 cm deposit with extracapsular extension and tumor deposit lymphatic:  -Patient received neoadjuvant chemotherapy with Taxotere and Cytoxan for 3 cycles between October 15, 2020 and November 30, 2020.  -After cycle 3 unfortunately patient developed bowel obstruction requiring surgery and colostomy at hospital in [place].  -Patient did not want to do cycle 4 prior to surgery and was subsequently sent back to Dr. Alexandra underwent lumpectomy on January 11 2021 that showed 1 cm residual cancer.  -Patient had axillary dissection done on February 25, 2021 that showed 1.9 cm lymph node involvement with extracapsular extension and tumor deposit in the lymphatic channel.  -Result of pathology report were discussed with patient and her .  Patient had a minimal or no response to chemotherapy with Taxotere and Cytoxan in neoadjuvant setting.  PET/CT done prior to starting chemotherapy had a shotty axillary lymph node without any significant uptake.  Patient did have enlarged  "lymph node at the time of surgery again not responding to chemotherapy preoperatively  -Had a radiation treatment that completed on May 24, 2021 at [facility].  -Patient was started on cycle 1 of Xeloda on Pushpa 15, 2021.          Past Medical History, Past Surgical History, Social History, Family History have been reviewed and are without significant changes except as mentioned.    Review of Systems   Constitutional: Positive for fatigue.   Gastrointestinal: Positive for diarrhea and nausea. Negative for blood in stool.   Hematological: Negative for adenopathy.      A comprehensive 14 point review of systems was performed and was negative except as mentioned.    Medications:  The current medication list was reviewed in the EMR    ALLERGIES:    Allergies   Allergen Reactions   • Other Confusion     Coda-clear       Objective      Vitals:    07/06/21 1300   BP: 132/59   Pulse: 88   Resp: 18   Temp: 97.4 °F (36.3 °C)   TempSrc: Temporal   Weight: 66.2 kg (145 lb 14.4 oz)   Height: 157.5 cm (62.01\")   PainSc: 0-No pain     Current Status 7/6/2021   ECOG score 0       Physical Exam  Cardiovascular:      Rate and Rhythm: Normal rate and regular rhythm.   Pulmonary:      Breath sounds: Normal breath sounds.   Neurological:      Mental Status: She is alert and oriented to person, place, and time.           RECENT LABS:  Glucose   Date Value Ref Range Status   07/06/2021 125 (H) 65 - 99 mg/dL Final     Sodium   Date Value Ref Range Status   07/06/2021 134 (L) 136 - 145 mmol/L Final     Potassium   Date Value Ref Range Status   07/06/2021 3.5 3.5 - 5.2 mmol/L Final     CO2   Date Value Ref Range Status   07/06/2021 18.0 (L) 22.0 - 29.0 mmol/L Final     Chloride   Date Value Ref Range Status   07/06/2021 107 98 - 107 mmol/L Final     Anion Gap   Date Value Ref Range Status   07/06/2021 9.0 5.0 - 15.0 mmol/L Final     Creatinine   Date Value Ref Range Status   07/06/2021 0.86 0.57 - 1.00 mg/dL Final     BUN   Date " Value Ref Range Status   07/06/2021 9 8 - 23 mg/dL Final     BUN/Creatinine Ratio   Date Value Ref Range Status   07/06/2021 10.5 7.0 - 25.0 Final     Calcium   Date Value Ref Range Status   07/06/2021 9.0 8.6 - 10.5 mg/dL Final     eGFR Non  Amer   Date Value Ref Range Status   07/06/2021 65 >60 mL/min/1.73 Final     Alkaline Phosphatase   Date Value Ref Range Status   07/06/2021 64 39 - 117 U/L Final     Total Protein   Date Value Ref Range Status   07/06/2021 6.2 6.0 - 8.5 g/dL Final     ALT (SGPT)   Date Value Ref Range Status   07/06/2021 20 1 - 33 U/L Final     AST (SGOT)   Date Value Ref Range Status   07/06/2021 23 1 - 32 U/L Final     Total Bilirubin   Date Value Ref Range Status   07/06/2021 0.3 0.0 - 1.2 mg/dL Final     Albumin   Date Value Ref Range Status   07/06/2021 3.40 (L) 3.50 - 5.20 g/dL Final     Globulin   Date Value Ref Range Status   07/06/2021 2.8 gm/dL Final     Lab Results   Component Value Date    WBC 6.53 07/06/2021    HGB 11.9 (L) 07/06/2021    HCT 35.6 07/06/2021    MCV 83.2 07/06/2021     07/06/2021     Lab Results   Component Value Date    NEUTROABS 3.51 07/06/2021    IRON 63 06/01/2021    IRON 77 03/15/2021    IRON 61 11/05/2020    TIBC 393 06/01/2021    TIBC 428 03/15/2021    TIBC 390 11/05/2020    LABIRON 16 (L) 06/01/2021    LABIRON 18 (L) 03/15/2021    LABIRON 16 (L) 11/05/2020    FERRITIN 37.15 06/01/2021    FERRITIN 49.06 03/15/2021    FERRITIN 151.10 (H) 11/05/2020    [id number] 549 03/15/2021    NIYHKWSI16 >2,000 (H) 11/05/2020    FOLATE >20.00 03/15/2021    FOLATE 5.69 11/05/2020     No results found for: , LABCA2, AFPTM, HCGQUANT, , CHROMGRNA, [id number], CEA, REFLABREPO      PATHOLOGY:  * Cannot find OR log *         RADIOLOGY DATA :  No radiology results for the last 7 days        Assessment/Plan     1.  Triple negative right breast cancer, [id number] with 1 lymph node showing 1.9 cm deposit with extracapsular extension and tumor deposit in  lymphatics:  -Reviewed prior oncology history for prior treatment details  -Patient was started on adjuvant Xeloda on Pushpa 15, 2021  -Patient had excessive diarrhea in third week of cycle 1 requiring hospitalization with hypotension and hypokalemia.  -Patient still has some diarrhea as of today.  Recommend holding starting Xeloda today.  -We will ask patient to return to clinic in 2 weeks with repeat CBC and CMP on that day.  If her diarrhea is resolved on that day we will start patient on Xeloda depending on her symptoms on that day.  -If patient can tolerate Xeloda, plan is to do Xeloda for 8 cycles in total that will be total 24 weeks of treatment.  -Recommendation were discussed with patient and her son.    2.  Diarrhea:  -Secondary to Xeloda.  -Patient was admitted to [facility] in [place].  We will try to obtain some records from that.  -Recommend continue using Lomotil.  Patient tried Imodium without any success.    3.  Anemia:  -Hemoglobin is 11.8  -Remains on ferrous sulfate 1 tablet p.o. daily along with B12 3 times a week and folic acid 1 mg p.o. daily  -We will repeat anemia work-up in August 2021    4.  Hypokalemia  -Potassium is 3.5.  Recommend continuing with K. Dur 20 mEq p.o. twice daily    5.  Elevated liver function test secondary to fatty liver.  -LFTs normal today.  Will monitor with CMP    6.  Genetic testing:  -Genetic testing done in November 2020 was negative for BRCA1 and BRCA2.    7.  Health maintenance: Patient does not smoke    8. Advance Care Planning: For now patient remains full code and is able to make decisions.  Patient has health care surrogate mentioned on chart.                 PHQ-9 Total Score: 0   -Patient is not homicidal or suicidal.  No acute intervention required.    Gale Tapia Marianna reports a pain score of 0.  Given her pain assessment as noted, treatment options were discussed and the following options were decided upon as a follow-up plan to address the  patient's pain: continuation of current treatment plan for pain.         Carlos Duong MD  7/6/2021  17:50 CDT        Part of this note may be an electronic transcription/translation of spoken language to printed text using the Dragon Dictation System.          CC:

## 2021-07-06 NOTE — PROGRESS NOTES
Spoke with the patient at length today while she was here at the clinic for a follow up appointment. She continued to have issues with diarrhea over the weekend and went to the ER in [place], [place], which is her home town. She was admitted with dehydration and low potassium. Her xeloda is going to be held at this time. She will have a follow up appointment and lab done in 2-3 weeks and at that time revisit trying the xeloda again per Dr. Duong. Encouraged her to call with any further issues. She verbalized understanding. She will not be picking up her prescription of xeloda at the hospital pharmacy today and they have been notified.

## 2021-07-06 NOTE — PROGRESS NOTES
CHIEF COMPLAINT:   Chief Complaint   Patient presents with   • Follow-up     punch biopsy of right arm skin lesion       HPI: This patient presents for a post-operative visit after undergoing punch biopsy of skin lesion of right forearm. Doing well- no cellulitis, wound separation, or significant pain. She is not having any symptoms from skin lesion other than being bothersome because it will not heal. She denies drainage or bleeding from the area.       PATHOLOGY:         Physical Exam  Vitals reviewed.   Constitutional:       Appearance: Normal appearance.   HENT:      Head: Normocephalic.   Skin:     General: Skin is warm and dry.      Findings: Lesion present.          Neurological:      General: No focal deficit present.      Mental Status: She is alert and oriented to person, place, and time.   Psychiatric:         Mood and Affect: Mood normal.         Behavior: Behavior normal.         Thought Content: Thought content normal.         Judgment: Judgment normal.         ASSESSMENT:    Diagnoses and all orders for this visit:    1. Skin lesion (Primary)        PLAN:    1. The patient will follow-up in 6 weeks for recheck of lesion or sooner if concerns arise.                      This document has been electronically signed by YOHANA Lugo on July 13, 2021 14:18 CDT

## 2021-07-06 NOTE — PATIENT INSTRUCTIONS
MyPlate from USDA    MyPlate is an outline of a general healthy diet based on the 2010 Dietary Guidelines for Americans, from the U.S. Department of Agriculture (USDA). It sets guidelines for how much food you should eat from each food group based on your age, sex, and level of physical activity.  What are tips for following MyPlate?  To follow MyPlate recommendations:  · Eat a wide variety of fruits and vegetables, grains, and protein foods.  · Serve smaller portions and eat less food throughout the day.  · Limit portion sizes to avoid overeating.  · Enjoy your food.  · Get at least 150 minutes of exercise every week. This is about 30 minutes each day, 5 or more days per week.  It can be difficult to have every meal look like MyPlate. Think about MyPlate as eating guidelines for an entire day, rather than each individual meal.  Fruits and vegetables  · Make half of your plate fruits and vegetables.  · Eat many different colors of fruits and vegetables each day.  · For a 2,000 calorie daily food plan, eat:  ? 2½ cups of vegetables every day.  ? 2 cups of fruit every day.  · 1 cup is equal to:  ? 1 cup raw or cooked vegetables.  ? 1 cup raw fruit.  ? 1 medium-sized orange, apple, or banana.  ? 1 cup 100% fruit or vegetable juice.  ? 2 cups raw leafy greens, such as lettuce, spinach, or kale.  ? ½ cup dried fruit.  Grains  · One fourth of your plate should be grains.  · Make at least half of the grains you eat each day whole grains.  · For a 2,000 calorie daily food plan, eat 6 oz of grains every day.  · 1 oz is equal to:  ? 1 slice bread.  ? 1 cup cereal.  ? ½ cup cooked rice, cereal, or pasta.  Protein  · One fourth of your plate should be protein.  · Eat a wide variety of protein foods, including meat, poultry, fish, eggs, beans, nuts, and tofu.  · For a 2,000 calorie daily food plan, eat 5½ oz of protein every day.  · 1 oz is equal to:  ? 1 oz meat, poultry, or fish.  ? ¼ cup cooked beans.  ? 1 egg.  ? ½ oz nuts  or seeds.  ? 1 Tbsp peanut butter.  Dairy  · Drink fat-free or low-fat (1%) milk.  · Eat or drink dairy as a side to meals.  · For a 2,000 calorie daily food plan, eat or drink 3 cups of dairy every day.  · 1 cup is equal to:  ? 1 cup milk, yogurt, cottage cheese, or soy milk (soy beverage).  ? 2 oz processed cheese.  ? 1½ oz natural cheese.  Fats, oils, salt, and sugars  · Only small amounts of oils are recommended.  · Avoid foods that are high in calories and low in nutritional value (empty calories), like foods high in fat or added sugars.  · Choose foods that are low in salt (sodium). Choose foods that have less than 140 milligrams (mg) of sodium per serving.  · Drink water instead of sugary drinks. Drink enough water each day to keep your urine pale yellow.  Where to find support  · Work with your health care provider or a nutrition specialist (dietitian) to develop a customized eating plan that is right for you.  · Download an hebert (mobile application) to help you track your daily food intake.  Where to find more information  · Go to ChooseMyPlate.gov for more information.  Summary  · MyPlate is a general guideline for healthy eating from the USDA. It is based on the 2010 Dietary Guidelines for Americans.  · In general, fruits and vegetables should take up ½ of your plate, grains should take up ¼ of your plate, and protein should take up ¼ of your plate.  This information is not intended to replace advice given to you by your health care provider. Make sure you discuss any questions you have with your health care provider.  Document Revised: 05/21/2020 Document Reviewed: 03/19/2018  Elsevier Patient Education © 2021 Elsevier Inc.

## 2021-07-16 RX ORDER — FERROUS SULFATE 325(65) MG
TABLET ORAL
Qty: 30 TABLET | Refills: 3 | Status: SHIPPED | OUTPATIENT
Start: 2021-07-16 | End: 2022-03-16

## 2021-07-23 ENCOUNTER — LAB (OUTPATIENT)
Dept: ONCOLOGY | Facility: HOSPITAL | Age: 76
End: 2021-07-23

## 2021-07-23 ENCOUNTER — OFFICE VISIT (OUTPATIENT)
Dept: ONCOLOGY | Facility: CLINIC | Age: 76
End: 2021-07-23

## 2021-07-23 VITALS
WEIGHT: 146.3 LBS | HEIGHT: 62 IN | TEMPERATURE: 96.3 F | BODY MASS INDEX: 26.92 KG/M2 | RESPIRATION RATE: 16 BRPM | SYSTOLIC BLOOD PRESSURE: 146 MMHG | DIASTOLIC BLOOD PRESSURE: 67 MMHG | HEART RATE: 73 BPM

## 2021-07-23 DIAGNOSIS — C50.911 INVASIVE DUCTAL CARCINOMA OF RIGHT BREAST (HCC): ICD-10-CM

## 2021-07-23 DIAGNOSIS — D64.9 ANEMIA, UNSPECIFIED TYPE: ICD-10-CM

## 2021-07-23 DIAGNOSIS — R79.89 ELEVATED LFTS: Chronic | ICD-10-CM

## 2021-07-23 DIAGNOSIS — C50.911 INVASIVE DUCTAL CARCINOMA OF RIGHT BREAST (HCC): Primary | Chronic | ICD-10-CM

## 2021-07-23 DIAGNOSIS — R19.7 DIARRHEA, UNSPECIFIED TYPE: ICD-10-CM

## 2021-07-23 DIAGNOSIS — R79.89 ELEVATED LFTS: ICD-10-CM

## 2021-07-23 DIAGNOSIS — D64.9 ANEMIA, UNSPECIFIED TYPE: Chronic | ICD-10-CM

## 2021-07-23 DIAGNOSIS — Z45.2 ENCOUNTER FOR VENOUS ACCESS DEVICE CARE: Primary | ICD-10-CM

## 2021-07-23 LAB
ALBUMIN SERPL-MCNC: 4 G/DL (ref 3.5–5.2)
ALBUMIN/GLOB SERPL: 1.4 G/DL
ALP SERPL-CCNC: 86 U/L (ref 39–117)
ALT SERPL W P-5'-P-CCNC: 32 U/L (ref 1–33)
ANION GAP SERPL CALCULATED.3IONS-SCNC: 8 MMOL/L (ref 5–15)
AST SERPL-CCNC: 36 U/L (ref 1–32)
BASOPHILS # BLD AUTO: 0.14 10*3/MM3 (ref 0–0.2)
BASOPHILS NFR BLD AUTO: 2.3 % (ref 0–1.5)
BILIRUB SERPL-MCNC: 0.4 MG/DL (ref 0–1.2)
BUN SERPL-MCNC: 14 MG/DL (ref 8–23)
BUN/CREAT SERPL: 15.6 (ref 7–25)
CALCIUM SPEC-SCNC: 9.2 MG/DL (ref 8.6–10.5)
CHLORIDE SERPL-SCNC: 107 MMOL/L (ref 98–107)
CO2 SERPL-SCNC: 23 MMOL/L (ref 22–29)
CREAT SERPL-MCNC: 0.9 MG/DL (ref 0.57–1)
DEPRECATED RDW RBC AUTO: 63.4 FL (ref 37–54)
EOSINOPHIL # BLD AUTO: 0.6 10*3/MM3 (ref 0–0.4)
EOSINOPHIL NFR BLD AUTO: 9.8 % (ref 0.3–6.2)
ERYTHROCYTE [DISTWIDTH] IN BLOOD BY AUTOMATED COUNT: 19.5 % (ref 12.3–15.4)
GFR SERPL CREATININE-BSD FRML MDRD: 61 ML/MIN/1.73
GLOBULIN UR ELPH-MCNC: 2.8 GM/DL
GLUCOSE SERPL-MCNC: 82 MG/DL (ref 65–99)
HCT VFR BLD AUTO: 37.2 % (ref 34–46.6)
HGB BLD-MCNC: 12.4 G/DL (ref 12–15.9)
HOLD SPECIMEN: NORMAL
IMM GRANULOCYTES # BLD AUTO: 0.02 10*3/MM3 (ref 0–0.05)
IMM GRANULOCYTES NFR BLD AUTO: 0.3 % (ref 0–0.5)
LYMPHOCYTES # BLD AUTO: 1.1 10*3/MM3 (ref 0.7–3.1)
LYMPHOCYTES NFR BLD AUTO: 17.9 % (ref 19.6–45.3)
MCH RBC QN AUTO: 29.8 PG (ref 26.6–33)
MCHC RBC AUTO-ENTMCNC: 33.3 G/DL (ref 31.5–35.7)
MCV RBC AUTO: 89.4 FL (ref 79–97)
MONOCYTES # BLD AUTO: 0.88 10*3/MM3 (ref 0.1–0.9)
MONOCYTES NFR BLD AUTO: 14.3 % (ref 5–12)
NEUTROPHILS NFR BLD AUTO: 3.41 10*3/MM3 (ref 1.7–7)
NEUTROPHILS NFR BLD AUTO: 55.4 % (ref 42.7–76)
NRBC BLD AUTO-RTO: 0 /100 WBC (ref 0–0.2)
PLATELET # BLD AUTO: 347 10*3/MM3 (ref 140–450)
PMV BLD AUTO: 8.8 FL (ref 6–12)
POTASSIUM SERPL-SCNC: 4.1 MMOL/L (ref 3.5–5.2)
PROT SERPL-MCNC: 6.8 G/DL (ref 6–8.5)
RBC # BLD AUTO: 4.16 10*6/MM3 (ref 3.77–5.28)
SODIUM SERPL-SCNC: 138 MMOL/L (ref 136–145)
WBC # BLD AUTO: 6.15 10*3/MM3 (ref 3.4–10.8)

## 2021-07-23 PROCEDURE — G9903 PT SCRN TBCO ID AS NON USER: HCPCS | Performed by: INTERNAL MEDICINE

## 2021-07-23 PROCEDURE — 36415 COLL VENOUS BLD VENIPUNCTURE: CPT

## 2021-07-23 PROCEDURE — 1123F ACP DISCUSS/DSCN MKR DOCD: CPT | Performed by: INTERNAL MEDICINE

## 2021-07-23 PROCEDURE — 85025 COMPLETE CBC W/AUTO DIFF WBC: CPT

## 2021-07-23 PROCEDURE — 80053 COMPREHEN METABOLIC PANEL: CPT

## 2021-07-23 PROCEDURE — 36591 DRAW BLOOD OFF VENOUS DEVICE: CPT | Performed by: INTERNAL MEDICINE

## 2021-07-23 PROCEDURE — 25010000002 HEPARIN LOCK FLUSH PER 10 UNITS: Performed by: NURSE PRACTITIONER

## 2021-07-23 PROCEDURE — 1126F AMNT PAIN NOTED NONE PRSNT: CPT | Performed by: INTERNAL MEDICINE

## 2021-07-23 PROCEDURE — 99214 OFFICE O/P EST MOD 30 MIN: CPT | Performed by: INTERNAL MEDICINE

## 2021-07-23 RX ORDER — SODIUM CHLORIDE 0.9 % (FLUSH) 0.9 %
20 SYRINGE (ML) INJECTION AS NEEDED
Status: CANCELLED | OUTPATIENT
Start: 2021-07-23

## 2021-07-23 RX ORDER — SODIUM CHLORIDE 0.9 % (FLUSH) 0.9 %
10 SYRINGE (ML) INJECTION AS NEEDED
Status: CANCELLED | OUTPATIENT
Start: 2021-08-16

## 2021-07-23 RX ORDER — HEPARIN SODIUM (PORCINE) LOCK FLUSH IV SOLN 100 UNIT/ML 100 UNIT/ML
500 SOLUTION INTRAVENOUS AS NEEDED
Status: DISCONTINUED | OUTPATIENT
Start: 2021-07-23 | End: 2021-07-23 | Stop reason: HOSPADM

## 2021-07-23 RX ORDER — SODIUM BICARBONATE 650 MG/1
650 TABLET ORAL 2 TIMES DAILY
COMMUNITY
Start: 2021-07-05 | End: 2021-09-08

## 2021-07-23 RX ORDER — HEPARIN SODIUM (PORCINE) LOCK FLUSH IV SOLN 100 UNIT/ML 100 UNIT/ML
500 SOLUTION INTRAVENOUS AS NEEDED
Status: CANCELLED | OUTPATIENT
Start: 2021-08-16

## 2021-07-23 RX ORDER — CAPECITABINE 500 MG/1
TABLET, FILM COATED ORAL
Qty: 70 TABLET | Refills: 1 | Status: SHIPPED | OUTPATIENT
Start: 2021-07-23 | End: 2021-09-07 | Stop reason: SDUPTHER

## 2021-07-23 RX ADMIN — HEPARIN 500 UNITS: 100 SYRINGE at 10:15

## 2021-07-23 NOTE — PROGRESS NOTES
DATE OF VISIT: 7/23/2021      REASON FOR VISIT: Triple negative right breast cancer, anemia, elevated liver function test, diarrhea, hyponatremia, hypokalemia      HISTORY OF PRESENT ILLNESS:   75-year-old female with medical problem consisting of diabetes mellitus, hypertension, dyslipidemia, asthma, osteopenia, obstructive sleep apnea, chronic back pain has been following up with [facility] since October 5, 2020 for triple negative right breast cancer.  Patient was started on cycle 1 of adjuvant Xeloda on Pushpa 15, 2021.  After cycle when she was admitted to [facility], [place], operated by [facility] in [facility] for severe hypokalemia, hyponatremia and diarrhea from Xeloda.  Second cycle has been held since then.  Patient is here for follow-up appointment today to discuss blood work as well as recommendation regarding Xeloda.  States her diarrhea is resolved.  Denies any bleeding.  Denies any new lymph node enlargement.          Oncology history:    1.  Triple negative right breast cancer, [id number] with 1 lymph node showing 1.9 cm deposit with extracapsular extension and tumor deposit lymphatic:  -Patient received neoadjuvant chemotherapy with Taxotere and Cytoxan for 3 cycles between October 15, 2020 and November 30, 2020.  -After cycle 3 unfortunately patient developed bowel obstruction requiring surgery and colostomy at hospital in [place].  -Patient did not want to do cycle 4 prior to surgery and was subsequently sent back to Dr. Alexandra underwent lumpectomy on January 11 2021 that showed 1 cm residual cancer.  -Patient had axillary dissection done on February 25, 2021 that showed 1.9 cm lymph node involvement with extracapsular extension and tumor deposit in the lymphatic channel.  -Result of pathology report were discussed with patient and her .  Patient had a minimal or no response to chemotherapy with Taxotere and Cytoxan in neoadjuvant setting.  PET/CT done prior to starting chemotherapy had  "a shotty axillary lymph node without any significant uptake.  Patient did have enlarged lymph node at the time of surgery again not responding to chemotherapy preoperatively  -Had a radiation treatment that completed on May 24, 2021 at [facility].  -Patient was started on cycle 1 of Xeloda on Pushpa 15, 2021.          Past Medical History, Past Surgical History, Social History, Family History have been reviewed and are without significant changes except as mentioned.    Review of Systems   Constitutional: Positive for fatigue.   Gastrointestinal: Positive for nausea. Negative for diarrhea.   Hematological: Negative for adenopathy.      A comprehensive 14 point review of systems was performed and was negative except as mentioned.    Medications:  The current medication list was reviewed in the EMR    ALLERGIES:    Allergies   Allergen Reactions   • Other Confusion     Coda-clear       Objective      Vitals:    07/23/21 1027   BP: 146/67   Pulse: 73   Resp: 16   Temp: 96.3 °F (35.7 °C)   TempSrc: Temporal   Weight: 66.4 kg (146 lb 4.8 oz)   Height: 157.5 cm (62\")   PainSc: 0-No pain     Current Status 7/23/2021   ECOG score 0       Physical Exam  Cardiovascular:      Rate and Rhythm: Normal rate and regular rhythm.   Pulmonary:      Breath sounds: Normal breath sounds.   Abdominal:      Comments: Colostomy present   Neurological:      Mental Status: She is alert and oriented to person, place, and time.           RECENT LABS:  Glucose   Date Value Ref Range Status   07/23/2021 82 65 - 99 mg/dL Final     Sodium   Date Value Ref Range Status   07/23/2021 138 136 - 145 mmol/L Final     Potassium   Date Value Ref Range Status   07/23/2021 4.1 3.5 - 5.2 mmol/L Final     CO2   Date Value Ref Range Status   07/23/2021 23.0 22.0 - 29.0 mmol/L Final     Chloride   Date Value Ref Range Status   07/23/2021 107 98 - 107 mmol/L Final     Anion Gap   Date Value Ref Range Status   07/23/2021 8.0 5.0 - 15.0 mmol/L Final "     Creatinine   Date Value Ref Range Status   07/23/2021 0.90 0.57 - 1.00 mg/dL Final     BUN   Date Value Ref Range Status   07/23/2021 14 8 - 23 mg/dL Final     BUN/Creatinine Ratio   Date Value Ref Range Status   07/23/2021 15.6 7.0 - 25.0 Final     Calcium   Date Value Ref Range Status   07/23/2021 9.2 8.6 - 10.5 mg/dL Final     eGFR Non  Amer   Date Value Ref Range Status   07/23/2021 61 >60 mL/min/1.73 Final     Alkaline Phosphatase   Date Value Ref Range Status   07/23/2021 86 39 - 117 U/L Final     Total Protein   Date Value Ref Range Status   07/23/2021 6.8 6.0 - 8.5 g/dL Final     ALT (SGPT)   Date Value Ref Range Status   07/23/2021 32 1 - 33 U/L Final     AST (SGOT)   Date Value Ref Range Status   07/23/2021 36 (H) 1 - 32 U/L Final     Total Bilirubin   Date Value Ref Range Status   07/23/2021 0.4 0.0 - 1.2 mg/dL Final     Albumin   Date Value Ref Range Status   07/23/2021 4.00 3.50 - 5.20 g/dL Final     Globulin   Date Value Ref Range Status   07/23/2021 2.8 gm/dL Final     Lab Results   Component Value Date    WBC 6.15 07/23/2021    HGB 12.4 07/23/2021    HCT 37.2 07/23/2021    MCV 89.4 07/23/2021     07/23/2021     Lab Results   Component Value Date    NEUTROABS 3.41 07/23/2021    IRON 63 06/01/2021    IRON 77 03/15/2021    IRON 61 11/05/2020    TIBC 393 06/01/2021    TIBC 428 03/15/2021    TIBC 390 11/05/2020    LABIRON 16 (L) 06/01/2021    LABIRON 18 (L) 03/15/2021    LABIRON 16 (L) 11/05/2020    FERRITIN 37.15 06/01/2021    FERRITIN 49.06 03/15/2021    FERRITIN 151.10 (H) 11/05/2020    [id number] 549 03/15/2021    [id number] >2,000 (H) 11/05/2020    FOLATE >20.00 03/15/2021    FOLATE 5.69 11/05/2020     No results found for: , LABCA2, AFPTM, HCGQUANT, , CHROMGRNA, [id number], CEA, REFLABREPO      PATHOLOGY:  * Cannot find OR log *         RADIOLOGY DATA :  No radiology results for the last 7 days        Assessment/Plan     1.  Triple negative right breast cancer, [id number]  Monday with 1 lymph node showing 1.9 cm deposit with extracapsular extension and tumor deposit in lymphatics  -Review oncology history for prior treatment details  -Patient was started on cycle 1 of Xeloda on Pushpa 15, 2021.  -Patient was admitted to hospital after cycle 1 with excessive diarrhea hypokalemia and hyponatremia  -Due to poor tolerance with cycle 1, recommend decreasing Xeloda to 3 tablets in the morning and 2 tablets at night.  Currently patient is on Xeloda 3 tablets twice daily.  -We will ask patient to return to clinic in 3 weeks with repeat CBC and CMP on that day.    2.  Diarrhea:  -Secondary to Xeloda  -Recommend as needed Lomotil    3.  Anemia:  -Hemoglobin is 12.4.  -We will repeat anemia work-up upon next clinic visit in 3 weeks    4.  Hypokalemia  -Potassium level is normal today at 4.1    5.  History of fatty liver with elevated LFT.  -We will monitor with CMP    6.  Genetic testing:  -Genetic testing done in November 2020 was negative for BRCA1 and BRCA2    7.  Health maintenance: Patient does not smoke    8. Advance Care Planning: For now patient remains full code and is able to make decisions.  Patient has health care surrogate mentioned on chart.    9.  Prescription: Prescription for Xeloda has been provided today             PHQ-9 Total Score: 0   -Patient is not homicidal or suicidal.  No acute intervention required.    Gale Tapia Marianna reports a pain score of 0.  Given her pain assessment as noted, treatment options were discussed and the following options were decided upon as a follow-up plan to address the patient's pain: continuation of current treatment plan for pain.         Carlos Duong MD  7/23/2021  18:44 CDT        Part of this note may be an electronic transcription/translation of spoken language to printed text using the Dragon Dictation System.          CC:

## 2021-08-11 DIAGNOSIS — T45.1X5A CHEMOTHERAPY-INDUCED DIARRHEA: ICD-10-CM

## 2021-08-11 DIAGNOSIS — K52.1 CHEMOTHERAPY-INDUCED DIARRHEA: ICD-10-CM

## 2021-08-11 RX ORDER — DIPHENOXYLATE HYDROCHLORIDE AND ATROPINE SULFATE 2.5; .025 MG/1; MG/1
TABLET ORAL
Qty: 40 TABLET | Refills: 1 | Status: SHIPPED | OUTPATIENT
Start: 2021-08-11 | End: 2023-03-28

## 2021-08-16 ENCOUNTER — LAB (OUTPATIENT)
Dept: ONCOLOGY | Facility: HOSPITAL | Age: 76
End: 2021-08-16

## 2021-08-16 ENCOUNTER — OFFICE VISIT (OUTPATIENT)
Dept: ONCOLOGY | Facility: CLINIC | Age: 76
End: 2021-08-16

## 2021-08-16 ENCOUNTER — OFFICE VISIT (OUTPATIENT)
Dept: SURGERY | Facility: CLINIC | Age: 76
End: 2021-08-16

## 2021-08-16 VITALS
WEIGHT: 147.9 LBS | RESPIRATION RATE: 18 BRPM | TEMPERATURE: 96.5 F | HEIGHT: 62 IN | SYSTOLIC BLOOD PRESSURE: 133 MMHG | BODY MASS INDEX: 27.22 KG/M2 | DIASTOLIC BLOOD PRESSURE: 62 MMHG | HEART RATE: 78 BPM

## 2021-08-16 VITALS
HEART RATE: 75 BPM | WEIGHT: 148.4 LBS | DIASTOLIC BLOOD PRESSURE: 70 MMHG | SYSTOLIC BLOOD PRESSURE: 136 MMHG | TEMPERATURE: 97 F | HEIGHT: 62 IN | BODY MASS INDEX: 27.31 KG/M2

## 2021-08-16 DIAGNOSIS — R19.7 DIARRHEA, UNSPECIFIED TYPE: ICD-10-CM

## 2021-08-16 DIAGNOSIS — R79.89 ELEVATED LFTS: ICD-10-CM

## 2021-08-16 DIAGNOSIS — D64.9 ANEMIA, UNSPECIFIED TYPE: ICD-10-CM

## 2021-08-16 DIAGNOSIS — D75.839 THROMBOCYTOSIS: Chronic | ICD-10-CM

## 2021-08-16 DIAGNOSIS — C50.911 INVASIVE DUCTAL CARCINOMA OF RIGHT BREAST (HCC): Primary | Chronic | ICD-10-CM

## 2021-08-16 DIAGNOSIS — D64.9 ANEMIA, UNSPECIFIED TYPE: Chronic | ICD-10-CM

## 2021-08-16 DIAGNOSIS — Z45.2 ENCOUNTER FOR VENOUS ACCESS DEVICE CARE: Primary | ICD-10-CM

## 2021-08-16 DIAGNOSIS — R79.89 ELEVATED LFTS: Chronic | ICD-10-CM

## 2021-08-16 DIAGNOSIS — Z86.19: Primary | ICD-10-CM

## 2021-08-16 DIAGNOSIS — Z12.31 ENCOUNTER FOR SCREENING MAMMOGRAM FOR BREAST CANCER: ICD-10-CM

## 2021-08-16 DIAGNOSIS — C50.911 INVASIVE DUCTAL CARCINOMA OF RIGHT BREAST (HCC): ICD-10-CM

## 2021-08-16 PROBLEM — K90.9 IRON MALABSORPTION: Status: ACTIVE | Noted: 2021-08-16

## 2021-08-16 LAB
ALBUMIN SERPL-MCNC: 4.2 G/DL (ref 3.5–5.2)
ALBUMIN/GLOB SERPL: 1.5 G/DL
ALP SERPL-CCNC: 87 U/L (ref 39–117)
ALT SERPL W P-5'-P-CCNC: 42 U/L (ref 1–33)
ANION GAP SERPL CALCULATED.3IONS-SCNC: 8 MMOL/L (ref 5–15)
AST SERPL-CCNC: 45 U/L (ref 1–32)
BASOPHILS # BLD AUTO: 0.1 10*3/MM3 (ref 0–0.2)
BASOPHILS NFR BLD AUTO: 1.4 % (ref 0–1.5)
BILIRUB SERPL-MCNC: 0.4 MG/DL (ref 0–1.2)
BUN SERPL-MCNC: 14 MG/DL (ref 8–23)
BUN/CREAT SERPL: 13.9 (ref 7–25)
CALCIUM SPEC-SCNC: 9.3 MG/DL (ref 8.6–10.5)
CHLORIDE SERPL-SCNC: 101 MMOL/L (ref 98–107)
CO2 SERPL-SCNC: 25 MMOL/L (ref 22–29)
CREAT SERPL-MCNC: 1.01 MG/DL (ref 0.57–1)
DEPRECATED RDW RBC AUTO: 63.2 FL (ref 37–54)
EOSINOPHIL # BLD AUTO: 0.4 10*3/MM3 (ref 0–0.4)
EOSINOPHIL NFR BLD AUTO: 5.5 % (ref 0.3–6.2)
ERYTHROCYTE [DISTWIDTH] IN BLOOD BY AUTOMATED COUNT: 19.2 % (ref 12.3–15.4)
FERRITIN SERPL-MCNC: 50.5 NG/ML (ref 13–150)
FOLATE SERPL-MCNC: >20 NG/ML (ref 4.78–24.2)
GFR SERPL CREATININE-BSD FRML MDRD: 54 ML/MIN/1.73
GLOBULIN UR ELPH-MCNC: 2.8 GM/DL
GLUCOSE SERPL-MCNC: 86 MG/DL (ref 65–99)
HCT VFR BLD AUTO: 39 % (ref 34–46.6)
HGB BLD-MCNC: 13 G/DL (ref 12–15.9)
IMM GRANULOCYTES # BLD AUTO: 0.03 10*3/MM3 (ref 0–0.05)
IMM GRANULOCYTES NFR BLD AUTO: 0.4 % (ref 0–0.5)
IRON 24H UR-MRATE: 54 MCG/DL (ref 37–145)
IRON SATN MFR SERPL: 12 % (ref 20–50)
LYMPHOCYTES # BLD AUTO: 1.24 10*3/MM3 (ref 0.7–3.1)
LYMPHOCYTES NFR BLD AUTO: 17.1 % (ref 19.6–45.3)
MCH RBC QN AUTO: 30.8 PG (ref 26.6–33)
MCHC RBC AUTO-ENTMCNC: 33.3 G/DL (ref 31.5–35.7)
MCV RBC AUTO: 92.4 FL (ref 79–97)
MONOCYTES # BLD AUTO: 1.13 10*3/MM3 (ref 0.1–0.9)
MONOCYTES NFR BLD AUTO: 15.6 % (ref 5–12)
NEUTROPHILS NFR BLD AUTO: 4.35 10*3/MM3 (ref 1.7–7)
NEUTROPHILS NFR BLD AUTO: 60 % (ref 42.7–76)
NRBC BLD AUTO-RTO: 0 /100 WBC (ref 0–0.2)
PLATELET # BLD AUTO: 301 10*3/MM3 (ref 140–450)
PMV BLD AUTO: 8.8 FL (ref 6–12)
POTASSIUM SERPL-SCNC: 4.1 MMOL/L (ref 3.5–5.2)
PROT SERPL-MCNC: 7 G/DL (ref 6–8.5)
RBC # BLD AUTO: 4.22 10*6/MM3 (ref 3.77–5.28)
SODIUM SERPL-SCNC: 134 MMOL/L (ref 136–145)
TIBC SERPL-MCNC: 465 MCG/DL (ref 298–536)
TRANSFERRIN SERPL-MCNC: 312 MG/DL (ref 200–360)
VIT B12 BLD-MCNC: 1053 PG/ML (ref 211–946)
WBC # BLD AUTO: 7.25 10*3/MM3 (ref 3.4–10.8)

## 2021-08-16 PROCEDURE — 82607 VITAMIN B-12: CPT

## 2021-08-16 PROCEDURE — 82728 ASSAY OF FERRITIN: CPT

## 2021-08-16 PROCEDURE — 1126F AMNT PAIN NOTED NONE PRSNT: CPT | Performed by: INTERNAL MEDICINE

## 2021-08-16 PROCEDURE — 1123F ACP DISCUSS/DSCN MKR DOCD: CPT | Performed by: INTERNAL MEDICINE

## 2021-08-16 PROCEDURE — 99212 OFFICE O/P EST SF 10 MIN: CPT | Performed by: NURSE PRACTITIONER

## 2021-08-16 PROCEDURE — 85025 COMPLETE CBC W/AUTO DIFF WBC: CPT

## 2021-08-16 PROCEDURE — 82746 ASSAY OF FOLIC ACID SERUM: CPT

## 2021-08-16 PROCEDURE — 84466 ASSAY OF TRANSFERRIN: CPT

## 2021-08-16 PROCEDURE — 99214 OFFICE O/P EST MOD 30 MIN: CPT | Performed by: INTERNAL MEDICINE

## 2021-08-16 PROCEDURE — 83540 ASSAY OF IRON: CPT

## 2021-08-16 PROCEDURE — 36591 DRAW BLOOD OFF VENOUS DEVICE: CPT | Performed by: NURSE PRACTITIONER

## 2021-08-16 PROCEDURE — G9903 PT SCRN TBCO ID AS NON USER: HCPCS | Performed by: INTERNAL MEDICINE

## 2021-08-16 PROCEDURE — 25010000002 HEPARIN LOCK FLUSH PER 10 UNITS: Performed by: NURSE PRACTITIONER

## 2021-08-16 PROCEDURE — 80053 COMPREHEN METABOLIC PANEL: CPT

## 2021-08-16 RX ORDER — SODIUM CHLORIDE 0.9 % (FLUSH) 0.9 %
20 SYRINGE (ML) INJECTION AS NEEDED
Status: CANCELLED | OUTPATIENT
Start: 2021-08-16

## 2021-08-16 RX ORDER — ACETAMINOPHEN 325 MG/1
650 TABLET ORAL ONCE
Status: CANCELLED | OUTPATIENT
Start: 2021-08-23

## 2021-08-16 RX ORDER — HEPARIN SODIUM (PORCINE) LOCK FLUSH IV SOLN 100 UNIT/ML 100 UNIT/ML
500 SOLUTION INTRAVENOUS AS NEEDED
Status: DISCONTINUED | OUTPATIENT
Start: 2021-08-16 | End: 2021-08-16 | Stop reason: HOSPADM

## 2021-08-16 RX ORDER — DIPHENHYDRAMINE HYDROCHLORIDE 50 MG/ML
25 INJECTION INTRAMUSCULAR; INTRAVENOUS ONCE
Status: CANCELLED | OUTPATIENT
Start: 2021-08-23

## 2021-08-16 RX ORDER — SODIUM CHLORIDE 0.9 % (FLUSH) 0.9 %
10 SYRINGE (ML) INJECTION AS NEEDED
Status: CANCELLED | OUTPATIENT
Start: 2021-08-24

## 2021-08-16 RX ORDER — SODIUM CHLORIDE 9 MG/ML
250 INJECTION, SOLUTION INTRAVENOUS ONCE
Status: CANCELLED | OUTPATIENT
Start: 2021-08-23

## 2021-08-16 RX ORDER — HEPARIN SODIUM (PORCINE) LOCK FLUSH IV SOLN 100 UNIT/ML 100 UNIT/ML
500 SOLUTION INTRAVENOUS AS NEEDED
Status: CANCELLED | OUTPATIENT
Start: 2021-08-24

## 2021-08-16 RX ADMIN — HEPARIN 500 UNITS: 100 SYRINGE at 09:43

## 2021-08-16 NOTE — PROGRESS NOTES
DATE OF VISIT: 8/16/2021      REASON FOR VISIT: Triple negative right breast cancer, anemia, elevated liver function test, diarrhea, hypokalemia      HISTORY OF PRESENT ILLNESS:   75-year-old female with medical problems consisting of diabetes mellitus, hypertension, dyslipidemia, asthma, osteopenia, obstructive sleep apnea, chronic back pain has been following up with [facility] since October 5, 2020 for triple negative right breast cancer.  Patient was started on cycle 1 of adjuvant Xeloda on Pushpa 15, 2021.  Due to hospital admission after cycle 1 dose of Xeloda has been decreased with cycle 2 on July 23, 2021.  Patient is here for follow-up appointment today to cycle 3 of Xeloda today.  Denies any excessive diarrhea.  Denies any bleeding.  Denies any new lymph node enlargement.        Oncology history:    1.  Triple negative right breast cancer, [id number] with 1 lymph node showing 1.9 cm deposit with extracapsular extension and tumor deposit lymphatic:  -Patient received neoadjuvant chemotherapy with Taxotere and Cytoxan for 3 cycles between October 15, 2020 and November 30, 2020.  -After cycle 3 unfortunately patient developed bowel obstruction requiring surgery and colostomy at hospital in [place].  -Patient did not want to do cycle 4 prior to surgery and was subsequently sent back to Dr. Alexandra underwent lumpectomy on January 11 2021 that showed 1 cm residual cancer.  -Patient had axillary dissection done on February 25, 2021 that showed 1.9 cm lymph node involvement with extracapsular extension and tumor deposit in the lymphatic channel.  -Result of pathology report were discussed with patient and her .  Patient had a minimal or no response to chemotherapy with Taxotere and Cytoxan in neoadjuvant setting.  PET/CT done prior to starting chemotherapy had a shotty axillary lymph node without any significant uptake.  Patient did have enlarged lymph node at the time of surgery again not responding  "to chemotherapy preoperatively  -Had a radiation treatment that completed on May 24, 2021 at [facility].  -Patient was started on cycle 1 of Xeloda on Pushpa 15, 2021.          Past Medical History, Past Surgical History, Social History, Family History have been reviewed and are without significant changes except as mentioned.    Review of Systems   Constitutional: Positive for fatigue.   Gastrointestinal: Positive for nausea. Negative for blood in stool and diarrhea.   Hematological: Negative for adenopathy.      A comprehensive 14 point review of systems was performed and was negative except as mentioned.    Medications:  The current medication list was reviewed in the EMR    ALLERGIES:    Allergies   Allergen Reactions   • Other Confusion     Coda-clear       Objective      Vitals:    08/16/21 0933   BP: 133/62   Pulse: 78   Resp: 18   Temp: 96.5 °F (35.8 °C)   TempSrc: Temporal   Weight: 67.1 kg (147 lb 14.4 oz)   Height: 157.5 cm (62\")   PainSc: 0-No pain     Current Status 8/16/2021   ECOG score 0       Physical Exam  Cardiovascular:      Rate and Rhythm: Normal rate and regular rhythm.   Pulmonary:      Breath sounds: Normal breath sounds.   Neurological:      Mental Status: She is alert and oriented to person, place, and time.           RECENT LABS:  Glucose   Date Value Ref Range Status   08/16/2021 86 65 - 99 mg/dL Final     Sodium   Date Value Ref Range Status   08/16/2021 134 (L) 136 - 145 mmol/L Final     Potassium   Date Value Ref Range Status   08/16/2021 4.1 3.5 - 5.2 mmol/L Final     CO2   Date Value Ref Range Status   08/16/2021 25.0 22.0 - 29.0 mmol/L Final     Chloride   Date Value Ref Range Status   08/16/2021 101 98 - 107 mmol/L Final     Anion Gap   Date Value Ref Range Status   08/16/2021 8.0 5.0 - 15.0 mmol/L Final     Creatinine   Date Value Ref Range Status   08/16/2021 1.01 (H) 0.57 - 1.00 mg/dL Final     BUN   Date Value Ref Range Status   08/16/2021 14 8 - 23 mg/dL Final "     BUN/Creatinine Ratio   Date Value Ref Range Status   08/16/2021 13.9 7.0 - 25.0 Final     Calcium   Date Value Ref Range Status   08/16/2021 9.3 8.6 - 10.5 mg/dL Final     eGFR Non  Amer   Date Value Ref Range Status   08/16/2021 54 (L) >60 mL/min/1.73 Final     Alkaline Phosphatase   Date Value Ref Range Status   08/16/2021 87 39 - 117 U/L Final     Total Protein   Date Value Ref Range Status   08/16/2021 7.0 6.0 - 8.5 g/dL Final     ALT (SGPT)   Date Value Ref Range Status   08/16/2021 42 (H) 1 - 33 U/L Final     AST (SGOT)   Date Value Ref Range Status   08/16/2021 45 (H) 1 - 32 U/L Final     Total Bilirubin   Date Value Ref Range Status   08/16/2021 0.4 0.0 - 1.2 mg/dL Final     Albumin   Date Value Ref Range Status   08/16/2021 4.20 3.50 - 5.20 g/dL Final     Globulin   Date Value Ref Range Status   08/16/2021 2.8 gm/dL Final     Lab Results   Component Value Date    WBC 7.25 08/16/2021    HGB 13.0 08/16/2021    HCT 39.0 08/16/2021    MCV 92.4 08/16/2021     08/16/2021     Lab Results   Component Value Date    NEUTROABS 4.35 08/16/2021    IRON 54 08/16/2021    IRON 63 06/01/2021    IRON 77 03/15/2021    TIBC 465 08/16/2021    TIBC 393 06/01/2021    TIBC 428 03/15/2021    LABIRON 12 (L) 08/16/2021    LABIRON 16 (L) 06/01/2021    LABIRON 18 (L) 03/15/2021    FERRITIN 50.50 08/16/2021    FERRITIN 37.15 06/01/2021    FERRITIN 49.06 03/15/2021    [id number] 549 03/15/2021    FRCSLKKW10 >2,000 (H) 11/05/2020    FOLATE >20.00 03/15/2021    FOLATE 5.69 11/05/2020     No results found for: , LABCA2, AFPTM, HCGQUANT, , CHROMGRNA, [id number], CEA, REFLABREPO      PATHOLOGY:  * Cannot find OR log *         RADIOLOGY DATA :  No radiology results for the last 7 days        Assessment/Plan     1.  Triple negative right breast cancer, [id number] with 1 hour lymph node showing 1.9 cm deposit with extracapsular extension and tumor deposit in lymphatics  -Review oncology history for prior treatment  details  -Dose of Xeloda was decreased to 3 tablets in the morning and 2 tablets at night with cycle 2.  -Patient tolerated cycle 2 better.  -We will continue with cycle 3 at the same dose of Xeloda.  -Patient was encouraged to call our our office with any unusual side effect from chemotherapy.  -We will ask patient to return to clinic in 3 weeks with repeat CBC and CMP on that day    2.  Diarrhea:  -Secondary to Xeloda  -Recommend continue using as needed Lomotil    3.  Anemia:  -Hemoglobin is 13.0  -Patient is currently on ferrous sulfate 1 tablet p.o. daily along with B12 3 times a week and folic acid p.o. daily  -Iron studies done earlier today shows worsening iron saturation of 12% with ferritin of 50.  Patient has iron malabsorption.  Recommend starting intravenous Venofer starting next week.  -B12 and folate level has improved significantly.  Discontinue B12 and folic acid supplement.  -We will repeat anemia work-up in November 2021      4.  Fatty liver with elevated LFT  -We will monitor with CMP    5.  Hypokalemia  -Potassium level is 4.1    6.  Genetic testing:  -Genetic testing done in November 2020 was negative for BRCA1 and BRCA2    7.  Health maintenance: Patient does not smoke    8. Advance Care Planning: For now patient remains full code and is able to make decisions.  Patient has health care surrogate mentioned on chart.    9.  Acute kidney injury:  -Creatinine is elevated at 1.01.  Recommend increasing hydration.  Patient was notified about elevated creatinine               PHQ-9 Total Score: 0   -Patient is not homicidal or suicidal.  No acute intervention required.    Gale Tapia Marianna reports a pain score of 0.  Given her pain assessment as noted, treatment options were discussed and the following options were decided upon as a follow-up plan to address the patient's pain: continuation of current treatment plan for pain.         Carlos Duong MD  8/16/2021  18:07 CDT        Part of this note may  be an electronic transcription/translation of spoken language to printed text using the Dragon Dictation System.          CC:

## 2021-08-16 NOTE — PATIENT INSTRUCTIONS
"BMI for Adults  What is BMI?  Body mass index (BMI) is a number that is calculated from a person's weight and height. BMI can help estimate how much of a person's weight is composed of fat. BMI does not measure body fat directly. Rather, it is an alternative to procedures that directly measure body fat, which can be difficult and expensive.  BMI can help identify people who may be at higher risk for certain medical problems.  What are BMI measurements used for?  BMI is used as a screening tool to identify possible weight problems. It helps determine whether a person is obese, overweight, a healthy weight, or underweight.  BMI is useful for:  · Identifying a weight problem that may be related to a medical condition or may increase the risk for medical problems.  · Promoting changes, such as changes in diet and exercise, to help reach a healthy weight. BMI screening can be repeated to see if these changes are working.  How is BMI calculated?  BMI involves measuring your weight in relation to your height. Both height and weight are measured, and the BMI is calculated from those numbers. This can be done either in English (U[place] or metric measurements. Note that charts and online BMI calculators are available to help you find your BMI quickly and easily without having to do these calculations yourself.  To calculate your BMI in English (U.S[place] measurements:    1. Measure your weight in pounds (lb).  2. Multiply the number of pounds by 703.  ? For example, for a person who weighs 180 lb, multiply that number by 703, which equals 126,540.  3. Measure your height in inches. Then multiply that number by itself to get a measurement called \"inches squared.\"  ? For example, for a person who is 70 inches tall, the \"inches squared\" measurement is 70 inches x 70 inches, which equals 4,900 inches squared.  4. Divide the total from step 2 (number of lb x 703) by the total from step 3 (inches squared): 126,540 ÷ 4,900 = 25.8. This is " "your BMI.  To calculate your BMI in metric measurements:  1. Measure your weight in kilograms (kg).  2. Measure your height in meters (m). Then multiply that number by itself to get a measurement called \"meters squared.\"  ? For example, for a person who is 1.75 m tall, the \"meters squared\" measurement is 1.75 m x 1.75 m, which is equal to 3.1 meters squared.  3. Divide the number of kilograms (your weight) by the meters squared number. In this example: 70 ÷ 3.1 = 22.6. This is your BMI.  What do the results mean?  BMI charts are used to identify whether you are underweight, normal weight, overweight, or obese. The following guidelines will be used:  · Underweight: BMI less than 18.5.  · Normal weight: BMI between 18.5 and 24.9.  · Overweight: BMI between 25 and 29.9.  · Obese: BMI of 30 or above.  Keep these notes in mind:  · Weight includes both fat and muscle, so someone with a muscular build, such as an athlete, may have a BMI that is higher than 24.9. In cases like these, BMI is not an accurate measure of body fat.  · To determine if excess body fat is the cause of a BMI of 25 or higher, further assessments may need to be done by a health care provider.  · BMI is usually interpreted in the same way for men and women.  Where to find more information  For more information about BMI, including tools to quickly calculate your BMI, go to these websites:  · Centers for Disease Control and Prevention: www.cdc.gov  · American Heart Association: www.heart.org  · National [employer]: www.nhlbi.nih.gov  Summary  · Body mass index (BMI) is a number that is calculated from a person's weight and height.  · BMI may help estimate how much of a person's weight is composed of fat. BMI can help identify those who may be at higher risk for certain medical problems.  · BMI can be measured using English measurements or metric measurements.  · BMI charts are used to identify whether you are underweight, normal " weight, overweight, or obese.  This information is not intended to replace advice given to you by your health care provider. Make sure you discuss any questions you have with your health care provider.  Document Revised: 09/09/2020 Document Reviewed: 07/17/2020  Elsevier Patient Education © 2021 [employer] Inc.

## 2021-08-16 NOTE — PROGRESS NOTES
"Chief Complaint  Follow-up (Recheck Right Arm)    Subjective          Gale Cabral presents to [facility] GENERAL SURGERY  History of Present Illness  Ms. Cabral presents today for recheck of skin lesion on right arm. Last visit a punch biopsy was performed and results below. She states she believes the lesion has gotten smaller since being treated.  She denies any bleeding or drainage from area although it has still not completely resolved.              Objective   Vital Signs:   /70   Pulse 75   Temp 97 °F (36.1 °C) (Temporal)   Ht 157.5 cm (62\")   Wt 67.3 kg (148 lb 6.4 oz)   BMI 27.14 kg/m²     Physical Exam  Vitals reviewed.   Constitutional:       Appearance: Normal appearance.   Skin:         Neurological:      General: No focal deficit present.      Mental Status: She is alert and oriented to person, place, and time.   Psychiatric:         Mood and Affect: Mood normal.         Behavior: Behavior normal.         Thought Content: Thought content normal.         Judgment: Judgment normal.        Result Review :                 Assessment and Plan    Diagnoses and all orders for this visit:    1. H/O verruca vulgaris (Primary)    2. Encounter for screening mammogram for breast cancer  -     Mammo Screening Digital Tomosynthesis Bilateral With CAD; Future    Will reassess skin lesion in 6 weeks as well as perform breast exam and discuss mammography results.        I spent 19 minutes caring for Gale on this date of service. This time includes time spent by me in the following activities:preparing for the visit, obtaining and/or reviewing a separately obtained history, performing a medically appropriate examination and/or evaluation , ordering medications, tests, or procedures, documenting information in the medical record and care coordination  Follow Up   Return in about 2 months (around 10/16/2021).  Patient was given instructions and counseling regarding her condition or for " health maintenance advice. Please see specific information pulled into the AVS if appropriate.                     This document has been electronically signed by YOHANA Lugo on August 16, 2021 11:36 CDT

## 2021-08-17 ENCOUNTER — TELEPHONE (OUTPATIENT)
Dept: ONCOLOGY | Facility: HOSPITAL | Age: 76
End: 2021-08-17

## 2021-08-17 RX ORDER — DAPAGLIFLOZIN 10 MG/1
10 TABLET, FILM COATED ORAL EVERY MORNING
Qty: 90 TABLET | Refills: 1 | Status: SHIPPED | OUTPATIENT
Start: 2021-08-17 | End: 2022-01-24 | Stop reason: SDUPTHER

## 2021-08-17 NOTE — TELEPHONE ENCOUNTER
----- Message from Carlos Duong MD sent at 8/16/2021 10:23 PM CDT -----  Please let patient know, her iron level is not showing any clinic visit.  Patient is absorption and she is not absorbing iron by mouth.  Recommend starting intravenous Venofer starting next week.  B12 and folate levels are showing significant improvement.  Recommend discontinuing B12 and folic acid from today.Thanks

## 2021-08-24 ENCOUNTER — TELEPHONE (OUTPATIENT)
Dept: ONCOLOGY | Facility: CLINIC | Age: 76
End: 2021-08-24

## 2021-08-24 ENCOUNTER — INFUSION (OUTPATIENT)
Dept: ONCOLOGY | Facility: HOSPITAL | Age: 76
End: 2021-08-24

## 2021-08-24 VITALS
HEART RATE: 79 BPM | RESPIRATION RATE: 18 BRPM | TEMPERATURE: 97.6 F | DIASTOLIC BLOOD PRESSURE: 53 MMHG | SYSTOLIC BLOOD PRESSURE: 125 MMHG

## 2021-08-24 DIAGNOSIS — Z45.2 ENCOUNTER FOR VENOUS ACCESS DEVICE CARE: ICD-10-CM

## 2021-08-24 DIAGNOSIS — K90.9 IRON MALABSORPTION: Primary | ICD-10-CM

## 2021-08-24 DIAGNOSIS — D50.8 OTHER IRON DEFICIENCY ANEMIA: ICD-10-CM

## 2021-08-24 PROCEDURE — 96365 THER/PROPH/DIAG IV INF INIT: CPT | Performed by: INTERNAL MEDICINE

## 2021-08-24 PROCEDURE — 25010000002 HEPARIN LOCK FLUSH PER 10 UNITS: Performed by: NURSE PRACTITIONER

## 2021-08-24 PROCEDURE — 25010000002 DIPHENHYDRAMINE PER 50 MG: Performed by: INTERNAL MEDICINE

## 2021-08-24 PROCEDURE — 96375 TX/PRO/DX INJ NEW DRUG ADDON: CPT | Performed by: INTERNAL MEDICINE

## 2021-08-24 PROCEDURE — 25010000002 IRON SUCROSE PER 1 MG: Performed by: INTERNAL MEDICINE

## 2021-08-24 RX ORDER — SODIUM CHLORIDE 0.9 % (FLUSH) 0.9 %
20 SYRINGE (ML) INJECTION AS NEEDED
Status: CANCELLED | OUTPATIENT
Start: 2021-08-24

## 2021-08-24 RX ORDER — DIPHENHYDRAMINE HYDROCHLORIDE 50 MG/ML
25 INJECTION INTRAMUSCULAR; INTRAVENOUS ONCE
Status: COMPLETED | OUTPATIENT
Start: 2021-08-24 | End: 2021-08-24

## 2021-08-24 RX ORDER — DIPHENHYDRAMINE HYDROCHLORIDE 50 MG/ML
25 INJECTION INTRAMUSCULAR; INTRAVENOUS ONCE
Status: CANCELLED | OUTPATIENT
Start: 2021-08-26

## 2021-08-24 RX ORDER — SODIUM CHLORIDE 9 MG/ML
250 INJECTION, SOLUTION INTRAVENOUS ONCE
Status: COMPLETED | OUTPATIENT
Start: 2021-08-24 | End: 2021-08-24

## 2021-08-24 RX ORDER — SODIUM CHLORIDE 9 MG/ML
250 INJECTION, SOLUTION INTRAVENOUS ONCE
Status: CANCELLED | OUTPATIENT
Start: 2021-08-26

## 2021-08-24 RX ORDER — ACETAMINOPHEN 325 MG/1
650 TABLET ORAL ONCE
Status: CANCELLED | OUTPATIENT
Start: 2021-08-26

## 2021-08-24 RX ORDER — SODIUM CHLORIDE 0.9 % (FLUSH) 0.9 %
10 SYRINGE (ML) INJECTION AS NEEDED
Status: CANCELLED | OUTPATIENT
Start: 2021-08-24

## 2021-08-24 RX ORDER — ACETAMINOPHEN 325 MG/1
650 TABLET ORAL ONCE
Status: COMPLETED | OUTPATIENT
Start: 2021-08-24 | End: 2021-08-24

## 2021-08-24 RX ORDER — HEPARIN SODIUM (PORCINE) LOCK FLUSH IV SOLN 100 UNIT/ML 100 UNIT/ML
500 SOLUTION INTRAVENOUS AS NEEDED
Status: CANCELLED | OUTPATIENT
Start: 2021-08-24

## 2021-08-24 RX ORDER — HEPARIN SODIUM (PORCINE) LOCK FLUSH IV SOLN 100 UNIT/ML 100 UNIT/ML
500 SOLUTION INTRAVENOUS AS NEEDED
Status: DISCONTINUED | OUTPATIENT
Start: 2021-08-24 | End: 2021-08-24 | Stop reason: HOSPADM

## 2021-08-24 RX ORDER — SODIUM CHLORIDE 0.9 % (FLUSH) 0.9 %
10 SYRINGE (ML) INJECTION AS NEEDED
Status: DISCONTINUED | OUTPATIENT
Start: 2021-08-24 | End: 2021-08-24 | Stop reason: HOSPADM

## 2021-08-24 RX ADMIN — HEPARIN 500 UNITS: 100 SYRINGE at 15:47

## 2021-08-24 RX ADMIN — IRON SUCROSE 200 MG: 20 INJECTION, SOLUTION INTRAVENOUS at 14:44

## 2021-08-24 RX ADMIN — DIPHENHYDRAMINE HYDROCHLORIDE 25 MG: 50 INJECTION INTRAMUSCULAR; INTRAVENOUS at 14:07

## 2021-08-24 RX ADMIN — ACETAMINOPHEN 650 MG: 325 TABLET, FILM COATED ORAL at 13:59

## 2021-08-24 RX ADMIN — SODIUM CHLORIDE, PRESERVATIVE FREE 10 ML: 5 INJECTION INTRAVENOUS at 15:47

## 2021-08-24 RX ADMIN — FAMOTIDINE 20 MG: 10 INJECTION INTRAVENOUS at 14:27

## 2021-08-24 RX ADMIN — SODIUM CHLORIDE 250 ML: 9 INJECTION, SOLUTION INTRAVENOUS at 14:07

## 2021-08-24 NOTE — TELEPHONE ENCOUNTER
Called and spoke with pt regarding taking iron by mouth, pt is not absorbing iron by mouth per Dr. Duong note.  Pt is to get iv iron, v/u obtained.

## 2021-08-26 ENCOUNTER — INFUSION (OUTPATIENT)
Dept: ONCOLOGY | Facility: HOSPITAL | Age: 76
End: 2021-08-26

## 2021-08-26 VITALS
DIASTOLIC BLOOD PRESSURE: 49 MMHG | RESPIRATION RATE: 16 BRPM | HEART RATE: 86 BPM | SYSTOLIC BLOOD PRESSURE: 112 MMHG | TEMPERATURE: 97.7 F

## 2021-08-26 DIAGNOSIS — K90.9 IRON MALABSORPTION: Primary | ICD-10-CM

## 2021-08-26 DIAGNOSIS — D50.8 OTHER IRON DEFICIENCY ANEMIA: ICD-10-CM

## 2021-08-26 DIAGNOSIS — Z45.2 ENCOUNTER FOR VENOUS ACCESS DEVICE CARE: ICD-10-CM

## 2021-08-26 PROCEDURE — 96375 TX/PRO/DX INJ NEW DRUG ADDON: CPT | Performed by: INTERNAL MEDICINE

## 2021-08-26 PROCEDURE — 25010000002 DIPHENHYDRAMINE PER 50 MG: Performed by: INTERNAL MEDICINE

## 2021-08-26 PROCEDURE — 25010000002 IRON SUCROSE PER 1 MG: Performed by: INTERNAL MEDICINE

## 2021-08-26 PROCEDURE — 96365 THER/PROPH/DIAG IV INF INIT: CPT | Performed by: INTERNAL MEDICINE

## 2021-08-26 PROCEDURE — 25010000002 HEPARIN LOCK FLUSH PER 10 UNITS: Performed by: NURSE PRACTITIONER

## 2021-08-26 RX ORDER — HEPARIN SODIUM (PORCINE) LOCK FLUSH IV SOLN 100 UNIT/ML 100 UNIT/ML
500 SOLUTION INTRAVENOUS AS NEEDED
Status: DISCONTINUED | OUTPATIENT
Start: 2021-08-26 | End: 2021-08-26 | Stop reason: HOSPADM

## 2021-08-26 RX ORDER — DIPHENHYDRAMINE HYDROCHLORIDE 50 MG/ML
25 INJECTION INTRAMUSCULAR; INTRAVENOUS ONCE
Status: COMPLETED | OUTPATIENT
Start: 2021-08-26 | End: 2021-08-26

## 2021-08-26 RX ORDER — ACETAMINOPHEN 325 MG/1
650 TABLET ORAL ONCE
Status: CANCELLED | OUTPATIENT
Start: 2021-08-28

## 2021-08-26 RX ORDER — ACETAMINOPHEN 325 MG/1
650 TABLET ORAL ONCE
Status: COMPLETED | OUTPATIENT
Start: 2021-08-26 | End: 2021-08-26

## 2021-08-26 RX ORDER — HEPARIN SODIUM (PORCINE) LOCK FLUSH IV SOLN 100 UNIT/ML 100 UNIT/ML
500 SOLUTION INTRAVENOUS AS NEEDED
Status: CANCELLED | OUTPATIENT
Start: 2021-08-30

## 2021-08-26 RX ORDER — SODIUM CHLORIDE 0.9 % (FLUSH) 0.9 %
20 SYRINGE (ML) INJECTION AS NEEDED
Status: CANCELLED | OUTPATIENT
Start: 2021-08-30

## 2021-08-26 RX ORDER — DIPHENHYDRAMINE HYDROCHLORIDE 50 MG/ML
25 INJECTION INTRAMUSCULAR; INTRAVENOUS ONCE
Status: CANCELLED | OUTPATIENT
Start: 2021-08-28

## 2021-08-26 RX ORDER — SODIUM CHLORIDE 0.9 % (FLUSH) 0.9 %
20 SYRINGE (ML) INJECTION AS NEEDED
Status: DISCONTINUED | OUTPATIENT
Start: 2021-08-26 | End: 2021-08-26 | Stop reason: HOSPADM

## 2021-08-26 RX ORDER — SODIUM CHLORIDE 9 MG/ML
250 INJECTION, SOLUTION INTRAVENOUS ONCE
Status: COMPLETED | OUTPATIENT
Start: 2021-08-26 | End: 2021-08-26

## 2021-08-26 RX ORDER — SODIUM CHLORIDE 0.9 % (FLUSH) 0.9 %
10 SYRINGE (ML) INJECTION AS NEEDED
Status: CANCELLED | OUTPATIENT
Start: 2021-08-30

## 2021-08-26 RX ORDER — SODIUM CHLORIDE 9 MG/ML
250 INJECTION, SOLUTION INTRAVENOUS ONCE
Status: CANCELLED | OUTPATIENT
Start: 2021-08-28

## 2021-08-26 RX ORDER — SODIUM CHLORIDE 0.9 % (FLUSH) 0.9 %
10 SYRINGE (ML) INJECTION AS NEEDED
Status: DISCONTINUED | OUTPATIENT
Start: 2021-08-26 | End: 2021-08-26 | Stop reason: HOSPADM

## 2021-08-26 RX ADMIN — IRON SUCROSE 200 MG: 20 INJECTION, SOLUTION INTRAVENOUS at 13:47

## 2021-08-26 RX ADMIN — DIPHENHYDRAMINE HYDROCHLORIDE 25 MG: 50 INJECTION INTRAMUSCULAR; INTRAVENOUS at 13:30

## 2021-08-26 RX ADMIN — ACETAMINOPHEN 650 MG: 325 TABLET, FILM COATED ORAL at 13:15

## 2021-08-26 RX ADMIN — SODIUM CHLORIDE, PRESERVATIVE FREE 10 ML: 5 INJECTION INTRAVENOUS at 14:53

## 2021-08-26 RX ADMIN — HEPARIN 500 UNITS: 100 SYRINGE at 14:53

## 2021-08-26 RX ADMIN — SODIUM CHLORIDE 250 ML: 9 INJECTION, SOLUTION INTRAVENOUS at 13:15

## 2021-08-26 RX ADMIN — FAMOTIDINE 20 MG: 10 INJECTION INTRAVENOUS at 13:15

## 2021-08-30 ENCOUNTER — INFUSION (OUTPATIENT)
Dept: ONCOLOGY | Facility: HOSPITAL | Age: 76
End: 2021-08-30

## 2021-08-30 VITALS
SYSTOLIC BLOOD PRESSURE: 137 MMHG | DIASTOLIC BLOOD PRESSURE: 61 MMHG | RESPIRATION RATE: 18 BRPM | TEMPERATURE: 97 F | HEART RATE: 85 BPM

## 2021-08-30 DIAGNOSIS — K90.9 IRON MALABSORPTION: ICD-10-CM

## 2021-08-30 DIAGNOSIS — Z45.2 ENCOUNTER FOR VENOUS ACCESS DEVICE CARE: Primary | ICD-10-CM

## 2021-08-30 DIAGNOSIS — D50.8 OTHER IRON DEFICIENCY ANEMIA: ICD-10-CM

## 2021-08-30 PROCEDURE — 96375 TX/PRO/DX INJ NEW DRUG ADDON: CPT | Performed by: INTERNAL MEDICINE

## 2021-08-30 PROCEDURE — 96365 THER/PROPH/DIAG IV INF INIT: CPT | Performed by: INTERNAL MEDICINE

## 2021-08-30 PROCEDURE — 25010000002 IRON SUCROSE PER 1 MG: Performed by: INTERNAL MEDICINE

## 2021-08-30 PROCEDURE — 25010000002 DIPHENHYDRAMINE PER 50 MG: Performed by: INTERNAL MEDICINE

## 2021-08-30 PROCEDURE — 25010000002 HEPARIN LOCK FLUSH PER 10 UNITS: Performed by: NURSE PRACTITIONER

## 2021-08-30 RX ORDER — SODIUM CHLORIDE 9 MG/ML
250 INJECTION, SOLUTION INTRAVENOUS ONCE
Status: CANCELLED | OUTPATIENT
Start: 2021-09-01

## 2021-08-30 RX ORDER — ACETAMINOPHEN 325 MG/1
650 TABLET ORAL ONCE
Status: COMPLETED | OUTPATIENT
Start: 2021-08-30 | End: 2021-08-30

## 2021-08-30 RX ORDER — HEPARIN SODIUM (PORCINE) LOCK FLUSH IV SOLN 100 UNIT/ML 100 UNIT/ML
500 SOLUTION INTRAVENOUS AS NEEDED
Status: CANCELLED | OUTPATIENT
Start: 2021-08-30

## 2021-08-30 RX ORDER — SODIUM CHLORIDE 9 MG/ML
250 INJECTION, SOLUTION INTRAVENOUS ONCE
Status: COMPLETED | OUTPATIENT
Start: 2021-08-30 | End: 2021-08-30

## 2021-08-30 RX ORDER — HEPARIN SODIUM (PORCINE) LOCK FLUSH IV SOLN 100 UNIT/ML 100 UNIT/ML
500 SOLUTION INTRAVENOUS AS NEEDED
Status: DISCONTINUED | OUTPATIENT
Start: 2021-08-30 | End: 2021-08-30 | Stop reason: HOSPADM

## 2021-08-30 RX ORDER — SODIUM CHLORIDE 0.9 % (FLUSH) 0.9 %
10 SYRINGE (ML) INJECTION AS NEEDED
Status: DISCONTINUED | OUTPATIENT
Start: 2021-08-30 | End: 2021-08-30 | Stop reason: HOSPADM

## 2021-08-30 RX ORDER — SODIUM CHLORIDE 0.9 % (FLUSH) 0.9 %
20 SYRINGE (ML) INJECTION AS NEEDED
Status: CANCELLED | OUTPATIENT
Start: 2021-08-30

## 2021-08-30 RX ORDER — SODIUM CHLORIDE 0.9 % (FLUSH) 0.9 %
10 SYRINGE (ML) INJECTION AS NEEDED
Status: CANCELLED | OUTPATIENT
Start: 2021-08-30

## 2021-08-30 RX ORDER — DIPHENHYDRAMINE HYDROCHLORIDE 50 MG/ML
25 INJECTION INTRAMUSCULAR; INTRAVENOUS ONCE
Status: COMPLETED | OUTPATIENT
Start: 2021-08-30 | End: 2021-08-30

## 2021-08-30 RX ORDER — ACETAMINOPHEN 325 MG/1
650 TABLET ORAL ONCE
Status: CANCELLED | OUTPATIENT
Start: 2021-09-01

## 2021-08-30 RX ORDER — DIPHENHYDRAMINE HYDROCHLORIDE 50 MG/ML
25 INJECTION INTRAMUSCULAR; INTRAVENOUS ONCE
Status: CANCELLED | OUTPATIENT
Start: 2021-09-01

## 2021-08-30 RX ADMIN — DIPHENHYDRAMINE HYDROCHLORIDE 25 MG: 50 INJECTION INTRAMUSCULAR; INTRAVENOUS at 13:52

## 2021-08-30 RX ADMIN — ACETAMINOPHEN 650 MG: 325 TABLET, FILM COATED ORAL at 13:52

## 2021-08-30 RX ADMIN — IRON SUCROSE 200 MG: 20 INJECTION, SOLUTION INTRAVENOUS at 14:19

## 2021-08-30 RX ADMIN — HEPARIN 500 UNITS: 100 SYRINGE at 15:22

## 2021-08-30 RX ADMIN — FAMOTIDINE 20 MG: 10 INJECTION INTRAVENOUS at 14:04

## 2021-08-30 RX ADMIN — SODIUM CHLORIDE, PRESERVATIVE FREE 10 ML: 5 INJECTION INTRAVENOUS at 15:22

## 2021-08-30 RX ADMIN — SODIUM CHLORIDE 250 ML: 9 INJECTION, SOLUTION INTRAVENOUS at 13:52

## 2021-09-01 ENCOUNTER — INFUSION (OUTPATIENT)
Dept: ONCOLOGY | Facility: HOSPITAL | Age: 76
End: 2021-09-01

## 2021-09-01 VITALS
SYSTOLIC BLOOD PRESSURE: 156 MMHG | TEMPERATURE: 97.9 F | RESPIRATION RATE: 20 BRPM | HEART RATE: 89 BPM | DIASTOLIC BLOOD PRESSURE: 60 MMHG

## 2021-09-01 DIAGNOSIS — D50.8 OTHER IRON DEFICIENCY ANEMIA: ICD-10-CM

## 2021-09-01 DIAGNOSIS — Z45.2 ENCOUNTER FOR VENOUS ACCESS DEVICE CARE: ICD-10-CM

## 2021-09-01 DIAGNOSIS — K90.9 IRON MALABSORPTION: Primary | ICD-10-CM

## 2021-09-01 PROCEDURE — 25010000002 DIPHENHYDRAMINE PER 50 MG: Performed by: INTERNAL MEDICINE

## 2021-09-01 PROCEDURE — 25010000002 HEPARIN LOCK FLUSH PER 10 UNITS: Performed by: NURSE PRACTITIONER

## 2021-09-01 PROCEDURE — 96375 TX/PRO/DX INJ NEW DRUG ADDON: CPT | Performed by: INTERNAL MEDICINE

## 2021-09-01 PROCEDURE — 96365 THER/PROPH/DIAG IV INF INIT: CPT | Performed by: INTERNAL MEDICINE

## 2021-09-01 PROCEDURE — 25010000002 IRON SUCROSE PER 1 MG: Performed by: INTERNAL MEDICINE

## 2021-09-01 RX ORDER — SODIUM CHLORIDE 0.9 % (FLUSH) 0.9 %
10 SYRINGE (ML) INJECTION AS NEEDED
Status: CANCELLED | OUTPATIENT
Start: 2021-09-01

## 2021-09-01 RX ORDER — ACETAMINOPHEN 325 MG/1
650 TABLET ORAL ONCE
Status: CANCELLED | OUTPATIENT
Start: 2021-09-03

## 2021-09-01 RX ORDER — DIPHENHYDRAMINE HYDROCHLORIDE 50 MG/ML
25 INJECTION INTRAMUSCULAR; INTRAVENOUS ONCE
Status: COMPLETED | OUTPATIENT
Start: 2021-09-01 | End: 2021-09-01

## 2021-09-01 RX ORDER — DIPHENHYDRAMINE HYDROCHLORIDE 50 MG/ML
25 INJECTION INTRAMUSCULAR; INTRAVENOUS ONCE
Status: CANCELLED | OUTPATIENT
Start: 2021-09-03

## 2021-09-01 RX ORDER — SODIUM CHLORIDE 9 MG/ML
250 INJECTION, SOLUTION INTRAVENOUS ONCE
Status: CANCELLED | OUTPATIENT
Start: 2021-09-03

## 2021-09-01 RX ORDER — SODIUM CHLORIDE 9 MG/ML
250 INJECTION, SOLUTION INTRAVENOUS ONCE
Status: COMPLETED | OUTPATIENT
Start: 2021-09-01 | End: 2021-09-01

## 2021-09-01 RX ORDER — SODIUM CHLORIDE 0.9 % (FLUSH) 0.9 %
10 SYRINGE (ML) INJECTION AS NEEDED
Status: DISCONTINUED | OUTPATIENT
Start: 2021-09-01 | End: 2021-09-01 | Stop reason: HOSPADM

## 2021-09-01 RX ORDER — HEPARIN SODIUM (PORCINE) LOCK FLUSH IV SOLN 100 UNIT/ML 100 UNIT/ML
500 SOLUTION INTRAVENOUS AS NEEDED
Status: CANCELLED | OUTPATIENT
Start: 2021-09-01

## 2021-09-01 RX ORDER — HEPARIN SODIUM (PORCINE) LOCK FLUSH IV SOLN 100 UNIT/ML 100 UNIT/ML
500 SOLUTION INTRAVENOUS AS NEEDED
Status: DISCONTINUED | OUTPATIENT
Start: 2021-09-01 | End: 2021-09-01 | Stop reason: HOSPADM

## 2021-09-01 RX ORDER — SODIUM CHLORIDE 0.9 % (FLUSH) 0.9 %
20 SYRINGE (ML) INJECTION AS NEEDED
Status: CANCELLED | OUTPATIENT
Start: 2021-09-01

## 2021-09-01 RX ORDER — ACETAMINOPHEN 325 MG/1
650 TABLET ORAL ONCE
Status: COMPLETED | OUTPATIENT
Start: 2021-09-01 | End: 2021-09-01

## 2021-09-01 RX ADMIN — DIPHENHYDRAMINE HYDROCHLORIDE 25 MG: 50 INJECTION INTRAMUSCULAR; INTRAVENOUS at 14:02

## 2021-09-01 RX ADMIN — HEPARIN 500 UNITS: 100 SYRINGE at 15:25

## 2021-09-01 RX ADMIN — FAMOTIDINE 20 MG: 10 INJECTION INTRAVENOUS at 14:07

## 2021-09-01 RX ADMIN — IRON SUCROSE 200 MG: 20 INJECTION, SOLUTION INTRAVENOUS at 14:22

## 2021-09-01 RX ADMIN — SODIUM CHLORIDE, PRESERVATIVE FREE 10 ML: 5 INJECTION INTRAVENOUS at 15:25

## 2021-09-01 RX ADMIN — SODIUM CHLORIDE 250 ML: 9 INJECTION, SOLUTION INTRAVENOUS at 14:02

## 2021-09-01 RX ADMIN — ACETAMINOPHEN 650 MG: 325 TABLET, FILM COATED ORAL at 14:05

## 2021-09-03 ENCOUNTER — INFUSION (OUTPATIENT)
Dept: ONCOLOGY | Facility: HOSPITAL | Age: 76
End: 2021-09-03

## 2021-09-03 ENCOUNTER — LAB (OUTPATIENT)
Dept: LAB | Facility: OTHER | Age: 76
End: 2021-09-03

## 2021-09-03 VITALS
DIASTOLIC BLOOD PRESSURE: 63 MMHG | HEART RATE: 81 BPM | TEMPERATURE: 96.2 F | SYSTOLIC BLOOD PRESSURE: 141 MMHG | RESPIRATION RATE: 16 BRPM

## 2021-09-03 DIAGNOSIS — R79.89 ELEVATED LFTS: ICD-10-CM

## 2021-09-03 DIAGNOSIS — E11.69 HYPERLIPIDEMIA ASSOCIATED WITH TYPE 2 DIABETES MELLITUS (HCC): Chronic | ICD-10-CM

## 2021-09-03 DIAGNOSIS — E55.9 VITAMIN D DEFICIENCY: Chronic | ICD-10-CM

## 2021-09-03 DIAGNOSIS — E03.9 ACQUIRED HYPOTHYROIDISM: Chronic | ICD-10-CM

## 2021-09-03 DIAGNOSIS — C50.411 MALIGNANT NEOPLASM OF UPPER-OUTER QUADRANT OF RIGHT BREAST IN FEMALE, ESTROGEN RECEPTOR POSITIVE (HCC): Chronic | ICD-10-CM

## 2021-09-03 DIAGNOSIS — D64.9 ANEMIA, UNSPECIFIED TYPE: ICD-10-CM

## 2021-09-03 DIAGNOSIS — Z11.59 ENCOUNTER FOR HEPATITIS C SCREENING TEST FOR LOW RISK PATIENT: ICD-10-CM

## 2021-09-03 DIAGNOSIS — G47.33 OBSTRUCTIVE SLEEP APNEA SYNDROME: Chronic | ICD-10-CM

## 2021-09-03 DIAGNOSIS — E11.9 TYPE 2 DIABETES MELLITUS WITHOUT COMPLICATION, WITHOUT LONG-TERM CURRENT USE OF INSULIN (HCC): Chronic | ICD-10-CM

## 2021-09-03 DIAGNOSIS — D75.839 THROMBOCYTOSIS: ICD-10-CM

## 2021-09-03 DIAGNOSIS — K90.9 IRON MALABSORPTION: ICD-10-CM

## 2021-09-03 DIAGNOSIS — Z45.2 ENCOUNTER FOR VENOUS ACCESS DEVICE CARE: Primary | ICD-10-CM

## 2021-09-03 DIAGNOSIS — D50.8 OTHER IRON DEFICIENCY ANEMIA: ICD-10-CM

## 2021-09-03 DIAGNOSIS — Z17.0 MALIGNANT NEOPLASM OF UPPER-OUTER QUADRANT OF RIGHT BREAST IN FEMALE, ESTROGEN RECEPTOR POSITIVE (HCC): Chronic | ICD-10-CM

## 2021-09-03 DIAGNOSIS — E78.5 HYPERLIPIDEMIA ASSOCIATED WITH TYPE 2 DIABETES MELLITUS (HCC): Chronic | ICD-10-CM

## 2021-09-03 DIAGNOSIS — C50.911 INVASIVE DUCTAL CARCINOMA OF RIGHT BREAST (HCC): ICD-10-CM

## 2021-09-03 DIAGNOSIS — I10 ESSENTIAL HYPERTENSION: Chronic | ICD-10-CM

## 2021-09-03 LAB
25(OH)D3 SERPL-MCNC: 30.1 NG/ML (ref 30–100)
ALBUMIN SERPL-MCNC: 4.5 G/DL (ref 3.5–5)
ALBUMIN UR-MCNC: <1.2 MG/DL
ALBUMIN/GLOB SERPL: 1.5 G/DL (ref 1.1–1.8)
ALP SERPL-CCNC: 83 U/L (ref 38–126)
ALT SERPL W P-5'-P-CCNC: 50 U/L
ANION GAP SERPL CALCULATED.3IONS-SCNC: 7 MMOL/L (ref 5–15)
ARTICHOKE IGE QN: 56 MG/DL (ref 0–100)
AST SERPL-CCNC: 57 U/L (ref 14–36)
BASOPHILS # BLD AUTO: 0.09 10*3/MM3 (ref 0–0.2)
BASOPHILS NFR BLD AUTO: 1.1 % (ref 0–1.5)
BILIRUB SERPL-MCNC: 0.5 MG/DL (ref 0.2–1.3)
BUN SERPL-MCNC: 16 MG/DL (ref 7–23)
BUN/CREAT SERPL: 16.8 (ref 7–25)
CALCIUM SPEC-SCNC: 9.8 MG/DL (ref 8.4–10.2)
CHLORIDE SERPL-SCNC: 107 MMOL/L (ref 101–112)
CO2 SERPL-SCNC: 27 MMOL/L (ref 22–30)
CREAT SERPL-MCNC: 0.95 MG/DL (ref 0.52–1.04)
CREAT UR-MCNC: 103.6 MG/DL
DEPRECATED RDW RBC AUTO: 67.5 FL (ref 37–54)
EOSINOPHIL # BLD AUTO: 0.36 10*3/MM3 (ref 0–0.4)
EOSINOPHIL NFR BLD AUTO: 4.5 % (ref 0.3–6.2)
ERYTHROCYTE [DISTWIDTH] IN BLOOD BY AUTOMATED COUNT: 19.6 % (ref 12.3–15.4)
GFR SERPL CREATININE-BSD FRML MDRD: 58 ML/MIN/1.73 (ref 39–90)
GLOBULIN UR ELPH-MCNC: 3 GM/DL (ref 2.3–3.5)
GLUCOSE SERPL-MCNC: 125 MG/DL (ref 70–99)
HBA1C MFR BLD: 6.19 % (ref 4.8–5.6)
HCT VFR BLD AUTO: 40.1 % (ref 34–46.6)
HCV AB SER DONR QL: NORMAL
HGB BLD-MCNC: 13 G/DL (ref 12–15.9)
LYMPHOCYTES # BLD AUTO: 1.28 10*3/MM3 (ref 0.7–3.1)
LYMPHOCYTES NFR BLD AUTO: 16.2 % (ref 19.6–45.3)
MCH RBC QN AUTO: 31.8 PG (ref 26.6–33)
MCHC RBC AUTO-ENTMCNC: 32.4 G/DL (ref 31.5–35.7)
MCV RBC AUTO: 98 FL (ref 79–97)
MICROALBUMIN/CREAT UR: NORMAL MG/G{CREAT}
MONOCYTES # BLD AUTO: 0.96 10*3/MM3 (ref 0.1–0.9)
MONOCYTES NFR BLD AUTO: 12.1 % (ref 5–12)
NEUTROPHILS NFR BLD AUTO: 5.23 10*3/MM3 (ref 1.7–7)
NEUTROPHILS NFR BLD AUTO: 66.1 % (ref 42.7–76)
PLATELET # BLD AUTO: 336 10*3/MM3 (ref 140–450)
PMV BLD AUTO: 8.7 FL (ref 6–12)
POTASSIUM SERPL-SCNC: 4.1 MMOL/L (ref 3.4–5)
PROT SERPL-MCNC: 7.5 G/DL (ref 6.3–8.6)
RBC # BLD AUTO: 4.09 10*6/MM3 (ref 3.77–5.28)
SODIUM SERPL-SCNC: 141 MMOL/L (ref 137–145)
T4 FREE SERPL-MCNC: 1.3 NG/DL (ref 0.93–1.7)
TSH SERPL DL<=0.05 MIU/L-ACNC: 1.82 UIU/ML (ref 0.27–4.2)
WBC # BLD AUTO: 7.92 10*3/MM3 (ref 3.4–10.8)

## 2021-09-03 PROCEDURE — 25010000002 HEPARIN LOCK FLUSH PER 10 UNITS: Performed by: NURSE PRACTITIONER

## 2021-09-03 PROCEDURE — 86803 HEPATITIS C AB TEST: CPT | Performed by: INTERNAL MEDICINE

## 2021-09-03 PROCEDURE — 82570 ASSAY OF URINE CREATININE: CPT | Performed by: INTERNAL MEDICINE

## 2021-09-03 PROCEDURE — 82306 VITAMIN D 25 HYDROXY: CPT | Performed by: INTERNAL MEDICINE

## 2021-09-03 PROCEDURE — 80053 COMPREHEN METABOLIC PANEL: CPT | Performed by: INTERNAL MEDICINE

## 2021-09-03 PROCEDURE — 83721 ASSAY OF BLOOD LIPOPROTEIN: CPT | Performed by: INTERNAL MEDICINE

## 2021-09-03 PROCEDURE — 25010000002 DIPHENHYDRAMINE PER 50 MG: Performed by: INTERNAL MEDICINE

## 2021-09-03 PROCEDURE — 96375 TX/PRO/DX INJ NEW DRUG ADDON: CPT | Performed by: INTERNAL MEDICINE

## 2021-09-03 PROCEDURE — 36415 COLL VENOUS BLD VENIPUNCTURE: CPT | Performed by: INTERNAL MEDICINE

## 2021-09-03 PROCEDURE — 82043 UR ALBUMIN QUANTITATIVE: CPT | Performed by: INTERNAL MEDICINE

## 2021-09-03 PROCEDURE — 84443 ASSAY THYROID STIM HORMONE: CPT | Performed by: INTERNAL MEDICINE

## 2021-09-03 PROCEDURE — 84439 ASSAY OF FREE THYROXINE: CPT | Performed by: INTERNAL MEDICINE

## 2021-09-03 PROCEDURE — 85025 COMPLETE CBC W/AUTO DIFF WBC: CPT | Performed by: INTERNAL MEDICINE

## 2021-09-03 PROCEDURE — 96365 THER/PROPH/DIAG IV INF INIT: CPT | Performed by: INTERNAL MEDICINE

## 2021-09-03 PROCEDURE — 25010000002 IRON SUCROSE PER 1 MG: Performed by: INTERNAL MEDICINE

## 2021-09-03 PROCEDURE — 83036 HEMOGLOBIN GLYCOSYLATED A1C: CPT | Performed by: INTERNAL MEDICINE

## 2021-09-03 RX ORDER — SODIUM CHLORIDE 0.9 % (FLUSH) 0.9 %
10 SYRINGE (ML) INJECTION AS NEEDED
Status: DISCONTINUED | OUTPATIENT
Start: 2021-09-03 | End: 2021-09-03 | Stop reason: HOSPADM

## 2021-09-03 RX ORDER — DIPHENHYDRAMINE HYDROCHLORIDE 50 MG/ML
25 INJECTION INTRAMUSCULAR; INTRAVENOUS ONCE
Status: COMPLETED | OUTPATIENT
Start: 2021-09-03 | End: 2021-09-03

## 2021-09-03 RX ORDER — SODIUM CHLORIDE 0.9 % (FLUSH) 0.9 %
10 SYRINGE (ML) INJECTION AS NEEDED
Status: CANCELLED | OUTPATIENT
Start: 2021-09-28

## 2021-09-03 RX ORDER — SODIUM CHLORIDE 9 MG/ML
250 INJECTION, SOLUTION INTRAVENOUS ONCE
Status: COMPLETED | OUTPATIENT
Start: 2021-09-03 | End: 2021-09-03

## 2021-09-03 RX ORDER — ACETAMINOPHEN 325 MG/1
650 TABLET ORAL ONCE
Status: COMPLETED | OUTPATIENT
Start: 2021-09-03 | End: 2021-09-03

## 2021-09-03 RX ORDER — SODIUM CHLORIDE 0.9 % (FLUSH) 0.9 %
20 SYRINGE (ML) INJECTION AS NEEDED
Status: DISCONTINUED | OUTPATIENT
Start: 2021-09-03 | End: 2021-09-03 | Stop reason: HOSPADM

## 2021-09-03 RX ORDER — SODIUM CHLORIDE 0.9 % (FLUSH) 0.9 %
20 SYRINGE (ML) INJECTION AS NEEDED
Status: CANCELLED | OUTPATIENT
Start: 2021-09-28

## 2021-09-03 RX ORDER — DIPHENHYDRAMINE HYDROCHLORIDE 50 MG/ML
25 INJECTION INTRAMUSCULAR; INTRAVENOUS ONCE
Status: CANCELLED | OUTPATIENT
Start: 2021-09-03

## 2021-09-03 RX ORDER — HEPARIN SODIUM (PORCINE) LOCK FLUSH IV SOLN 100 UNIT/ML 100 UNIT/ML
500 SOLUTION INTRAVENOUS AS NEEDED
Status: DISCONTINUED | OUTPATIENT
Start: 2021-09-03 | End: 2021-09-03 | Stop reason: HOSPADM

## 2021-09-03 RX ORDER — ACETAMINOPHEN 325 MG/1
650 TABLET ORAL ONCE
Status: CANCELLED | OUTPATIENT
Start: 2021-09-03

## 2021-09-03 RX ORDER — HEPARIN SODIUM (PORCINE) LOCK FLUSH IV SOLN 100 UNIT/ML 100 UNIT/ML
500 SOLUTION INTRAVENOUS AS NEEDED
Status: CANCELLED | OUTPATIENT
Start: 2021-09-28

## 2021-09-03 RX ORDER — SODIUM CHLORIDE 9 MG/ML
250 INJECTION, SOLUTION INTRAVENOUS ONCE
Status: CANCELLED | OUTPATIENT
Start: 2021-09-03

## 2021-09-03 RX ADMIN — SODIUM CHLORIDE 250 ML: 9 INJECTION, SOLUTION INTRAVENOUS at 13:53

## 2021-09-03 RX ADMIN — IRON SUCROSE 200 MG: 20 INJECTION, SOLUTION INTRAVENOUS at 14:17

## 2021-09-03 RX ADMIN — ACETAMINOPHEN 650 MG: 325 TABLET, FILM COATED ORAL at 13:53

## 2021-09-03 RX ADMIN — HEPARIN 500 UNITS: 100 SYRINGE at 15:25

## 2021-09-03 RX ADMIN — DIPHENHYDRAMINE HYDROCHLORIDE 25 MG: 50 INJECTION INTRAMUSCULAR; INTRAVENOUS at 14:06

## 2021-09-03 RX ADMIN — FAMOTIDINE 20 MG: 10 INJECTION INTRAVENOUS at 13:53

## 2021-09-03 RX ADMIN — SODIUM CHLORIDE, PRESERVATIVE FREE 10 ML: 5 INJECTION INTRAVENOUS at 15:24

## 2021-09-07 ENCOUNTER — OFFICE VISIT (OUTPATIENT)
Dept: ONCOLOGY | Facility: CLINIC | Age: 76
End: 2021-09-07

## 2021-09-07 ENCOUNTER — APPOINTMENT (OUTPATIENT)
Dept: ONCOLOGY | Facility: HOSPITAL | Age: 76
End: 2021-09-07

## 2021-09-07 VITALS
RESPIRATION RATE: 18 BRPM | OXYGEN SATURATION: 96 % | BODY MASS INDEX: 27.58 KG/M2 | SYSTOLIC BLOOD PRESSURE: 132 MMHG | HEART RATE: 79 BPM | TEMPERATURE: 96.6 F | WEIGHT: 150.8 LBS | DIASTOLIC BLOOD PRESSURE: 59 MMHG

## 2021-09-07 DIAGNOSIS — L27.1 HAND FOOT SYNDROME: ICD-10-CM

## 2021-09-07 DIAGNOSIS — R79.89 ELEVATED LFTS: Chronic | ICD-10-CM

## 2021-09-07 DIAGNOSIS — C50.911 INVASIVE DUCTAL CARCINOMA OF RIGHT BREAST (HCC): Chronic | ICD-10-CM

## 2021-09-07 DIAGNOSIS — D50.8 OTHER IRON DEFICIENCY ANEMIA: Chronic | ICD-10-CM

## 2021-09-07 DIAGNOSIS — C50.911 INVASIVE DUCTAL CARCINOMA OF RIGHT BREAST (HCC): Primary | Chronic | ICD-10-CM

## 2021-09-07 DIAGNOSIS — K90.9 IRON MALABSORPTION: Chronic | ICD-10-CM

## 2021-09-07 PROCEDURE — G0463 HOSPITAL OUTPT CLINIC VISIT: HCPCS | Performed by: INTERNAL MEDICINE

## 2021-09-07 PROCEDURE — 99214 OFFICE O/P EST MOD 30 MIN: CPT | Performed by: INTERNAL MEDICINE

## 2021-09-07 PROCEDURE — 1123F ACP DISCUSS/DSCN MKR DOCD: CPT | Performed by: INTERNAL MEDICINE

## 2021-09-07 PROCEDURE — 1126F AMNT PAIN NOTED NONE PRSNT: CPT | Performed by: INTERNAL MEDICINE

## 2021-09-07 RX ORDER — CAPECITABINE 500 MG/1
TABLET, FILM COATED ORAL
Qty: 70 TABLET | Refills: 1 | Status: SHIPPED | OUTPATIENT
Start: 2021-09-07 | End: 2021-10-12 | Stop reason: SDUPTHER

## 2021-09-07 RX ORDER — CAPECITABINE 500 MG/1
TABLET, FILM COATED ORAL
Qty: 70 TABLET | Refills: 1 | Status: SHIPPED | OUTPATIENT
Start: 2021-09-07 | End: 2021-09-08 | Stop reason: SDUPTHER

## 2021-09-07 NOTE — PROGRESS NOTES
DATE OF VISIT: 9/7/2021      REASON FOR VISIT: Triple negative right breast cancer, anemia, elevated liver function test, hand-foot syndrome      HISTORY OF PRESENT ILLNESS:   75-year-old female with medical problem consisting of diabetes mellitus, hypertension, dyslipidemia, asthma, osteopenia, obstructive sleep apnea, chronic back pain who has been following up in [facility] since October 5, 2020 for triple negative right breast cancer.  Patient is currently on Xeloda in adjuvant setting since Pushpa 15, 2021.  Patient is here to start cycle 4 of Xeloda today.  Denies any excessive diarrhea or nausea or vomiting with cycle 3.  Complains of redness affecting bilateral hands and feet.  Denies any new lymph node enlargement.  Denies any bleeding.        Oncology history:    1.  Triple negative right breast cancer, [id number] with 1 lymph node showing 1.9 cm deposit with extracapsular extension and tumor deposit lymphatic:  -Patient received neoadjuvant chemotherapy with Taxotere and Cytoxan for 3 cycles between October 15, 2020 and November 30, 2020.  -After cycle 3 unfortunately patient developed bowel obstruction requiring surgery and colostomy at hospital in [place].  -Patient did not want to do cycle 4 prior to surgery and was subsequently sent back to Dr. Alexandra underwent lumpectomy on January 11 2021 that showed 1 cm residual cancer.  -Patient had axillary dissection done on February 25, 2021 that showed 1.9 cm lymph node involvement with extracapsular extension and tumor deposit in the lymphatic channel.  -Result of pathology report were discussed with patient and her .  Patient had a minimal or no response to chemotherapy with Taxotere and Cytoxan in neoadjuvant setting.  PET/CT done prior to starting chemotherapy had a shotty axillary lymph node without any significant uptake.  Patient did have enlarged lymph node at the time of surgery again not responding to chemotherapy preoperatively  -Had  a radiation treatment that completed on May 24, 2021 at [facility].  -Patient was started on cycle 1 of Xeloda on Pushpa 15, 2021.          Past Medical History, Past Surgical History, Social History, Family History have been reviewed and are without significant changes except as mentioned.    Review of Systems   Constitutional: Positive for fatigue.   Gastrointestinal: Positive for nausea.   Skin:        Complains of redness affecting bilateral hand and feet   Hematological: Negative for adenopathy.      A comprehensive 14 point review of systems was performed and was negative except as mentioned.    Medications:  The current medication list was reviewed in the EMR    ALLERGIES:    Allergies   Allergen Reactions   • Other Confusion     Coda-clear       Objective      Vitals:    09/07/21 1146   BP: 132/59   Pulse: 79   Resp: 18   Temp: 96.6 °F (35.9 °C)   SpO2: 96%   Weight: 68.4 kg (150 lb 12.8 oz)   PainSc: 0-No pain     Current Status 9/1/2021   ECOG score 0       Physical Exam  Pulmonary:      Breath sounds: Normal breath sounds.   Abdominal:      Comments: Colostomy present   Neurological:      Mental Status: She is alert and oriented to person, place, and time.           RECENT LABS:  Glucose   Date Value Ref Range Status   09/03/2021 125 (H) 70 - 99 mg/dL Final     Sodium   Date Value Ref Range Status   09/03/2021 141 137 - 145 mmol/L Final     Potassium   Date Value Ref Range Status   09/03/2021 4.1 3.4 - 5.0 mmol/L Final     CO2   Date Value Ref Range Status   09/03/2021 27.0 22.0 - 30.0 mmol/L Final     Chloride   Date Value Ref Range Status   09/03/2021 107 101 - 112 mmol/L Final     Anion Gap   Date Value Ref Range Status   09/03/2021 7.0 5.0 - 15.0 mmol/L Final     Creatinine   Date Value Ref Range Status   09/03/2021 0.95 0.52 - 1.04 mg/dL Final     BUN   Date Value Ref Range Status   09/03/2021 16 7 - 23 mg/dL Final     BUN/Creatinine Ratio   Date Value Ref Range Status   09/03/2021 16.8 7.0  - 25.0 Final     Calcium   Date Value Ref Range Status   09/03/2021 9.8 8.4 - 10.2 mg/dL Final     eGFR Non  Amer   Date Value Ref Range Status   09/03/2021 58 39 - 90 mL/min/1.73 Final     Alkaline Phosphatase   Date Value Ref Range Status   09/03/2021 83 38 - 126 U/L Final     Total Protein   Date Value Ref Range Status   09/03/2021 7.5 6.3 - 8.6 g/dL Final     ALT (SGPT)   Date Value Ref Range Status   09/03/2021 50 (H) <=35 U/L Final     AST (SGOT)   Date Value Ref Range Status   09/03/2021 57 (H) 14 - 36 U/L Final     Total Bilirubin   Date Value Ref Range Status   09/03/2021 0.5 0.2 - 1.3 mg/dL Final     Albumin   Date Value Ref Range Status   09/03/2021 4.50 3.50 - 5.00 g/dL Final     Globulin   Date Value Ref Range Status   09/03/2021 3.0 2.3 - 3.5 gm/dL Final     Lab Results   Component Value Date    WBC 7.92 09/03/2021    HGB 13.0 09/03/2021    HCT 40.1 09/03/2021    MCV 98.0 (H) 09/03/2021     09/03/2021     Lab Results   Component Value Date    NEUTROABS 5.23 09/03/2021    IRON 54 08/16/2021    IRON 63 06/01/2021    IRON 77 03/15/2021    TIBC 465 08/16/2021    TIBC 393 06/01/2021    TIBC 428 03/15/2021    LABIRON 12 (L) 08/16/2021    LABIRON 16 (L) 06/01/2021    LABIRON 18 (L) 03/15/2021    FERRITIN 50.50 08/16/2021    FERRITIN 37.15 06/01/2021    FERRITIN 49.06 03/15/2021    WZINALKV36 1,053 (H) 08/16/2021    DGNEAIWX68 549 03/15/2021    GNPKKYIV88 >2,000 (H) 11/05/2020    FOLATE >20.00 08/16/2021    FOLATE >20.00 03/15/2021    FOLATE 5.69 11/05/2020     No results found for: , LABCA2, AFPTM, HCGQUANT, , CHROMGRNA, [id number], CEA, REFLABREPO      PATHOLOGY:  * Cannot find OR log *         RADIOLOGY DATA :  No radiology results for the last 7 days        Assessment/Plan     1.  Triple negative right breast cancer, YPT1B pN1a with 1 lymph node showing 1.9 cm deposit with extracapsular extension and tumor deposit in lymphatic  -Review oncology history for prior treatment  details  -Dose of Xeloda was decreased with cycle 2 patient is currently taking 3 tablets in the morning and 2 tablets at night.  -Tolerating this dose of Xeloda well except for development of hand-foot syndrome  -Discussed with patient if hand-foot syndrome gets worse we may have to hold Xeloda.  -We will ask patient to return to clinic in 3 weeks with repeat CBC and CMP on that day.    2.  Anemia:  -Hemoglobin is 13.  -Due to iron malabsorption patient received 5 dose of Venofer that completed on September 3, 2021  -We will repeat anemia work-up in November 2021    3.  Hand-foot syndrome:  -Secondary to Xeloda  -We will prescribe urea cream.  Prescription has been sent to her pharmacy today  -If patient develops any worsening of hand-foot syndrome may need to hold Xeloda which was discussed with patient    4.  Fatty liver with elevated LFT    5.  Genetic testing:  Genetic testing done in November 2020 with BRCA1 and BRCA2 was negative.    6.  Health maintenance: Patient does not smoke    7. Advance Care Planning: For now patient remains full code and is able to make decisions.  Patient has health care surrogate mentioned on chart.                 PHQ-9 Total Score: 0   -Patient is not homicidal or suicidal.  No acute intervention required.    Gale Cabral reports a pain score of 0.  Given her pain assessment as noted, treatment options were discussed and the following options were decided upon as a follow-up plan to address the patient's pain: No acute intervention required.         Carlos Duong MD  9/7/2021  17:17 CDT        Part of this note may be an electronic transcription/translation of spoken language to printed text using the Dragon Dictation System.          CC:

## 2021-09-08 ENCOUNTER — OFFICE VISIT (OUTPATIENT)
Dept: FAMILY MEDICINE CLINIC | Facility: CLINIC | Age: 76
End: 2021-09-08

## 2021-09-08 VITALS
HEART RATE: 73 BPM | BODY MASS INDEX: 27.23 KG/M2 | DIASTOLIC BLOOD PRESSURE: 62 MMHG | WEIGHT: 148 LBS | SYSTOLIC BLOOD PRESSURE: 114 MMHG | TEMPERATURE: 97.8 F | HEIGHT: 62 IN

## 2021-09-08 DIAGNOSIS — E11.9 TYPE 2 DIABETES MELLITUS WITHOUT COMPLICATION, WITHOUT LONG-TERM CURRENT USE OF INSULIN (HCC): Primary | Chronic | ICD-10-CM

## 2021-09-08 DIAGNOSIS — T45.1X5A IMMUNOSUPPRESSED DUE TO CHEMOTHERAPY (HCC): ICD-10-CM

## 2021-09-08 DIAGNOSIS — I10 ESSENTIAL HYPERTENSION: Chronic | ICD-10-CM

## 2021-09-08 DIAGNOSIS — K90.9 IRON MALABSORPTION: Chronic | ICD-10-CM

## 2021-09-08 DIAGNOSIS — E55.9 VITAMIN D DEFICIENCY: Chronic | ICD-10-CM

## 2021-09-08 DIAGNOSIS — D50.8 IRON DEFICIENCY ANEMIA SECONDARY TO INADEQUATE DIETARY IRON INTAKE: Chronic | ICD-10-CM

## 2021-09-08 DIAGNOSIS — E03.9 ACQUIRED HYPOTHYROIDISM: Chronic | ICD-10-CM

## 2021-09-08 DIAGNOSIS — Z79.899 IMMUNOSUPPRESSED DUE TO CHEMOTHERAPY (HCC): ICD-10-CM

## 2021-09-08 DIAGNOSIS — C50.911 INVASIVE DUCTAL CARCINOMA OF RIGHT BREAST (HCC): Chronic | ICD-10-CM

## 2021-09-08 DIAGNOSIS — E11.69 HYPERLIPIDEMIA ASSOCIATED WITH TYPE 2 DIABETES MELLITUS (HCC): Chronic | ICD-10-CM

## 2021-09-08 DIAGNOSIS — E78.5 HYPERLIPIDEMIA ASSOCIATED WITH TYPE 2 DIABETES MELLITUS (HCC): Chronic | ICD-10-CM

## 2021-09-08 DIAGNOSIS — D84.821 IMMUNOSUPPRESSED DUE TO CHEMOTHERAPY (HCC): ICD-10-CM

## 2021-09-08 PROCEDURE — 99443 PR PHYS/QHP TELEPHONE EVALUATION 21-30 MIN: CPT | Performed by: INTERNAL MEDICINE

## 2021-09-08 RX ORDER — ATORVASTATIN CALCIUM 20 MG/1
20 TABLET, FILM COATED ORAL EVERY OTHER DAY
Qty: 90 TABLET | Refills: 3 | Status: SHIPPED | OUTPATIENT
Start: 2021-09-08 | End: 2022-03-16 | Stop reason: SDUPTHER

## 2021-09-08 NOTE — PATIENT INSTRUCTIONS
Exercising to Lose Weight  Exercise is structured, repetitive physical activity to improve fitness and health. Getting regular exercise is important for everyone. It is especially important if you are overweight. Being overweight increases your risk of heart disease, stroke, diabetes, high blood pressure, and several types of cancer. Reducing your calorie intake and exercising can help you lose weight.  Exercise is usually categorized as moderate or vigorous intensity. To lose weight, most people need to do a certain amount of moderate-intensity or vigorous-intensity exercise each week.  Moderate-intensity exercise    Moderate-intensity exercise is any activity that gets you moving enough to burn at least three times more energy (calories) than if you were sitting.  Examples of moderate exercise include:  · Walking a mile in 15 minutes.  · Doing light yard work.  · Biking at an easy pace.  Most people should get at least 150 minutes (2 hours and 30 minutes) a week of moderate-intensity exercise to maintain their body weight.  Vigorous-intensity exercise  Vigorous-intensity exercise is any activity that gets you moving enough to burn at least six times more calories than if you were sitting. When you exercise at this intensity, you should be working hard enough that you are not able to carry on a conversation.  Examples of vigorous exercise include:  · Running.  · Playing a team sport, such as football, basketball, and soccer.  · Jumping rope.  Most people should get at least 75 minutes (1 hour and 15 minutes) a week of vigorous-intensity exercise to maintain their body weight.  How can exercise affect me?  When you exercise enough to burn more calories than you eat, you lose weight. Exercise also reduces body fat and builds muscle. The more muscle you have, the more calories you burn. Exercise also:  · Improves mood.  · Reduces stress and tension.  · Improves your overall fitness, flexibility, and  endurance.  · Increases bone strength.  The amount of exercise you need to lose weight depends on:  · Your age.  · The type of exercise.  · Any health conditions you have.  · Your overall physical ability.  Talk to your health care provider about how much exercise you need and what types of activities are safe for you.  What actions can I take to lose weight?  Nutrition    · Make changes to your diet as told by your health care provider or diet and nutrition specialist (dietitian). This may include:  ? Eating fewer calories.  ? Eating more protein.  ? Eating less unhealthy fats.  ? Eating a diet that includes fresh fruits and vegetables, whole grains, low-fat dairy products, and lean protein.  ? Avoiding foods with added fat, salt, and sugar.  · Drink plenty of water while you exercise to prevent dehydration or heat stroke.    Activity  · Choose an activity that you enjoy and set realistic goals. Your health care provider can help you make an exercise plan that works for you.  · Exercise at a moderate or vigorous intensity most days of the week.  ? The intensity of exercise may vary from person to person. You can tell how intense a workout is for you by paying attention to your breathing and heartbeat. Most people will notice their breathing and heartbeat get faster with more intense exercise.  · Do resistance training twice each week, such as:  ? Push-ups.  ? Sit-ups.  ? Lifting weights.  ? Using resistance bands.  · Getting short amounts of exercise can be just as helpful as long structured periods of exercise. If you have trouble finding time to exercise, try to include exercise in your daily routine.  ? Get up, stretch, and walk around every 30 minutes throughout the day.  ? Go for a walk during your lunch break.  ? Park your car farther away from your destination.  ? If you take public transportation, get off one stop early and walk the rest of the way.  ? Make phone calls while standing up and walking  around.  ? Take the stairs instead of elevators or escalators.  · Wear comfortable clothes and shoes with good support.  · Do not exercise so much that you hurt yourself, feel dizzy, or get very short of breath.  Where to find more information  · [place] Department of Health and Human Services: www.hhs.gov  · Centers for Disease Control and Prevention (CDC): www.cdc.gov  Contact a health care provider:  · Before starting a new exercise program.  · If you have questions or concerns about your weight.  · If you have a medical problem that keeps you from exercising.  Get help right away if you have any of the following while exercising:  · Injury.  · Dizziness.  · Difficulty breathing or shortness of breath that does not go away when you stop exercising.  · Chest pain.  · Rapid heartbeat.  Summary  · Being overweight increases your risk of heart disease, stroke, diabetes, high blood pressure, and several types of cancer.  · Losing weight happens when you burn more calories than you eat.  · Reducing the amount of calories you eat in addition to getting regular moderate or vigorous exercise each week helps you lose weight.  This information is not intended to replace advice given to you by your health care provider. Make sure you discuss any questions you have with your health care provider.  Document Revised: 04/15/2021 Document Reviewed: 04/15/2021  [employer] Patient Education © 2021 [employer]      Calorie Counting for Weight Loss  Calories are units of energy. Your body needs a certain number of calories from food to keep going throughout the day. When you eat or drink more calories than your body needs, your body stores the extra calories mostly as fat. When you eat or drink fewer calories than your body needs, your body burns fat to get the energy it needs.  Calorie counting means keeping track of how many calories you eat and drink each day. Calorie counting can be helpful if you need to lose weight. If you eat  fewer calories than your body needs, you should lose weight. Ask your health care provider what a healthy weight is for you.  For calorie counting to work, you will need to eat the right number of calories each day to lose a healthy amount of weight per week. A dietitian can help you figure out how many calories you need in a day and will suggest ways to reach your calorie goal.  · A healthy amount of weight to lose each week is usually 1-2 lb (0.5-0.9 kg). This usually means that your daily calorie intake should be reduced by 500-750 calories.  · Eating 1,200-1,500 calories a day can help most women lose weight.  · Eating 1,500-1,800 calories a day can help most men lose weight.  What do I need to know about calorie counting?  Work with your health care provider or dietitian to determine how many calories you should get each day. To meet your daily calorie goal, you will need to:  · Find out how many calories are in each food that you would like to eat. Try to do this before you eat.  · Decide how much of the food you plan to eat.  · Keep a food log. Do this by writing down what you ate and how many calories it had.  To successfully lose weight, it is important to balance calorie counting with a healthy lifestyle that includes regular activity.  Where do I find calorie information?    The number of calories in a food can be found on a Nutrition Facts label. If a food does not have a Nutrition Facts label, try to look up the calories online or ask your dietitian for help.  Remember that calories are listed per serving. If you choose to have more than one serving of a food, you will have to multiply the calories per serving by the number of servings you plan to eat. For example, the label on a package of bread might say that a serving size is 1 slice and that there are 90 calories in a serving. If you eat 1 slice, you will have eaten 90 calories. If you eat 2 slices, you will have eaten 180 calories.  How do I keep a  food log?  After each time that you eat, record the following in your food log as soon as possible:  · What you ate. Be sure to include toppings, sauces, and other extras on the food.  · How much you ate. This can be measured in cups, ounces, or number of items.  · How many calories were in each food and drink.  · The total number of calories in the food you ate.  Keep your food log near you, such as in a pocket-sized notebook or on an hebert or website on your mobile phone. Some programs will calculate calories for you and show you how many calories you have left to meet your daily goal.  What are some portion-control tips?  · Know how many calories are in a serving. This will help you know how many servings you can have of a certain food.  · Use a measuring cup to measure serving sizes. You could also try weighing out portions on a kitchen scale. With time, you will be able to estimate serving sizes for some foods.  · Take time to put servings of different foods on your favorite plates or in your favorite bowls and cups so you know what a serving looks like.  · Try not to eat straight from a food's packaging, such as from a bag or box. Eating straight from the package makes it hard to see how much you are eating and can lead to overeating. Put the amount you would like to eat in a cup or on a plate to make sure you are eating the right portion.  · Use smaller plates, glasses, and bowls for smaller portions and to prevent overeating.  · Try not to multitask. For example, avoid watching TV or using your computer while eating. If it is time to eat, sit down at a table and enjoy your food. This will help you recognize when you are full. It will also help you be more mindful of what and how much you are eating.  What are tips for following this plan?  Reading food labels  · Check the calorie count compared with the serving size. The serving size may be smaller than what you are used to eating.  · Check the source of the  calories. Try to choose foods that are high in protein, fiber, and vitamins, and low in saturated fat, trans fat, and sodium.  Shopping  · Read nutrition labels while you shop. This will help you make healthy decisions about which foods to buy.  · Pay attention to nutrition labels for low-fat or fat-free foods. These foods sometimes have the same number of calories or more calories than the full-fat versions. They also often have added sugar, starch, or salt to make up for flavor that was removed with the fat.  · Make a grocery list of lower-calorie foods and stick to it.  Cooking  · Try to cook your favorite foods in a healthier way. For example, try baking instead of frying.  · Use low-fat dairy products.  Meal planning  · Use more fruits and vegetables. One-half of your plate should be fruits and vegetables.  · Include lean proteins, such as chicken, turkey, and fish.  Lifestyle  Each week, aim to do one of the following:  · 150 minutes of moderate exercise, such as walking.  · 75 minutes of vigorous exercise, such as running.  General information  · Know how many calories are in the foods you eat most often. This will help you calculate calorie counts faster.  · Find a way of tracking calories that works for you. Get creative. Try different apps or programs if writing down calories does not work for you.  What foods should I eat?    · Eat nutritious foods. It is better to have a nutritious, high-calorie food, such as an avocado, than a food with few nutrients, such as a bag of potato chips.  · Use your calories on foods and drinks that will fill you up and will not leave you hungry soon after eating.  ? Examples of foods that fill you up are nuts and nut butters, vegetables, lean proteins, and high-fiber foods such as whole grains. High-fiber foods are foods with more than 5 g of fiber per serving.  · Pay attention to calories in drinks. Low-calorie drinks include water and unsweetened drinks.  The items listed  above may not be a complete list of foods and beverages you can eat. Contact a dietitian for more information.  What foods should I limit?  Limit foods or drinks that are not good sources of vitamins, minerals, or protein or that are high in unhealthy fats. These include:  · Candy.  · Other sweets.  · Sodas, specialty coffee drinks, alcohol, and juice.  The items listed above may not be a complete list of foods and beverages you should avoid. Contact a dietitian for more information.  How do I count calories when eating out?  · Pay attention to portions. Often, portions are much larger when eating out. Try these tips to keep portions smaller:  ? Consider sharing a meal instead of getting your own.  ? If you get your own meal, eat only half of it. Before you start eating, ask for a container and put half of your meal into it.  ? When available, consider ordering smaller portions from the menu instead of full portions.  · Pay attention to your food and drink choices. Knowing the way food is cooked and what is included with the meal can help you eat fewer calories.  ? If calories are listed on the menu, choose the lower-calorie options.  ? Choose dishes that include vegetables, fruits, whole grains, low-fat dairy products, and lean proteins.  ? Choose items that are boiled, broiled, grilled, or steamed. Avoid items that are buttered, battered, fried, or served with cream sauce. Items labeled as crispy are usually fried, unless stated otherwise.  ? Choose water, low-fat milk, unsweetened iced tea, or other drinks without added sugar. If you want an alcoholic beverage, choose a lower-calorie option, such as a glass of wine or light beer.  ? Ask for dressings, sauces, and syrups on the side. These are usually high in calories, so you should limit the amount you eat.  ? If you want a salad, choose a garden salad and ask for grilled meats. Avoid extra toppings such as juarez, cheese, or fried items. Ask for the dressing on  the side, or ask for olive oil and vinegar or lemon to use as dressing.  · Estimate how many servings of a food you are given. Knowing serving sizes will help you be aware of how much food you are eating at restaurants.  Where to find more information  · Centers for Disease Control and Prevention: www.cdc.gov  · [place]. Department of Agriculture: myplate.gov  Summary  · Calorie counting means keeping track of how many calories you eat and drink each day. If you eat fewer calories than your body needs, you should lose weight.  · A healthy amount of weight to lose per week is usually 1-2 lb (0.5-0.9 kg). This usually means reducing your daily calorie intake by 500-750 calories.  · The number of calories in a food can be found on a Nutrition Facts label. If a food does not have a Nutrition Facts label, try to look up the calories online or ask your dietitian for help.  · Use smaller plates, glasses, and bowls for smaller portions and to prevent overeating.  · Use your calories on foods and drinks that will fill you up and not leave you hungry shortly after a meal.  This information is not intended to replace advice given to you by your health care provider. Make sure you discuss any questions you have with your health care provider.  Document Revised: 01/28/2021 Document Reviewed: 01/28/2021  [employer] Patient Education © 2021 [employer]

## 2021-09-08 NOTE — PROGRESS NOTES
You have chosen to receive care through a telephone visit. Do you consent to use a telephone visit for your medical care today? Yes    Total visit time: 31 minutes.     Chief Complaint  Follow-up (6 month. She holds her Losartan if b/p under 120 and the same thing at night . Her B12, folic acid and ferrous sulfate is on hold.  She is getting iron infusions )    Subjective          History of Present Illness     Gale Cabral receives care via telephone visit for 6-month follow up on type 2 diabetes, hypertension, hyperlipidemia/low HDL, asthma, and hypothyroidism among other issues.      Dr. Duong is following patient for diagnosis of triple negative right breast cancer.  She has been on Xeloda since Pushpa 15, 2021 and started cycle 4 of 8 yesterday.  Patient received neoadjuvant chemotherapy last fall, but unfortunately developed bowel obstruction after cycle 3 requiring surgery and colostomy at [facility] in [place].  She will be scheduled for reanastomosis in a few months, after recovering from chemotherapy.   She did not complete cycle 4 and  was subsequently sent back to Dr. Alexandra where she underwent lumpectomy on January 11 2021 that showed 1 cm residual cancer.  [place] lymph node was positive.  All other lymph nodes were negative.      Patient has had multiple recent iron infusions with nice improvement in anemia with hemoglobin improved at 13.0.      Patient has been fully vaccinated for COVID-19 with Pfizer vaccine.   Dr. Duong recommended patient have her COVID booster in between cycles of Xeloda.  The patient requested my opinion of this and I told her that I fully agree with Dr. Duong.    Weight is up 5 pounds in the past six months.  Blood pressure and heart rate at goal.  She has been monitoring BP and basically taking BP medications p.r.n. BP readings. I recommended she take the losartan HCT daily and hold the Norvasc, only adding for systolic >140.      The patient's relevant past medical,  "surgical, and social history was reviewed in Epic.   Lab results are reviewed with the patient today.  Fasting glucose 125. A1c 6.19. Normal renal function.  Liver enzymes have trended up with ALT 50 and AST 57, more than likely due to chemotherapy and fatty liver.   We checked hepatitis C screen, which is negative.  LDL is lower than necessary at 56 with daily Lipitor.  Vitamin D and vitamin B levels at goal.          Objective   Vital Signs:   /62   Pulse 73   Temp 97.8 °F (36.6 °C) (Oral)   Ht 157.5 cm (62\")   Wt 67.1 kg (148 lb)   BMI 27.07 kg/m²       Physical Exam   Result Review :     CMP    CMP 7/23/21 8/16/21 9/3/21   Glucose 82 86 125 (A)   BUN 14 14 16   Creatinine 0.90 1.01 (A) 0.95   eGFR Non  Am 61 54 (A) 58   Sodium 138 134 (A) 141   Potassium 4.1 4.1 4.1   Chloride 107 101 107   Calcium 9.2 9.3 9.8   Albumin 4.00 4.20 4.50   Total Bilirubin 0.4 0.4 0.5   Alkaline Phosphatase 86 87 83   AST (SGOT) 36 (A) 45 (A) 57 (A)   ALT (SGPT) 32 42 (A) 50 (A)   (A) Abnormal value            CBC w/diff    CBC w/Diff 7/23/21 8/16/21 9/3/21   WBC 6.15 7.25 7.92   RBC 4.16 4.22 4.09   Hemoglobin 12.4 13.0 13.0   Hematocrit 37.2 39.0 40.1   MCV 89.4 92.4 98.0 (A)   MCH 29.8 30.8 31.8   MCHC 33.3 33.3 32.4   RDW 19.5 (A) 19.2 (A) 19.6 (A)   Platelets 347 301 336   Neutrophil Rel % 55.4 60.0 66.1   Immature Granulocyte Rel % 0.3 0.4    Lymphocyte Rel % 17.9 (A) 17.1 (A) 16.2 (A)   Monocyte Rel % 14.3 (A) 15.6 (A) 12.1 (A)   Eosinophil Rel % 9.8 (A) 5.5 4.5   Basophil Rel % 2.3 (A) 1.4 1.1   (A) Abnormal value            Lipid Panel    Lipid Panel 2/22/21 9/3/21   Total Cholesterol 190    Triglycerides 244 (A)    HDL Cholesterol 34 (A)    VLDL Cholesterol 43 (A)    LDL Cholesterol  113 (A) 56   LDL/HDL Ratio 3.15    (A) Abnormal value            TSH    TSH 2/22/21 9/3/21   TSH 3.480 1.820           A1C Last 3 Results    HGBA1C Last 3 Results 2/22/21 9/3/21   Hemoglobin A1C 6.29 (A) 6.19 (A)   (A) " Abnormal value            Data reviewed: Consultant notes Dr. Duong, yesterday          Assessment and Plan    Diagnoses and all orders for this visit:    1. Type 2 diabetes mellitus without complication, without long-term current use of insulin (CMS/HCC) (Primary)  -     CBC Auto Differential; Future  -     Comprehensive Metabolic Panel; Future  -     Hemoglobin A1c; Future  -     Lipid Panel; Future  -     TSH; Future  -     T4, free; Future  -     Vitamin B12; Future    2. Hyperlipidemia associated with type 2 diabetes mellitus (CMS/HCC)  -     Lipid Panel; Future    3. Essential hypertension  -     Comprehensive Metabolic Panel; Future    4. Vitamin D deficiency  -     Vitamin D 25 Hydroxy; Future    5. Acquired hypothyroidism  -     TSH; Future  -     T4, free; Future    6. Iron malabsorption  -     CBC Auto Differential; Future    7. Iron deficiency anemia secondary to inadequate dietary iron intake -response to IV iron infusions  -     CBC Auto Differential; Future    8. Invasive ductal carcinoma of right breast (CMS/HCC)    9. Immunosuppressed due to chemotherapy (CMS/HCC)  -     CBC Auto Differential; Future    Other orders  -     atorvastatin (LIPITOR) 20 MG tablet; Take 1 tablet by mouth Every Other Day.  Dispense: 90 tablet; Refill: 3           Monitor BP closely.  Continue the losartan HCT q.d..  Hold the Norvasc, adding only if systolic BP is 140 or above.    Continue follow up visits with Dr. Duong, oncology/hematology to address the breast cancer diagnosis and multifactorial anemia.  She will plan to have COVID booster in between cycles of chemotherapy as directed by Dr. Duong.  She will plan to have flu vaccine when available.  I suggested she ask Dr. Duong to advise regarding the timing of the influenza vaccination as well.    LDL is lower than necessary in her LFTs are trending upward.  I recommended she decrease the  Lipitor to take 3 days weekly, Monday, Wednesday, Friday in this patient who is  currently undergoing chemotherapy .      Continue Celexa for NATA, stable.     Continue the current diabetic medications and management.  Diabetes at goal with combination of metformin and Farxiga    Continue OTC vitamin D 1000 IU daily.  Continue the calcium supplement.  We will plan to repeat DEXA next year, when some of her more serious health issues are addressed.         Continue current dose of Synthroid.  Hypothyroidism at goal.     Continue other medications and vitamin and mineral supplements to treat additional medical problems which we addressed today.       Return in six months for follow up with fasting labs one week prior.      Scrib[facility] for Dr. Soliman [facility].     Follow Up   Return in about 6 months (around 3/8/2022) for Follow up in six months with labs one week prior., Next scheduled follow up - labs 1 week prior.  Patient was given instructions and counseling regarding her condition or for health maintenance advice. Please see specific information pulled into the AVS if appropriate.

## 2021-09-23 ENCOUNTER — TELEPHONE (OUTPATIENT)
Dept: ONCOLOGY | Facility: CLINIC | Age: 76
End: 2021-09-23

## 2021-09-23 NOTE — TELEPHONE ENCOUNTER
Who is recommending 8 months? The CDC? I think should ask Dr. Duong about this, I do know that he does not like for them to get any vaccine the week of any treatment

## 2021-09-24 NOTE — TELEPHONE ENCOUNTER
I believe for cancer patient you can get a third dose or a booster dose 4 weeks after your second dose.  She is good to get it at any time during her week off from chemotherapy.  Thank you

## 2021-09-28 ENCOUNTER — OFFICE VISIT (OUTPATIENT)
Dept: ONCOLOGY | Facility: CLINIC | Age: 76
End: 2021-09-28

## 2021-09-28 ENCOUNTER — LAB (OUTPATIENT)
Dept: ONCOLOGY | Facility: HOSPITAL | Age: 76
End: 2021-09-28

## 2021-09-28 VITALS
HEART RATE: 84 BPM | RESPIRATION RATE: 18 BRPM | SYSTOLIC BLOOD PRESSURE: 123 MMHG | TEMPERATURE: 97.1 F | BODY MASS INDEX: 27.64 KG/M2 | OXYGEN SATURATION: 98 % | DIASTOLIC BLOOD PRESSURE: 56 MMHG | WEIGHT: 151.1 LBS

## 2021-09-28 DIAGNOSIS — C50.911 INVASIVE DUCTAL CARCINOMA OF RIGHT BREAST (HCC): Primary | ICD-10-CM

## 2021-09-28 DIAGNOSIS — C50.911 INVASIVE DUCTAL CARCINOMA OF RIGHT BREAST (HCC): Primary | Chronic | ICD-10-CM

## 2021-09-28 DIAGNOSIS — R79.89 ELEVATED LFTS: Chronic | ICD-10-CM

## 2021-09-28 DIAGNOSIS — R79.89 ELEVATED LFTS: ICD-10-CM

## 2021-09-28 DIAGNOSIS — Z45.2 ENCOUNTER FOR VENOUS ACCESS DEVICE CARE: ICD-10-CM

## 2021-09-28 DIAGNOSIS — D50.8 IRON DEFICIENCY ANEMIA SECONDARY TO INADEQUATE DIETARY IRON INTAKE: Chronic | ICD-10-CM

## 2021-09-28 DIAGNOSIS — D50.8 OTHER IRON DEFICIENCY ANEMIA: ICD-10-CM

## 2021-09-28 DIAGNOSIS — L27.1 HAND FOOT SYNDROME: ICD-10-CM

## 2021-09-28 DIAGNOSIS — K90.9 IRON MALABSORPTION: Chronic | ICD-10-CM

## 2021-09-28 LAB
ALBUMIN SERPL-MCNC: 4 G/DL (ref 3.5–5.2)
ALBUMIN/GLOB SERPL: 1.3 G/DL
ALP SERPL-CCNC: 85 U/L (ref 39–117)
ALT SERPL W P-5'-P-CCNC: 48 U/L (ref 1–33)
ANION GAP SERPL CALCULATED.3IONS-SCNC: 12 MMOL/L (ref 5–15)
AST SERPL-CCNC: 48 U/L (ref 1–32)
BASOPHILS # BLD AUTO: 0.1 10*3/MM3 (ref 0–0.2)
BASOPHILS NFR BLD AUTO: 1.7 % (ref 0–1.5)
BILIRUB SERPL-MCNC: 0.3 MG/DL (ref 0–1.2)
BUN SERPL-MCNC: 12 MG/DL (ref 8–23)
BUN/CREAT SERPL: 13.3 (ref 7–25)
CALCIUM SPEC-SCNC: 9.4 MG/DL (ref 8.6–10.5)
CHLORIDE SERPL-SCNC: 104 MMOL/L (ref 98–107)
CO2 SERPL-SCNC: 23 MMOL/L (ref 22–29)
CREAT SERPL-MCNC: 0.9 MG/DL (ref 0.57–1)
DEPRECATED RDW RBC AUTO: 71.7 FL (ref 37–54)
EOSINOPHIL # BLD AUTO: 0.39 10*3/MM3 (ref 0–0.4)
EOSINOPHIL NFR BLD AUTO: 6.6 % (ref 0.3–6.2)
ERYTHROCYTE [DISTWIDTH] IN BLOOD BY AUTOMATED COUNT: 19.9 % (ref 12.3–15.4)
GFR SERPL CREATININE-BSD FRML MDRD: 61 ML/MIN/1.73
GLOBULIN UR ELPH-MCNC: 3 GM/DL
GLUCOSE SERPL-MCNC: 140 MG/DL (ref 65–99)
HCT VFR BLD AUTO: 38.3 % (ref 34–46.6)
HGB BLD-MCNC: 13 G/DL (ref 12–15.9)
HOLD SPECIMEN: NORMAL
IMM GRANULOCYTES # BLD AUTO: 0.02 10*3/MM3 (ref 0–0.05)
IMM GRANULOCYTES NFR BLD AUTO: 0.3 % (ref 0–0.5)
LYMPHOCYTES # BLD AUTO: 0.93 10*3/MM3 (ref 0.7–3.1)
LYMPHOCYTES NFR BLD AUTO: 15.8 % (ref 19.6–45.3)
MCH RBC QN AUTO: 33.7 PG (ref 26.6–33)
MCHC RBC AUTO-ENTMCNC: 33.9 G/DL (ref 31.5–35.7)
MCV RBC AUTO: 99.2 FL (ref 79–97)
MONOCYTES # BLD AUTO: 0.79 10*3/MM3 (ref 0.1–0.9)
MONOCYTES NFR BLD AUTO: 13.4 % (ref 5–12)
NEUTROPHILS NFR BLD AUTO: 3.66 10*3/MM3 (ref 1.7–7)
NEUTROPHILS NFR BLD AUTO: 62.2 % (ref 42.7–76)
NRBC BLD AUTO-RTO: 0 /100 WBC (ref 0–0.2)
PLATELET # BLD AUTO: 280 10*3/MM3 (ref 140–450)
PMV BLD AUTO: 8.7 FL (ref 6–12)
POTASSIUM SERPL-SCNC: 3.7 MMOL/L (ref 3.5–5.2)
PROT SERPL-MCNC: 7 G/DL (ref 6–8.5)
RBC # BLD AUTO: 3.86 10*6/MM3 (ref 3.77–5.28)
SODIUM SERPL-SCNC: 139 MMOL/L (ref 136–145)
WBC # BLD AUTO: 5.89 10*3/MM3 (ref 3.4–10.8)

## 2021-09-28 PROCEDURE — 1126F AMNT PAIN NOTED NONE PRSNT: CPT | Performed by: INTERNAL MEDICINE

## 2021-09-28 PROCEDURE — 1123F ACP DISCUSS/DSCN MKR DOCD: CPT | Performed by: INTERNAL MEDICINE

## 2021-09-28 PROCEDURE — 36415 COLL VENOUS BLD VENIPUNCTURE: CPT

## 2021-09-28 PROCEDURE — 36591 DRAW BLOOD OFF VENOUS DEVICE: CPT | Performed by: INTERNAL MEDICINE

## 2021-09-28 PROCEDURE — 85025 COMPLETE CBC W/AUTO DIFF WBC: CPT

## 2021-09-28 PROCEDURE — 99214 OFFICE O/P EST MOD 30 MIN: CPT | Performed by: INTERNAL MEDICINE

## 2021-09-28 PROCEDURE — 25010000002 HEPARIN LOCK FLUSH PER 10 UNITS: Performed by: NURSE PRACTITIONER

## 2021-09-28 PROCEDURE — 80053 COMPREHEN METABOLIC PANEL: CPT

## 2021-09-28 RX ORDER — HEPARIN SODIUM (PORCINE) LOCK FLUSH IV SOLN 100 UNIT/ML 100 UNIT/ML
500 SOLUTION INTRAVENOUS AS NEEDED
Status: DISCONTINUED | OUTPATIENT
Start: 2021-09-28 | End: 2021-09-28 | Stop reason: HOSPADM

## 2021-09-28 RX ORDER — HEPARIN SODIUM (PORCINE) LOCK FLUSH IV SOLN 100 UNIT/ML 100 UNIT/ML
500 SOLUTION INTRAVENOUS AS NEEDED
Status: CANCELLED | OUTPATIENT
Start: 2021-10-12

## 2021-09-28 RX ORDER — SODIUM CHLORIDE 0.9 % (FLUSH) 0.9 %
10 SYRINGE (ML) INJECTION AS NEEDED
Status: CANCELLED | OUTPATIENT
Start: 2021-10-12

## 2021-09-28 RX ORDER — SODIUM CHLORIDE 0.9 % (FLUSH) 0.9 %
20 SYRINGE (ML) INJECTION AS NEEDED
Status: CANCELLED | OUTPATIENT
Start: 2021-10-12

## 2021-09-28 RX ADMIN — HEPARIN 500 UNITS: 100 SYRINGE at 09:23

## 2021-09-28 NOTE — PROGRESS NOTES
DATE OF VISIT: 9/28/2021      REASON FOR VISIT: Triple negative right breast cancer, anemia, hand-foot syndrome, elevated liver function test      HISTORY OF PRESENT ILLNESS:   75-year-old female with medical problem consisting of diabetes mellitus, hypertension, dyslipidemia, asthma, osteopenia, obstructive sleep apnea, chronic back pain who has been following up with [facility] since October 5, 2020 for triple negative right breast cancer.  Patient is currently on Xeloda in adjuvant setting since Pushpa 15, 2021.  Patient is scheduled to get cycle 5 of Xeloda today.  Denies any excessive nausea vomiting or diarrhea with cycle 4.  Complains of worsening redness affecting bilateral hand and foot consistent with hand-foot syndrome.  Denies any new lymph node enlargement.  Denies any bleeding.        Oncology history:    1.  Triple negative right breast cancer, [id number] with 1 lymph node showing 1.9 cm deposit with extracapsular extension and tumor deposit lymphatic:  -Patient received neoadjuvant chemotherapy with Taxotere and Cytoxan for 3 cycles between October 15, 2020 and November 30, 2020.  -After cycle 3 unfortunately patient developed bowel obstruction requiring surgery and colostomy at hospital in [place].  -Patient did not want to do cycle 4 prior to surgery and was subsequently sent back to Dr. Alexandra underwent lumpectomy on January 11 2021 that showed 1 cm residual cancer.  -Patient had axillary dissection done on February 25, 2021 that showed 1.9 cm lymph node involvement with extracapsular extension and tumor deposit in the lymphatic channel.  -Result of pathology report were discussed with patient and her .  Patient had a minimal or no response to chemotherapy with Taxotere and Cytoxan in neoadjuvant setting.  PET/CT done prior to starting chemotherapy had a shotty axillary lymph node without any significant uptake.  Patient did have enlarged lymph node at the time of surgery again not  responding to chemotherapy preoperatively  -Had a radiation treatment that completed on May 24, 2021 at [facility].  -Patient was started on cycle 1 of Xeloda on Pushpa 15, 2021.            Past Medical History, Past Surgical History, Social History, Family History have been reviewed and are without significant changes except as mentioned.    Review of Systems   Constitutional: Positive for fatigue.   Gastrointestinal: Positive for nausea.   Skin:        Redness affecting bilateral hand and feet consistent with hand-foot syndrome   Hematological: Negative for adenopathy.      A comprehensive 14 point review of systems was performed and was negative except as mentioned.    Medications:  The current medication list was reviewed in the EMR    ALLERGIES:    Allergies   Allergen Reactions   • Other Confusion     Coda-clear       Objective      Vitals:    09/28/21 0932   BP: 123/56   Pulse: 84   Resp: 18   Temp: 97.1 °F (36.2 °C)   SpO2: 98%   Weight: 68.5 kg (151 lb 1.6 oz)   PainSc: 0-No pain     Current Status 9/1/2021   ECOG score 0       Physical Exam  Pulmonary:      Breath sounds: Normal breath sounds.   Abdominal:      Comments: Colostomy present   Skin:     Comments: Worsening redness and pain affecting bilateral hand and feet consistent with worsening hand-foot syndrome. There is evidence of some skin cracking in hand.   Neurological:      Mental Status: She is alert and oriented to person, place, and time.           RECENT LABS:  Glucose   Date Value Ref Range Status   09/28/2021 140 (H) 65 - 99 mg/dL Final     Sodium   Date Value Ref Range Status   09/28/2021 139 136 - 145 mmol/L Final     Potassium   Date Value Ref Range Status   09/28/2021 3.7 3.5 - 5.2 mmol/L Final     Comment:     Slight hemolysis detected by analyzer. Results may be affected.     CO2   Date Value Ref Range Status   09/28/2021 23.0 22.0 - 29.0 mmol/L Final     Chloride   Date Value Ref Range Status   09/28/2021 104 98 - 107  mmol/L Final     Anion Gap   Date Value Ref Range Status   09/28/2021 12.0 5.0 - 15.0 mmol/L Final     Creatinine   Date Value Ref Range Status   09/28/2021 0.90 0.57 - 1.00 mg/dL Final     BUN   Date Value Ref Range Status   09/28/2021 12 8 - 23 mg/dL Final     BUN/Creatinine Ratio   Date Value Ref Range Status   09/28/2021 13.3 7.0 - 25.0 Final     Calcium   Date Value Ref Range Status   09/28/2021 9.4 8.6 - 10.5 mg/dL Final     eGFR Non  Amer   Date Value Ref Range Status   09/28/2021 61 >60 mL/min/1.73 Final     Alkaline Phosphatase   Date Value Ref Range Status   09/28/2021 85 39 - 117 U/L Final     Total Protein   Date Value Ref Range Status   09/28/2021 7.0 6.0 - 8.5 g/dL Final     ALT (SGPT)   Date Value Ref Range Status   09/28/2021 48 (H) 1 - 33 U/L Final     AST (SGOT)   Date Value Ref Range Status   09/28/2021 48 (H) 1 - 32 U/L Final     Total Bilirubin   Date Value Ref Range Status   09/28/2021 0.3 0.0 - 1.2 mg/dL Final     Albumin   Date Value Ref Range Status   09/28/2021 4.00 3.50 - 5.20 g/dL Final     Globulin   Date Value Ref Range Status   09/28/2021 3.0 gm/dL Final     Lab Results   Component Value Date    WBC 5.89 09/28/2021    HGB 13.0 09/28/2021    HCT 38.3 09/28/2021    MCV 99.2 (H) 09/28/2021     09/28/2021     Lab Results   Component Value Date    NEUTROABS 3.66 09/28/2021    IRON 54 08/16/2021    IRON 63 06/01/2021    IRON 77 03/15/2021    TIBC 465 08/16/2021    TIBC 393 06/01/2021    TIBC 428 03/15/2021    LABIRON 12 (L) 08/16/2021    LABIRON 16 (L) 06/01/2021    LABIRON 18 (L) 03/15/2021    FERRITIN 50.50 08/16/2021    FERRITIN 37.15 06/01/2021    FERRITIN 49.06 03/15/2021    CUQVPQAB28 1,053 (H) 08/16/2021    SNOFATJI64 549 03/15/2021    PRNCSIRB35 >2,000 (H) 11/05/2020    FOLATE >20.00 08/16/2021    FOLATE >20.00 03/15/2021    FOLATE 5.69 11/05/2020     No results found for: , LABCA2, AFPTM, HCGQUANT, , CHROMGRNA, [id number], CEA, REFLABREPO      PATHOLOGY:  *  Cannot find OR log *         RADIOLOGY DATA :  No radiology results for the last 7 days        Assessment/Plan     1.  Triple negative right breast cancer, [id number] with 1 lymph node showing 1.9 cm deposit with extracapsular extension and tumor deposit in lymphatic  -Review oncology history for prior treatment details  -Dose of Xeloda was decreased to 3 tablets in the morning 2 tablets at night with cycle 2.  -Due to worsening hand-foot syndrome. Will hold cycle 4 of Xeloda today.  -We will ask patient to return to clinic in 2 weeks with repeat CBC and CMP and will resume Xeloda only if there is improvement in hand-foot syndrome which was discussed with patient today.  -We will continue close monitoring for chemotherapy related side effect      2.  Anemia:  -Hemoglobin is 13.0  -Due to iron malabsorption patient received 5 dose of Venofer that completed on September 3, 2021  -We will repeat anemia work-up in November 2021    3.  Hand-foot syndrome:  -Secondary to Xeloda  -Recommend continuing with urea cream    4.  Fatty liver with elevated liver function test    5.  Genetic testing:  -Genetic testing done in November 2020 was negative for BRCA1 and BRCA2 mutation    6.  Health maintenance: Patient does not smoke    7. Advance Care Planning: For now patient remains full code and is able to make decisions.  Patient has health care surrogate mentioned on chart.                 PHQ-9 Total Score: 0   -Patient is not homicidal or suicidal.  No acute intervention required.    Gale Tapia Marianna reports a pain score of 0.  Given her pain assessment as noted, treatment options were discussed and the following options were decided upon as a follow-up plan to address the patient's pain: continuation of current treatment plan for pain.         Carlos Duong MD  9/28/2021  10:25 CDT        Part of this note may be an electronic transcription/translation of spoken language to printed text using the Dragon Dictation  System.          CC:

## 2021-10-12 ENCOUNTER — OFFICE VISIT (OUTPATIENT)
Dept: ONCOLOGY | Facility: CLINIC | Age: 76
End: 2021-10-12

## 2021-10-12 ENCOUNTER — INFUSION (OUTPATIENT)
Dept: ONCOLOGY | Facility: HOSPITAL | Age: 76
End: 2021-10-12

## 2021-10-12 VITALS
HEART RATE: 72 BPM | SYSTOLIC BLOOD PRESSURE: 120 MMHG | OXYGEN SATURATION: 98 % | WEIGHT: 151 LBS | DIASTOLIC BLOOD PRESSURE: 56 MMHG | TEMPERATURE: 97.7 F | BODY MASS INDEX: 27.62 KG/M2

## 2021-10-12 DIAGNOSIS — R79.89 ELEVATED LFTS: ICD-10-CM

## 2021-10-12 DIAGNOSIS — Z45.2 ENCOUNTER FOR VENOUS ACCESS DEVICE CARE: ICD-10-CM

## 2021-10-12 DIAGNOSIS — D50.8 IRON DEFICIENCY ANEMIA SECONDARY TO INADEQUATE DIETARY IRON INTAKE: ICD-10-CM

## 2021-10-12 DIAGNOSIS — D50.8 OTHER IRON DEFICIENCY ANEMIA: Chronic | ICD-10-CM

## 2021-10-12 DIAGNOSIS — K90.9 IRON MALABSORPTION: Chronic | ICD-10-CM

## 2021-10-12 DIAGNOSIS — C50.911 INVASIVE DUCTAL CARCINOMA OF RIGHT BREAST (HCC): Primary | Chronic | ICD-10-CM

## 2021-10-12 DIAGNOSIS — L27.1 HAND FOOT SYNDROME: ICD-10-CM

## 2021-10-12 DIAGNOSIS — R79.89 ELEVATED LFTS: Chronic | ICD-10-CM

## 2021-10-12 DIAGNOSIS — D50.8 IRON DEFICIENCY ANEMIA SECONDARY TO INADEQUATE DIETARY IRON INTAKE: Chronic | ICD-10-CM

## 2021-10-12 DIAGNOSIS — C50.911 INVASIVE DUCTAL CARCINOMA OF RIGHT BREAST (HCC): Primary | ICD-10-CM

## 2021-10-12 DIAGNOSIS — K90.9 IRON MALABSORPTION: ICD-10-CM

## 2021-10-12 LAB
ALBUMIN SERPL-MCNC: 4.2 G/DL (ref 3.5–5.2)
ALBUMIN/GLOB SERPL: 1.3 G/DL
ALP SERPL-CCNC: 99 U/L (ref 39–117)
ALT SERPL W P-5'-P-CCNC: 48 U/L (ref 1–33)
ANION GAP SERPL CALCULATED.3IONS-SCNC: 11 MMOL/L (ref 5–15)
AST SERPL-CCNC: 54 U/L (ref 1–32)
BASOPHILS # BLD AUTO: 0.15 10*3/MM3 (ref 0–0.2)
BASOPHILS NFR BLD AUTO: 2.2 % (ref 0–1.5)
BILIRUB SERPL-MCNC: 0.3 MG/DL (ref 0–1.2)
BUN SERPL-MCNC: 14 MG/DL (ref 8–23)
BUN/CREAT SERPL: 15.7 (ref 7–25)
CALCIUM SPEC-SCNC: 9.7 MG/DL (ref 8.6–10.5)
CHLORIDE SERPL-SCNC: 103 MMOL/L (ref 98–107)
CO2 SERPL-SCNC: 23 MMOL/L (ref 22–29)
CREAT SERPL-MCNC: 0.89 MG/DL (ref 0.57–1)
DEPRECATED RDW RBC AUTO: 65.7 FL (ref 37–54)
EOSINOPHIL # BLD AUTO: 0.33 10*3/MM3 (ref 0–0.4)
EOSINOPHIL NFR BLD AUTO: 4.9 % (ref 0.3–6.2)
ERYTHROCYTE [DISTWIDTH] IN BLOOD BY AUTOMATED COUNT: 17.7 % (ref 12.3–15.4)
GFR SERPL CREATININE-BSD FRML MDRD: 62 ML/MIN/1.73
GLOBULIN UR ELPH-MCNC: 3.2 GM/DL
GLUCOSE SERPL-MCNC: 150 MG/DL (ref 65–99)
HCT VFR BLD AUTO: 40.6 % (ref 34–46.6)
HGB BLD-MCNC: 13.5 G/DL (ref 12–15.9)
HOLD SPECIMEN: NORMAL
IMM GRANULOCYTES # BLD AUTO: 0.03 10*3/MM3 (ref 0–0.05)
IMM GRANULOCYTES NFR BLD AUTO: 0.4 % (ref 0–0.5)
LYMPHOCYTES # BLD AUTO: 1.05 10*3/MM3 (ref 0.7–3.1)
LYMPHOCYTES NFR BLD AUTO: 15.6 % (ref 19.6–45.3)
MCH RBC QN AUTO: 33.2 PG (ref 26.6–33)
MCHC RBC AUTO-ENTMCNC: 33.3 G/DL (ref 31.5–35.7)
MCV RBC AUTO: 99.8 FL (ref 79–97)
MONOCYTES # BLD AUTO: 0.75 10*3/MM3 (ref 0.1–0.9)
MONOCYTES NFR BLD AUTO: 11.1 % (ref 5–12)
NEUTROPHILS NFR BLD AUTO: 4.43 10*3/MM3 (ref 1.7–7)
NEUTROPHILS NFR BLD AUTO: 65.8 % (ref 42.7–76)
NRBC BLD AUTO-RTO: 0 /100 WBC (ref 0–0.2)
PLATELET # BLD AUTO: 292 10*3/MM3 (ref 140–450)
PMV BLD AUTO: 8.9 FL (ref 6–12)
POTASSIUM SERPL-SCNC: 4.1 MMOL/L (ref 3.5–5.2)
PROT SERPL-MCNC: 7.4 G/DL (ref 6–8.5)
RBC # BLD AUTO: 4.07 10*6/MM3 (ref 3.77–5.28)
SODIUM SERPL-SCNC: 137 MMOL/L (ref 136–145)
WBC # BLD AUTO: 6.74 10*3/MM3 (ref 3.4–10.8)

## 2021-10-12 PROCEDURE — 1123F ACP DISCUSS/DSCN MKR DOCD: CPT | Performed by: INTERNAL MEDICINE

## 2021-10-12 PROCEDURE — 1126F AMNT PAIN NOTED NONE PRSNT: CPT | Performed by: INTERNAL MEDICINE

## 2021-10-12 PROCEDURE — 80053 COMPREHEN METABOLIC PANEL: CPT

## 2021-10-12 PROCEDURE — 99214 OFFICE O/P EST MOD 30 MIN: CPT | Performed by: INTERNAL MEDICINE

## 2021-10-12 PROCEDURE — 36415 COLL VENOUS BLD VENIPUNCTURE: CPT

## 2021-10-12 PROCEDURE — 85025 COMPLETE CBC W/AUTO DIFF WBC: CPT

## 2021-10-12 PROCEDURE — 25010000002 HEPARIN LOCK FLUSH PER 10 UNITS: Performed by: NURSE PRACTITIONER

## 2021-10-12 PROCEDURE — 36591 DRAW BLOOD OFF VENOUS DEVICE: CPT | Performed by: NURSE PRACTITIONER

## 2021-10-12 RX ORDER — HEPARIN SODIUM (PORCINE) LOCK FLUSH IV SOLN 100 UNIT/ML 100 UNIT/ML
500 SOLUTION INTRAVENOUS AS NEEDED
Status: DISCONTINUED | OUTPATIENT
Start: 2021-10-12 | End: 2021-10-12 | Stop reason: HOSPADM

## 2021-10-12 RX ORDER — SODIUM CHLORIDE 0.9 % (FLUSH) 0.9 %
10 SYRINGE (ML) INJECTION AS NEEDED
Status: CANCELLED | OUTPATIENT
Start: 2021-11-02

## 2021-10-12 RX ORDER — CAPECITABINE 500 MG/1
TABLET, FILM COATED ORAL
Qty: 56 TABLET | Refills: 1 | Status: SHIPPED | OUTPATIENT
Start: 2021-10-12 | End: 2021-11-23 | Stop reason: SDUPTHER

## 2021-10-12 RX ORDER — HEPARIN SODIUM (PORCINE) LOCK FLUSH IV SOLN 100 UNIT/ML 100 UNIT/ML
500 SOLUTION INTRAVENOUS AS NEEDED
Status: CANCELLED | OUTPATIENT
Start: 2021-11-02

## 2021-10-12 RX ORDER — SODIUM CHLORIDE 0.9 % (FLUSH) 0.9 %
20 SYRINGE (ML) INJECTION AS NEEDED
Status: CANCELLED | OUTPATIENT
Start: 2021-10-12

## 2021-10-12 RX ADMIN — HEPARIN 500 UNITS: 100 SYRINGE at 09:27

## 2021-10-12 NOTE — PROGRESS NOTES
DATE OF VISIT: 10/12/2021      REASON FOR VISIT: Triple negative right breast cancer, anemia, hand-foot syndrome, elevated liver function test      HISTORY OF PRESENT ILLNESS:   76-year-old female with medical problem consisting of diabetes mellitus, hypertension, dyslipidemia, asthma, osteopenia, obstructive sleep apnea, chronic back pain who has been following up with [facility] since October 5, 2020 for triple negative right breast cancer.  Patient is currently on Xeloda in adjuvant setting since Pushpa 15, 2021.  Cycle 5 was held last clinic visit secondary to worsening hand-foot syndrome.  Patient is here for follow-up appointment today.  States redness swelling and pain affecting hands and feet has improved since last clinic visit.  Denies any recent infection.  Denies any bleeding.  Denies any new lymph node enlargement.          Oncology history:     1.  Triple negative right breast cancer, [id number] with 1 lymph node showing 1.9 cm deposit with extracapsular extension and tumor deposit lymphatic:  -Patient received neoadjuvant chemotherapy with Taxotere and Cytoxan for 3 cycles between October 15, 2020 and November 30, 2020.  -After cycle 3 unfortunately patient developed bowel obstruction requiring surgery and colostomy at hospital in [place].  -Patient did not want to do cycle 4 prior to surgery and was subsequently sent back to Dr. Alexandra underwent lumpectomy on January 11 2021 that showed 1 cm residual cancer.  -Patient had axillary dissection done on February 25, 2021 that showed 1.9 cm lymph node involvement with extracapsular extension and tumor deposit in the lymphatic channel.  -Result of pathology report were discussed with patient and her .  Patient had a minimal or no response to chemotherapy with Taxotere and Cytoxan in neoadjuvant setting.  PET/CT done prior to starting chemotherapy had a shotty axillary lymph node without any significant uptake.  Patient did have enlarged lymph  node at the time of surgery again not responding to chemotherapy preoperatively  -Had a radiation treatment that completed on May 24, 2021 at [facility].  -Patient was started on cycle 1 of Xeloda on Pushpa 15, 2021.  -Dose of Xeloda was decreased to 3 tablets in the morning and 2 tablets at night with cycle 2.  -Dose of Xeloda will be decreased to 2 tablets in the morning and 2 tablets at night with cycle 5 due to worsening hand-foot syndrome            Past Medical History, Past Surgical History, Social History, Family History have been reviewed and are without significant changes except as mentioned.    Review of Systems   Constitutional: Positive for fatigue.   Gastrointestinal: Positive for nausea.   Skin:        Redness and pain affecting bilateral hand and feet consistent with hand-foot syndrome      A comprehensive 14 point review of systems was performed and was negative except as mentioned.    Medications:  The current medication list was reviewed in the EMR    ALLERGIES:    Allergies   Allergen Reactions   • Other Confusion     Coda-clear       Objective      Vitals:    10/12/21 0929   BP: 120/56   Pulse: 72   Temp: 97.7 °F (36.5 °C)   SpO2: 98%   Weight: 68.5 kg (151 lb)   PainSc: 0-No pain     Current Status 9/1/2021   ECOG score 0       Physical Exam  Pulmonary:      Breath sounds: Normal breath sounds.   Skin:     Comments: Redness and swelling affecting hands and feet has improved as compared to last clinic visit.   Neurological:      Mental Status: She is alert and oriented to person, place, and time.           RECENT LABS:  Glucose   Date Value Ref Range Status   09/28/2021 140 (H) 65 - 99 mg/dL Final     Sodium   Date Value Ref Range Status   09/28/2021 139 136 - 145 mmol/L Final     Potassium   Date Value Ref Range Status   09/28/2021 3.7 3.5 - 5.2 mmol/L Final     Comment:     Slight hemolysis detected by analyzer. Results may be affected.     CO2   Date Value Ref Range Status    09/28/2021 23.0 22.0 - 29.0 mmol/L Final     Chloride   Date Value Ref Range Status   09/28/2021 104 98 - 107 mmol/L Final     Anion Gap   Date Value Ref Range Status   09/28/2021 12.0 5.0 - 15.0 mmol/L Final     Creatinine   Date Value Ref Range Status   09/28/2021 0.90 0.57 - 1.00 mg/dL Final     BUN   Date Value Ref Range Status   09/28/2021 12 8 - 23 mg/dL Final     BUN/Creatinine Ratio   Date Value Ref Range Status   09/28/2021 13.3 7.0 - 25.0 Final     Calcium   Date Value Ref Range Status   09/28/2021 9.4 8.6 - 10.5 mg/dL Final     eGFR Non  Amer   Date Value Ref Range Status   09/28/2021 61 >60 mL/min/1.73 Final     Alkaline Phosphatase   Date Value Ref Range Status   09/28/2021 85 39 - 117 U/L Final     Total Protein   Date Value Ref Range Status   09/28/2021 7.0 6.0 - 8.5 g/dL Final     ALT (SGPT)   Date Value Ref Range Status   09/28/2021 48 (H) 1 - 33 U/L Final     AST (SGOT)   Date Value Ref Range Status   09/28/2021 48 (H) 1 - 32 U/L Final     Total Bilirubin   Date Value Ref Range Status   09/28/2021 0.3 0.0 - 1.2 mg/dL Final     Albumin   Date Value Ref Range Status   09/28/2021 4.00 3.50 - 5.20 g/dL Final     Globulin   Date Value Ref Range Status   09/28/2021 3.0 gm/dL Final     Lab Results   Component Value Date    WBC 6.74 10/12/2021    HGB 13.5 10/12/2021    HCT 40.6 10/12/2021    MCV 99.8 (H) 10/12/2021     10/12/2021     Lab Results   Component Value Date    NEUTROABS 4.43 10/12/2021    IRON 54 08/16/2021    IRON 63 06/01/2021    IRON 77 03/15/2021    TIBC 465 08/16/2021    TIBC 393 06/01/2021    TIBC 428 03/15/2021    LABIRON 12 (L) 08/16/2021    LABIRON 16 (L) 06/01/2021    LABIRON 18 (L) 03/15/2021    FERRITIN 50.50 08/16/2021    FERRITIN 37.15 06/01/2021    FERRITIN 49.06 03/15/2021    HBDJNNDC27 1,053 (H) 08/16/2021    GRCJKEFQ91 549 03/15/2021    [id number] >2,000 (H) 11/05/2020    FOLATE >20.00 08/16/2021    FOLATE >20.00 03/15/2021    FOLATE 5.69 11/05/2020     No  results found for: , LABCA2, AFPTM, HCGQUANT, , CHROMGRNA, [id number], CEA, REFLABREPO      PATHOLOGY:  * Cannot find OR log *         RADIOLOGY DATA :  No radiology results for the last 7 days        Assessment/Plan     1.  Triple negative right breast cancer, [id number] with lung report showing 1.9 cm deposit with extracapsular extension and tumor deposit in the lymphatic  -Review oncology history for prior treatment details  -Due to development of worsening hand-foot syndrome after cycle 4 requiring 2-week days  -Recommend decreasing dose of Xeloda with 2 tablets in the morning and 2 tablets at night with cycle 5 starting today  -If patient has any worsening hand-foot syndrome will recommend discontinuing Xeloda altogether which was discussed with patient  -We will continue with close monitoring for chemotherapy related side effect  -We will ask patient to return to clinic in 3 weeks with repeat CBC and CMP on that day      2.  Anemia  -Hemoglobin is 13.5  -Due to iron malabsorption proceed with 5 days of intravenous Venofer on September 3, 2021  -We will repeat anemia work-up in November 2021    3.  Hand-foot syndrome  -Secondary to Xeloda  -Clinically improved  -Recommend continue using urea cream    4.  Fatty liver with elevated liver function test    5.  Genetic testing  -Genetic testing done in November 2020 was negative for BRCA1 and BRCA2 mutation    6.  Health maintenance: Patient does not smoke    7. Advance Care Planning: For now patient remains full code and is able to make decisions.  Patient has health care surrogate mentioned on chart.    8.  Prescription: Prescription for Xeloda has been sent to her pharmacy today           PHQ-9 Total Score: 0   -Patient is not homicidal or suicidal.  No acute intervention required.    Gale Willie Cabral reports a pain score of 0.  Given her pain assessment as noted, treatment options were discussed and the following options were decided upon as a  follow-up plan to address the patient's pain: No acute intervention required.         Carlos Duong MD  10/12/2021  09:37 CDT        Part of this note may be an electronic transcription/translation of spoken language to printed text using the Dragon Dictation System.          CC:

## 2021-10-13 RX ORDER — DOCUSATE SODIUM 100 MG
CAPSULE ORAL
Qty: 60 CAPSULE | Refills: 2 | Status: SHIPPED | OUTPATIENT
Start: 2021-10-13 | End: 2022-01-31 | Stop reason: SDUPTHER

## 2021-10-13 NOTE — TELEPHONE ENCOUNTER
Rx Refill Note  Requested Prescriptions     Pending Prescriptions Disp Refills   • Stool Softener 100 MG capsule [Pharmacy Med Name: STOOL SOFTENER 100MG CAPSULES] 60 capsule 2     Sig: TAKE 1 CAPSULE BY MOUTH TWICE DAILY      Last office visit with prescribing clinician: 10/12/2021      Next office visit with prescribing clinician: 11/2/2021            Natalie Rivas RN  10/13/21, 08:39 CDT

## 2021-11-02 ENCOUNTER — OFFICE VISIT (OUTPATIENT)
Dept: ONCOLOGY | Facility: CLINIC | Age: 76
End: 2021-11-02

## 2021-11-02 ENCOUNTER — OFFICE VISIT (OUTPATIENT)
Dept: SLEEP MEDICINE | Facility: HOSPITAL | Age: 76
End: 2021-11-02

## 2021-11-02 ENCOUNTER — INFUSION (OUTPATIENT)
Dept: ONCOLOGY | Facility: HOSPITAL | Age: 76
End: 2021-11-02

## 2021-11-02 VITALS
BODY MASS INDEX: 27.78 KG/M2 | WEIGHT: 151.9 LBS | SYSTOLIC BLOOD PRESSURE: 126 MMHG | TEMPERATURE: 97.4 F | OXYGEN SATURATION: 99 % | HEART RATE: 64 BPM | DIASTOLIC BLOOD PRESSURE: 57 MMHG

## 2021-11-02 VITALS
DIASTOLIC BLOOD PRESSURE: 84 MMHG | OXYGEN SATURATION: 98 % | BODY MASS INDEX: 27.79 KG/M2 | HEIGHT: 62 IN | WEIGHT: 151 LBS | HEART RATE: 67 BPM | SYSTOLIC BLOOD PRESSURE: 138 MMHG

## 2021-11-02 DIAGNOSIS — D50.8 IRON DEFICIENCY ANEMIA SECONDARY TO INADEQUATE DIETARY IRON INTAKE: ICD-10-CM

## 2021-11-02 DIAGNOSIS — C50.911 INVASIVE DUCTAL CARCINOMA OF RIGHT BREAST (HCC): Primary | ICD-10-CM

## 2021-11-02 DIAGNOSIS — K90.9 IRON MALABSORPTION: ICD-10-CM

## 2021-11-02 DIAGNOSIS — G47.33 OBSTRUCTIVE SLEEP APNEA, ADULT: Primary | ICD-10-CM

## 2021-11-02 DIAGNOSIS — D50.8 OTHER IRON DEFICIENCY ANEMIA: ICD-10-CM

## 2021-11-02 DIAGNOSIS — L27.1 HAND FOOT SYNDROME: ICD-10-CM

## 2021-11-02 DIAGNOSIS — Z45.2 ENCOUNTER FOR VENOUS ACCESS DEVICE CARE: ICD-10-CM

## 2021-11-02 DIAGNOSIS — R79.89 ELEVATED LFTS: ICD-10-CM

## 2021-11-02 LAB
ALBUMIN SERPL-MCNC: 4.1 G/DL (ref 3.5–5.2)
ALBUMIN/GLOB SERPL: 1.5 G/DL
ALP SERPL-CCNC: 97 U/L (ref 39–117)
ALT SERPL W P-5'-P-CCNC: 60 U/L (ref 1–33)
ANION GAP SERPL CALCULATED.3IONS-SCNC: 12 MMOL/L (ref 5–15)
AST SERPL-CCNC: 69 U/L (ref 1–32)
BASOPHILS # BLD AUTO: 0.12 10*3/MM3 (ref 0–0.2)
BASOPHILS NFR BLD AUTO: 2.4 % (ref 0–1.5)
BILIRUB SERPL-MCNC: 0.3 MG/DL (ref 0–1.2)
BUN SERPL-MCNC: 14 MG/DL (ref 8–23)
BUN/CREAT SERPL: 14.9 (ref 7–25)
CALCIUM SPEC-SCNC: 9.6 MG/DL (ref 8.6–10.5)
CHLORIDE SERPL-SCNC: 102 MMOL/L (ref 98–107)
CO2 SERPL-SCNC: 23 MMOL/L (ref 22–29)
CREAT SERPL-MCNC: 0.94 MG/DL (ref 0.57–1)
DEPRECATED RDW RBC AUTO: 62 FL (ref 37–54)
EOSINOPHIL # BLD AUTO: 0.39 10*3/MM3 (ref 0–0.4)
EOSINOPHIL NFR BLD AUTO: 7.9 % (ref 0.3–6.2)
ERYTHROCYTE [DISTWIDTH] IN BLOOD BY AUTOMATED COUNT: 17 % (ref 12.3–15.4)
GFR SERPL CREATININE-BSD FRML MDRD: 58 ML/MIN/1.73
GLOBULIN UR ELPH-MCNC: 2.7 GM/DL
GLUCOSE SERPL-MCNC: 135 MG/DL (ref 65–99)
HCT VFR BLD AUTO: 39.4 % (ref 34–46.6)
HGB BLD-MCNC: 13.5 G/DL (ref 12–15.9)
IMM GRANULOCYTES # BLD AUTO: 0.01 10*3/MM3 (ref 0–0.05)
IMM GRANULOCYTES NFR BLD AUTO: 0.2 % (ref 0–0.5)
LYMPHOCYTES # BLD AUTO: 1 10*3/MM3 (ref 0.7–3.1)
LYMPHOCYTES NFR BLD AUTO: 20.4 % (ref 19.6–45.3)
MCH RBC QN AUTO: 33.8 PG (ref 26.6–33)
MCHC RBC AUTO-ENTMCNC: 34.3 G/DL (ref 31.5–35.7)
MCV RBC AUTO: 98.7 FL (ref 79–97)
MONOCYTES # BLD AUTO: 0.59 10*3/MM3 (ref 0.1–0.9)
MONOCYTES NFR BLD AUTO: 12 % (ref 5–12)
NEUTROPHILS NFR BLD AUTO: 2.8 10*3/MM3 (ref 1.7–7)
NEUTROPHILS NFR BLD AUTO: 57.1 % (ref 42.7–76)
NRBC BLD AUTO-RTO: 0 /100 WBC (ref 0–0.2)
PLATELET # BLD AUTO: 279 10*3/MM3 (ref 140–450)
PMV BLD AUTO: 8.9 FL (ref 6–12)
POTASSIUM SERPL-SCNC: 3.8 MMOL/L (ref 3.5–5.2)
PROT SERPL-MCNC: 6.8 G/DL (ref 6–8.5)
RBC # BLD AUTO: 3.99 10*6/MM3 (ref 3.77–5.28)
SODIUM SERPL-SCNC: 137 MMOL/L (ref 136–145)
WBC # BLD AUTO: 4.91 10*3/MM3 (ref 3.4–10.8)

## 2021-11-02 PROCEDURE — 25010000002 HEPARIN LOCK FLUSH PER 10 UNITS: Performed by: NURSE PRACTITIONER

## 2021-11-02 PROCEDURE — 80053 COMPREHEN METABOLIC PANEL: CPT

## 2021-11-02 PROCEDURE — 99213 OFFICE O/P EST LOW 20 MIN: CPT | Performed by: NURSE PRACTITIONER

## 2021-11-02 PROCEDURE — G0463 HOSPITAL OUTPT CLINIC VISIT: HCPCS | Performed by: NURSE PRACTITIONER

## 2021-11-02 PROCEDURE — 85025 COMPLETE CBC W/AUTO DIFF WBC: CPT

## 2021-11-02 RX ORDER — HEPARIN SODIUM (PORCINE) LOCK FLUSH IV SOLN 100 UNIT/ML 100 UNIT/ML
500 SOLUTION INTRAVENOUS AS NEEDED
Status: DISCONTINUED | OUTPATIENT
Start: 2021-11-02 | End: 2021-11-02 | Stop reason: HOSPADM

## 2021-11-02 RX ORDER — SODIUM CHLORIDE 0.9 % (FLUSH) 0.9 %
20 SYRINGE (ML) INJECTION AS NEEDED
Status: CANCELLED | OUTPATIENT
Start: 2021-11-02

## 2021-11-02 RX ORDER — SODIUM CHLORIDE 0.9 % (FLUSH) 0.9 %
20 SYRINGE (ML) INJECTION AS NEEDED
Status: CANCELLED | OUTPATIENT
Start: 2021-11-23

## 2021-11-02 RX ORDER — HEPARIN SODIUM (PORCINE) LOCK FLUSH IV SOLN 100 UNIT/ML 100 UNIT/ML
500 SOLUTION INTRAVENOUS AS NEEDED
Status: CANCELLED | OUTPATIENT
Start: 2021-11-23

## 2021-11-02 RX ADMIN — HEPARIN 500 UNITS: 100 SYRINGE at 09:25

## 2021-11-02 NOTE — PROGRESS NOTES
Sleep Clinic Follow Up    Date: 2021  Primary Care Provider: Darian Soliman MD    Last office visit: 2019 (I reviewed this note)    CC: Follow up: GLO, previously on CPAP, wants to restart PAP therapy      Interim History:  Since the last visit:    1) mild GLO -  Gale Cabral has not remained compliant with CPAP. She stopped using it when she was first diagnosed with breast cancer and starting taking treatments. She started losing her hair and was unable to keep her mask on. She is interested in restarting PAP therapy and she is aware of the safety recall on [employer] machine.    2) Patient denies RLS symptoms.     Sleep Testin. HST on 2019, AHI of 8.7  2. Currently on no therapy, previously on 5-20 cm H2O    PAP Data:    No new data available  Mask type: Nasal pillows  DME: [employer]    Bed time: 2200  Sleep latency: 1-4 hours  Number of times awakens during the night: 1-2  Wake time: [id number]  Estimated total sleep time at night: 5-8 hours  Caffeine intake: 3 cups of coffee, 1 cups of tea, and 1 sodas per day   Alcohol intake: 0 drinks per week  Nap time: rare - lays down some days but rarely sleeps   Sleepiness with Driving: none     Sula - 6    PMHx, FH, SH reviewed and pertinent changes are: Diagnosed with breast cancer. Had ruptured colon with colostomy placement.    REVIEW OF SYSTEMS:   Negative for chest pain, SOA, fever, chills, cough, N/V/D, abdominal pain.    Smoking:none    Gale Cabral  reports that she has never smoked. She has never used smokeless tobacco.      Exam:  Vitals:    21 1032   BP: 138/84   Pulse: 67   SpO2: 98%           21  1032   Weight: 68.5 kg (151 lb)     Body mass index is 27.61 kg/m². Patient's Body mass index is 27.61 kg/m². indicating that she is overweight (BMI 25-29.9). Obesity-related health conditions include the following: obstructive sleep apnea, hypertension, diabetes mellitus and dyslipidemias. Weight is unchanged.  BMI is is above average; BMI management plan is completed. We discussed portion control and increasing exercise..      Gen:                No distress, conversant, pleasant, appears stated age, alert, oriented  Eyes:               Anicteric sclera, moist conjunctiva, no lid lag                           PERRL, EOMI   Heent:             NC/AT                          Oropharynx clear                          Normal hearing  Lungs:             Normal effort, non-labored breathing                          Clear to auscultation bilaterally          CV:                  Normal S1/S2, no murmur                          No lower extremity edema  ABD:               Soft, rounded, non-distended                          Normal bowel sounds                    Psych:             Appropriate affect  Neuro:             CN 2-12 appear intact    Past Medical History:   Diagnosis Date   • Abnormal mammogram of right breast    • Acquired hypothyroidism     started synthroid 9/2015   • Allergic rhinitis, seasonal    • Breast cancer (HCC)    • Cervicalgia     right upper extremity radiculopathy   • Diabetes mellitus (HCC)    • Encounter for screening for malignant neoplasm of colon    • Essential hypertension    • Glaucoma    • Health maintenance examination     individual health exam   • Hypercholesterolemia    • Hyperglycemia     steroid-induced hyperglycemia in diabetic patient   • Hyperlipidemia associated with type 2 diabetes mellitus (HCC) 2/26/2021   • Iron deficiency anemia secondary to inadequate dietary iron intake -response to IV iron infusions 9/8/2021   • Lumbar disc disorder     w/right radiculopathy   • Malignant neoplasm of upper-outer quadrant of right breast in female, estrogen receptor positive (HCC) 2/9/2021    Added automatically from request for surgery [id number]   • Menopause    • Mild persistent asthma     resumed dulera twice daily   • Obstructive sleep apnea syndrome 9/17/2019   • Osteopenia     improved DEXA  2/2013   • 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   • Rupture of colon (HCC)    • Sebaceous cyst     subepidermal cyst   • Shoulder pain     likely radicular pain due to cervical DJD   • Spasm of back muscles     right neck and trapezius   • Spinal stenosis of lumbar region    • Strain of neck muscle     cervical strain   • Temporomandibular joint disorder     h/o   • Vitamin D deficiency     on oral calcium/vitamin D supplement       Current Outpatient Medications:   •  acetaminophen (TYLENOL) 500 MG tablet, Take 500 mg by mouth Every 6 (Six) Hours As Needed for Mild Pain ., Disp: , Rfl:   •  albuterol sulfate HFA (PROAIR HFA) 108 (90 Base) MCG/ACT inhaler, Inhale 2 puffs 4 (Four) Times a Day As Needed for Wheezing., Disp: 8.5 inhaler, Rfl: 6  •  aspirin 81 MG EC tablet, Take 81 mg by mouth Every Night., Disp: , Rfl:   •  atorvastatin (LIPITOR) 20 MG tablet, Take 1 tablet by mouth Every Other Day., Disp: 90 tablet, Rfl: 3  •  Blood Glucose Monitoring Suppl (ONE TOUCH ULTRA 2) W/DEVICE kit, , Disp: , Rfl:   •  Calcium Carbonate-Vitamin D 600-200 MG-UNIT tablet, Take 1 tablet by mouth Daily., Disp: , Rfl:   •  capecitabine (XELODA) 500 MG chemo tablet, Take 2 tablets by mouth in the morning and 2 tablets at night as directed., Disp: 56 tablet, Rfl: 1  •  citalopram (CeleXA) 20 MG tablet, Take 0.5 tablets by mouth Daily., Disp: 90 tablet, Rfl: 3  •  Dapagliflozin Propanediol (Farxiga) 10 MG tablet, Take 10 mg by mouth Every Morning., Disp: 90 tablet, Rfl: 1  •  diphenoxylate-atropine (LOMOTIL) 2.5-0.025 MG per tablet, TAKE 1 TABLET BY MOUTH FOUR TIMES DAILY AS NEEDED FOR DIARRHEA, Disp: 40 tablet, Rfl: 1  •  FeroSul 325 (65 Fe) MG tablet, TAKE ONE TABLET BY MOUTH EVERY DAY WITH BREAKFAST, Disp: 30 tablet, Rfl: 3  •  fluticasone (FLONASE) 50 MCG/ACT nasal spray, 2 sprays into each nostril As Needed for rhinitis., Disp: , Rfl:   •  folic acid (FOLVITE) 1 MG tablet, TAKE 1 TABLET BY MOUTH DAILY, Disp: 90 tablet, Rfl:  1  •  glucose blood (ONE TOUCH ULTRA TEST) test strip, 1 each by Other route Daily. Check 1-2times daily  E11.9  90 day supply One Touch Verio, Disp: 150 each, Rfl: 3  •  HYDROcodone-acetaminophen (NORCO) 7.5-325 MG per tablet, Take 1 tablet by mouth Every 6 (Six) Hours As Needed for Moderate Pain  or Severe Pain  (Pain)., Disp: 18 tablet, Rfl: 0  •  LANCETS MICRO THIN 33G misc, Check once daily  E11.9  Trueplus, Disp: 100 each, Rfl: 3  •  latanoprost (XALATAN) 0.005 % ophthalmic solution, Administer 1 drop to both eyes Every Night., Disp: , Rfl:   •  levothyroxine (SYNTHROID, LEVOTHROID) 50 MCG tablet, Take 1 tablet by mouth Daily., Disp: 90 tablet, Rfl: 3  •  losartan-hydrochlorothiazide (HYZAAR) 100-12.5 MG per tablet, Take 1 tablet by mouth Daily., Disp: 90 tablet, Rfl: 3  •  metFORMIN (GLUCOPHAGE) 500 MG tablet, Take 1 tablet by mouth 2 (Two) Times a Day With Meals., Disp: 180 tablet, Rfl: 1  •  mometasone-formoterol (DULERA 200) 200-5 MCG/ACT inhaler, Inhale 2 puffs 2 (Two) Times a Day As Needed., Disp: , Rfl:   •  montelukast (SINGULAIR) 10 MG tablet, Take 1 tablet by mouth Every Night., Disp: 90 tablet, Rfl: 3  •  ondansetron (ZOFRAN) 4 MG tablet, Take 1 tablet by mouth 4 (Four) Times a Day As Needed for Nausea or Vomiting., Disp: 40 tablet, Rfl: 3  •  Stool Softener 100 MG capsule, TAKE 1 CAPSULE BY MOUTH TWICE DAILY, Disp: 60 capsule, Rfl: 2  •  urea (CARMOL) 10 % cream, Apply  topically to the appropriate area as directed 2 (Two) Times a Day., Disp: 142 g, Rfl: 2  •  vitamin B-12 (CYANOCOBALAMIN) 1000 MCG tablet, Take 1 tablet by mouth on Monday, Wednesday and Friday, Disp: 36 tablet, Rfl: 1  No current facility-administered medications for this visit.    Facility-Administered Medications Ordered in Other Visits:   •  heparin injection 500 Units, 500 Units, Intravenous, PRN, [place], Sharon R, APRN    WBC   Date Value Ref Range Status   11/02/2021 4.91 3.40 - 10.80 10*3/mm3 Final     RBC   Date Value Ref  Range Status   11/02/2021 3.99 3.77 - 5.28 10*6/mm3 Final     Hemoglobin   Date Value Ref Range Status   11/02/2021 13.5 12.0 - 15.9 g/dL Final     Hematocrit   Date Value Ref Range Status   11/02/2021 39.4 34.0 - 46.6 % Final     MCV   Date Value Ref Range Status   11/02/2021 98.7 (H) 79.0 - 97.0 fL Final     MCH   Date Value Ref Range Status   11/02/2021 33.8 (H) 26.6 - 33.0 pg Final     MCHC   Date Value Ref Range Status   11/02/2021 34.3 31.5 - 35.7 g/dL Final     RDW   Date Value Ref Range Status   11/02/2021 17.0 (H) 12.3 - 15.4 % Final     RDW-SD   Date Value Ref Range Status   11/02/2021 62.0 (H) 37.0 - 54.0 fl Final     MPV   Date Value Ref Range Status   11/02/2021 8.9 6.0 - 12.0 fL Final     Platelets   Date Value Ref Range Status   11/02/2021 279 140 - 450 10*3/mm3 Final     Neutrophil %   Date Value Ref Range Status   11/02/2021 57.1 42.7 - 76.0 % Final     Lymphocyte %   Date Value Ref Range Status   11/02/2021 20.4 19.6 - 45.3 % Final     Monocyte %   Date Value Ref Range Status   11/02/2021 12.0 5.0 - 12.0 % Final     Eosinophil %   Date Value Ref Range Status   11/02/2021 7.9 (H) 0.3 - 6.2 % Final     Basophil %   Date Value Ref Range Status   11/02/2021 2.4 (H) 0.0 - 1.5 % Final     Immature Grans %   Date Value Ref Range Status   11/02/2021 0.2 0.0 - 0.5 % Final     Neutrophils, Absolute   Date Value Ref Range Status   11/02/2021 2.80 1.70 - 7.00 10*3/mm3 Final     Lymphocytes, Absolute   Date Value Ref Range Status   11/02/2021 1.00 0.70 - 3.10 10*3/mm3 Final     Monocytes, Absolute   Date Value Ref Range Status   11/02/2021 0.59 0.10 - 0.90 10*3/mm3 Final     Eosinophils, Absolute   Date Value Ref Range Status   11/02/2021 0.39 0.00 - 0.40 10*3/mm3 Final     Basophils, Absolute   Date Value Ref Range Status   11/02/2021 0.12 0.00 - 0.20 10*3/mm3 Final     Immature Grans, Absolute   Date Value Ref Range Status   11/02/2021 0.01 0.00 - 0.05 10*3/mm3 Final     nRBC   Date Value Ref Range Status    11/02/2021 0.0 0.0 - 0.2 /100 WBC Final       Lab Results   Component Value Date    GLUCOSE 135 (H) 11/02/2021    BUN 14 11/02/2021    CREATININE 0.94 11/02/2021    EGFRIFNONA 58 (L) 11/02/2021    BCR 14.9 11/02/2021    K 3.8 11/02/2021    CO2 23.0 11/02/2021    CALCIUM 9.6 11/02/2021    ALBUMIN 4.10 11/02/2021    AST 69 (H) 11/02/2021    ALT 60 (H) 11/02/2021       Assessment and Plan:    1. Obstructive sleep apnea - Established, not controlled  1. Non-compliant with PAP therapy but wishes to restart treatment  2. Advised patient of new safety recall of [employer] CPAP/BiPAP/AVAPS machines. We discussed the risk versus benefit of continuing usage of PAP therapy. The company has recommended that patients stop usage of their current machines. Instructed patient to call [employer] ([phone]) to check if the machine is under warranty for repair or replacement. [place] machine with serial number on website: www.[employer]/src-updates. Advised patient to avoid unapproved ozone-type CPAP . Advised to call us if any further questions or problems. Patient wishes to resume using her current machine at this time due to the benefit of PAP therapy over the potential risk. I also encouraged patient to call her insurance company to discuss early approval for a new CPAP machine given the safety recall and to call us if she is approved for a new machine  3. 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 Will pull compliance report in 1 month after resuming CPAP usage - will call patient if any issues or pressure adjustments are needed  5. Return to clinic in 1 year with compliance report unless change in symptoms in interim period - advised patient to call if any difficulties with resuming PAP therapy      I spent 25 minutes caring for Gale on this date of service.  This time includes time spent by me in the following activities: preparing for the visit, reviewing tests, obtaining and/or reviewing a separately obtained history, performing a medically appropriate examination and/or evaluation , counseling and educating the patient/family/caregiver, documenting information in the medical record and care coordination; discussing PAP therapy, PAP compliance and PAP maintenance    RTC in 12 months. Patient agrees to return sooner if changes in symptoms.        This document has been electronically signed by YOHANA Chavis on November 2, 2021 10:48 CDT          CC: Darian Soliman MD          No ref. provider found

## 2021-11-03 NOTE — PROGRESS NOTES
DATE OF VISIT: 11/2/2021      REASON FOR VISIT:  Triple negative right breast cancer, anemia, hand-foot syndrome, elevated liver function test        HISTORY OF PRESENT ILLNESS:   76-year-old female with medical problem consisting of diabetes mellitus, hypertension, dyslipidemia, asthma, osteopenia, obstructive sleep apnea, chronic back pain who has been following up with [facility] since October 5, 2020 for triple negative right breast cancer.  Patient is currently on Xeloda in adjuvant setting since Pushpa 15, 2021.    She is currently on Xeloda 2 tabs in AM and 2 tabs in PM; dose has been reduced due to redness and swelling and pain in hand and feet.            Oncology history:     1.  Triple negative right breast cancer, [id number] with 1 lymph node showing 1.9 cm deposit with extracapsular extension and tumor deposit lymphatic:  -Patient received neoadjuvant chemotherapy with Taxotere and Cytoxan for 3 cycles between October 15, 2020 and November 30, 2020.  -After cycle 3 unfortunately patient developed bowel obstruction requiring surgery and colostomy at hospital in [place].  -Patient did not want to do cycle 4 prior to surgery and was subsequently sent back to Dr. Alexandra underwent lumpectomy on January 11 2021 that showed 1 cm residual cancer.  -Patient had axillary dissection done on February 25, 2021 that showed 1.9 cm lymph node involvement with extracapsular extension and tumor deposit in the lymphatic channel.  -Result of pathology report were discussed with patient and her .  Patient had a minimal or no response to chemotherapy with Taxotere and Cytoxan in neoadjuvant setting.  PET/CT done prior to starting chemotherapy had a shotty axillary lymph node without any significant uptake.  Patient did have enlarged lymph node at the time of surgery again not responding to chemotherapy preoperatively  -Had a radiation treatment that completed on May 24, 2021 at [facility].  -Patient  was started on cycle 1 of Xeloda on Pushpa 15, 2021.  -Dose of Xeloda was decreased to 3 tablets in the morning and 2 tablets at night with cycle 2.  -Dose of Xeloda will be decreased to 2 tablets in the morning and 2 tablets at night with cycle 5 due to worsening hand-foot syndrome               Past Medical History, Past Surgical History, Social History, Family History have been reviewed and are without significant changes except as mentioned.    Review of Systems   A comprehensive 14 point review of systems was performed and was negative except as mentioned.  Patient states hands and feet are much improved with new dosing. She is scheduled to start cycle 6 today.  Medications:  The current medication list was reviewed in the EMR    ALLERGIES:    Allergies   Allergen Reactions   • Other Confusion     Coda-clear       Objective      Vitals:    11/02/21 0948   BP: 126/57   Pulse: 64   Temp: 97.4 °F (36.3 °C)   SpO2: 99%   Weight: 68.9 kg (151 lb 14.4 oz)   PainSc: 0-No pain     Current Status 9/1/2021   ECOG score 0       Physical Exam  Skin: hands and feet much improved; no significant redness or open areas seen.    RECENT LABS:  Glucose   Date Value Ref Range Status   11/02/2021 135 (H) 65 - 99 mg/dL Final     Sodium   Date Value Ref Range Status   11/02/2021 137 136 - 145 mmol/L Final     Potassium   Date Value Ref Range Status   11/02/2021 3.8 3.5 - 5.2 mmol/L Final     CO2   Date Value Ref Range Status   11/02/2021 23.0 22.0 - 29.0 mmol/L Final     Chloride   Date Value Ref Range Status   11/02/2021 102 98 - 107 mmol/L Final     Anion Gap   Date Value Ref Range Status   11/02/2021 12.0 5.0 - 15.0 mmol/L Final     Creatinine   Date Value Ref Range Status   11/02/2021 0.94 0.57 - 1.00 mg/dL Final     BUN   Date Value Ref Range Status   11/02/2021 14 8 - 23 mg/dL Final     BUN/Creatinine Ratio   Date Value Ref Range Status   11/02/2021 14.9 7.0 - 25.0 Final     Calcium   Date Value Ref Range Status   11/02/2021 9.6  8.6 - 10.5 mg/dL Final     eGFR Non  Amer   Date Value Ref Range Status   11/02/2021 58 (L) >60 mL/min/1.73 Final     Alkaline Phosphatase   Date Value Ref Range Status   11/02/2021 97 39 - 117 U/L Final     Total Protein   Date Value Ref Range Status   11/02/2021 6.8 6.0 - 8.5 g/dL Final     ALT (SGPT)   Date Value Ref Range Status   11/02/2021 60 (H) 1 - 33 U/L Final     AST (SGOT)   Date Value Ref Range Status   11/02/2021 69 (H) 1 - 32 U/L Final     Total Bilirubin   Date Value Ref Range Status   11/02/2021 0.3 0.0 - 1.2 mg/dL Final     Albumin   Date Value Ref Range Status   11/02/2021 4.10 3.50 - 5.20 g/dL Final     Globulin   Date Value Ref Range Status   11/02/2021 2.7 gm/dL Final     Lab Results   Component Value Date    WBC 4.91 11/02/2021    HGB 13.5 11/02/2021    HCT 39.4 11/02/2021    MCV 98.7 (H) 11/02/2021     11/02/2021     Lab Results   Component Value Date    NEUTROABS 2.80 11/02/2021    IRON 54 08/16/2021    IRON 63 06/01/2021    IRON 77 03/15/2021    TIBC 465 08/16/2021    TIBC 393 06/01/2021    TIBC 428 03/15/2021    LABIRON 12 (L) 08/16/2021    LABIRON 16 (L) 06/01/2021    LABIRON 18 (L) 03/15/2021    FERRITIN 50.50 08/16/2021    FERRITIN 37.15 06/01/2021    FERRITIN 49.06 03/15/2021    OPQCIMIQ16 1,053 (H) 08/16/2021    EKBGQEGI62 549 03/15/2021    IPJMCEEX10 >2,000 (H) 11/05/2020    FOLATE >20.00 08/16/2021    FOLATE >20.00 03/15/2021    FOLATE 5.69 11/05/2020     No results found for: , LABCA2, AFPTM, HCGQUANT, , CHROMGRNA, [id number], CEA, REFLABREPO        RADIOLOGY DATA :  No radiology results for the last 7 days        Assessment/Plan       1.  Triple negative right breast cancer, [id number] with lung report showing 1.9 cm deposit with extracapsular extension and tumor deposit in the lymphatic  -Review oncology history for prior treatment details  -Dosing of xeloda has been reduced due to hand foot syndrome.  -current dose is 2 tablets in AM and 2 tablets in PM;  hands have improved.  -continue with same dosing; will start cycle 6 today.  -RTC in 3 weeks with labs and reassessment before cycle 7.        2.  Anemia  -Hemoglobin is 13.5  -Due to iron malabsorption proceed with 5 days of intravenous Venofer on September 3, 2021  -We will repeat anemia work-up in November 2021     3.  Hand-foot syndrome  -Secondary to Xeloda  -Clinically improved  -Recommend continue using urea cream     4.  Fatty liver with elevated liver function test     5.  Genetic testing  -Genetic testing done in November 2020 was negative for BRCA1 and BRCA2 mutation     6.  Health maintenance: Patient does not smoke     7. Advance Care Planning: For now patient remains full code and is able to make decisions.  Patient has health care surrogate mentioned on chart.                PHQ-9 Total Score: 0     Gale Tapia Marianna reports a pain score of 0.  Given her pain assessment as noted, treatment options were discussed and the following options were decided upon as a follow-up plan to address the patient's pain: no pain today.         Sharon Holloway, APRN  11/3/2021  15:25 CDT        Part of this note may be an electronic transcription/translation of spoken language to printed text using the Dragon Dictation System.          CC:

## 2021-11-22 ENCOUNTER — OFFICE VISIT (OUTPATIENT)
Dept: SURGERY | Facility: CLINIC | Age: 76
End: 2021-11-22

## 2021-11-22 VITALS
TEMPERATURE: 97.9 F | HEART RATE: 81 BPM | SYSTOLIC BLOOD PRESSURE: 116 MMHG | BODY MASS INDEX: 28.12 KG/M2 | WEIGHT: 152.8 LBS | HEIGHT: 62 IN | DIASTOLIC BLOOD PRESSURE: 70 MMHG

## 2021-11-22 DIAGNOSIS — C50.111 MALIGNANT NEOPLASM OF CENTRAL PORTION OF RIGHT BREAST IN FEMALE, ESTROGEN RECEPTOR NEGATIVE (HCC): Primary | ICD-10-CM

## 2021-11-22 DIAGNOSIS — Z17.1 MALIGNANT NEOPLASM OF CENTRAL PORTION OF RIGHT BREAST IN FEMALE, ESTROGEN RECEPTOR NEGATIVE (HCC): Primary | ICD-10-CM

## 2021-11-22 PROCEDURE — 99212 OFFICE O/P EST SF 10 MIN: CPT | Performed by: NURSE PRACTITIONER

## 2021-11-22 RX ORDER — AMLODIPINE BESYLATE 5 MG/1
5 TABLET ORAL DAILY PRN
COMMUNITY
Start: 2021-10-14 | End: 2022-03-16

## 2021-11-22 NOTE — PROGRESS NOTES
"Chief Complaint  Follow-up (Recheck Breast and Mammogram)    Subjective          Gale Cabral presents to [facility] GENERAL SURGERY  History of Present Illness  Ms. Cabral presents today for follow-up after having annual mammogram this a.m.  She has completed her radiation therapy and is still currently on adjuvant Xeloda for triple negative right breast cancer.  Cycle 5 had been postponed due to hand-foot syndrome. She returns to [facility] tomorrow for reevaluation.  She denies any concerns with her breasts including lumps, masses, nipple discharge, skin changes, or lymph node enlargement.    Results from mammography this a.m. are not available for review at this time.      Objective   Vital Signs:   /70   Pulse 81   Temp 97.9 °F (36.6 °C) (Oral)   Ht 157.5 cm (62.01\")   Wt 69.3 kg (152 lb 12.8 oz)   BMI 27.94 kg/m²     Physical Exam  Vitals reviewed.   Constitutional:       General: She is not in acute distress.     Appearance: Normal appearance. She is normal weight. She is not ill-appearing, toxic-appearing or diaphoretic.   HENT:      Head: Normocephalic and atraumatic.   Cardiovascular:      Rate and Rhythm: Normal rate.   Pulmonary:      Effort: Pulmonary effort is normal. No respiratory distress.   Chest:   Breasts: Breasts are symmetrical.      Right: Skin change (Scar tissue) present. No swelling, bleeding, inverted nipple, mass, nipple discharge, tenderness, axillary adenopathy or supraclavicular adenopathy.      Left: No swelling, bleeding, inverted nipple, mass, nipple discharge, skin change, tenderness, axillary adenopathy or supraclavicular adenopathy.       Abdominal:       Lymphadenopathy:      Cervical: No cervical adenopathy.      Upper Body:      Right upper body: No supraclavicular or axillary adenopathy.      Left upper body: No supraclavicular or axillary adenopathy.   Skin:     General: Skin is warm and dry.   Neurological:      General: No " focal deficit present.      Mental Status: She is alert and oriented to person, place, and time.   Psychiatric:         Mood and Affect: Mood normal.         Thought Content: Thought content normal.         Judgment: Judgment normal.        Result Review :       Data reviewed: Radiologic studies Mammography results unavailable for review at this time          Assessment and Plan    Diagnoses and all orders for this visit:    1. Malignant neoplasm of central portion of right breast in female, estrogen receptor negative (HCC) (Primary)      I spent 25 minutes caring for Gale on this date of service. This time includes time spent by me in the following activities:preparing for the visit, obtaining and/or reviewing a separately obtained history, performing a medically appropriate examination and/or evaluation , referring and communicating with other health care professionals , documenting information in the medical record and care coordination  Follow Up   No follow-ups on file. Will notify patient of mammography results and arrange for follow up accordingly.   Patient was given instructions and counseling regarding her condition or for health maintenance advice. Please see specific information pulled into the AVS if appropriate.                     This document has been electronically signed by YOHANA Lugo on November 22, 2021 10:59 CST

## 2021-11-23 ENCOUNTER — OFFICE VISIT (OUTPATIENT)
Dept: ONCOLOGY | Facility: CLINIC | Age: 76
End: 2021-11-23

## 2021-11-23 ENCOUNTER — INFUSION (OUTPATIENT)
Dept: ONCOLOGY | Facility: HOSPITAL | Age: 76
End: 2021-11-23

## 2021-11-23 VITALS
BODY MASS INDEX: 27.96 KG/M2 | SYSTOLIC BLOOD PRESSURE: 137 MMHG | WEIGHT: 152.9 LBS | OXYGEN SATURATION: 98 % | TEMPERATURE: 96.8 F | DIASTOLIC BLOOD PRESSURE: 63 MMHG | HEART RATE: 71 BPM

## 2021-11-23 DIAGNOSIS — C50.911 INVASIVE DUCTAL CARCINOMA OF RIGHT BREAST (HCC): ICD-10-CM

## 2021-11-23 DIAGNOSIS — C50.911 INVASIVE DUCTAL CARCINOMA OF RIGHT BREAST (HCC): Primary | Chronic | ICD-10-CM

## 2021-11-23 DIAGNOSIS — L27.1 HAND FOOT SYNDROME: Chronic | ICD-10-CM

## 2021-11-23 DIAGNOSIS — R92.1 BREAST CALCIFICATION, LEFT: Primary | ICD-10-CM

## 2021-11-23 DIAGNOSIS — D50.8 OTHER IRON DEFICIENCY ANEMIA: Chronic | ICD-10-CM

## 2021-11-23 DIAGNOSIS — Z45.2 ENCOUNTER FOR VENOUS ACCESS DEVICE CARE: Primary | ICD-10-CM

## 2021-11-23 DIAGNOSIS — R79.89 ELEVATED LFTS: Chronic | ICD-10-CM

## 2021-11-23 DIAGNOSIS — K90.9 IRON MALABSORPTION: Chronic | ICD-10-CM

## 2021-11-23 LAB
ALBUMIN SERPL-MCNC: 4.2 G/DL (ref 3.5–5.2)
ALBUMIN/GLOB SERPL: 1.3 G/DL
ALP SERPL-CCNC: 92 U/L (ref 39–117)
ALT SERPL W P-5'-P-CCNC: 47 U/L (ref 1–33)
ANION GAP SERPL CALCULATED.3IONS-SCNC: 9 MMOL/L (ref 5–15)
AST SERPL-CCNC: 49 U/L (ref 1–32)
BASOPHILS # BLD AUTO: 0.11 10*3/MM3 (ref 0–0.2)
BASOPHILS NFR BLD AUTO: 1.8 % (ref 0–1.5)
BILIRUB SERPL-MCNC: 0.3 MG/DL (ref 0–1.2)
BUN SERPL-MCNC: 16 MG/DL (ref 8–23)
BUN/CREAT SERPL: 17 (ref 7–25)
CALCIUM SPEC-SCNC: 9.8 MG/DL (ref 8.6–10.5)
CHLORIDE SERPL-SCNC: 99 MMOL/L (ref 98–107)
CO2 SERPL-SCNC: 26 MMOL/L (ref 22–29)
CREAT SERPL-MCNC: 0.94 MG/DL (ref 0.57–1)
DEPRECATED RDW RBC AUTO: 63.5 FL (ref 37–54)
EOSINOPHIL # BLD AUTO: 0.4 10*3/MM3 (ref 0–0.4)
EOSINOPHIL NFR BLD AUTO: 6.5 % (ref 0.3–6.2)
ERYTHROCYTE [DISTWIDTH] IN BLOOD BY AUTOMATED COUNT: 17.3 % (ref 12.3–15.4)
GFR SERPL CREATININE-BSD FRML MDRD: 58 ML/MIN/1.73
GLOBULIN UR ELPH-MCNC: 3.2 GM/DL
GLUCOSE SERPL-MCNC: 148 MG/DL (ref 65–99)
HCT VFR BLD AUTO: 40.3 % (ref 34–46.6)
HGB BLD-MCNC: 14 G/DL (ref 12–15.9)
IMM GRANULOCYTES # BLD AUTO: 0.02 10*3/MM3 (ref 0–0.05)
IMM GRANULOCYTES NFR BLD AUTO: 0.3 % (ref 0–0.5)
LYMPHOCYTES # BLD AUTO: 1.25 10*3/MM3 (ref 0.7–3.1)
LYMPHOCYTES NFR BLD AUTO: 20.4 % (ref 19.6–45.3)
MCH RBC QN AUTO: 34.7 PG (ref 26.6–33)
MCHC RBC AUTO-ENTMCNC: 34.7 G/DL (ref 31.5–35.7)
MCV RBC AUTO: 99.8 FL (ref 79–97)
MONOCYTES # BLD AUTO: 0.69 10*3/MM3 (ref 0.1–0.9)
MONOCYTES NFR BLD AUTO: 11.3 % (ref 5–12)
NEUTROPHILS NFR BLD AUTO: 3.66 10*3/MM3 (ref 1.7–7)
NEUTROPHILS NFR BLD AUTO: 59.7 % (ref 42.7–76)
NRBC BLD AUTO-RTO: 0 /100 WBC (ref 0–0.2)
PLATELET # BLD AUTO: 324 10*3/MM3 (ref 140–450)
PMV BLD AUTO: 8.6 FL (ref 6–12)
POTASSIUM SERPL-SCNC: 3.6 MMOL/L (ref 3.5–5.2)
PROT SERPL-MCNC: 7.4 G/DL (ref 6–8.5)
RBC # BLD AUTO: 4.04 10*6/MM3 (ref 3.77–5.28)
SODIUM SERPL-SCNC: 134 MMOL/L (ref 136–145)
WBC NRBC COR # BLD: 6.13 10*3/MM3 (ref 3.4–10.8)

## 2021-11-23 PROCEDURE — 85025 COMPLETE CBC W/AUTO DIFF WBC: CPT

## 2021-11-23 PROCEDURE — 36591 DRAW BLOOD OFF VENOUS DEVICE: CPT | Performed by: NURSE PRACTITIONER

## 2021-11-23 PROCEDURE — 1126F AMNT PAIN NOTED NONE PRSNT: CPT | Performed by: INTERNAL MEDICINE

## 2021-11-23 PROCEDURE — 1123F ACP DISCUSS/DSCN MKR DOCD: CPT | Performed by: INTERNAL MEDICINE

## 2021-11-23 PROCEDURE — G0463 HOSPITAL OUTPT CLINIC VISIT: HCPCS | Performed by: INTERNAL MEDICINE

## 2021-11-23 PROCEDURE — 99214 OFFICE O/P EST MOD 30 MIN: CPT | Performed by: INTERNAL MEDICINE

## 2021-11-23 PROCEDURE — 25010000002 HEPARIN LOCK FLUSH PER 10 UNITS: Performed by: NURSE PRACTITIONER

## 2021-11-23 PROCEDURE — 80053 COMPREHEN METABOLIC PANEL: CPT

## 2021-11-23 RX ORDER — HEPARIN SODIUM (PORCINE) LOCK FLUSH IV SOLN 100 UNIT/ML 100 UNIT/ML
500 SOLUTION INTRAVENOUS AS NEEDED
Status: DISCONTINUED | OUTPATIENT
Start: 2021-11-23 | End: 2021-11-23 | Stop reason: HOSPADM

## 2021-11-23 RX ORDER — HEPARIN SODIUM (PORCINE) LOCK FLUSH IV SOLN 100 UNIT/ML 100 UNIT/ML
500 SOLUTION INTRAVENOUS AS NEEDED
Status: CANCELLED | OUTPATIENT
Start: 2021-12-14

## 2021-11-23 RX ORDER — SODIUM CHLORIDE 0.9 % (FLUSH) 0.9 %
20 SYRINGE (ML) INJECTION AS NEEDED
Status: CANCELLED | OUTPATIENT
Start: 2021-12-14

## 2021-11-23 RX ORDER — CAPECITABINE 500 MG/1
TABLET, FILM COATED ORAL
Qty: 56 TABLET | Refills: 1 | Status: SHIPPED | OUTPATIENT
Start: 2021-11-23 | End: 2022-03-16

## 2021-11-23 RX ADMIN — HEPARIN 500 UNITS: 100 SYRINGE at 09:31

## 2021-11-23 NOTE — PROGRESS NOTES
DATE OF VISIT: 11/23/2021      REASON FOR VISIT: Triple negative right breast cancer, anemia, hand-foot syndrome, elevated liver function test      HISTORY OF PRESENT ILLNESS:   76-year-old female with medical problem consisting of diabetes mellitus, hypertension, dyslipidemia, asthma, osteopenia, obstructive sleep apnea, chronic back pain who has been following up with [facility] since October 5, 2020 for triple negative right breast cancer. Patient is currently on adjuvant Xeloda since Pushpa 15, 2021. Patient is here to start cycle seven of Xeloda today. Denies any worsening redness, swelling or pain affecting bilateral hands or feet since last clinic visit. Denies any new lymph node enlargement. Denies any bleeding. Denies any recent hospitalization.        Oncology history:    1.  Triple negative right breast cancer, [id number] with 1 lymph node showing 1.9 cm deposit with extracapsular extension and tumor deposit lymphatic:  -Patient received neoadjuvant chemotherapy with Taxotere and Cytoxan for 3 cycles between October 15, 2020 and November 30, 2020.  -After cycle 3 unfortunately patient developed bowel obstruction requiring surgery and colostomy at hospital in [place].  -Patient did not want to do cycle 4 prior to surgery and was subsequently sent back to Dr. Alexandra underwent lumpectomy on January 11 2021 that showed 1 cm residual cancer.  -Patient had axillary dissection done on February 25, 2021 that showed 1.9 cm lymph node involvement with extracapsular extension and tumor deposit in the lymphatic channel.  -Result of pathology report were discussed with patient and her .  Patient had a minimal or no response to chemotherapy with Taxotere and Cytoxan in neoadjuvant setting.  PET/CT done prior to starting chemotherapy had a shotty axillary lymph node without any significant uptake.  Patient did have enlarged lymph node at the time of surgery again not responding to chemotherapy  preoperatively  -Had a radiation treatment that completed on May 24, 2021 at [facility].  -Patient was started on cycle 1 of Xeloda on Pushpa 15, 2021.  -Dose of Xeloda was decreased to 3 tablets in the morning and 2 tablets at night with cycle 2.  -Dose of Xeloda will be decreased to 2 tablets in the morning and 2 tablets at night with cycle 5 due to worsening hand-foot syndrome             Past Medical History, Past Surgical History, Social History, Family History have been reviewed and are without significant changes except as mentioned.    Review of Systems   A comprehensive 14 point review of systems was performed and was negative except as mentioned in HPI .    Medications:  The current medication list was reviewed in the EMR    ALLERGIES:    Allergies   Allergen Reactions   • Other Confusion     Coda-clear       Objective      Vitals:    11/23/21 1027   BP: 137/63   Pulse: 71   Temp: 96.8 °F (36 °C)   TempSrc: Temporal   SpO2: 98%   Weight: 69.4 kg (152 lb 14.4 oz)   PainSc: 0-No pain     Current Status 9/1/2021   ECOG score 0       Physical Exam  Pulmonary:      Breath sounds: Normal breath sounds.   Abdominal:      Comments: Ostomy present   Skin:     Comments: Redness present in bilateral hand consistent with hand-foot syndrome. Hand-foot syndrome is clinically improved.   Neurological:      Mental Status: She is alert and oriented to person, place, and time.           RECENT LABS:  Glucose   Date Value Ref Range Status   11/23/2021 148 (H) 65 - 99 mg/dL Final     Sodium   Date Value Ref Range Status   11/23/2021 134 (L) 136 - 145 mmol/L Final     Potassium   Date Value Ref Range Status   11/23/2021 3.6 3.5 - 5.2 mmol/L Final     CO2   Date Value Ref Range Status   11/23/2021 26.0 22.0 - 29.0 mmol/L Final     Chloride   Date Value Ref Range Status   11/23/2021 99 98 - 107 mmol/L Final     Anion Gap   Date Value Ref Range Status   11/23/2021 9.0 5.0 - 15.0 mmol/L Final     Creatinine   Date Value  Ref Range Status   11/23/2021 0.94 0.57 - 1.00 mg/dL Final     BUN   Date Value Ref Range Status   11/23/2021 16 8 - 23 mg/dL Final     BUN/Creatinine Ratio   Date Value Ref Range Status   11/23/2021 17.0 7.0 - 25.0 Final     Calcium   Date Value Ref Range Status   11/23/2021 9.8 8.6 - 10.5 mg/dL Final     eGFR Non  Amer   Date Value Ref Range Status   11/23/2021 58 (L) >60 mL/min/1.73 Final     Alkaline Phosphatase   Date Value Ref Range Status   11/23/2021 92 39 - 117 U/L Final     Total Protein   Date Value Ref Range Status   11/23/2021 7.4 6.0 - 8.5 g/dL Final     ALT (SGPT)   Date Value Ref Range Status   11/23/2021 47 (H) 1 - 33 U/L Final     AST (SGOT)   Date Value Ref Range Status   11/23/2021 49 (H) 1 - 32 U/L Final     Total Bilirubin   Date Value Ref Range Status   11/23/2021 0.3 0.0 - 1.2 mg/dL Final     Albumin   Date Value Ref Range Status   11/23/2021 4.20 3.50 - 5.20 g/dL Final     Globulin   Date Value Ref Range Status   11/23/2021 3.2 gm/dL Final     Lab Results   Component Value Date    WBC 6.13 11/23/2021    HGB 14.0 11/23/2021    HCT 40.3 11/23/2021    MCV 99.8 (H) 11/23/2021     11/23/2021     Lab Results   Component Value Date    NEUTROABS 3.66 11/23/2021    IRON 54 08/16/2021    IRON 63 06/01/2021    IRON 77 03/15/2021    TIBC 465 08/16/2021    TIBC 393 06/01/2021    TIBC 428 03/15/2021    LABIRON 12 (L) 08/16/2021    LABIRON 16 (L) 06/01/2021    LABIRON 18 (L) 03/15/2021    FERRITIN 50.50 08/16/2021    FERRITIN 37.15 06/01/2021    FERRITIN 49.06 03/15/2021    OMUXNVSN87 1,053 (H) 08/16/2021    CBXITFTU73 549 03/15/2021    SLKWUQQV06 >2,000 (H) 11/05/2020    FOLATE >20.00 08/16/2021    FOLATE >20.00 03/15/2021    FOLATE 5.69 11/05/2020     No results found for: , LABCA2, AFPTM, HCGQUANT, , CHROMGRNA, [id number], CEA, REFLABREPO      PATHOLOGY:  * Cannot find OR log *         RADIOLOGY DATA :  Mammo Screening Digital Tomosynthesis Bilateral With CAD    Result Date:  11/23/2021  1.Left breast upper outer as well as left breast two lower inner cluster of calcifications which should be further characterized with spot mag imaging and true lateral projection left breast.  BI-RADS category 0: Additional imaging required. Recommendation: Call back for additional views Electronically signed by:  Brad Mclain MD  11/23/2021 8:10 AM CST Workstation: [id number]          Assessment/Plan     1. Triple negative right breast cancer, white [id number] with final pathology report showing 1.9 cm deposit with extracapsular extension and tumor deposit in lymphatics  -Review oncology history for prior treatment details  -Dose of Xeloda has been decreased to 2 tablets twice daily since cycle five due to hand-foot syndrome  -Clinically hand-foot syndrome appears stable.  -We will proceed with cycle seven of Xeloda with same dose  -We will ask patient to return to clinic in 3 weeks with repeat CBC and CMP on that day.    2. Anemia:  -Hemoglobin is 14.0  -Due to iron malabsorption patient received 5 days of intravenous Venofer on September 3, 2021  -We will repeat anemia work-up upon next clinic visit in 3 weeks    3. Fatty liver with elevated liver function test    4. Hand-foot syndrome:  -Secondary to Xeloda  -Clinically improved  -Recommend continue using urea cream    5. Genetic testing:  -Genetic testing done in November 2020 was negative for BRCA1 and BRCA2 mutation    6. Health maintenance: Patient does not smoke    7. Advance Care Planning: For now patient remains full code and is able to make decisions.  Patient has health care surrogate mentioned on chart.    8. Abnormal mammogram of left breast:  -Mammogram done on November 22nd 2021 showed spots of calcification. Patient is scheduled to get diagnostic mammogram done later today. She was encouraged to keep that appointment       9. Prescription: Prescription for Xeloda has been sent to her pharmacy today      PHQ-9 Total Score: 0   -Patient  is not homicidal or suicidal.  No acute intervention required.    Gale Cabral reports a pain score of 0.  Given her pain assessment as noted, treatment options were discussed and the following options were decided upon as a follow-up plan to address the patient's pain: continuation of current treatment plan for pain.         Carlos Duong MD  11/23/2021  10:48 CST        Part of this note may be an electronic transcription/translation of spoken language to printed text using the Dragon Dictation System.          CC:

## 2021-12-14 ENCOUNTER — INFUSION (OUTPATIENT)
Dept: ONCOLOGY | Facility: HOSPITAL | Age: 76
End: 2021-12-14

## 2021-12-14 ENCOUNTER — OFFICE VISIT (OUTPATIENT)
Dept: ONCOLOGY | Facility: CLINIC | Age: 76
End: 2021-12-14

## 2021-12-14 VITALS
OXYGEN SATURATION: 98 % | BODY MASS INDEX: 28.18 KG/M2 | HEART RATE: 70 BPM | DIASTOLIC BLOOD PRESSURE: 53 MMHG | TEMPERATURE: 97.2 F | WEIGHT: 154.1 LBS | SYSTOLIC BLOOD PRESSURE: 129 MMHG

## 2021-12-14 DIAGNOSIS — K90.9 IRON MALABSORPTION: Chronic | ICD-10-CM

## 2021-12-14 DIAGNOSIS — M85.88 OSTEOPENIA OF SPINE: Chronic | ICD-10-CM

## 2021-12-14 DIAGNOSIS — L27.1 HAND FOOT SYNDROME: Chronic | ICD-10-CM

## 2021-12-14 DIAGNOSIS — Z45.2 ENCOUNTER FOR VENOUS ACCESS DEVICE CARE: Primary | ICD-10-CM

## 2021-12-14 DIAGNOSIS — D50.8 IRON DEFICIENCY ANEMIA SECONDARY TO INADEQUATE DIETARY IRON INTAKE: Chronic | ICD-10-CM

## 2021-12-14 DIAGNOSIS — C50.911 INVASIVE DUCTAL CARCINOMA OF RIGHT BREAST (HCC): Primary | Chronic | ICD-10-CM

## 2021-12-14 DIAGNOSIS — D50.8 OTHER IRON DEFICIENCY ANEMIA: ICD-10-CM

## 2021-12-14 DIAGNOSIS — D50.8 OTHER IRON DEFICIENCY ANEMIA: Chronic | ICD-10-CM

## 2021-12-14 DIAGNOSIS — R79.89 ELEVATED LFTS: ICD-10-CM

## 2021-12-14 DIAGNOSIS — C50.911 INVASIVE DUCTAL CARCINOMA OF RIGHT BREAST (HCC): ICD-10-CM

## 2021-12-14 DIAGNOSIS — L27.1 HAND FOOT SYNDROME: ICD-10-CM

## 2021-12-14 DIAGNOSIS — R79.89 ELEVATED LFTS: Chronic | ICD-10-CM

## 2021-12-14 DIAGNOSIS — K90.9 IRON MALABSORPTION: ICD-10-CM

## 2021-12-14 LAB
ALBUMIN SERPL-MCNC: 4.5 G/DL (ref 3.5–5.2)
ALBUMIN/GLOB SERPL: 1.7 G/DL
ALP SERPL-CCNC: 87 U/L (ref 39–117)
ALT SERPL W P-5'-P-CCNC: 48 U/L (ref 1–33)
ANION GAP SERPL CALCULATED.3IONS-SCNC: 10 MMOL/L (ref 5–15)
AST SERPL-CCNC: 66 U/L (ref 1–32)
BASOPHILS # BLD AUTO: 0.11 10*3/MM3 (ref 0–0.2)
BASOPHILS NFR BLD AUTO: 1.9 % (ref 0–1.5)
BILIRUB SERPL-MCNC: 0.4 MG/DL (ref 0–1.2)
BUN SERPL-MCNC: 18 MG/DL (ref 8–23)
BUN/CREAT SERPL: 18.4 (ref 7–25)
CALCIUM SPEC-SCNC: 10 MG/DL (ref 8.6–10.5)
CHLORIDE SERPL-SCNC: 100 MMOL/L (ref 98–107)
CO2 SERPL-SCNC: 25 MMOL/L (ref 22–29)
CREAT SERPL-MCNC: 0.98 MG/DL (ref 0.57–1)
DEPRECATED RDW RBC AUTO: 62.1 FL (ref 37–54)
EOSINOPHIL # BLD AUTO: 0.45 10*3/MM3 (ref 0–0.4)
EOSINOPHIL NFR BLD AUTO: 7.6 % (ref 0.3–6.2)
ERYTHROCYTE [DISTWIDTH] IN BLOOD BY AUTOMATED COUNT: 16.3 % (ref 12.3–15.4)
FERRITIN SERPL-MCNC: 668.7 NG/ML (ref 13–150)
FOLATE SERPL-MCNC: 15.5 NG/ML (ref 4.78–24.2)
GFR SERPL CREATININE-BSD FRML MDRD: 55 ML/MIN/1.73
GLOBULIN UR ELPH-MCNC: 2.7 GM/DL
GLUCOSE SERPL-MCNC: 107 MG/DL (ref 65–99)
HCT VFR BLD AUTO: 40.6 % (ref 34–46.6)
HGB BLD-MCNC: 14.1 G/DL (ref 12–15.9)
IMM GRANULOCYTES # BLD AUTO: 0.02 10*3/MM3 (ref 0–0.05)
IMM GRANULOCYTES NFR BLD AUTO: 0.3 % (ref 0–0.5)
IRON 24H UR-MRATE: 124 MCG/DL (ref 37–145)
IRON SATN MFR SERPL: 26 % (ref 20–50)
LYMPHOCYTES # BLD AUTO: 1.28 10*3/MM3 (ref 0.7–3.1)
LYMPHOCYTES NFR BLD AUTO: 21.7 % (ref 19.6–45.3)
MCH RBC QN AUTO: 35.6 PG (ref 26.6–33)
MCHC RBC AUTO-ENTMCNC: 34.7 G/DL (ref 31.5–35.7)
MCV RBC AUTO: 102.5 FL (ref 79–97)
MONOCYTES # BLD AUTO: 0.87 10*3/MM3 (ref 0.1–0.9)
MONOCYTES NFR BLD AUTO: 14.8 % (ref 5–12)
NEUTROPHILS NFR BLD AUTO: 3.16 10*3/MM3 (ref 1.7–7)
NEUTROPHILS NFR BLD AUTO: 53.7 % (ref 42.7–76)
NRBC BLD AUTO-RTO: 0 /100 WBC (ref 0–0.2)
PLATELET # BLD AUTO: 341 10*3/MM3 (ref 140–450)
PMV BLD AUTO: 8.7 FL (ref 6–12)
POTASSIUM SERPL-SCNC: 3.7 MMOL/L (ref 3.5–5.2)
PROT SERPL-MCNC: 7.2 G/DL (ref 6–8.5)
RBC # BLD AUTO: 3.96 10*6/MM3 (ref 3.77–5.28)
SODIUM SERPL-SCNC: 135 MMOL/L (ref 136–145)
TIBC SERPL-MCNC: 483 MCG/DL (ref 298–536)
TRANSFERRIN SERPL-MCNC: 324 MG/DL (ref 200–360)
VIT B12 BLD-MCNC: 744 PG/ML (ref 211–946)
WBC NRBC COR # BLD: 5.89 10*3/MM3 (ref 3.4–10.8)

## 2021-12-14 PROCEDURE — 80053 COMPREHEN METABOLIC PANEL: CPT

## 2021-12-14 PROCEDURE — 82746 ASSAY OF FOLIC ACID SERUM: CPT

## 2021-12-14 PROCEDURE — 83540 ASSAY OF IRON: CPT

## 2021-12-14 PROCEDURE — 1126F AMNT PAIN NOTED NONE PRSNT: CPT | Performed by: INTERNAL MEDICINE

## 2021-12-14 PROCEDURE — 36591 DRAW BLOOD OFF VENOUS DEVICE: CPT | Performed by: INTERNAL MEDICINE

## 2021-12-14 PROCEDURE — 84466 ASSAY OF TRANSFERRIN: CPT

## 2021-12-14 PROCEDURE — 25010000002 HEPARIN LOCK FLUSH PER 10 UNITS: Performed by: NURSE PRACTITIONER

## 2021-12-14 PROCEDURE — G0463 HOSPITAL OUTPT CLINIC VISIT: HCPCS | Performed by: INTERNAL MEDICINE

## 2021-12-14 PROCEDURE — 82607 VITAMIN B-12: CPT

## 2021-12-14 PROCEDURE — 99214 OFFICE O/P EST MOD 30 MIN: CPT | Performed by: INTERNAL MEDICINE

## 2021-12-14 PROCEDURE — 82728 ASSAY OF FERRITIN: CPT

## 2021-12-14 PROCEDURE — 1123F ACP DISCUSS/DSCN MKR DOCD: CPT | Performed by: INTERNAL MEDICINE

## 2021-12-14 PROCEDURE — 85025 COMPLETE CBC W/AUTO DIFF WBC: CPT

## 2021-12-14 RX ORDER — HEPARIN SODIUM (PORCINE) LOCK FLUSH IV SOLN 100 UNIT/ML 100 UNIT/ML
500 SOLUTION INTRAVENOUS AS NEEDED
Status: DISCONTINUED | OUTPATIENT
Start: 2021-12-14 | End: 2021-12-14 | Stop reason: HOSPADM

## 2021-12-14 RX ORDER — SODIUM CHLORIDE 0.9 % (FLUSH) 0.9 %
20 SYRINGE (ML) INJECTION AS NEEDED
Status: CANCELLED | OUTPATIENT
Start: 2022-01-11

## 2021-12-14 RX ORDER — HEPARIN SODIUM (PORCINE) LOCK FLUSH IV SOLN 100 UNIT/ML 100 UNIT/ML
500 SOLUTION INTRAVENOUS AS NEEDED
Status: CANCELLED | OUTPATIENT
Start: 2022-01-11

## 2021-12-14 RX ADMIN — HEPARIN 500 UNITS: 100 SYRINGE at 08:53

## 2021-12-14 NOTE — PROGRESS NOTES
DATE OF VISIT: 12/14/2021      REASON FOR VISIT: Triple negative right breast cancer, anemia, hand-foot syndrome, elevated liver function test      HISTORY OF PRESENT ILLNESS:   76-year-old female with medical problem consisting of diabetes mellitus, hypertension, dyslipidemia, asthma, osteopenia, obstructive sleep apnea, chronic back pain has been following up with [facility] since October 5, 2020 for triple negative right breast cancer.  Patient is currently on adjuvant Xeloda since 1/15/2021.  Patient is scheduled to start cycle 8 of Xeloda today.  Denies any recent worsening of redness or swelling or pain affecting bilateral hands or feet.  Denies any bleeding.  Denies any new lymph node enlargement.  Denies any recent hospitalization.        Oncology history:    1.  Triple negative right breast cancer, [id number] with 1 lymph node showing 1.9 cm deposit with extracapsular extension and tumor deposit lymphatic:  -Patient received neoadjuvant chemotherapy with Taxotere and Cytoxan for 3 cycles between October 15, 2020 and November 30, 2020.  -After cycle 3 unfortunately patient developed bowel obstruction requiring surgery and colostomy at hospital in [place].  -Patient did not want to do cycle 4 prior to surgery and was subsequently sent back to Dr. Alexandra underwent lumpectomy on January 11 2021 that showed 1 cm residual cancer.  -Patient had axillary dissection done on February 25, 2021 that showed 1.9 cm lymph node involvement with extracapsular extension and tumor deposit in the lymphatic channel.  -Result of pathology report were discussed with patient and her .  Patient had a minimal or no response to chemotherapy with Taxotere and Cytoxan in neoadjuvant setting.  PET/CT done prior to starting chemotherapy had a shotty axillary lymph node without any significant uptake.  Patient did have enlarged lymph node at the time of surgery again not responding to chemotherapy preoperatively  -Had a  radiation treatment that completed on May 24, 2021 at [facility].  -Patient was started on cycle 1 of Xeloda on Pushpa 15, 2021.  -Dose of Xeloda was decreased to 3 tablets in the morning and 2 tablets at night with cycle 2.  -Dose of Xeloda will be decreased to 2 tablets in the morning and 2 tablets at night with cycle 5 due to worsening hand-foot syndrome          Past Medical History, Past Surgical History, Social History, Family History have been reviewed and are without significant changes except as mentioned.    Review of Systems   A comprehensive 14 point review of systems was performed and was negative except as mentioned in HPI.    Medications:  The current medication list was reviewed in the EMR    ALLERGIES:    Allergies   Allergen Reactions   • Other Confusion     Coda-clear       Objective      Vitals:    12/14/21 0942   BP: 129/53   Pulse: 70   Temp: 97.2 °F (36.2 °C)   TempSrc: Temporal   SpO2: 98%   Weight: 69.9 kg (154 lb 1.6 oz)   PainSc: 0-No pain     Current Status 9/1/2021   ECOG score 0       Physical Exam  Pulmonary:      Breath sounds: Normal breath sounds.   Abdominal:      Comments: Colostomy present   Skin:     Comments: Erythema present in bilateral hand consistent with hand-foot syndrome.   Neurological:      Mental Status: She is alert and oriented to person, place, and time.           RECENT LABS:  Glucose   Date Value Ref Range Status   12/14/2021 107 (H) 65 - 99 mg/dL Final     Sodium   Date Value Ref Range Status   12/14/2021 135 (L) 136 - 145 mmol/L Final     Potassium   Date Value Ref Range Status   12/14/2021 3.7 3.5 - 5.2 mmol/L Final     CO2   Date Value Ref Range Status   12/14/2021 25.0 22.0 - 29.0 mmol/L Final     Chloride   Date Value Ref Range Status   12/14/2021 100 98 - 107 mmol/L Final     Anion Gap   Date Value Ref Range Status   12/14/2021 10.0 5.0 - 15.0 mmol/L Final     Creatinine   Date Value Ref Range Status   12/14/2021 0.98 0.57 - 1.00 mg/dL Final      BUN   Date Value Ref Range Status   12/14/2021 18 8 - 23 mg/dL Final     BUN/Creatinine Ratio   Date Value Ref Range Status   12/14/2021 18.4 7.0 - 25.0 Final     Calcium   Date Value Ref Range Status   12/14/2021 10.0 8.6 - 10.5 mg/dL Final     eGFR Non  Amer   Date Value Ref Range Status   12/14/2021 55 (L) >60 mL/min/1.73 Final     Alkaline Phosphatase   Date Value Ref Range Status   12/14/2021 87 39 - 117 U/L Final     Total Protein   Date Value Ref Range Status   12/14/2021 7.2 6.0 - 8.5 g/dL Final     ALT (SGPT)   Date Value Ref Range Status   12/14/2021 48 (H) 1 - 33 U/L Final     AST (SGOT)   Date Value Ref Range Status   12/14/2021 66 (H) 1 - 32 U/L Final     Total Bilirubin   Date Value Ref Range Status   12/14/2021 0.4 0.0 - 1.2 mg/dL Final     Albumin   Date Value Ref Range Status   12/14/2021 4.50 3.50 - 5.20 g/dL Final     Globulin   Date Value Ref Range Status   12/14/2021 2.7 gm/dL Final     Lab Results   Component Value Date    WBC 5.89 12/14/2021    HGB 14.1 12/14/2021    HCT 40.6 12/14/2021    .5 (H) 12/14/2021     12/14/2021     Lab Results   Component Value Date    NEUTROABS 3.16 12/14/2021    IRON 124 12/14/2021    IRON 54 08/16/2021    IRON 63 06/01/2021    TIBC 483 12/14/2021    TIBC 465 08/16/2021    TIBC 393 06/01/2021    LABIRON 26 12/14/2021    LABIRON 12 (L) 08/16/2021    LABIRON 16 (L) 06/01/2021    FERRITIN 668.70 (H) 12/14/2021    FERRITIN 50.50 08/16/2021    FERRITIN 37.15 06/01/2021    [id number] 744 12/14/2021    KREOYLID69 1,053 (H) 08/16/2021    USRLVHMG77 549 03/15/2021    FOLATE 15.50 12/14/2021    FOLATE >20.00 08/16/2021    FOLATE >20.00 03/15/2021     No results found for: , LABCA2, AFPTM, HCGQUANT, , CHROMGRNA, [id number], CEA, REFLABREPO      PATHOLOGY:  * Cannot find OR log *         RADIOLOGY DATA :  No radiology results for the last 7 days        Assessment/Plan     1.  Triple negative right breast cancer,[id number] with final pathology  report showing 1.9 cm deposit with extracapsular extension and tumor deposit in lymphatics.  -Review oncology history for prior treatment details  -Dose of Xeloda has been decreased to 2 tablets twice daily since cycle 5 due to hand-foot syndrome  -Patient is tolerating this dose of Xeloda pretty well without any worsening hand-foot syndrome  -We will proceed with the 8th and final cycle of Xeloda today on December 14, 2021  -We will have patient return to clinic in 4 weeks with repeat CBC and CMP on that day.    2.  Anemia:  -Hemoglobin is 14.1  -Due to iron malabsorption the patient received 5 dose of Venofer in September 2021  -Iron studies from earlier today shows adequate amount of iron.  No need for any intravenous Venofer at present.    3.  Fatty liver with elevated liver function test    4.  Hand-foot syndrome:  -Clinically improved.  -Secondary to Xeloda.  -Recommend using urea cream    5.  Genetic testing:  -Genetic testing done in November 2020 was negative for BRCA1 and BRCA2 mutation.    6.  Health maintenance: Patient does not smoke    7. Advance Care Planning: For now patient remains full code and is able to make decisions.  Patient has health care surrogate mentioned on chart.                 PHQ-9 Total Score: 0   -Patient is not homicidal or suicidal.  No acute intervention required.    Gale Cabral reports a pain score of 0.  Given her pain assessment as noted, treatment options were discussed and the following options were decided upon as a follow-up plan to address the patient's pain: No acute intervention required.         Carlos Duong MD  12/14/2021  18:01 CST        Part of this note may be an electronic transcription/translation of spoken language to printed text using the Dragon Dictation System.          CC:

## 2021-12-15 ENCOUNTER — TELEPHONE (OUTPATIENT)
Dept: ONCOLOGY | Facility: HOSPITAL | Age: 76
End: 2021-12-15

## 2021-12-15 NOTE — TELEPHONE ENCOUNTER
Contacted pt regarding lab results. PT verbalizes understanding and denies any further questions.

## 2021-12-15 NOTE — TELEPHONE ENCOUNTER
----- Message from Carlos Duong MD sent at 12/14/2021  6:02 PM CST -----  Please let patient know, her iron, B12 and folate level is adequate.  No need to start any supplementation at present.  Thank you

## 2022-01-10 RX ORDER — FOLIC ACID 1 MG/1
1 TABLET ORAL DAILY
Qty: 90 TABLET | Refills: 1 | OUTPATIENT
Start: 2022-01-10

## 2022-01-10 NOTE — TELEPHONE ENCOUNTER
Rx Refill Note  Requested Prescriptions     Pending Prescriptions Disp Refills   • folic acid (FOLVITE) 1 MG tablet [Pharmacy Med Name: FOLIC ACID 1MG TABLETS] 90 tablet 1     Sig: TAKE 1 TABLET BY MOUTH DAILY      Last office visit with prescribing clinician: 12/14/2021      Next office visit with prescribing clinician: 1/11/2022            Natalie Rivas RN  01/10/22, 08:06 CST

## 2022-01-11 ENCOUNTER — INFUSION (OUTPATIENT)
Dept: ONCOLOGY | Facility: HOSPITAL | Age: 77
End: 2022-01-11

## 2022-01-11 ENCOUNTER — OFFICE VISIT (OUTPATIENT)
Dept: ONCOLOGY | Facility: CLINIC | Age: 77
End: 2022-01-11

## 2022-01-11 VITALS
DIASTOLIC BLOOD PRESSURE: 58 MMHG | BODY MASS INDEX: 28.74 KG/M2 | TEMPERATURE: 97.9 F | WEIGHT: 157.2 LBS | OXYGEN SATURATION: 98 % | HEART RATE: 68 BPM | SYSTOLIC BLOOD PRESSURE: 133 MMHG

## 2022-01-11 DIAGNOSIS — R79.89 ELEVATED LFTS: Chronic | ICD-10-CM

## 2022-01-11 DIAGNOSIS — K90.9 IRON MALABSORPTION: ICD-10-CM

## 2022-01-11 DIAGNOSIS — D50.8 OTHER IRON DEFICIENCY ANEMIA: Chronic | ICD-10-CM

## 2022-01-11 DIAGNOSIS — Z45.2 ENCOUNTER FOR VENOUS ACCESS DEVICE CARE: Primary | ICD-10-CM

## 2022-01-11 DIAGNOSIS — C50.911 INVASIVE DUCTAL CARCINOMA OF RIGHT BREAST: ICD-10-CM

## 2022-01-11 DIAGNOSIS — R79.89 ELEVATED LFTS: ICD-10-CM

## 2022-01-11 DIAGNOSIS — C50.911 INVASIVE DUCTAL CARCINOMA OF RIGHT BREAST: Primary | Chronic | ICD-10-CM

## 2022-01-11 DIAGNOSIS — M85.88 OSTEOPENIA OF SPINE: ICD-10-CM

## 2022-01-11 DIAGNOSIS — D50.8 IRON DEFICIENCY ANEMIA SECONDARY TO INADEQUATE DIETARY IRON INTAKE: ICD-10-CM

## 2022-01-11 DIAGNOSIS — L27.1 HAND FOOT SYNDROME: Chronic | ICD-10-CM

## 2022-01-11 DIAGNOSIS — L27.1 HAND FOOT SYNDROME: ICD-10-CM

## 2022-01-11 DIAGNOSIS — K90.9 IRON MALABSORPTION: Chronic | ICD-10-CM

## 2022-01-11 DIAGNOSIS — D50.8 OTHER IRON DEFICIENCY ANEMIA: ICD-10-CM

## 2022-01-11 LAB
ALBUMIN SERPL-MCNC: 4.4 G/DL (ref 3.5–5.2)
ALBUMIN/GLOB SERPL: 1.8 G/DL
ALP SERPL-CCNC: 82 U/L (ref 39–117)
ALT SERPL W P-5'-P-CCNC: 41 U/L (ref 1–33)
ANION GAP SERPL CALCULATED.3IONS-SCNC: 10 MMOL/L (ref 5–15)
AST SERPL-CCNC: 47 U/L (ref 1–32)
BASOPHILS # BLD AUTO: 0.16 10*3/MM3 (ref 0–0.2)
BASOPHILS NFR BLD AUTO: 2.7 % (ref 0–1.5)
BILIRUB SERPL-MCNC: 0.3 MG/DL (ref 0–1.2)
BUN SERPL-MCNC: 16 MG/DL (ref 8–23)
BUN/CREAT SERPL: 16.5 (ref 7–25)
CALCIUM SPEC-SCNC: 9.4 MG/DL (ref 8.6–10.5)
CHLORIDE SERPL-SCNC: 103 MMOL/L (ref 98–107)
CO2 SERPL-SCNC: 27 MMOL/L (ref 22–29)
CREAT SERPL-MCNC: 0.97 MG/DL (ref 0.57–1)
DEPRECATED RDW RBC AUTO: 53.4 FL (ref 37–54)
EOSINOPHIL # BLD AUTO: 0.41 10*3/MM3 (ref 0–0.4)
EOSINOPHIL NFR BLD AUTO: 6.9 % (ref 0.3–6.2)
ERYTHROCYTE [DISTWIDTH] IN BLOOD BY AUTOMATED COUNT: 14.1 % (ref 12.3–15.4)
GFR SERPL CREATININE-BSD FRML MDRD: 56 ML/MIN/1.73
GLOBULIN UR ELPH-MCNC: 2.4 GM/DL
GLUCOSE SERPL-MCNC: 104 MG/DL (ref 65–99)
HCT VFR BLD AUTO: 40.1 % (ref 34–46.6)
HGB BLD-MCNC: 13.8 G/DL (ref 12–15.9)
HOLD SPECIMEN: NORMAL
IMM GRANULOCYTES # BLD AUTO: 0.02 10*3/MM3 (ref 0–0.05)
IMM GRANULOCYTES NFR BLD AUTO: 0.3 % (ref 0–0.5)
LYMPHOCYTES # BLD AUTO: 1.3 10*3/MM3 (ref 0.7–3.1)
LYMPHOCYTES NFR BLD AUTO: 22 % (ref 19.6–45.3)
MCH RBC QN AUTO: 35.3 PG (ref 26.6–33)
MCHC RBC AUTO-ENTMCNC: 34.4 G/DL (ref 31.5–35.7)
MCV RBC AUTO: 102.6 FL (ref 79–97)
MONOCYTES # BLD AUTO: 0.75 10*3/MM3 (ref 0.1–0.9)
MONOCYTES NFR BLD AUTO: 12.7 % (ref 5–12)
NEUTROPHILS NFR BLD AUTO: 3.26 10*3/MM3 (ref 1.7–7)
NEUTROPHILS NFR BLD AUTO: 55.4 % (ref 42.7–76)
NRBC BLD AUTO-RTO: 0 /100 WBC (ref 0–0.2)
PLATELET # BLD AUTO: 264 10*3/MM3 (ref 140–450)
PMV BLD AUTO: 9.3 FL (ref 6–12)
POTASSIUM SERPL-SCNC: 4 MMOL/L (ref 3.5–5.2)
PROT SERPL-MCNC: 6.8 G/DL (ref 6–8.5)
RBC # BLD AUTO: 3.91 10*6/MM3 (ref 3.77–5.28)
SODIUM SERPL-SCNC: 140 MMOL/L (ref 136–145)
WBC NRBC COR # BLD: 5.9 10*3/MM3 (ref 3.4–10.8)

## 2022-01-11 PROCEDURE — 80053 COMPREHEN METABOLIC PANEL: CPT

## 2022-01-11 PROCEDURE — 85025 COMPLETE CBC W/AUTO DIFF WBC: CPT

## 2022-01-11 PROCEDURE — 1126F AMNT PAIN NOTED NONE PRSNT: CPT | Performed by: INTERNAL MEDICINE

## 2022-01-11 PROCEDURE — 99214 OFFICE O/P EST MOD 30 MIN: CPT | Performed by: INTERNAL MEDICINE

## 2022-01-11 PROCEDURE — 1123F ACP DISCUSS/DSCN MKR DOCD: CPT | Performed by: INTERNAL MEDICINE

## 2022-01-11 PROCEDURE — 36415 COLL VENOUS BLD VENIPUNCTURE: CPT

## 2022-01-11 PROCEDURE — 25010000002 HEPARIN LOCK FLUSH PER 10 UNITS: Performed by: NURSE PRACTITIONER

## 2022-01-11 PROCEDURE — 96523 IRRIG DRUG DELIVERY DEVICE: CPT | Performed by: INTERNAL MEDICINE

## 2022-01-11 RX ORDER — HEPARIN SODIUM (PORCINE) LOCK FLUSH IV SOLN 100 UNIT/ML 100 UNIT/ML
300 SOLUTION INTRAVENOUS AS NEEDED
Status: CANCELLED | OUTPATIENT
Start: 2022-03-24

## 2022-01-11 RX ORDER — HEPARIN SODIUM (PORCINE) LOCK FLUSH IV SOLN 100 UNIT/ML 100 UNIT/ML
500 SOLUTION INTRAVENOUS AS NEEDED
Status: DISCONTINUED | OUTPATIENT
Start: 2022-01-11 | End: 2022-04-01 | Stop reason: HOSPADM

## 2022-01-11 RX ORDER — SODIUM CHLORIDE 0.9 % (FLUSH) 0.9 %
20 SYRINGE (ML) INJECTION AS NEEDED
Status: CANCELLED | OUTPATIENT
Start: 2022-01-11

## 2022-01-11 RX ADMIN — HEPARIN 500 UNITS: 100 SYRINGE at 10:00

## 2022-01-11 NOTE — PROGRESS NOTES
DATE OF VISIT: 1/11/2022      REASON FOR VISIT: Triple negative right breast cancer, anemia, hand-foot syndrome      HISTORY OF PRESENT ILLNESS:   76-year-old female with medical problem consisting of diabetes mellitus, hypertension, dyslipidemia, asthma, osteopenia, obstructive sleep apnea, chronic back pain has been following up with [facility] since October 5, 2020 for triple negative right breast cancer.  Patient completed cycle 8 of chemotherapy with Xeloda in December 2021.  Patient is here for follow-up appointment today.  States erythema involving bilateral hand and feet is improving.  Denies any bleeding.  Denies any new lymph node enlargement.  Denies any recent hospitalization.        Oncology history:    1.  Triple negative right breast cancer, [id number] with 1 lymph node showing 1.9 cm deposit with extracapsular extension and tumor deposit lymphatic:  -Patient received neoadjuvant chemotherapy with Taxotere and Cytoxan for 3 cycles between October 15, 2020 and November 30, 2020.  -After cycle 3 unfortunately patient developed bowel obstruction requiring surgery and colostomy at hospital in [place].  -Patient did not want to do cycle 4 prior to surgery and was subsequently sent back to Dr. Alexandra underwent lumpectomy on January 11 2021 that showed 1 cm residual cancer.  -Patient had axillary dissection done on February 25, 2021 that showed 1.9 cm lymph node involvement with extracapsular extension and tumor deposit in the lymphatic channel.  -Result of pathology report were discussed with patient and her .  Patient had a minimal or no response to chemotherapy with Taxotere and Cytoxan in neoadjuvant setting.  PET/CT done prior to starting chemotherapy had a shotty axillary lymph node without any significant uptake.  Patient did have enlarged lymph node at the time of surgery again not responding to chemotherapy preoperatively  -Had a radiation treatment that completed on May 24, 2021 at  [facility].  -Patient was started on cycle 1 of Xeloda on Pushpa 15, 2021.  -Dose of Xeloda was decreased to 3 tablets in the morning and 2 tablets at night with cycle 2.  -Dose of Xeloda will be decreased to 2 tablets in the morning and 2 tablets at night with cycle 5 due to worsening hand-foot syndrome  -Patient completed cycle 8 of Xeloda on December 28, 2021.          Past Medical History, Past Surgical History, Social History, Family History have been reviewed and are without significant changes except as mentioned.    Review of Systems   A comprehensive 14 point review of systems was performed and was negative except as mentioned in HPI.    Medications:  The current medication list was reviewed in the EMR    ALLERGIES:    Allergies   Allergen Reactions   • Other Confusion     Coda-clear       Objective      Vitals:    01/11/22 1041   BP: 133/58   Pulse: 68   Temp: 97.9 °F (36.6 °C)   TempSrc: Temporal   SpO2: 98%   Weight: 71.3 kg (157 lb 3.2 oz)   PainSc: 0-No pain     Current Status 9/1/2021   ECOG score 0       Physical Exam  Cardiovascular:      Comments: Port-A-Cath present  Pulmonary:      Breath sounds: Normal breath sounds.   Abdominal:      Comments: Colostomy present   Skin:     Comments: Erythema involving bilateral hands have improved.  Minimal skin peeling present in bilateral hands   Neurological:      Mental Status: She is alert and oriented to person, place, and time.           RECENT LABS:  Glucose   Date Value Ref Range Status   01/11/2022 104 (H) 65 - 99 mg/dL Final     Sodium   Date Value Ref Range Status   01/11/2022 140 136 - 145 mmol/L Final     Potassium   Date Value Ref Range Status   01/11/2022 4.0 3.5 - 5.2 mmol/L Final     CO2   Date Value Ref Range Status   01/11/2022 27.0 22.0 - 29.0 mmol/L Final     Chloride   Date Value Ref Range Status   01/11/2022 103 98 - 107 mmol/L Final     Anion Gap   Date Value Ref Range Status   01/11/2022 10.0 5.0 - 15.0 mmol/L Final      Creatinine   Date Value Ref Range Status   01/11/2022 0.97 0.57 - 1.00 mg/dL Final     BUN   Date Value Ref Range Status   01/11/2022 16 8 - 23 mg/dL Final     BUN/Creatinine Ratio   Date Value Ref Range Status   01/11/2022 16.5 7.0 - 25.0 Final     Calcium   Date Value Ref Range Status   01/11/2022 9.4 8.6 - 10.5 mg/dL Final     eGFR Non  Amer   Date Value Ref Range Status   01/11/2022 56 (L) >60 mL/min/1.73 Final     Alkaline Phosphatase   Date Value Ref Range Status   01/11/2022 82 39 - 117 U/L Final     Total Protein   Date Value Ref Range Status   01/11/2022 6.8 6.0 - 8.5 g/dL Final     ALT (SGPT)   Date Value Ref Range Status   01/11/2022 41 (H) 1 - 33 U/L Final     AST (SGOT)   Date Value Ref Range Status   01/11/2022 47 (H) 1 - 32 U/L Final     Total Bilirubin   Date Value Ref Range Status   01/11/2022 0.3 0.0 - 1.2 mg/dL Final     Albumin   Date Value Ref Range Status   01/11/2022 4.40 3.50 - 5.20 g/dL Final     Globulin   Date Value Ref Range Status   01/11/2022 2.4 gm/dL Final     Lab Results   Component Value Date    WBC 5.90 01/11/2022    HGB 13.8 01/11/2022    HCT 40.1 01/11/2022    .6 (H) 01/11/2022     01/11/2022     Lab Results   Component Value Date    NEUTROABS 3.26 01/11/2022    IRON 124 12/14/2021    IRON 54 08/16/2021    IRON 63 06/01/2021    TIBC 483 12/14/2021    TIBC 465 08/16/2021    TIBC 393 06/01/2021    LABIRON 26 12/14/2021    LABIRON 12 (L) 08/16/2021    LABIRON 16 (L) 06/01/2021    FERRITIN 668.70 (H) 12/14/2021    FERRITIN 50.50 08/16/2021    FERRITIN 37.15 06/01/2021    [id number] 744 12/14/2021    AKLXSGZH01 1,053 (H) 08/16/2021    [id number] 549 03/15/2021    FOLATE 15.50 12/14/2021    FOLATE >20.00 08/16/2021    FOLATE >20.00 03/15/2021     No results found for: , LABCA2, AFPTM, HCGQUANT, , CHROMGRNA, [id number], CEA, REFLABREPO      PATHOLOGY:  * Cannot find OR log *         RADIOLOGY DATA :  No radiology results for the last 7  days        Assessment/Plan     1.  Triple negative right breast cancer [id number] A with final pathology report showing 1.9 cm deposit with extracapsular extension and tumor deposit and lymphatics.  - Review oncology history for prior treatment details  - Completed cycle 8 of Xeloda between December 14, 2021 and December 28, 2021.  -At this point we will continue with clinical surveillance.  - We will have patient return to clinic in 3 months with repeat CBC, CMP, iron studies, ferritin, B12 and folate to be done on that day.    2.  Anemia:  - Hemoglobin is 13.8.  - Due to iron malabsorption patient received 5 doses of Venofer in September 2021  - Anemia work-up will be repeated upon next clinic visit in 3 months    3.  Elevated liver function test due to fatty liver  - CMP from today shows liver function test is elevated but improved as compared to last clinic visit    4.  Hand-foot syndrome:  - Clinically improved.  Recommend continuing with urea cream.    5.  Genetic testing:  - Genetic testing done in November 2020 was negative for BRCA1 and BRCA2    6.  Health maintenance: Patient does not smoke    7. Advance Care Planning: For now patient remains full code and is able to make decisions.  Patient has health care surrogate mentioned on chart.               PHQ-9 Total Score: 0   -Patient is not homicidal or suicidal.  No acute intervention required.    Gale Tapia Marianna reports a pain score of 0.  Given her pain assessment as noted, treatment options were discussed and the following options were decided upon as a follow-up plan to address the patient's pain: No acute intervention required.         Carlos Duong MD  1/11/2022  12:11 CST        Part of this note may be an electronic transcription/translation of spoken language to printed text using the Dragon Dictation System.          CC:

## 2022-01-19 RX ORDER — MONTELUKAST SODIUM 10 MG/1
10 TABLET ORAL NIGHTLY
Qty: 90 TABLET | Refills: 3 | Status: SHIPPED | OUTPATIENT
Start: 2022-01-19 | End: 2022-10-31 | Stop reason: SDUPTHER

## 2022-01-24 RX ORDER — DAPAGLIFLOZIN 10 MG/1
10 TABLET, FILM COATED ORAL EVERY MORNING
Qty: 90 TABLET | Refills: 1 | Status: SHIPPED | OUTPATIENT
Start: 2022-01-24 | End: 2022-08-01 | Stop reason: SDUPTHER

## 2022-01-31 RX ORDER — ALBUTEROL SULFATE 90 UG/1
2 AEROSOL, METERED RESPIRATORY (INHALATION) 4 TIMES DAILY PRN
Qty: 8 G | Refills: 5 | Status: SHIPPED | OUTPATIENT
Start: 2022-01-31

## 2022-01-31 RX ORDER — DOCUSATE SODIUM 100 MG/1
100 CAPSULE, LIQUID FILLED ORAL 2 TIMES DAILY
Qty: 60 CAPSULE | Refills: 2 | Status: SHIPPED | OUTPATIENT
Start: 2022-01-31 | End: 2023-03-28

## 2022-03-03 ENCOUNTER — TELEPHONE (OUTPATIENT)
Dept: FAMILY MEDICINE CLINIC | Facility: CLINIC | Age: 77
End: 2022-03-03

## 2022-03-03 DIAGNOSIS — E03.9 ACQUIRED HYPOTHYROIDISM: Primary | ICD-10-CM

## 2022-03-03 RX ORDER — LEVOTHYROXINE SODIUM 0.05 MG/1
50 TABLET ORAL DAILY
Qty: 90 TABLET | Refills: 3 | Status: SHIPPED | OUTPATIENT
Start: 2022-03-03 | End: 2023-03-06 | Stop reason: SDUPTHER

## 2022-03-03 NOTE — TELEPHONE ENCOUNTER
----- Message from Madison Cross, Tristaned Rep sent at 3/3/2022 11:08 AM CST -----  Regarding: MEDICATION  levothyroxine (SYNTHROID, LEVOTHROID) 50 MCG tablet     Darian Soliman MD   [address] [place] [address] [address] [place], [place] [address]   Phone:  [phone]   Fax:  [phone]   NPI:  [id number]

## 2022-03-09 ENCOUNTER — LAB (OUTPATIENT)
Dept: LAB | Facility: OTHER | Age: 77
End: 2022-03-09

## 2022-03-09 DIAGNOSIS — T45.1X5A IMMUNOSUPPRESSED DUE TO CHEMOTHERAPY: ICD-10-CM

## 2022-03-09 DIAGNOSIS — E03.9 ACQUIRED HYPOTHYROIDISM: Chronic | ICD-10-CM

## 2022-03-09 DIAGNOSIS — I10 ESSENTIAL HYPERTENSION: Chronic | ICD-10-CM

## 2022-03-09 DIAGNOSIS — Z79.899 IMMUNOSUPPRESSED DUE TO CHEMOTHERAPY: ICD-10-CM

## 2022-03-09 DIAGNOSIS — D50.8 IRON DEFICIENCY ANEMIA SECONDARY TO INADEQUATE DIETARY IRON INTAKE: Chronic | ICD-10-CM

## 2022-03-09 DIAGNOSIS — E55.9 VITAMIN D DEFICIENCY: Chronic | ICD-10-CM

## 2022-03-09 DIAGNOSIS — E11.69 HYPERLIPIDEMIA ASSOCIATED WITH TYPE 2 DIABETES MELLITUS: Chronic | ICD-10-CM

## 2022-03-09 DIAGNOSIS — K90.9 IRON MALABSORPTION: Chronic | ICD-10-CM

## 2022-03-09 DIAGNOSIS — E78.5 HYPERLIPIDEMIA ASSOCIATED WITH TYPE 2 DIABETES MELLITUS: Chronic | ICD-10-CM

## 2022-03-09 DIAGNOSIS — E11.9 TYPE 2 DIABETES MELLITUS WITHOUT COMPLICATION, WITHOUT LONG-TERM CURRENT USE OF INSULIN: Chronic | ICD-10-CM

## 2022-03-09 DIAGNOSIS — D84.821 IMMUNOSUPPRESSED DUE TO CHEMOTHERAPY: ICD-10-CM

## 2022-03-09 LAB
25(OH)D3 SERPL-MCNC: 46.3 NG/ML
ALBUMIN SERPL-MCNC: 4.4 G/DL (ref 3.5–5)
ALBUMIN/GLOB SERPL: 1.3 G/DL (ref 1.1–1.8)
ALP SERPL-CCNC: 82 U/L (ref 38–126)
ALT SERPL W P-5'-P-CCNC: 52 U/L
ANION GAP SERPL CALCULATED.3IONS-SCNC: 9 MMOL/L (ref 5–15)
AST SERPL-CCNC: 65 U/L (ref 14–36)
BASOPHILS # BLD AUTO: 0.1 10*3/MM3 (ref 0–0.2)
BASOPHILS NFR BLD AUTO: 1.1 % (ref 0–1.5)
BILIRUB SERPL-MCNC: 0.3 MG/DL (ref 0.2–1.3)
BUN SERPL-MCNC: 20 MG/DL (ref 7–23)
BUN/CREAT SERPL: 19.4 (ref 7–25)
CALCIUM SPEC-SCNC: 9.7 MG/DL (ref 8.4–10.2)
CHLORIDE SERPL-SCNC: 107 MMOL/L (ref 101–112)
CHOLEST SERPL-MCNC: 212 MG/DL (ref 150–200)
CO2 SERPL-SCNC: 25 MMOL/L (ref 22–30)
CREAT SERPL-MCNC: 1.03 MG/DL (ref 0.52–1.04)
DEPRECATED RDW RBC AUTO: 49.3 FL (ref 37–54)
EGFRCR SERPLBLD CKD-EPI 2021: 57.2 ML/MIN/1.73
EOSINOPHIL # BLD AUTO: 0.72 10*3/MM3 (ref 0–0.4)
EOSINOPHIL NFR BLD AUTO: 8.1 % (ref 0.3–6.2)
ERYTHROCYTE [DISTWIDTH] IN BLOOD BY AUTOMATED COUNT: 13.4 % (ref 12.3–15.4)
GLOBULIN UR ELPH-MCNC: 3.5 GM/DL (ref 2.3–3.5)
GLUCOSE SERPL-MCNC: 123 MG/DL (ref 70–99)
HBA1C MFR BLD: 6.6 % (ref 4.8–5.6)
HCT VFR BLD AUTO: 43.4 % (ref 34–46.6)
HDLC SERPL-MCNC: 42 MG/DL (ref 40–59)
HGB BLD-MCNC: 14.1 G/DL (ref 12–15.9)
LDLC SERPL CALC-MCNC: 134 MG/DL
LDLC/HDLC SERPL: 3.09 {RATIO} (ref 0–3.22)
LYMPHOCYTES # BLD AUTO: 1.84 10*3/MM3 (ref 0.7–3.1)
LYMPHOCYTES NFR BLD AUTO: 20.6 % (ref 19.6–45.3)
MCH RBC QN AUTO: 32.9 PG (ref 26.6–33)
MCHC RBC AUTO-ENTMCNC: 32.5 G/DL (ref 31.5–35.7)
MCV RBC AUTO: 101.4 FL (ref 79–97)
MONOCYTES # BLD AUTO: 0.93 10*3/MM3 (ref 0.1–0.9)
MONOCYTES NFR BLD AUTO: 10.4 % (ref 5–12)
NEUTROPHILS NFR BLD AUTO: 5.35 10*3/MM3 (ref 1.7–7)
NEUTROPHILS NFR BLD AUTO: 59.8 % (ref 42.7–76)
PLATELET # BLD AUTO: 362 10*3/MM3 (ref 140–450)
PMV BLD AUTO: 9.1 FL (ref 6–12)
POTASSIUM SERPL-SCNC: 4.8 MMOL/L (ref 3.4–5)
PROT SERPL-MCNC: 7.9 G/DL (ref 6.3–8.6)
RBC # BLD AUTO: 4.28 10*6/MM3 (ref 3.77–5.28)
SODIUM SERPL-SCNC: 141 MMOL/L (ref 137–145)
T4 FREE SERPL-MCNC: 1.14 NG/DL (ref 0.93–1.7)
TRIGL SERPL-MCNC: 201 MG/DL
TSH SERPL DL<=0.05 MIU/L-ACNC: 3.25 UIU/ML (ref 0.27–4.2)
VIT B12 BLD-MCNC: 764 PG/ML (ref 211–946)
VLDLC SERPL-MCNC: 36 MG/DL (ref 5–40)
WBC NRBC COR # BLD: 8.94 10*3/MM3 (ref 3.4–10.8)

## 2022-03-09 PROCEDURE — 36415 COLL VENOUS BLD VENIPUNCTURE: CPT | Performed by: INTERNAL MEDICINE

## 2022-03-09 PROCEDURE — 82607 VITAMIN B-12: CPT | Performed by: INTERNAL MEDICINE

## 2022-03-09 PROCEDURE — 80061 LIPID PANEL: CPT | Performed by: INTERNAL MEDICINE

## 2022-03-09 PROCEDURE — 83036 HEMOGLOBIN GLYCOSYLATED A1C: CPT | Performed by: INTERNAL MEDICINE

## 2022-03-09 PROCEDURE — 84439 ASSAY OF FREE THYROXINE: CPT | Performed by: INTERNAL MEDICINE

## 2022-03-09 PROCEDURE — 80053 COMPREHEN METABOLIC PANEL: CPT | Performed by: INTERNAL MEDICINE

## 2022-03-09 PROCEDURE — 85025 COMPLETE CBC W/AUTO DIFF WBC: CPT | Performed by: INTERNAL MEDICINE

## 2022-03-09 PROCEDURE — 82306 VITAMIN D 25 HYDROXY: CPT | Performed by: INTERNAL MEDICINE

## 2022-03-09 PROCEDURE — 84443 ASSAY THYROID STIM HORMONE: CPT | Performed by: INTERNAL MEDICINE

## 2022-03-16 ENCOUNTER — OFFICE VISIT (OUTPATIENT)
Dept: FAMILY MEDICINE CLINIC | Facility: CLINIC | Age: 77
End: 2022-03-16

## 2022-03-16 VITALS
DIASTOLIC BLOOD PRESSURE: 60 MMHG | BODY MASS INDEX: 27.86 KG/M2 | SYSTOLIC BLOOD PRESSURE: 120 MMHG | TEMPERATURE: 96.8 F | HEIGHT: 62 IN | WEIGHT: 151.4 LBS | HEART RATE: 72 BPM

## 2022-03-16 DIAGNOSIS — D50.8 IRON DEFICIENCY ANEMIA SECONDARY TO INADEQUATE DIETARY IRON INTAKE: Chronic | ICD-10-CM

## 2022-03-16 DIAGNOSIS — E03.9 ACQUIRED HYPOTHYROIDISM: Chronic | ICD-10-CM

## 2022-03-16 DIAGNOSIS — E78.5 HYPERLIPIDEMIA ASSOCIATED WITH TYPE 2 DIABETES MELLITUS: Chronic | ICD-10-CM

## 2022-03-16 DIAGNOSIS — J45.31 MILD PERSISTENT ASTHMA WITH ACUTE EXACERBATION: Chronic | ICD-10-CM

## 2022-03-16 DIAGNOSIS — E11.69 HYPERLIPIDEMIA ASSOCIATED WITH TYPE 2 DIABETES MELLITUS: Chronic | ICD-10-CM

## 2022-03-16 DIAGNOSIS — D50.8 OTHER IRON DEFICIENCY ANEMIA: Chronic | ICD-10-CM

## 2022-03-16 DIAGNOSIS — E11.9 TYPE 2 DIABETES MELLITUS WITHOUT COMPLICATION, WITHOUT LONG-TERM CURRENT USE OF INSULIN: Primary | Chronic | ICD-10-CM

## 2022-03-16 DIAGNOSIS — I10 ESSENTIAL HYPERTENSION: Chronic | ICD-10-CM

## 2022-03-16 DIAGNOSIS — M85.88 OSTEOPENIA OF SPINE: Chronic | ICD-10-CM

## 2022-03-16 DIAGNOSIS — E55.9 VITAMIN D DEFICIENCY: Chronic | ICD-10-CM

## 2022-03-16 PROCEDURE — 99214 OFFICE O/P EST MOD 30 MIN: CPT | Performed by: INTERNAL MEDICINE

## 2022-03-16 RX ORDER — ATORVASTATIN CALCIUM 20 MG/1
20 TABLET, FILM COATED ORAL DAILY
Qty: 90 TABLET | Refills: 3 | Status: SHIPPED | OUTPATIENT
Start: 2022-03-16 | End: 2022-03-21 | Stop reason: SDUPTHER

## 2022-03-16 NOTE — PROGRESS NOTES
Chief Complaint  Follow-up (6 month)    Subjective          History of Present Illness     Gale Cabral presents to the office for 6-month follow up on type 2 diabetes, hypertension, hyperlipidemia/low HDL, iron deficiency anemia with iron malabsorption requiring episodic iron infusion, asthma, and hypothyroidism among other issues.  Patient  continues to follow with Dr. Rhoda veliz [facility] since October 5, 2020 for triple negative right breast cancer.  Patient had colonoscopy by Dr. Peters 11/19/2020 revealing one polyp. However she developed perforated diverticulitis 2021 that was treated in [place], [place] with a laparoscopic sigmotomy and colectomy with diverting colostomy.  Patient had recently underwent reanastomosis 01/28/2022.    Weight is up 3 pounds in the past six months.  She attributes the weight change due to recent colostomy reversal.  We discussed using an abdominal binder for support.  Her weight was down after her colostomy reversal, although, she has been making some homemade pies, which has resulted in weight gain and also  raising her cholesterol despite taking Lipitor three days weekly.      She is having a mild flare of asthma exacerbation developing some phelgm production yesterday.  She uses ProAir rescue inhaler for mild persistent asthma.  She has Dulera ordered, which has been unavailable at [facility] pharmacy. We have given a sample of Trelegy today in the office.      BP in the office today is 120/60.  I had given instructions for patient to take the losartan HCT daily and hold the Norvasc, only adding for systolic >140.  She had misunderstood and stopped the losartan, which she has since restarted and is now taking daily.        The patient's relevant past medical, surgical, and social history was reviewed in Epic.   Lab results are reviewed with the patient today.  Fasting glocse 123.  A1c 6.6. Normal renal function.  Mildly elevated liver enzymes consistent with  "fatty liver.  Hypothyroidism at goal with Synthroid 50 mcg daily.  Cholesterol above goal with Lipitor three times daily.        Objective   Vital Signs:   /60   Pulse 72   Temp 96.8 °F (36 °C) (Tympanic)   Ht 157.5 cm (62\")   Wt 68.7 kg (151 lb 6.4 oz)   BMI 27.69 kg/m²       Physical Exam  Vitals reviewed.   Constitutional:       General: She is not in acute distress.     Appearance: She is well-developed.      Comments: Pleasant female, overweight.   HENT:      Head: Normocephalic and atraumatic.      Nose:      Right Sinus: No maxillary sinus tenderness or frontal sinus tenderness.      Left Sinus: No maxillary sinus tenderness or frontal sinus tenderness.      Mouth/Throat:      Mouth: No oral lesions.      Pharynx: Uvula midline.      Tonsils: No tonsillar exudate.   Eyes:      Conjunctiva/sclera: Conjunctivae normal.      Pupils: Pupils are equal, round, and reactive to light.   Neck:      Thyroid: No thyroid mass or thyromegaly.      Vascular: No carotid bruit or JVD.      Trachea: Trachea normal. No tracheal deviation.   Cardiovascular:      Rate and Rhythm: Normal rate and regular rhythm.  No extrasystoles are present.     Chest Wall: PMI is not displaced.      Heart sounds: Normal heart sounds. No murmur heard.  Pulmonary:      Effort: Pulmonary effort is normal. No accessory muscle usage or respiratory distress.      Breath sounds: Normal breath sounds. No decreased breath sounds, wheezing, rhonchi or rales.      Comments: Inspiratory wheeze, right greater than left.   Abdominal:      General: Bowel sounds are normal. There is no distension.      Palpations: Abdomen is soft.      Tenderness: There is no abdominal tenderness.      Comments: Scar from colostomy reversal left lower quadrant    Musculoskeletal:      Cervical back: Neck supple.   Lymphadenopathy:      Cervical: No cervical adenopathy.   Skin:     General: Skin is warm and dry.      Findings: No rash.      Nails: There is no " clubbing.   Neurological:      Mental Status: She is alert and oriented to person, place, and time.      Cranial Nerves: No cranial nerve deficit.      Coordination: Coordination normal.   Psychiatric:         Speech: Speech normal.         Behavior: Behavior normal.         Thought Content: Thought content normal.         Judgment: Judgment normal.            Result Review :     CMP    CMP 12/14/21 1/11/22 3/9/22   Glucose 107 (A) 104 (A) 123 (A)   BUN 18 16 20   Creatinine 0.98 0.97 1.03   eGFR Non African Am 55 (A) 56 (A)    Sodium 135 (A) 140 141   Potassium 3.7 4.0 4.8   Chloride 100 103 107   Calcium 10.0 9.4 9.7   Albumin 4.50 4.40 4.40   Total Bilirubin 0.4 0.3 0.3   Alkaline Phosphatase 87 82 82   AST (SGOT) 66 (A) 47 (A) 65 (A)   ALT (SGPT) 48 (A) 41 (A) 52 (A)   (A) Abnormal value            CBC w/diff    CBC w/Diff 12/14/21 1/11/22 3/9/22   WBC 5.89 5.90 8.94   RBC 3.96 3.91 4.28   Hemoglobin 14.1 13.8 14.1   Hematocrit 40.6 40.1 43.4   .5 (A) 102.6 (A) 101.4 (A)   MCH 35.6 (A) 35.3 (A) 32.9   MCHC 34.7 34.4 32.5   RDW 16.3 (A) 14.1 13.4   Platelets 341 264 362   Neutrophil Rel % 53.7 55.4 59.8   Immature Granulocyte Rel % 0.3 0.3    Lymphocyte Rel % 21.7 22.0 20.6   Monocyte Rel % 14.8 (A) 12.7 (A) 10.4   Eosinophil Rel % 7.6 (A) 6.9 (A) 8.1 (A)   Basophil Rel % 1.9 (A) 2.7 (A) 1.1   (A) Abnormal value            Lipid Panel    Lipid Panel 9/3/21 3/9/22   Total Cholesterol  212 (A)   Triglycerides  201 (A)   HDL Cholesterol  42   VLDL Cholesterol  36   LDL Cholesterol  56 134 (A)   LDL/HDL Ratio  3.09   (A) Abnormal value            TSH    TSH 9/3/21 3/9/22   TSH 1.820 3.250           A1C Last 3 Results    HGBA1C Last 3 Results 9/3/21 3/9/22   Hemoglobin A1C 6.19 (A) 6.60 (A)   (A) Abnormal value            Data reviewed: Consultant notes Dr. Duong, oncology          Assessment and Plan    Diagnoses and all orders for this visit:    1. Type 2 diabetes mellitus without complication, without  long-term current use of insulin (HCC) (Primary)  -     CBC Auto Differential; Future  -     Comprehensive Metabolic Panel; Future  -     Hemoglobin A1c; Future  -     LDL Cholesterol, Direct; Future  -     Microalbumin / Creatinine Urine Ratio - Urine, Clean Catch; Future  -     TSH; Future  -     T4, free; Future    2. Mild persistent asthma with acute exacerbation  -     Comprehensive Metabolic Panel; Future    3. Hyperlipidemia associated with type 2 diabetes mellitus (HCC)  -     LDL Cholesterol, Direct; Future    4. Essential hypertension  -     Comprehensive Metabolic Panel; Future    5. Acquired hypothyroidism    6. Vitamin D deficiency  -     Vitamin D 25 Hydroxy; Future    7. Iron deficiency anemia secondary to inadequate dietary iron intake -response to IV iron infusions  -     CBC Auto Differential; Future    8. Osteopenia of spine  -     Vitamin D 25 Hydroxy; Future    9. Other iron deficiency anemia  -     CBC Auto Differential; Future    Other orders  -     atorvastatin (LIPITOR) 20 MG tablet; Take 1 tablet by mouth Daily.  Dispense: 90 tablet; Refill: 3         For the mild exacerbation of mild persistent asthma, patient is given a sample of Trelegy 100 mcg inhaler today.  Continue the ProAir rescue inhaler.  Notify us if the Dulera does not become available at her pharmacy, and she needs to remain on a maintenance inhaler    I recommended patient wear an abdominal binder to help prevent hernia with her recent reanastomosis.    Increase the Lipitor 20 mg from q.o.d. to daily use.  Pursue dietary efforts.  I asked her to cut back on her homemade pies and calorie dense foods.      Continue to follow with Dr. Duong, oncology, to address breast cancer and multifactorial anemia.     I recommended she take the losartan HCT daily and hold the Norvasc, only adding for systolic >140.   Pursue weight loss. continue to monitor BP and notify me if not consistently at goal.     Continue metformin and Farxiga.   Diabetes slightly progressed, although, acceptable.      Continue current dose of Synthroid.  Hypothyroidism at goal.      Continue other medications and vitamin and mineral supplements to treat additional medical problems which we addressed today.      Return in six months for routine follow up with fasting labs one week prior or sooner if needed.      Scribed for Dr. Soliman by Lydia Davis [facility].     Follow Up   Return in about 6 months (around 9/16/2022) for Follow up in six months with labs one week prior..  Patient was given instructions and counseling regarding her condition or for health maintenance advice. Please see specific information pulled into the AVS if appropriate.

## 2022-03-21 RX ORDER — ATORVASTATIN CALCIUM 20 MG/1
20 TABLET, FILM COATED ORAL DAILY
Qty: 90 TABLET | Refills: 3 | Status: SHIPPED | OUTPATIENT
Start: 2022-03-21

## 2022-03-24 ENCOUNTER — INFUSION (OUTPATIENT)
Dept: ONCOLOGY | Facility: HOSPITAL | Age: 77
End: 2022-03-24

## 2022-03-24 DIAGNOSIS — Z45.2 ENCOUNTER FOR VENOUS ACCESS DEVICE CARE: Primary | ICD-10-CM

## 2022-03-24 PROCEDURE — 96523 IRRIG DRUG DELIVERY DEVICE: CPT | Performed by: NURSE PRACTITIONER

## 2022-03-24 PROCEDURE — 25010000002 HEPARIN (PORCINE) PER 1000 UNITS: Performed by: NURSE PRACTITIONER

## 2022-03-24 RX ORDER — HEPARIN SODIUM (PORCINE) LOCK FLUSH IV SOLN 100 UNIT/ML 100 UNIT/ML
300 SOLUTION INTRAVENOUS AS NEEDED
Status: DISCONTINUED | OUTPATIENT
Start: 2022-03-24 | End: 2022-03-24 | Stop reason: HOSPADM

## 2022-03-24 RX ORDER — HEPARIN SODIUM (PORCINE) LOCK FLUSH IV SOLN 100 UNIT/ML 100 UNIT/ML
500 SOLUTION INTRAVENOUS AS NEEDED
Status: CANCELLED | OUTPATIENT
Start: 2022-04-12

## 2022-03-24 RX ORDER — SODIUM CHLORIDE 0.9 % (FLUSH) 0.9 %
20 SYRINGE (ML) INJECTION AS NEEDED
Status: CANCELLED | OUTPATIENT
Start: 2022-03-24

## 2022-03-24 RX ADMIN — HEPARIN SODIUM 300 UNITS: 5000 INJECTION, SOLUTION INTRAVENOUS; SUBCUTANEOUS at 15:38

## 2022-04-12 ENCOUNTER — OFFICE VISIT (OUTPATIENT)
Dept: ONCOLOGY | Facility: CLINIC | Age: 77
End: 2022-04-12

## 2022-04-12 ENCOUNTER — LAB (OUTPATIENT)
Dept: ONCOLOGY | Facility: HOSPITAL | Age: 77
End: 2022-04-12

## 2022-04-12 VITALS
HEART RATE: 77 BPM | SYSTOLIC BLOOD PRESSURE: 136 MMHG | BODY MASS INDEX: 27.65 KG/M2 | OXYGEN SATURATION: 96 % | TEMPERATURE: 98.6 F | WEIGHT: 151.2 LBS | DIASTOLIC BLOOD PRESSURE: 63 MMHG

## 2022-04-12 DIAGNOSIS — D50.8 OTHER IRON DEFICIENCY ANEMIA: Chronic | ICD-10-CM

## 2022-04-12 DIAGNOSIS — K90.9 IRON MALABSORPTION: ICD-10-CM

## 2022-04-12 DIAGNOSIS — Z45.2 ENCOUNTER FOR VENOUS ACCESS DEVICE CARE: Primary | ICD-10-CM

## 2022-04-12 DIAGNOSIS — C50.911 INVASIVE DUCTAL CARCINOMA OF RIGHT BREAST: Primary | Chronic | ICD-10-CM

## 2022-04-12 DIAGNOSIS — L27.1 HAND FOOT SYNDROME: ICD-10-CM

## 2022-04-12 DIAGNOSIS — L27.1 HAND FOOT SYNDROME: Chronic | ICD-10-CM

## 2022-04-12 DIAGNOSIS — C50.911 INVASIVE DUCTAL CARCINOMA OF RIGHT BREAST: ICD-10-CM

## 2022-04-12 DIAGNOSIS — R79.89 ELEVATED LFTS: Chronic | ICD-10-CM

## 2022-04-12 DIAGNOSIS — D50.8 OTHER IRON DEFICIENCY ANEMIA: ICD-10-CM

## 2022-04-12 DIAGNOSIS — R79.89 ELEVATED LFTS: ICD-10-CM

## 2022-04-12 LAB
ALBUMIN SERPL-MCNC: 4 G/DL (ref 3.5–5.2)
ALBUMIN/GLOB SERPL: 1.1 G/DL
ALP SERPL-CCNC: 85 U/L (ref 39–117)
ALT SERPL W P-5'-P-CCNC: 48 U/L (ref 1–33)
ANION GAP SERPL CALCULATED.3IONS-SCNC: 12 MMOL/L (ref 5–15)
AST SERPL-CCNC: 73 U/L (ref 1–32)
BASOPHILS # BLD AUTO: 0.14 10*3/MM3 (ref 0–0.2)
BASOPHILS NFR BLD AUTO: 2 % (ref 0–1.5)
BILIRUB SERPL-MCNC: 0.3 MG/DL (ref 0–1.2)
BUN SERPL-MCNC: 19 MG/DL (ref 8–23)
BUN/CREAT SERPL: 18.8 (ref 7–25)
CALCIUM SPEC-SCNC: 9.3 MG/DL (ref 8.6–10.5)
CHLORIDE SERPL-SCNC: 100 MMOL/L (ref 98–107)
CO2 SERPL-SCNC: 22 MMOL/L (ref 22–29)
CREAT SERPL-MCNC: 1.01 MG/DL (ref 0.57–1)
DEPRECATED RDW RBC AUTO: 43.9 FL (ref 37–54)
EGFRCR SERPLBLD CKD-EPI 2021: 58.5 ML/MIN/1.73
EOSINOPHIL # BLD AUTO: 0.5 10*3/MM3 (ref 0–0.4)
EOSINOPHIL NFR BLD AUTO: 7.1 % (ref 0.3–6.2)
ERYTHROCYTE [DISTWIDTH] IN BLOOD BY AUTOMATED COUNT: 12.7 % (ref 12.3–15.4)
FERRITIN SERPL-MCNC: 431.4 NG/ML (ref 13–150)
FOLATE SERPL-MCNC: 9.33 NG/ML (ref 4.78–24.2)
GLOBULIN UR ELPH-MCNC: 3.5 GM/DL
GLUCOSE SERPL-MCNC: 152 MG/DL (ref 65–99)
HCT VFR BLD AUTO: 42.5 % (ref 34–46.6)
HGB BLD-MCNC: 14.5 G/DL (ref 12–15.9)
IMM GRANULOCYTES # BLD AUTO: 0.02 10*3/MM3 (ref 0–0.05)
IMM GRANULOCYTES NFR BLD AUTO: 0.3 % (ref 0–0.5)
IRON 24H UR-MRATE: 84 MCG/DL (ref 37–145)
IRON SATN MFR SERPL: 24 % (ref 20–50)
LYMPHOCYTES # BLD AUTO: 1.37 10*3/MM3 (ref 0.7–3.1)
LYMPHOCYTES NFR BLD AUTO: 19.4 % (ref 19.6–45.3)
MCH RBC QN AUTO: 32.2 PG (ref 26.6–33)
MCHC RBC AUTO-ENTMCNC: 34.1 G/DL (ref 31.5–35.7)
MCV RBC AUTO: 94.4 FL (ref 79–97)
MONOCYTES # BLD AUTO: 0.56 10*3/MM3 (ref 0.1–0.9)
MONOCYTES NFR BLD AUTO: 7.9 % (ref 5–12)
NEUTROPHILS NFR BLD AUTO: 4.48 10*3/MM3 (ref 1.7–7)
NEUTROPHILS NFR BLD AUTO: 63.3 % (ref 42.7–76)
NRBC BLD AUTO-RTO: 0 /100 WBC (ref 0–0.2)
PLATELET # BLD AUTO: 333 10*3/MM3 (ref 140–450)
PMV BLD AUTO: 9.1 FL (ref 6–12)
POTASSIUM SERPL-SCNC: 3.7 MMOL/L (ref 3.5–5.2)
PROT SERPL-MCNC: 7.5 G/DL (ref 6–8.5)
RBC # BLD AUTO: 4.5 10*6/MM3 (ref 3.77–5.28)
SODIUM SERPL-SCNC: 134 MMOL/L (ref 136–145)
TIBC SERPL-MCNC: 353 MCG/DL (ref 298–536)
TRANSFERRIN SERPL-MCNC: 237 MG/DL (ref 200–360)
VIT B12 BLD-MCNC: 684 PG/ML (ref 211–946)
WBC NRBC COR # BLD: 7.07 10*3/MM3 (ref 3.4–10.8)

## 2022-04-12 PROCEDURE — 83540 ASSAY OF IRON: CPT

## 2022-04-12 PROCEDURE — 1126F AMNT PAIN NOTED NONE PRSNT: CPT | Performed by: INTERNAL MEDICINE

## 2022-04-12 PROCEDURE — 82728 ASSAY OF FERRITIN: CPT

## 2022-04-12 PROCEDURE — 80053 COMPREHEN METABOLIC PANEL: CPT

## 2022-04-12 PROCEDURE — 85025 COMPLETE CBC W/AUTO DIFF WBC: CPT

## 2022-04-12 PROCEDURE — 1123F ACP DISCUSS/DSCN MKR DOCD: CPT | Performed by: INTERNAL MEDICINE

## 2022-04-12 PROCEDURE — 82746 ASSAY OF FOLIC ACID SERUM: CPT

## 2022-04-12 PROCEDURE — 82607 VITAMIN B-12: CPT

## 2022-04-12 PROCEDURE — 36591 DRAW BLOOD OFF VENOUS DEVICE: CPT | Performed by: NURSE PRACTITIONER

## 2022-04-12 PROCEDURE — 84466 ASSAY OF TRANSFERRIN: CPT

## 2022-04-12 PROCEDURE — 25010000002 HEPARIN LOCK FLUSH PER 10 UNITS: Performed by: NURSE PRACTITIONER

## 2022-04-12 PROCEDURE — 99214 OFFICE O/P EST MOD 30 MIN: CPT | Performed by: INTERNAL MEDICINE

## 2022-04-12 RX ORDER — HEPARIN SODIUM (PORCINE) LOCK FLUSH IV SOLN 100 UNIT/ML 100 UNIT/ML
500 SOLUTION INTRAVENOUS AS NEEDED
Status: CANCELLED | OUTPATIENT
Start: 2022-05-24

## 2022-04-12 RX ORDER — SODIUM CHLORIDE 0.9 % (FLUSH) 0.9 %
20 SYRINGE (ML) INJECTION AS NEEDED
Status: DISCONTINUED | OUTPATIENT
Start: 2022-04-12 | End: 2022-04-12 | Stop reason: HOSPADM

## 2022-04-12 RX ORDER — SODIUM CHLORIDE 0.9 % (FLUSH) 0.9 %
20 SYRINGE (ML) INJECTION AS NEEDED
Status: CANCELLED | OUTPATIENT
Start: 2022-04-12

## 2022-04-12 RX ORDER — HEPARIN SODIUM (PORCINE) LOCK FLUSH IV SOLN 100 UNIT/ML 100 UNIT/ML
500 SOLUTION INTRAVENOUS AS NEEDED
Status: DISCONTINUED | OUTPATIENT
Start: 2022-04-12 | End: 2022-04-12 | Stop reason: HOSPADM

## 2022-04-12 RX ADMIN — Medication 20 ML: at 10:11

## 2022-04-12 RX ADMIN — HEPARIN 500 UNITS: 100 SYRINGE at 10:11

## 2022-04-12 NOTE — PROGRESS NOTES
DATE OF VISIT: 4/13/2022      REASON FOR VISIT: Triple negative right breast cancer, anemia, hand-foot syndrome from Xeloda      HISTORY OF PRESENT ILLNESS:   76-year-old female with medical problem consisting of diabetes mellitus, hypertension, dyslipidemia, asthma, osteopenia, obstructive sleep apnea, chronic back pain has been following up with [facility] since October 5, 2020 for triple negative right breast cancer.  Patient completed adjuvant Xeloda in December 2021.  Had a colostomy reversal on January 27, 2022 at [facility].  Complains of scar tissue at the site of colostomy reversal.  Denies any worsening erythema affecting bilateral hands or feet.  Denies any bleeding.  Denies any new lymph node enlargement.        Oncology history:    1.  Triple negative right breast cancer, [id number] with 1 lymph node showing 1.9 cm deposit with extracapsular extension and tumor deposit lymphatic:  -Patient received neoadjuvant chemotherapy with Taxotere and Cytoxan for 3 cycles between October 15, 2020 and November 30, 2020.  -After cycle 3 unfortunately patient developed bowel obstruction requiring surgery and colostomy at hospital in [place].  -Patient did not want to do cycle 4 prior to surgery and was subsequently sent back to Dr. Alexandra underwent lumpectomy on January 11 2021 that showed 1 cm residual cancer.  -Patient had axillary dissection done on February 25, 2021 that showed 1.9 cm lymph node involvement with extracapsular extension and tumor deposit in the lymphatic channel.  -Result of pathology report were discussed with patient and her .  Patient had a minimal or no response to chemotherapy with Taxotere and Cytoxan in neoadjuvant setting.  PET/CT done prior to starting chemotherapy had a shotty axillary lymph node without any significant uptake.  Patient did have enlarged lymph node at the time of surgery again not responding to chemotherapy preoperatively  -Had a radiation  treatment that completed on May 24, 2021 at [facility].  -Patient was started on cycle 1 of Xeloda on Push[place] 15, 2021.  -Dose of Xeloda was decreased to 3 tablets in the morning and 2 tablets at night with cycle 2.  -Dose of Xeloda will be decreased to 2 tablets in the morning and 2 tablets at night with cycle 5 due to worsening hand-foot syndrome  -Patient completed cycle 8 of Xeloda on December 28, 2021.            Past Medical History, Past Surgical History, Social History, Family History have been reviewed and are without significant changes except as mentioned.    Review of Systems   A comprehensive 14 point review of systems was performed and was negative except as mentioned in HPI.    Medications:  The current medication list was reviewed in the EMR    ALLERGIES:    Allergies   Allergen Reactions   • Other Confusion     Coda-clear       Objective      Vitals:    04/12/22 1037   BP: 136/63   Pulse: 77   Temp: 98.6 °F (37 °C)   TempSrc: Temporal   SpO2: 96%   Weight: 68.6 kg (151 lb 3.2 oz)   PainSc: 0-No pain     Current Status 9/1/2021   ECOG score 0       Physical Exam  Pulmonary:      Breath sounds: Normal breath sounds.   Neurological:      Mental Status: She is alert and oriented to person, place, and time.           RECENT LABS:  Glucose   Date Value Ref Range Status   04/12/2022 152 (H) 65 - 99 mg/dL Final     Sodium   Date Value Ref Range Status   04/12/2022 134 (L) 136 - 145 mmol/L Final     Potassium   Date Value Ref Range Status   04/12/2022 3.7 3.5 - 5.2 mmol/L Final     CO2   Date Value Ref Range Status   04/12/2022 22.0 22.0 - 29.0 mmol/L Final     Chloride   Date Value Ref Range Status   04/12/2022 100 98 - 107 mmol/L Final     Anion Gap   Date Value Ref Range Status   04/12/2022 12.0 5.0 - 15.0 mmol/L Final     Creatinine   Date Value Ref Range Status   04/12/2022 1.01 (H) 0.57 - 1.00 mg/dL Final     BUN   Date Value Ref Range Status   04/12/2022 19 8 - 23 mg/dL Final      BUN/Creatinine Ratio   Date Value Ref Range Status   04/12/2022 18.8 7.0 - 25.0 Final     Calcium   Date Value Ref Range Status   04/12/2022 9.3 8.6 - 10.5 mg/dL Final     eGFR Non  Amer   Date Value Ref Range Status   01/11/2022 56 (L) >60 mL/min/1.73 Final     Alkaline Phosphatase   Date Value Ref Range Status   04/12/2022 85 39 - 117 U/L Final     Total Protein   Date Value Ref Range Status   04/12/2022 7.5 6.0 - 8.5 g/dL Final     ALT (SGPT)   Date Value Ref Range Status   04/12/2022 48 (H) 1 - 33 U/L Final     AST (SGOT)   Date Value Ref Range Status   04/12/2022 73 (H) 1 - 32 U/L Final     Total Bilirubin   Date Value Ref Range Status   04/12/2022 0.3 0.0 - 1.2 mg/dL Final     Albumin   Date Value Ref Range Status   04/12/2022 4.00 3.50 - 5.20 g/dL Final     Globulin   Date Value Ref Range Status   04/12/2022 3.5 gm/dL Final     Lab Results   Component Value Date    WBC 7.07 04/12/2022    HGB 14.5 04/12/2022    HCT 42.5 04/12/2022    MCV 94.4 04/12/2022     04/12/2022     Lab Results   Component Value Date    NEUTROABS 4.48 04/12/2022    IRON 84 04/12/2022    IRON 124 12/14/2021    IRON 54 08/16/2021    TIBC 353 04/12/2022    TIBC 483 12/14/2021    TIBC 465 08/16/2021    LABIRON 24 04/12/2022    LABIRON 26 12/14/2021    LABIRON 12 (L) 08/16/2021    FERRITIN 431.40 (H) 04/12/2022    FERRITIN 668.70 (H) 12/14/2021    FERRITIN 50.50 08/16/2021    BURDBGYH97 684 04/12/2022    EJHQLJIH82 764 03/09/2022    FXRHAPNX77 744 12/14/2021    FOLATE 9.33 04/12/2022    FOLATE 15.50 12/14/2021    FOLATE >20.00 08/16/2021     No results found for: , LABCA2, AFPTM, HCGQUANT, , CHROMGRNA, [id number], CEA, REFLABREPO      PATHOLOGY:  * Cannot find OR log *         RADIOLOGY DATA :  No radiology results for the last 7 days        Assessment/Plan     1.  Triple negative right breast cancer, [id number] on final pathology report showing 1.9 cm deposit with extracapsular extension and tumor deposit in  lymphatics.  -Review oncology history for prior treatment details  -Completed cycle 8 of adjuvant Xeloda on December 28, 2021  -We will continue with clinical surveillance at this point.  -Patient will return to clinic in 3 months with repeat CBC, CMP, iron studies, ferritin, B12 and folate to be done on that day.  -Mammogram on left breast was BI-RADS Category 3 and repeat mammogram has been recommended for May 2022.  Patient was encouraged to keep that appointment    2.  Anemia:  -Hemoglobin is 14.5  -Due to iron malabsorption patient has received 5 doses of Venofer in September 2021  -Anemia work-up done today shows adequate amount of iron.  B12 and folate level is going somewhat lower than blood.  Recommend restarting B12 and folic acid 3 times a week.    3.  Elevated liver function test due to fatty liver  -CMP from today shows AST and ALT is again elevated secondary to fatty liver.  Will monitor with CMP    4.  Hand-foot syndrome:  -Clinically improved.  We will continue with clinical monitoring    5.  Genetic testing:  -Genetic testing done in November 2020 was negative for BRCA1 and BRCA2    6.  Health maintenance: Patient does not smoke    7. Advance Care Planning: For now patient remains full code and is able to make decisions.  Patient has health care surrogate mentioned on chart.    8.  Prescriptions: Prescription for B12 and folic acid has been sent to her pharmacy today    9.  Acute kidney injury:  -Creatinine is borderline elevated at 1.01.  Patient will be notified about elevated creatinine and need to increase hydration               PHQ-9 Total Score: 0   -Patient is not homicidal or suicidal.  No acute intervention required.    Gale Tapia Marianna reports a pain score of 0.  Given her pain assessment as noted, treatment options were discussed and the following options were decided upon as a follow-up plan to address the patient's pain: continuation of current treatment plan for pain.         Carlos DUEÑAS  MD Rhoda  4/13/2022  07:04 CDT        Part of this note may be an electronic transcription/translation of spoken language to printed text using the Dragon Dictation System.          CC:

## 2022-04-13 ENCOUNTER — TELEPHONE (OUTPATIENT)
Dept: ONCOLOGY | Facility: CLINIC | Age: 77
End: 2022-04-13

## 2022-04-13 RX ORDER — FOLIC ACID 1 MG/1
TABLET ORAL
Qty: 36 TABLET | Refills: 1 | Status: SHIPPED | OUTPATIENT
Start: 2022-04-13 | End: 2023-01-14

## 2022-04-13 RX ORDER — LANOLIN ALCOHOL/MO/W.PET/CERES
CREAM (GRAM) TOPICAL
Qty: 36 TABLET | Refills: 1 | Status: SHIPPED | OUTPATIENT
Start: 2022-04-13 | End: 2022-11-23 | Stop reason: ALTCHOICE

## 2022-04-13 NOTE — TELEPHONE ENCOUNTER
Called and spoke with pt regarding lab results along with medication instructions as per Dr. Duong, v/u obtained.

## 2022-04-13 NOTE — TELEPHONE ENCOUNTER
----- Message from Carlos Duong MD sent at 4/13/2022  7:06 AM CDT -----  Regarding: labs  Please let patient know, her creatinine was borderline high at 1.01.  Recommend increasing hydration.  Her B12 level is decreased to 684, folate level is decreased to 9.  Recommend restarting B12 and folic acid 3 times a week.  Her liver enzymes are again elevated secondary to underlying fatty liver.  I have sent prescription for B12 and folic acid to her pharmacy.  Thank you

## 2022-04-29 ENCOUNTER — TELEPHONE (OUTPATIENT)
Dept: FAMILY MEDICINE CLINIC | Facility: CLINIC | Age: 77
End: 2022-04-29

## 2022-04-29 DIAGNOSIS — E11.9 TYPE 2 DIABETES MELLITUS WITHOUT COMPLICATION, WITHOUT LONG-TERM CURRENT USE OF INSULIN: ICD-10-CM

## 2022-04-29 DIAGNOSIS — I10 ESSENTIAL HYPERTENSION: ICD-10-CM

## 2022-04-29 DIAGNOSIS — E11.9 TYPE 2 DIABETES MELLITUS WITHOUT COMPLICATION, WITHOUT LONG-TERM CURRENT USE OF INSULIN: Primary | ICD-10-CM

## 2022-04-29 RX ORDER — LOSARTAN POTASSIUM AND HYDROCHLOROTHIAZIDE 12.5; 1 MG/1; MG/1
1 TABLET ORAL DAILY
Qty: 90 TABLET | Refills: 3 | Status: SHIPPED | OUTPATIENT
Start: 2022-04-29

## 2022-04-29 RX ORDER — BUDESONIDE AND FORMOTEROL FUMARATE DIHYDRATE 160; 4.5 UG/1; UG/1
2 AEROSOL RESPIRATORY (INHALATION)
Qty: 18 G | Refills: 3 | Status: SHIPPED | OUTPATIENT
Start: 2022-04-29 | End: 2022-12-20 | Stop reason: SDUPTHER

## 2022-04-29 RX ORDER — LANCETS 33 GAUGE
EACH MISCELLANEOUS
Qty: 100 EACH | Refills: 3 | Status: SHIPPED | OUTPATIENT
Start: 2022-04-29

## 2022-04-29 NOTE — TELEPHONE ENCOUNTER
losartan-hydrochlorothiazide (HYZAAR) 100-12.5 MG     glucose blood (ONE TOUCH ULTRA TEST) test strip    LANCETS MICRO THIN 33G [facility] [[id number]    [id number] - [place], [place] - [address] AT [address] - [phone]  - [phone] FX   [address] [id number]

## 2022-04-29 NOTE — TELEPHONE ENCOUNTER
----- Message from Darian Soliman MD sent at 4/29/2022  1:08 PM CDT -----  Change Dulera to high-dose Symbicort 2 puffs twice daily.  EB  ----- Message -----  From: Cassy Izaguirre RN  Sent: 4/29/2022  11:03 AM CDT  To: Darian Soliman MD    She is on Dulera 200 but insurance denied. She states they approve Symbicort. TP

## 2022-05-20 RX ORDER — CITALOPRAM 20 MG/1
10 TABLET ORAL DAILY
Qty: 90 TABLET | Refills: 0 | Status: SHIPPED | OUTPATIENT
Start: 2022-05-20 | End: 2022-10-31 | Stop reason: SDUPTHER

## 2022-05-24 ENCOUNTER — INFUSION (OUTPATIENT)
Dept: ONCOLOGY | Facility: HOSPITAL | Age: 77
End: 2022-05-24

## 2022-05-24 ENCOUNTER — OFFICE VISIT (OUTPATIENT)
Dept: SURGERY | Facility: CLINIC | Age: 77
End: 2022-05-24

## 2022-05-24 VITALS
WEIGHT: 151.6 LBS | HEIGHT: 62 IN | DIASTOLIC BLOOD PRESSURE: 70 MMHG | SYSTOLIC BLOOD PRESSURE: 132 MMHG | BODY MASS INDEX: 27.9 KG/M2 | TEMPERATURE: 97.1 F | HEART RATE: 74 BPM

## 2022-05-24 DIAGNOSIS — Z12.31 SCREENING MAMMOGRAM FOR HIGH-RISK PATIENT: Primary | ICD-10-CM

## 2022-05-24 DIAGNOSIS — Z45.2 ENCOUNTER FOR VENOUS ACCESS DEVICE CARE: Primary | ICD-10-CM

## 2022-05-24 PROCEDURE — 99213 OFFICE O/P EST LOW 20 MIN: CPT | Performed by: NURSE PRACTITIONER

## 2022-05-24 PROCEDURE — 25010000002 HEPARIN LOCK FLUSH PER 10 UNITS: Performed by: NURSE PRACTITIONER

## 2022-05-24 PROCEDURE — 96523 IRRIG DRUG DELIVERY DEVICE: CPT | Performed by: INTERNAL MEDICINE

## 2022-05-24 RX ORDER — HEPARIN SODIUM (PORCINE) LOCK FLUSH IV SOLN 100 UNIT/ML 100 UNIT/ML
500 SOLUTION INTRAVENOUS AS NEEDED
Status: CANCELLED | OUTPATIENT
Start: 2022-07-12

## 2022-05-24 RX ORDER — SODIUM CHLORIDE 0.9 % (FLUSH) 0.9 %
20 SYRINGE (ML) INJECTION AS NEEDED
Status: CANCELLED | OUTPATIENT
Start: 2022-05-24

## 2022-05-24 RX ORDER — HEPARIN SODIUM (PORCINE) LOCK FLUSH IV SOLN 100 UNIT/ML 100 UNIT/ML
500 SOLUTION INTRAVENOUS AS NEEDED
Status: DISCONTINUED | OUTPATIENT
Start: 2022-05-24 | End: 2022-05-24 | Stop reason: HOSPADM

## 2022-05-24 RX ADMIN — HEPARIN 500 UNITS: 100 SYRINGE at 14:34

## 2022-05-24 NOTE — PROGRESS NOTES
"Chief Complaint  Follow-up (6 Month follow-up)    Subjective          Gale Cabral presents to [facility] GENER[facility] SUR[facility]ERY     History of Present Illness  Ms. Gale Cabral presents today to discuss recent left diagnostic mammography as well as for routine breast exam.  She has history of right triple negative breast cancer in 2020 treated with lumpectomy and neoadjuvant chemotherapy as well as adjuvant Xeloda. She denies any concerns with her breasts including lumps, masses, skin changes, nipple discharge or lymph node enlargement.  Still seeing medical oncology on routine basis.       Most recent imaging:  Study Result  Narrative & Impression   PROCEDURE: Left unilateral diagnostic mammogram with 3-D   tomosynthesis with CAD   REASON FOR EXAM: Follow-up of left breast benign calcifications.   FINDINGS: Comparison study dated 11/22/2021. No interval change   in appearance of the breast parenchyma. Stable benign left breast   calcifications. No suspicious mass or malignant type   microcalcifications. No skin thickening or retraction.   Parenchymal pattern: Scattered fibroglandular densities   IMPRESSION:   No mammographic evidence of malignancy.   BI-RADS category 2: Benign findings.   Recommendation: Annual mammography   Electronically signed by: Brad Mclain MD 5/24/2022 1:39 PM CDT   Workstation: [id number]         Objective   Vital Signs:  /70   Pulse 74   Temp 97.1 °F (36.2 °C) (Temporal)   Ht 157.5 cm (62\")   Wt 68.8 kg (151 lb 9.6 oz)   BMI 27.73 kg/m²         Physical Exam  Vitals reviewed.   Constitutional:       General: She is not in acute distress.     Appearance: Normal appearance. She is not ill-appearing, toxic-appearing or diaphoretic.   Pulmonary:      Effort: Pulmonary effort is normal. No respiratory distress.   Chest:   Breasts:      Right: Normal. No swelling, bleeding, inverted nipple, mass, nipple discharge, skin change, tenderness, axillary " adenopathy or supraclavicular adenopathy.      Left: No swelling, bleeding, inverted nipple, mass, nipple discharge, skin change, tenderness, axillary adenopathy or supraclavicular adenopathy.         Lymphadenopathy:      Upper Body:      Right upper body: No supraclavicular or axillary adenopathy.      Left upper body: No supraclavicular or axillary adenopathy.   Neurological:      General: No focal deficit present.      Mental Status: She is alert and oriented to person, place, and time.   Psychiatric:         Mood and Affect: Mood normal.         Behavior: Behavior normal.         Thought Content: Thought content normal.         Judgment: Judgment normal.        Result Review :       Data reviewed: Radiologic studies mammography          Assessment and Plan {CC Problem List  Visit Diagnosis   ROS  Review (Popup)  Health Maintenance  Quality  BestPractice  Medications  SmartSets  SnapShot Encounters  Media :23}   Diagnoses and all orders for this visit:    1. Screening mammogram for high-risk patient (Primary)  -     Mammo Screening Digital Tomosynthesis Bilateral With CAD; Future           I spent 22 minutes caring for Gale on this date of service. This time includes time spent by me in the following activities:preparing for the visit, reviewing tests, performing a medically appropriate examination and/or evaluation , ordering medications, tests, or procedures, documenting information in the medical record and care coordination  Follow Up   Return in about 6 months (around 11/24/2022), or if symptoms worsen or fail to improve. Continue bilateral annual screening mammography in November 2022 as scheduled unless concerns arise sooner. Encouraged to continue self breast exams. Continue follow up with oncology as scheduled.        Patient was given instructions and counseling regarding her condition or for health maintenance advice. Please see specific information pulled into the AVS if appropriate.                  This document has been electronically signed by YOHANA Lugo on May 25, 2022 16:42 CDT

## 2022-06-24 ENCOUNTER — OFFICE VISIT (OUTPATIENT)
Dept: FAMILY MEDICINE CLINIC | Facility: CLINIC | Age: 77
End: 2022-06-24

## 2022-06-24 VITALS
HEART RATE: 64 BPM | BODY MASS INDEX: 27.79 KG/M2 | HEIGHT: 62 IN | WEIGHT: 151 LBS | SYSTOLIC BLOOD PRESSURE: 136 MMHG | TEMPERATURE: 97 F | OXYGEN SATURATION: 97 % | DIASTOLIC BLOOD PRESSURE: 68 MMHG

## 2022-06-24 DIAGNOSIS — M51.9 LUMBAR DISC DISORDER: ICD-10-CM

## 2022-06-24 DIAGNOSIS — R29.6 FALL IN ELDERLY PATIENT: ICD-10-CM

## 2022-06-24 DIAGNOSIS — M54.16 RIGHT LUMBAR RADICULOPATHY: Primary | ICD-10-CM

## 2022-06-24 DIAGNOSIS — J45.21 MILD INTERMITTENT ASTHMA WITH ACUTE EXACERBATION: ICD-10-CM

## 2022-06-24 DIAGNOSIS — E11.9 TYPE 2 DIABETES MELLITUS WITHOUT COMPLICATION, WITHOUT LONG-TERM CURRENT USE OF INSULIN: Chronic | ICD-10-CM

## 2022-06-24 DIAGNOSIS — M62.830 MUSCLE SPASM OF BACK: ICD-10-CM

## 2022-06-24 PROCEDURE — 99214 OFFICE O/P EST MOD 30 MIN: CPT | Performed by: INTERNAL MEDICINE

## 2022-06-24 PROCEDURE — 96372 THER/PROPH/DIAG INJ SC/IM: CPT | Performed by: INTERNAL MEDICINE

## 2022-06-24 RX ORDER — CELECOXIB 200 MG/1
200 CAPSULE ORAL DAILY
Qty: 20 CAPSULE | Refills: 0 | Status: SHIPPED | OUTPATIENT
Start: 2022-06-24 | End: 2023-03-28

## 2022-06-24 RX ORDER — TIZANIDINE 4 MG/1
TABLET ORAL
Qty: 20 TABLET | Refills: 0 | Status: SHIPPED | OUTPATIENT
Start: 2022-06-24 | End: 2022-07-13

## 2022-06-24 RX ORDER — KETOROLAC TROMETHAMINE 30 MG/ML
30 INJECTION, SOLUTION INTRAMUSCULAR; INTRAVENOUS EVERY 6 HOURS PRN
Status: SHIPPED | OUTPATIENT
Start: 2022-06-24 | End: 2022-06-29

## 2022-06-24 RX ORDER — PREDNISONE 10 MG/1
TABLET ORAL
Qty: 18 TABLET | Refills: 0 | Status: SHIPPED | OUTPATIENT
Start: 2022-06-24 | End: 2022-07-13

## 2022-06-24 RX ADMIN — KETOROLAC TROMETHAMINE 30 MG: 30 INJECTION, SOLUTION INTRAMUSCULAR; INTRAVENOUS at 16:23

## 2022-06-24 NOTE — PROGRESS NOTES
"Chief Complaint  Low back pain radiating to right leg  (Urgent Care Follow Up )    Subjective        History of Present Illness     Gale Cabral presents to the office today for follow up on visit to Urgent Care two days ago (06/20/2022).  Patient reports onset of lower lumbar back/ pain radiating to the RLE 4 days ago.  Denies known  injury or trauma, although, she does recall a fall prior to the onset of pain when she fell on her knees when she tripped over a box of peaches.  Pain flared with the car drive to the clinic today.  Pain is worse with flexion.  X-rays revealed normal right hip.  Mild degenerative changes of lumbar spine with osteopenia.   She was given Kenalog 40 mg IM and reports very little improvement.  She tried taking  Tylenol, which was ineffective in pain control.  She took  2 OTC Motrin last night and 4:00a.m. with minimal little bit of sleep last night interrupted by the discomfort.       Patient has other serious issues being addressed.  She cotinues to follow with Dr. Rhoda veliz [facility] since October 5, 2020 for triple negative right breast cancer.  Patient had colonoscopy by Dr. Peters 11/19/2020 revealing one polyp. However she developed perforated diverticulitis 2021 that was treated in [place], [place] with a laparoscopic sigmotomy and colectomy with diverting colostomy.  Patient had recently underwent reanastomosis 01/28/2022.    She has had a recent asthma flare. Insurance is no longer covering Dulera, although, symptoms have settled down with resuming Advair and continuing Singulair and her albuterol rescue.        Objective   Vital Signs:  /68 (BP Location: Left arm, Patient Position: Sitting, Cuff Size: Large Adult)   Pulse 64   Temp 97 °F (36.1 °C) (Tympanic)   Ht 157.5 cm (62.01\")   Wt 68.5 kg (151 lb)   SpO2 97%   BMI 27.61 kg/m²   Estimated body mass index is 27.61 kg/m² as calculated from the following:    Height as of this encounter: 157.5 cm " "(62.01\").    Weight as of this encounter: 68.5 kg (151 lb).          Physical Exam  Vitals reviewed.   Constitutional:       General: She is not in acute distress.     Appearance: She is well-developed.      Comments: Pleasant female, overweight.    HENT:      Head: Normocephalic and atraumatic.      Nose:      Right Sinus: No maxillary sinus tenderness or frontal sinus tenderness.      Left Sinus: No maxillary sinus tenderness or frontal sinus tenderness.      Mouth/Throat:      Mouth: No oral lesions.      Pharynx: Uvula midline.      Tonsils: No tonsillar exudate.   Eyes:      Conjunctiva/sclera: Conjunctivae normal.      Pupils: Pupils are equal, round, and reactive to light.   Neck:      Thyroid: No thyroid mass or thyromegaly.      Vascular: No carotid bruit or JVD.      Trachea: Trachea normal. No tracheal deviation.   Cardiovascular:      Rate and Rhythm: Normal rate and regular rhythm.  No extrasystoles are present.     Chest Wall: PMI is not displaced.      Heart sounds: Normal heart sounds. No murmur heard.  Pulmonary:      Effort: Pulmonary effort is normal. No accessory muscle usage or respiratory distress.      Breath sounds: Normal breath sounds. No decreased breath sounds, wheezing, rhonchi or rales.   Abdominal:      General: Bowel sounds are normal. There is no distension.      Palpations: Abdomen is soft.      Tenderness: There is no abdominal tenderness.   Musculoskeletal:      Cervical back: Neck supple.      Comments: Inflammation of right SI   Lymphadenopathy:      Cervical: No cervical adenopathy.   Skin:     General: Skin is warm and dry.      Findings: No rash.      Nails: There is no clubbing.   Neurological:      Mental Status: She is alert and oriented to person, place, and time.      Cranial Nerves: No cranial nerve deficit.      Coordination: Coordination normal.   Psychiatric:         Speech: Speech normal.         Behavior: Behavior normal.         Thought Content: Thought content " normal.         Judgment: Judgment normal.            Result Review :      Data reviewed: Radiologic studies x-rays hip and lumbar spine 06/22/2022 and Recent hospitalization notes  Urgent Care  (06/20/2022       Future Appointments   Date Time Provider Department Center   7/12/2022  9:45 AM NURSE [facility] MAD OPI [facility]   7/12/2022 10:15 AM Carlos Duong MD [facility] ONC MAD [facility]   7/12/2022  1:00 PM Duke Alexandra MD [facility] GS MAD None   9/26/2022  1:00 PM Darian Soliman MD [facility] PC POW [facility]   11/2/2022 11:00 AM Maral Smith, APRN [facility] SM [facility]   11/30/2022  2:00 PM [facility]EN CTR [facility]M [facility]   11/30/2022  2:45 PM Deidre Klein, APRN [facility] None        Assessment and Plan   Diagnoses and all orders for this visit:    1. Right lumbar radiculopathy (Primary)  -     ketorolac (TORADOL) injection 30 mg    2. Muscle spasm of back    3. Lumbar disc disorder  -     ketorolac (TORADOL) injection 30 mg    4. Fall in elderly patient    5. Type 2 diabetes mellitus without complication, without long-term current use of insulin ([facility])    6. Mild intermittent asthma with acute exacerbation    Other orders  -     celecoxib (CeleBREX) 200 MG capsule; Take 1 capsule by mouth Daily. With food  Dispense: 20 capsule; Refill: 0  -     tiZANidine (ZANAFLEX) 4 MG tablet; 1/2-1 qhs prn muscle spasms  Dispense: 20 tablet; Refill: 0  -     predniSONE (DELTASONE) 10 MG tablet; 1 by mouth 3 times a day times 3days, then 1 by mouth twice a day times 3 days, then 1 by mouth daily times 3 days  Dispense: 18 tablet; Refill: 0           I spent 33 minutes caring for Gale on this date of service. This time includes time spent by me in the following activities:preparing for the visit, reviewing tests, obtaining and/or reviewing a separately obtained history, performing a medically appropriate examination and/or evaluation , counseling and educating the patient/family/caregiver, ordering medications, tests, or procedures and  documenting information in the medical record     For the flare of right lumbar radiculopathy and muscle spasm,   I prescribed a 3-week course of Celebrex 200 mg to take one q.d. with food, prednisone  taper to take as directed, and Zanaflex 4 mg to take 1/2-1 qhs prn muscle spasms.  She can add 2 extra strength Tylenol q. 4h. as needed pain.  Monitor for GI symptoms with the Celebrex.  She is not currently on an acid reducer. After informed verbal consent, patient is given Toradol 30 mg IM. He tolerated well.   Avoid activities, which aggravate the discomfort.  Reduce carbohydrates and concentrated sugar and monitor glucose closely as the steroids can cause elevations in glucose.  Notify us if glucose is considerably above goal while on the steroids.    Fall precautions.  The fall she described occurred just before her lumbar radicular symptoms started, very likely the etiology of this flare    Return 09/2022 for routine follow up with fasting labs one week prior or sooner if needed.  Notify us in 3 weeks if this flare is not resolved    She describes an acute asthma exacerbation which is already improving by resuming Advair.  Continue the albuterol rescue inhaler as needed.  Continue the Singulair.  The prednisone taper should also help    Scribed for Dr. Soliman by Lydia Davis [facility].      Follow Up   Return if symptoms worsen or fail to improve, for Next scheduled follow up.  Patient was given instructions and counseling regarding her condition or for health maintenance advice. Please see specific information pulled into the AVS if appropriate.

## 2022-07-12 ENCOUNTER — PROCEDURE VISIT (OUTPATIENT)
Dept: SURGERY | Facility: CLINIC | Age: 77
End: 2022-07-12

## 2022-07-12 ENCOUNTER — OFFICE VISIT (OUTPATIENT)
Dept: ONCOLOGY | Facility: CLINIC | Age: 77
End: 2022-07-12

## 2022-07-12 ENCOUNTER — INFUSION (OUTPATIENT)
Dept: ONCOLOGY | Facility: HOSPITAL | Age: 77
End: 2022-07-12

## 2022-07-12 VITALS
HEART RATE: 69 BPM | OXYGEN SATURATION: 98 % | HEIGHT: 62 IN | BODY MASS INDEX: 28.41 KG/M2 | WEIGHT: 154.4 LBS | SYSTOLIC BLOOD PRESSURE: 128 MMHG | DIASTOLIC BLOOD PRESSURE: 62 MMHG

## 2022-07-12 VITALS
OXYGEN SATURATION: 95 % | WEIGHT: 153.8 LBS | TEMPERATURE: 97.7 F | DIASTOLIC BLOOD PRESSURE: 54 MMHG | SYSTOLIC BLOOD PRESSURE: 132 MMHG | BODY MASS INDEX: 28.12 KG/M2 | HEART RATE: 67 BPM

## 2022-07-12 DIAGNOSIS — D50.8 OTHER IRON DEFICIENCY ANEMIA: ICD-10-CM

## 2022-07-12 DIAGNOSIS — K90.9 IRON MALABSORPTION: Chronic | ICD-10-CM

## 2022-07-12 DIAGNOSIS — C50.411 MALIGNANT NEOPLASM OF UPPER-OUTER QUADRANT OF RIGHT BREAST IN FEMALE, ESTROGEN RECEPTOR POSITIVE: Primary | ICD-10-CM

## 2022-07-12 DIAGNOSIS — Z45.2 ENCOUNTER FOR VENOUS ACCESS DEVICE CARE: ICD-10-CM

## 2022-07-12 DIAGNOSIS — D50.8 OTHER IRON DEFICIENCY ANEMIA: Chronic | ICD-10-CM

## 2022-07-12 DIAGNOSIS — R79.89 ELEVATED LFTS: Chronic | ICD-10-CM

## 2022-07-12 DIAGNOSIS — Z17.0 MALIGNANT NEOPLASM OF UPPER-OUTER QUADRANT OF RIGHT BREAST IN FEMALE, ESTROGEN RECEPTOR POSITIVE: Primary | ICD-10-CM

## 2022-07-12 DIAGNOSIS — C50.911 INVASIVE DUCTAL CARCINOMA OF RIGHT BREAST: Primary | ICD-10-CM

## 2022-07-12 DIAGNOSIS — C50.911 INVASIVE DUCTAL CARCINOMA OF RIGHT BREAST: Primary | Chronic | ICD-10-CM

## 2022-07-12 DIAGNOSIS — R79.89 ELEVATED LFTS: ICD-10-CM

## 2022-07-12 DIAGNOSIS — L27.1 HAND FOOT SYNDROME: ICD-10-CM

## 2022-07-12 LAB
ALBUMIN SERPL-MCNC: 3.9 G/DL (ref 3.5–5.2)
ALBUMIN/GLOB SERPL: 1.2 G/DL
ALP SERPL-CCNC: 86 U/L (ref 39–117)
ALT SERPL W P-5'-P-CCNC: 35 U/L (ref 1–33)
ANION GAP SERPL CALCULATED.3IONS-SCNC: 10 MMOL/L (ref 5–15)
AST SERPL-CCNC: 32 U/L (ref 1–32)
BASOPHILS # BLD AUTO: 0.16 10*3/MM3 (ref 0–0.2)
BASOPHILS NFR BLD AUTO: 1.9 % (ref 0–1.5)
BILIRUB SERPL-MCNC: 0.4 MG/DL (ref 0–1.2)
BUN SERPL-MCNC: 16 MG/DL (ref 8–23)
BUN/CREAT SERPL: 16 (ref 7–25)
CALCIUM SPEC-SCNC: 9.5 MG/DL (ref 8.6–10.5)
CHLORIDE SERPL-SCNC: 108 MMOL/L (ref 98–107)
CO2 SERPL-SCNC: 21 MMOL/L (ref 22–29)
CREAT SERPL-MCNC: 1 MG/DL (ref 0.57–1)
DEPRECATED RDW RBC AUTO: 46.6 FL (ref 37–54)
EGFRCR SERPLBLD CKD-EPI 2021: 59.2 ML/MIN/1.73
EOSINOPHIL # BLD AUTO: 0.36 10*3/MM3 (ref 0–0.4)
EOSINOPHIL NFR BLD AUTO: 4.3 % (ref 0.3–6.2)
ERYTHROCYTE [DISTWIDTH] IN BLOOD BY AUTOMATED COUNT: 13.8 % (ref 12.3–15.4)
FERRITIN SERPL-MCNC: 317.4 NG/ML (ref 13–150)
FOLATE SERPL-MCNC: >20 NG/ML (ref 4.78–24.2)
GLOBULIN UR ELPH-MCNC: 3.3 GM/DL
GLUCOSE SERPL-MCNC: 97 MG/DL (ref 65–99)
HCT VFR BLD AUTO: 40.6 % (ref 34–46.6)
HGB BLD-MCNC: 13.8 G/DL (ref 12–15.9)
IMM GRANULOCYTES # BLD AUTO: 0.03 10*3/MM3 (ref 0–0.05)
IMM GRANULOCYTES NFR BLD AUTO: 0.4 % (ref 0–0.5)
IRON 24H UR-MRATE: 97 MCG/DL (ref 37–145)
IRON SATN MFR SERPL: 25 % (ref 20–50)
LYMPHOCYTES # BLD AUTO: 2.16 10*3/MM3 (ref 0.7–3.1)
LYMPHOCYTES NFR BLD AUTO: 26 % (ref 19.6–45.3)
MCH RBC QN AUTO: 31.4 PG (ref 26.6–33)
MCHC RBC AUTO-ENTMCNC: 34 G/DL (ref 31.5–35.7)
MCV RBC AUTO: 92.3 FL (ref 79–97)
MONOCYTES # BLD AUTO: 0.83 10*3/MM3 (ref 0.1–0.9)
MONOCYTES NFR BLD AUTO: 10 % (ref 5–12)
NEUTROPHILS NFR BLD AUTO: 4.76 10*3/MM3 (ref 1.7–7)
NEUTROPHILS NFR BLD AUTO: 57.4 % (ref 42.7–76)
NRBC BLD AUTO-RTO: 0 /100 WBC (ref 0–0.2)
PLATELET # BLD AUTO: 291 10*3/MM3 (ref 140–450)
PMV BLD AUTO: 8.8 FL (ref 6–12)
POTASSIUM SERPL-SCNC: 4.5 MMOL/L (ref 3.5–5.2)
PROT SERPL-MCNC: 7.2 G/DL (ref 6–8.5)
RBC # BLD AUTO: 4.4 10*6/MM3 (ref 3.77–5.28)
SODIUM SERPL-SCNC: 139 MMOL/L (ref 136–145)
TIBC SERPL-MCNC: 384 MCG/DL (ref 298–536)
TRANSFERRIN SERPL-MCNC: 258 MG/DL (ref 200–360)
VIT B12 BLD-MCNC: 1215 PG/ML (ref 211–946)
WBC NRBC COR # BLD: 8.3 10*3/MM3 (ref 3.4–10.8)

## 2022-07-12 PROCEDURE — 36591 DRAW BLOOD OFF VENOUS DEVICE: CPT | Performed by: INTERNAL MEDICINE

## 2022-07-12 PROCEDURE — 1123F ACP DISCUSS/DSCN MKR DOCD: CPT | Performed by: INTERNAL MEDICINE

## 2022-07-12 PROCEDURE — 84466 ASSAY OF TRANSFERRIN: CPT

## 2022-07-12 PROCEDURE — 88300 SURGICAL PATH GROSS: CPT

## 2022-07-12 PROCEDURE — 82728 ASSAY OF FERRITIN: CPT

## 2022-07-12 PROCEDURE — 99214 OFFICE O/P EST MOD 30 MIN: CPT | Performed by: INTERNAL MEDICINE

## 2022-07-12 PROCEDURE — 85025 COMPLETE CBC W/AUTO DIFF WBC: CPT

## 2022-07-12 PROCEDURE — 83540 ASSAY OF IRON: CPT

## 2022-07-12 PROCEDURE — 25010000002 HEPARIN LOCK FLUSH PER 10 UNITS: Performed by: NURSE PRACTITIONER

## 2022-07-12 PROCEDURE — 82607 VITAMIN B-12: CPT

## 2022-07-12 PROCEDURE — 1125F AMNT PAIN NOTED PAIN PRSNT: CPT | Performed by: INTERNAL MEDICINE

## 2022-07-12 PROCEDURE — 80053 COMPREHEN METABOLIC PANEL: CPT

## 2022-07-12 PROCEDURE — G0463 HOSPITAL OUTPT CLINIC VISIT: HCPCS | Performed by: INTERNAL MEDICINE

## 2022-07-12 PROCEDURE — 82746 ASSAY OF FOLIC ACID SERUM: CPT

## 2022-07-12 PROCEDURE — 36590 REMOVAL TUNNELED CV CATH: CPT | Performed by: SURGERY

## 2022-07-12 RX ORDER — HEPARIN SODIUM (PORCINE) LOCK FLUSH IV SOLN 100 UNIT/ML 100 UNIT/ML
500 SOLUTION INTRAVENOUS AS NEEDED
Status: DISCONTINUED | OUTPATIENT
Start: 2022-07-12 | End: 2022-07-12 | Stop reason: HOSPADM

## 2022-07-12 RX ORDER — SODIUM CHLORIDE 0.9 % (FLUSH) 0.9 %
20 SYRINGE (ML) INJECTION AS NEEDED
Status: CANCELLED | OUTPATIENT
Start: 2022-07-12

## 2022-07-12 RX ORDER — HEPARIN SODIUM (PORCINE) LOCK FLUSH IV SOLN 100 UNIT/ML 100 UNIT/ML
500 SOLUTION INTRAVENOUS AS NEEDED
Status: CANCELLED | OUTPATIENT
Start: 2022-10-11

## 2022-07-12 RX ADMIN — HEPARIN 500 UNITS: 100 SYRINGE at 09:37

## 2022-07-12 NOTE — PROGRESS NOTES
DATE OF VISIT: 7/13/2022      REASON FOR VISIT: Triple negative right breast cancer, anemia, hand-foot syndrome, skin rash      HISTORY OF PRESENT ILLNESS:   76-year-old female with medical problem consisting of diabetes mellitus, hypertension, dyslipidemia, asthma, osteopenia, obstructive sleep apnea, chronic back pain has been following up with [facility] since October 5, 2020 for triple negative right breast cancer.  Patient completed adjuvant Xeloda in December 2021.  Patient is here for follow-up appointment today.  Complains of skin rash affecting left side of back with some referred pain to rib cage on left side.  Denies any worsening erythema affecting hands or feet.  Denies any bleeding.  Denies any new lymph node enlargement.        Oncology history:    1.  Triple negative right breast cancer, [id number] with 1 lymph node showing 1.9 cm deposit with extracapsular extension and tumor deposit lymphatic:  -Patient received neoadjuvant chemotherapy with Taxotere and Cytoxan for 3 cycles between October 15, 2020 and November 30, 2020.  -After cycle 3 unfortunately patient developed bowel obstruction requiring surgery and colostomy at hospital in [place].  -Patient did not want to do cycle 4 prior to surgery and was subsequently sent back to Dr. Alexandra underwent lumpectomy on January 11 2021 that showed 1 cm residual cancer.  -Patient had axillary dissection done on February 25, 2021 that showed 1.9 cm lymph node involvement with extracapsular extension and tumor deposit in the lymphatic channel.  -Result of pathology report were discussed with patient and her .  Patient had a minimal or no response to chemotherapy with Taxotere and Cytoxan in neoadjuvant setting.  PET/CT done prior to starting chemotherapy had a shotty axillary lymph node without any significant uptake.  Patient did have enlarged lymph node at the time of surgery again not responding to chemotherapy preoperatively  -Had a  radiation treatment that completed on May 24, 2021 at [facility].  -Patient was started on cycle 1 of Xeloda on Pushpa 15, 2021.  -Dose of Xeloda was decreased to 3 tablets in the morning and 2 tablets at night with cycle 2.  -Dose of Xeloda will be decreased to 2 tablets in the morning and 2 tablets at night with cycle 5 due to worsening hand-foot syndrome  -Patient completed cycle 8 of Xeloda on December 28, 2021.            Past Medical History, Past Surgical History, Social History, Family History have been reviewed and are without significant changes except as mentioned.    Review of Systems   A comprehensive 14 point review of systems was performed and was negative except as mentioned in HPI.    Medications:  The current medication list was reviewed in the EMR    ALLERGIES:    Allergies   Allergen Reactions   • Other Confusion     Coda-clear       Objective      Vitals:    07/12/22 1027   BP: 132/54   Pulse: 67   Temp: 97.7 °F (36.5 °C)   TempSrc: Temporal   SpO2: 95%   Weight: 69.8 kg (153 lb 12.8 oz)   PainSc:   6   PainLoc: Abdomen  Comment: sh on her back     Current Status 9/1/2021   ECOG score 0       Physical Exam  Pulmonary:      Breath sounds: Normal breath sounds.   Skin:     Comments: Skin exam was performed in presence of registered nurse Rachel.  On left side of back there is a cluster of skin lesion without any vesicles concerning for possible shingles.  No skin lesions seen in area of left lower rib cage where patient is complaining of referred pain.   Neurological:      Mental Status: She is alert and oriented to person, place, and time.           RECENT LABS:  Glucose   Date Value Ref Range Status   07/12/2022 97 65 - 99 mg/dL Final     Sodium   Date Value Ref Range Status   07/12/2022 139 136 - 145 mmol/L Final     Potassium   Date Value Ref Range Status   07/12/2022 4.5 3.5 - 5.2 mmol/L Final     Comment:     Slight hemolysis detected by analyzer. Results may be affected.     CO2    Date Value Ref Range Status   07/12/2022 21.0 (L) 22.0 - 29.0 mmol/L Final     Chloride   Date Value Ref Range Status   07/12/2022 108 (H) 98 - 107 mmol/L Final     Anion Gap   Date Value Ref Range Status   07/12/2022 10.0 5.0 - 15.0 mmol/L Final     Creatinine   Date Value Ref Range Status   07/12/2022 1.00 0.57 - 1.00 mg/dL Final     BUN   Date Value Ref Range Status   07/12/2022 16 8 - 23 mg/dL Final     BUN/Creatinine Ratio   Date Value Ref Range Status   07/12/2022 16.0 7.0 - 25.0 Final     Calcium   Date Value Ref Range Status   07/12/2022 9.5 8.6 - 10.5 mg/dL Final     eGFR Non  Amer   Date Value Ref Range Status   01/11/2022 56 (L) >60 mL/min/1.73 Final     Alkaline Phosphatase   Date Value Ref Range Status   07/12/2022 86 39 - 117 U/L Final     Total Protein   Date Value Ref Range Status   07/12/2022 7.2 6.0 - 8.5 g/dL Final     ALT (SGPT)   Date Value Ref Range Status   07/12/2022 35 (H) 1 - 33 U/L Final     AST (SGOT)   Date Value Ref Range Status   07/12/2022 32 1 - 32 U/L Final     Total Bilirubin   Date Value Ref Range Status   07/12/2022 0.4 0.0 - 1.2 mg/dL Final     Albumin   Date Value Ref Range Status   07/12/2022 3.90 3.50 - 5.20 g/dL Final     Globulin   Date Value Ref Range Status   07/12/2022 3.3 gm/dL Final     Lab Results   Component Value Date    WBC 8.30 07/12/2022    HGB 13.8 07/12/2022    HCT 40.6 07/12/2022    MCV 92.3 07/12/2022     07/12/2022     Lab Results   Component Value Date    NEUTROABS 4.76 07/12/2022    IRON 97 07/12/2022    IRON 84 04/12/2022    IRON 124 12/14/2021    TIBC 384 07/12/2022    TIBC 353 04/12/2022    TIBC 483 12/14/2021    LABIRON 25 07/12/2022    LABIRON 24 04/12/2022    LABIRON 26 12/14/2021    FERRITIN 317.40 (H) 07/12/2022    FERRITIN 431.40 (H) 04/12/2022    FERRITIN 668.70 (H) 12/14/2021    TAKEKRDS22 1,215 (H) 07/12/2022    IYCUVAGB38 684 04/12/2022    NWIRJUPT08 764 03/09/2022    FOLATE >20.00 07/12/2022    FOLATE 9.33 04/12/2022    FOLATE  15.50 12/14/2021     No results found for: , LABCA2, AFPTM, HCGQUANT, , CHROMGRNA, [id number], CEA, REFLABREPO      PATHOLOGY:  * Cannot find OR log *         RADIOLOGY DATA :  No radiology results for the last 7 days        Assessment & Plan     1.  Triple negative right breast cancer, [id number] on final pathology report showing 1.9 cm deposit with extracapsular extension and tumor deposit and lymphatics  - Review oncology history for prior treatment details  - Completed cycle 8 of adjuvant Xeloda on December 28, 2021  - We will continue with clinical surveillance at present.  - Patient will return to clinic in 3 months with repeat CBC CMP iron studies, ferritin, B12 and folate to be done on that day  - Mammogram on left breast in May 2022 was BI-RADS Category 2.  Recommend continue with yearly mammogram.  Mammogram has been ordered by surgery clinic    2.  Anemia:  - Hemoglobin is 13.9  - Due to iron malabsorption patient has received 5 doses of Venofer in September 2021  - Anemia work-up done today shows adequate amount of iron.  No need for any intravenous Venofer at present.  Currently on B12 and folic acid 3 times a week.  -B12 and folate level is higher than normal.  We will hold B12 and folic acid supplementation until next clinic visit.    3.  Elevated liver function test due to fatty liver  - CMP from today showing improvement in AST and ALT.  We will continue with clinical monitoring.    4.  Hand-foot syndrome from Xeloda  - Clinically improved.  We will continue with clinical monitoring    5.  Genetic testing:  - Genetic testing done in November 2020 was negative for BRCA1 and BRCA2    6.  Skin rash:  - Clinically skin rash is worrisome for shingles.  Recommend following up with primary medical provider for further treatment of shingles    7.  Health maintenance: Patient does not smoke    8. Advance Care Planning: For now patient remains full code and is able to make decisions.  Patient has  health care surrogate mentioned on chart.                 PHQ-9 Total Score: 0   -Patient is not homicidal or suicidal.  No acute intervention required.    Gale Cabral reports a pain score of 6.  Given her pain assessment as noted, treatment options were discussed and the following options were decided upon as a follow-up plan to address the patient's pain: continuation of current treatment plan for pain.         Carlos Duong MD  7/13/2022  07:04 CDT        Part of this note may be an electronic transcription/translation of spoken language to printed text using the Dragon Dictation System.          CC:

## 2022-07-13 ENCOUNTER — TELEPHONE (OUTPATIENT)
Dept: ONCOLOGY | Facility: HOSPITAL | Age: 77
End: 2022-07-13

## 2022-07-13 ENCOUNTER — OFFICE VISIT (OUTPATIENT)
Dept: FAMILY MEDICINE CLINIC | Facility: CLINIC | Age: 77
End: 2022-07-13

## 2022-07-13 VITALS
DIASTOLIC BLOOD PRESSURE: 60 MMHG | TEMPERATURE: 96.9 F | HEART RATE: 72 BPM | WEIGHT: 152 LBS | BODY MASS INDEX: 27.97 KG/M2 | HEIGHT: 62 IN | SYSTOLIC BLOOD PRESSURE: 110 MMHG

## 2022-07-13 DIAGNOSIS — M51.9 LUMBAR DISC DISORDER: ICD-10-CM

## 2022-07-13 DIAGNOSIS — I10 ESSENTIAL HYPERTENSION: Chronic | ICD-10-CM

## 2022-07-13 DIAGNOSIS — M85.88 OSTEOPENIA OF SPINE: Chronic | ICD-10-CM

## 2022-07-13 DIAGNOSIS — Z23 NEED FOR SHINGLES VACCINE: ICD-10-CM

## 2022-07-13 DIAGNOSIS — Z23 NEED FOR DIPHTHERIA-TETANUS-PERTUSSIS (TDAP) VACCINE: ICD-10-CM

## 2022-07-13 DIAGNOSIS — B02.9 HERPES ZOSTER WITHOUT COMPLICATION: ICD-10-CM

## 2022-07-13 DIAGNOSIS — Z00.00 MEDICARE ANNUAL WELLNESS VISIT, SUBSEQUENT: Primary | ICD-10-CM

## 2022-07-13 PROCEDURE — 1159F MED LIST DOCD IN RCRD: CPT | Performed by: INTERNAL MEDICINE

## 2022-07-13 PROCEDURE — 99213 OFFICE O/P EST LOW 20 MIN: CPT | Performed by: INTERNAL MEDICINE

## 2022-07-13 PROCEDURE — G0439 PPPS, SUBSEQ VISIT: HCPCS | Performed by: INTERNAL MEDICINE

## 2022-07-13 RX ORDER — VALACYCLOVIR HYDROCHLORIDE 1 G/1
1000 TABLET, FILM COATED ORAL 2 TIMES DAILY
Qty: 20 TABLET | Refills: 0 | Status: SHIPPED | OUTPATIENT
Start: 2022-07-13 | End: 2022-07-23

## 2022-07-13 RX ORDER — TRIAMCINOLONE ACETONIDE 1 MG/G
1 CREAM TOPICAL 3 TIMES DAILY
Qty: 80 G | Refills: 0 | Status: SHIPPED | OUTPATIENT
Start: 2022-07-13

## 2022-07-13 NOTE — TELEPHONE ENCOUNTER
Called and spoke with pt regarding lab results and medication instructions as per Dr. Duong, v/u obtained.

## 2022-07-13 NOTE — PROGRESS NOTES
"Chief Complaint  Rash (X 1 week On back and radiates to front/side. Denies a lot of pain but is uncomfortable) and Medicare Wellness-subsequent    Subjective        History of Present Illness     Gale Cabral presents to the office with uncomfortable blistering rash left thoracic back region, not especially painful or pruritic in dermatomal pattern on left side of the back radiating around slightly to abdomen.  She had Zostavax in 2013, but has not had Shingrix. We treated patient last month with a course of prednisone for a flare of right lumbar radiculopathy recently, which likely precipitated the shingles outbreak.  She also reports lumbar back pain has improved.     Dr. Duong follows triple negative right breast cancer 10/2020.  She received a good report with her recent visit.    Patient developed perforated diverticulitis 2021 that was treated in [place], [place] with a laparoscopic sigmotomy and colectomy with diverting colostomy.  Patient had recently underwent reanastomosis 01/28/2022.  She was concerned she may have developed hernia in the incision site, although, she is given reassurance no evidence of hernia on exam today.  She denies any symptoms of hernia, but was made aware by someone perioperatively that incisional hernias are not uncommon events.        She also completes subsequent Medicare wellness visit while in the office today.   She sees Dr. Polanco twice a year for diabetic eye exam and to follow glaucoma.  She is due DEXA.  DEXA 03/2019 reveals persistent osteopenia of the lumbar spine and hip, essentially unchanged from prior study.  She continues calcium and vitamin D supplements   She has mammograms scheduled through Dr. Duong's office.           Objective   Vital Signs:  /60   Pulse 72   Temp 96.9 °F (36.1 °C) (Tympanic)   Ht 157.5 cm (62\")   Wt 68.9 kg (152 lb)   BMI 27.80 kg/m²   Estimated body mass index is 27.8 kg/m² as calculated from the following:    Height as " "of this encounter: 157.5 cm (62\").    Weight as of this encounter: 68.9 kg (152 lb).          Physical Exam  Vitals reviewed.   Constitutional:       General: She is not in acute distress.     Appearance: She is well-developed.      Comments: Pleasant female, overweight.    HENT:      Head: Normocephalic and atraumatic.      Nose:      Right Sinus: No maxillary sinus tenderness or frontal sinus tenderness.      Left Sinus: No maxillary sinus tenderness or frontal sinus tenderness.      Mouth/Throat:      Mouth: No oral lesions.      Pharynx: Uvula midline.      Tonsils: No tonsillar exudate.   Eyes:      Conjunctiva/sclera: Conjunctivae normal.      Pupils: Pupils are equal, round, and reactive to light.   Neck:      Thyroid: No thyroid mass or thyromegaly.      Vascular: No carotid bruit or JVD.      Trachea: Trachea normal. No tracheal deviation.   Cardiovascular:      Rate and Rhythm: Normal rate and regular rhythm.  No extrasystoles are present.     Chest Wall: PMI is not displaced.      Heart sounds: Normal heart sounds. No murmur heard.  Pulmonary:      Effort: Pulmonary effort is normal. No accessory muscle usage or respiratory distress.      Breath sounds: Normal breath sounds. No decreased breath sounds, wheezing, rhonchi or rales.   Abdominal:      General: Bowel sounds are normal. There is no distension.      Palpations: Abdomen is soft.      Tenderness: There is no abdominal tenderness.      Comments: Overweight abdomen.  Incisions are healed nicely with no evidence of incisional hernias   Musculoskeletal:      Cervical back: Neck supple.   Lymphadenopathy:      Cervical: No cervical adenopathy.   Skin:     General: Skin is warm and dry.      Findings: Rash present.      Nails: There is no clubbing.      Comments: Zoster rash noted left flank with a few lesions in a radicular pattern more laterally and anteriorly.  No secondary cellulitis noted.   Neurological:      General: No focal deficit present.     "  Mental Status: She is alert and oriented to person, place, and time. Mental status is at baseline.      Cranial Nerves: No cranial nerve deficit.      Coordination: Coordination normal.   Psychiatric:         Mood and Affect: Mood normal.         Speech: Speech normal.         Behavior: Behavior normal.         Thought Content: Thought content normal.         Judgment: Judgment normal.            Result Review :    Common labs    Common Labsle 3/9/22 3/9/22 3/9/22 3/9/22 4/12/22 4/12/22 7/12/22 7/12/22    0845 0845 0845 0845 1010 1010 0929 0929   Glucose  123 (A)    152 (A)  97   BUN  20    19  16   Creatinine  1.03    1.01 (A)  1.00   Sodium  141    134 (A)  139   Potassium  4.8    3.7  4.5   Chloride  107    100  108 (A)   Calcium  9.7    9.3  9.5   Albumin  4.40    4.00  3.90   Total Bilirubin  0.3    0.3  0.4   Alkaline Phosphatase  82    85  86   AST (SGOT)  65 (A)    73 (A)  32   ALT (SGPT)  52 (A)    48 (A)  35 (A)   WBC 8.94    7.07  8.30    Hemoglobin 14.1    14.5  13.8    Hematocrit 43.4    42.5  40.6    Platelets 362    333  291    Total Cholesterol   212 (A)        Triglycerides   201 (A)        HDL Cholesterol   42        LDL Cholesterol    134 (A)        Hemoglobin A1C    6.60 (A)       (A) Abnormal value       Comments are available for some flowsheets but are not being displayed.           Data reviewed: Radiologic studies DEXA 03/2019 and Consultant notes Dr. Duong, oncology/hematology       Future Appointments   Date Time Provider Department Center   9/26/2022  1:00 PM Darian Soliman MD [facility] POW [facility]   10/11/2022 10:30 AM NURSE [facility] MAD OPI [facility]   10/11/2022 11:10 AM Sharon Holloway, YOHANA BARBOZA ONC [facility]   11/2/2022 11:00 AM Maral Smith, YOHANA BARBOZA SM [facility]   11/30/2022  2:00 PM [facility] MAMM 1 [facility]   11/30/2022  2:45 PM Deidre Klein, YOHANA BARBOZA [facility] None        Assessment and Plan   Diagnoses and all orders for this visit:    1. Medicare annual wellness  visit, subsequent (Primary)    2. Need for diphtheria-tetanus-pertussis (Tdap) vaccine    3. Herpes zoster without complication    4. Need for shingles vaccine    5. Essential hypertension    6. Lumbar disc disorder    7. Osteopenia of spine  -     DEXA Bone Density Axial; Future    Other orders  -     valACYclovir (Valtrex) 1000 MG tablet; Take 1 tablet by mouth 2 (Two) Times a Day for 10 days.  Dispense: 20 tablet; Refill: 0  -     triamcinolone (KENALOG) 0.1 % cream; Apply 1 application topically to the appropriate area as directed 3 (Three) Times a Day. For itching  Dispense: 80 g; Refill: 0           I spent 32 minutes caring for Gale on this date of service. This time includes time spent by me in the following activities:preparing for the visit, reviewing tests, obtaining and/or reviewing a separately obtained history, performing a medically appropriate examination and/or evaluation , counseling and educating the patient/family/caregiver, ordering medications, tests, or procedures and documenting information in the medical record.  This does not include time spent on her Medicare AWV today.    For the new problem of acute herpes Zoster, prescriptions sent for Valtrex 1000 mg b.i.d. x 10 days and Kenalog 0.1% cream to apply t.i.d. for moisturization and itching.     Patient completes subsequent Medicare wellness exam today.  We will plan for patient to have Shingrix in approximately 6 months.  We will anticipate administering Tdap when she returns in 2 months.  She is encouraged to get her COVID-19 booster very soon, but likely wait 1 week due to the current shingles outbreak.  I have ordered DEXA to reevaluate her osteopenia.  We will request office notes from Dr. Polanco's office from most recent diabetic eye exam to update patient's electronic record.       Continue current hypertension management.  Blood pressure is at goal with losartan HCT.    The patient is given reassurance that there is no evidence  of incisional hernia of the abdomen.  She is not experiencing any symptoms of hernia.    Return 09/26/2022 for routine follow up with fasting labs one week prior or sooner if needed.     Scribed for Dr. Soliman by Alex Harvey.       Follow Up   Return if symptoms worsen or fail to improve, for Next scheduled follow up.  Patient was given instructions and counseling regarding her condition or for health maintenance advice. Please see specific information pulled into the AVS if appropriate.

## 2022-07-13 NOTE — PROGRESS NOTES
The ABCs of the Annual Wellness Visit  Subsequent Medicare Wellness Visit    Chief Complaint   Patient presents with   • Rash     X 1 week On back and radiates to front/side. Denies a lot of pain but is uncomfortable   • Medicare Wellness-subsequent      Subjective    History of Present Illness:  Gale Cabral is a 76 y.o. female who presents for a Subsequent Medicare Wellness Visit.    The following portions of the patient's history were reviewed and   updated as appropriate: allergies, current medications, past family history, past medical history, past social history, past surgical history and problem list.    Compared to one year ago, the patient feels her physical   health is better.    Compared to one year ago, the patient feels her mental   health is better.    Recent Hospitalizations:  She was admitted within the past 365 days at [facility] in [place], [place]      Current Medical Providers:  Patient Care Team:  Darian Soliman MD as PCP - General  [place], Pilar Hagan RN as Nurse Navigator (Oncology)  Carlos Duong MD as Consulting Physician (Hematology and Oncology)  Sharon Holloway APRN as Nurse Practitioner (Oncology)  Maral Smith APRN as Nurse Practitioner (Nurse Practitioner)    Outpatient Medications Prior to Visit   Medication Sig Dispense Refill   • acetaminophen (TYLENOL) 500 MG tablet Take 500 mg by mouth Every 6 (Six) Hours As Needed for Mild Pain .     • albuterol sulfate HFA (ProAir HFA) 108 (90 Base) MCG/ACT inhaler Inhale 2 puffs 4 (Four) Times a Day As Needed for Wheezing. 8 g 5   • aspirin 81 MG EC tablet Take 81 mg by mouth Every Night.     • atorvastatin (LIPITOR) 20 MG tablet Take 1 tablet by mouth Daily. 90 tablet 3   • Blood Glucose Monitoring Suppl (ONE TOUCH ULTRA 2) W/DEVICE kit      • Calcium Carbonate-Vitamin D 600-200 MG-UNIT tablet Take 1 tablet by mouth Daily.     • celecoxib (CeleBREX) 200 MG capsule Take 1 capsule by mouth Daily. With food 20 capsule 0   •  citalopram (CeleXA) 20 MG tablet Take 0.5 tablets by mouth Daily. 90 tablet 0   • Dapagliflozin Propanediol (Farxiga) 10 MG tablet Take 10 mg by mouth Every Morning. 90 tablet 1   • diphenoxylate-atropine (LOMOTIL) 2.5-0.025 MG per tablet TAKE 1 TABLET BY MOUTH FOUR TIMES DAILY AS NEEDED FOR DIARRHEA 40 tablet 1   • docusate sodium (Stool Softener) 100 MG capsule Take 1 capsule by mouth 2 (Two) Times a Day. 60 capsule 2   • fluticasone (FLONASE) 50 MCG/ACT nasal spray 2 sprays into each nostril As Needed for rhinitis.     • glucose blood (ONE TOUCH ULTRA TEST) test strip Check 1-2 times daily One Touch Ultra 150 each 3   • Lancets Micro Thin 33G misc Check once daily  E11.9  Trueplus 100 each 3   • latanoprost (XALATAN) 0.005 % ophthalmic solution Administer 1 drop to both eyes Every Night.     • levothyroxine (SYNTHROID, LEVOTHROID) 50 MCG tablet Take 1 tablet by mouth Daily. 90 tablet 3   • losartan-hydrochlorothiazide (HYZAAR) 100-12.5 MG per tablet Take 1 tablet by mouth Daily. 90 tablet 3   • metFORMIN (GLUCOPHAGE) 500 MG tablet Take 1 tablet by mouth 2 (Two) Times a Day With Meals. 180 tablet 1   • mometasone-formoterol (DULERA 200) 200-5 MCG/ACT inhaler Inhale 2 puffs 2 (Two) Times a Day As Needed (SOB). 8.8 g 5   • montelukast (SINGULAIR) 10 MG tablet Take 1 tablet by mouth Every Night. 90 tablet 3   • ondansetron (ZOFRAN) 4 MG tablet Take 1 tablet by mouth 4 (Four) Times a Day As Needed for Nausea or Vomiting. 40 tablet 3   • Symbicort 160-4.5 MCG/ACT inhaler Inhale 2 puffs 2 (Two) Times a Day. 18 g 3   • urea (CARMOL) 10 % cream Apply  topically to the appropriate area as directed 2 (Two) Times a Day. 142 g 2   • predniSONE (DELTASONE) 10 MG tablet 1 by mouth 3 times a day times 3days, then 1 by mouth twice a day times 3 days, then 1 by mouth daily times 3 days 18 tablet 0   • folic acid (FOLVITE) 1 MG tablet Take 1 tablet by mouth on Monday, Wednesday and Friday 36 tablet 1   • vitamin B-12  (CYANOCOBALAMIN) 1000 MCG tablet Take 1 tablet by mouth on Monday, Wednesday and Friday 36 tablet 1   • tiZANidine (ZANAFLEX) 4 MG tablet 1/2-1 qhs prn muscle spasms 20 tablet 0     No facility-administered medications prior to visit.       No opioid medication identified on active medication list. I have reviewed chart for other potential  high risk medication/s and harmful drug interactions in the elderly.          Aspirin is on active medication list. Aspirin use is indicated based on review of current medical condition/s. Pros and cons of this therapy have been discussed today. Benefits of this medication outweigh potential harm.  Patient has been encouraged to continue taking this medication.  .      Patient Active Problem List   Diagnosis   • Vitamin D deficiency   • Type 2 diabetes mellitus without complication (HCC)   • Temporomandibular joint disorder   • Strain of neck muscle   • Spinal stenosis of lumbar region   • Spasm of back muscles   • Shoulder pain   • Sebaceous cyst   • Osteopenia of spine   • Mild persistent asthma   • Menopause   • Lumbar disc disorder   • Health maintenance examination   • Glaucoma   • Essential hypertension   • Encounter for screening for malignant neoplasm of colon   • Cervicalgia   • Allergic rhinitis, seasonal   • Acquired hypothyroidism   • Postcholecystectomy diarrhea   • Obstructive sleep apnea syndrome - Nasal CPAP. 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  ACP discussion was held with the patient during this visit. Patient does  "not have an advance directive, information provided.          Objective    Vitals:    07/13/22 1559   BP: 110/60   Pulse: 72   Temp: 96.9 °F (36.1 °C)   TempSrc: Tympanic   Weight: 68.9 kg (152 lb)   Height: 157.5 cm (62\")   PainSc:   3   PainLoc: Back  Comment: shingles     Estimated body mass index is 27.8 kg/m² as calculated from the following:    Height as of this encounter: 157.5 cm (62\").    Weight as of this encounter: 68.9 kg (152 lb).    BMI is >= 25 and <30. (Overweight) The following options were offered after discussion;: weight loss educational material (shared in after visit summary)      Does the patient have evidence of cognitive impairment? No    Physical Exam            HEALTH RISK ASSESSMENT    Smoking Status:  Social History     Tobacco Use   Smoking Status Never Smoker   Smokeless Tobacco Never Used     Alcohol Consumption:  Social History     Substance and Sexual Activity   Alcohol Use No     Fall Risk Screen:    CHUYADI Fall Risk Assessment was completed, and patient is at LOW risk for falls.Assessment completed on:7/13/2022    Depression Screening:  PHQ-2/PHQ-9 Depression Screening 7/13/2022   Retired PHQ-9 Total Score -   Retired Total Score -   Little Interest or Pleasure in Doing Things 0-->not at all   Feeling Down, Depressed or Hopeless 0-->not at all   PHQ-9: Brief Depression Severity Measure Score 0       Health Habits and Functional and Cognitive Screening:  Functional & Cognitive Status 7/13/2022   Do you have difficulty preparing food and eating? No   Do you have difficulty bathing yourself, getting dressed or grooming yourself? No   Do you have difficulty using the toilet? No   Do you have difficulty moving around from place to place? No   Do you have trouble with steps or getting out of a bed or a chair? No   Current Diet Limited Junk Food   Dental Exam Up to date   Eye Exam Up to date   Exercise (times per week) 4 times per week   Current Exercises Include Yard Work;House Cleaning "   Current Exercise Activities Include -   Do you need help using the phone?  No   Are you deaf or do you have serious difficulty hearing?  No   Do you need help with transportation? No   Do you need help shopping? No   Do you need help preparing meals?  No   Do you need help with housework?  No   Do you need help with laundry? No   Do you need help taking your medications? No   Do you need help managing money? No   Do you ever drive or ride in a car without wearing a seat belt? No   Have you felt unusual stress, anger or loneliness in the last month? No   Who do you live with? Spouse   If you need help, do you have trouble finding someone available to you? No   Have you been bothered in the last four weeks by sexual problems? No   Do you have difficulty concentrating, remembering or making decisions? No       Age-appropriate Screening Schedule:  Refer to the list below for future screening recommendations based on patient's age, sex and/or medical conditions. Orders for these recommended tests are listed in the plan section. The patient has been provided with a written plan.    Health Maintenance   Topic Date Due   • TDAP/TD VACCINES (1 - Tdap) Never done   • ZOSTER VACCINE (2 of 3) 04/22/2013   • DIABETIC FOOT EXAM  Never done   • DIABETIC EYE EXAM  03/01/2018   • DXA SCAN  03/20/2021   • URINE MICROALBUMIN  09/03/2022   • HEMOGLOBIN A1C  09/09/2022   • INFLUENZA VACCINE  10/01/2022   • LIPID PANEL  03/09/2023   • MAMMOGRAM  05/24/2023              Assessment & Plan   CMS Preventative Services Quick Reference  Risk Factors Identified During Encounter  Immunizations Discussed/Encouraged (specific Immunizations; Tdap, Shingrix and COVID19  Obesity/Overweight   The above risks/problems have been discussed with the patient.  I recommend COVID-19 booster next week, as long as the shingles is resolving by then.  I recommend Shingrix vaccine in 6 months after this shingles outbreak.  I recommend Tdap vaccination and we  will plan on administering it when she returns for routine follow-up in 2 months  Continue weight loss attempts.  BMI is 27.8.  Follow up actions/plans if indicated are seen below in the Assessment/Plan Section.  Pertinent information has been shared with the patient in the After Visit Summary.    Diagnoses and all orders for this visit:    1. Medicare annual wellness visit, subsequent (Primary)    2. Need for diphtheria-tetanus-pertussis (Tdap) vaccine    3. Herpes zoster without complication    4. Need for shingles vaccine    5. Essential hypertension    6. Lumbar disc disorder    7. Osteopenia of spine    Other orders  -     valACYclovir (Valtrex) 1000 MG tablet; Take 1 tablet by mouth 2 (Two) Times a Day for 10 days.  Dispense: 20 tablet; Refill: 0  -     triamcinolone (KENALOG) 0.1 % cream; Apply 1 application topically to the appropriate area as directed 3 (Three) Times a Day. For itching  Dispense: 80 g; Refill: 0        Follow Up:   Return if symptoms worsen or fail to improve, for Next scheduled follow up.     An After Visit Summary and PPPS were made available to the patient.          I spent 8 minutes caring for Gale on this date of service. This time includes time spent by me in the following activities:preparing for the visit, performing a medically appropriate examination and/or evaluation , counseling and educating the patient/family/caregiver and documenting information in the medical record

## 2022-07-13 NOTE — TELEPHONE ENCOUNTER
----- Message from Carlos Duong MD sent at 7/13/2022  7:05 AM CDT -----  Regarding: labs  Please let patient know, her B12 and folate level is adequate.  She can hold off on taking any more B12 and folic acid supplementation until next clinic visit.  Thank you

## 2022-07-15 LAB — REF LAB TEST METHOD: NORMAL

## 2022-07-15 NOTE — PROGRESS NOTES
Chief Complaint   Patient presents with   • Follow-up     Port Removal per Stefanie          HPI  Here for mediport removal.     Past Medical History:   Diagnosis Date   • Abnormal mammogram of right breast    • Acquired hypothyroidism     started synthroid 9/2015   • Allergic rhinitis, seasonal    • Breast cancer (HCC)    • Breast cyst    • Cervicalgia     right upper extremity radiculopathy   • Diabetes mellitus (HCC)    • Drug therapy    • Encounter for screening for malignant neoplasm of colon    • Essential hypertension    • Glaucoma    • Health maintenance examination     individual health exam   • Hx of radiation therapy    • Hypercholesterolemia    • Hyperglycemia     steroid-induced hyperglycemia in diabetic patient   • Hyperlipidemia associated with type 2 diabetes mellitus (HCC) 2/26/2021   • Iron deficiency anemia secondary to inadequate dietary iron intake -response to IV iron infusions 9/8/2021   • Lumbar disc disorder     w/right radiculopathy   • Malignant neoplasm of upper-outer quadrant of right breast in female, estrogen receptor positive (HCC) 2/9/2021    Added automatically from request for surgery [id number]   • Menopause    • Mild persistent asthma     resumed dulera twice daily   • Obstructive sleep apnea syndrome 9/17/2019   • Osteopenia     improved DEXA 2/2013   • 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   • Rupture of colon (HCC)    • Sebaceous cyst     subepidermal cyst   • Shoulder pain     likely radicular pain due to cervical DJD   • Spasm of back muscles     right neck and trapezius   • Spinal stenosis of lumbar region    • Strain of neck muscle     cervical strain   • Temporomandibular joint disorder     h/o   • Vitamin D deficiency     on oral calcium/vitamin D supplement       Past Surgical History:   Procedure Laterality Date   • BREAST BIOPSY     • BREAST CYST EXCISION     • BREAST LUMPECTOMY     • BREAST LUMPECTOMY WITH SENTINEL NODE BIOPSY Right 1/11/2021    Procedure: RIGHT BREAST  LUMPECTOMY WITH SENTINEL NODE BIOPSY AND NEEDLE LOCALIZATION                     (injection @07:00 a.m. / nl done @ 07:00 a.m. in mammo. room);  Surgeon: Duke Alexandra MD;  Location: [facility];  Service: General;  Laterality: Right;   • CHOLECYSTECTOMY     • COLONOSCOPY  12/17/2015    normal   • COLONOSCOPY N/A 11/19/2020    Procedure: COLONOSCOPY;  Surgeon: Devin Rene MD;  Location: [facility] ENDOSCOPY;  Service: Gastroenterology;  Laterality: N/A;   • COLOSTOMY     • ENDOSCOPY AND COLONOSCOPY  08/2010   • HYSTERECTOMY     • OOPHORECTOMY     • SENTINEL NODE BIOPSY Right 2/25/2021    Procedure: RIGHT AXILLARY SENTINEL LYMPH NODE MAPPING AND BIOPSY  POSSIBLE AXILLARY DISSECTION                        ( injection @07:00 a.m.);  Surgeon: Duke Alexandra MD;  Location: [facility] OR;  Service: General;  Laterality: Right;   • TONSILLECTOMY     • VENOUS ACCESS DEVICE (PORT) INSERTION Left 10/8/2020    Procedure: INSERTION VENOUS ACCESS DEVICE (MEDIPORT)         (c-arm#1);  Surgeon: Duke Alexandra MD;  Location: [facility];  Service: General;  Laterality: Left;         Current Outpatient Medications:   •  acetaminophen (TYLENOL) 500 MG tablet, Take 500 mg by mouth Every 6 (Six) Hours As Needed for Mild Pain ., Disp: , Rfl:   •  albuterol sulfate HFA (ProAir HFA) 108 (90 Base) MCG/ACT inhaler, Inhale 2 puffs 4 (Four) Times a Day As Needed for Wheezing., Disp: 8 g, Rfl: 5  •  aspirin 81 MG EC tablet, Take 81 mg by mouth Every Night., Disp: , Rfl:   •  atorvastatin (LIPITOR) 20 MG tablet, Take 1 tablet by mouth Daily., Disp: 90 tablet, Rfl: 3  •  Blood Glucose Monitoring Suppl (ONE TOUCH ULTRA 2) W/DEVICE kit, , Disp: , Rfl:   •  Calcium Carbonate-Vitamin D 600-200 MG-UNIT tablet, Take 1 tablet by mouth Daily., Disp: , Rfl:   •  celecoxib (CeleBREX) 200 MG capsule, Take 1 capsule by mouth Daily. With food, Disp: 20 capsule, Rfl: 0  •  citalopram (CeleXA) 20 MG tablet, Take 0.5 tablets by mouth Daily., Disp: 90 tablet, Rfl: 0  •   Dapagliflozin Propanediol (Farxiga) 10 MG tablet, Take 10 mg by mouth Every Morning., Disp: 90 tablet, Rfl: 1  •  diphenoxylate-atropine (LOMOTIL) 2.5-0.025 MG per tablet, TAKE 1 TABLET BY MOUTH FOUR TIMES DAILY AS NEEDED FOR DIARRHEA, Disp: 40 tablet, Rfl: 1  •  docusate sodium (Stool Softener) 100 MG capsule, Take 1 capsule by mouth 2 (Two) Times a Day., Disp: 60 capsule, Rfl: 2  •  fluticasone (FLONASE) 50 MCG/ACT nasal spray, 2 sprays into each nostril As Needed for rhinitis., Disp: , Rfl:   •  folic acid (FOLVITE) 1 MG tablet, Take 1 tablet by mouth on Monday, Wednesday and Friday, Disp: 36 tablet, Rfl: 1  •  glucose blood (ONE TOUCH ULTRA TEST) test strip, Check 1-2 times daily One Touch Ultra, Disp: 150 each, Rfl: 3  •  Lancets Micro Thin 33G misc, Check once daily  E11.9  Trueplus, Disp: 100 each, Rfl: 3  •  latanoprost (XALATAN) 0.005 % ophthalmic solution, Administer 1 drop to both eyes Every Night., Disp: , Rfl:   •  levothyroxine (SYNTHROID, LEVOTHROID) 50 MCG tablet, Take 1 tablet by mouth Daily., Disp: 90 tablet, Rfl: 3  •  losartan-hydrochlorothiazide (HYZAAR) 100-12.5 MG per tablet, Take 1 tablet by mouth Daily., Disp: 90 tablet, Rfl: 3  •  metFORMIN (GLUCOPHAGE) 500 MG tablet, Take 1 tablet by mouth 2 (Two) Times a Day With Meals., Disp: 180 tablet, Rfl: 1  •  mometasone-formoterol (DULERA 200) 200-5 MCG/ACT inhaler, Inhale 2 puffs 2 (Two) Times a Day As Needed (SOB)., Disp: 8.8 g, Rfl: 5  •  montelukast (SINGULAIR) 10 MG tablet, Take 1 tablet by mouth Every Night., Disp: 90 tablet, Rfl: 3  •  ondansetron (ZOFRAN) 4 MG tablet, Take 1 tablet by mouth 4 (Four) Times a Day As Needed for Nausea or Vomiting., Disp: 40 tablet, Rfl: 3  •  Symbicort 160-4.5 MCG/ACT inhaler, Inhale 2 puffs 2 (Two) Times a Day., Disp: 18 g, Rfl: 3  •  urea (CARMOL) 10 % cream, Apply  topically to the appropriate area as directed 2 (Two) Times a Day., Disp: 142 g, Rfl: 2  •  vitamin B-12 (CYANOCOBALAMIN) 1000 MCG tablet, Take  1 tablet by mouth on Monday, Wednesday and Friday, Disp: 36 tablet, Rfl: 1  •  triamcinolone (KENALOG) 0.1 % cream, Apply 1 application topically to the appropriate area as directed 3 (Three) Times a Day. For itching, Disp: 80 g, Rfl: 0  •  valACYclovir (Valtrex) 1000 MG tablet, Take 1 tablet by mouth 2 (Two) Times a Day for 10 days., Disp: 20 tablet, Rfl: 0    Allergies   Allergen Reactions   • Other Confusion     Coda-clear       Family History   Problem Relation Age of Onset   • Colon cancer Mother    • Heart attack Father    • Aneurysm Brother    • No Known Problems Son    • Alzheimer's disease Maternal Grandmother    • Heart attack Maternal Grandfather    • Alzheimer's disease Paternal Grandmother    • No Known Problems Son        Social History     Socioeconomic History   • Marital status:    Tobacco Use   • Smoking status: Never Smoker   • Smokeless tobacco: Never Used   Vaping Use   • Vaping Use: Never used   Substance and Sexual Activity   • Alcohol use: No   • Drug use: No   • Sexual activity: Defer           Physical Exam  Left subclavian mediport in place and functioning well.    PROCEDURE:  Pre-op dx: Mediport in place  Post op dx: Same  Operation: Mediport removal  Surgeon: Duke Alexandra MD  EBL: 2 mL  Replacement: None  Findings: None  Complications: None  Drains: None  Anesthesia: 10 mL1% Xylocaine with epinephrine  Indications: Completed chemotherapy  Informed consent: Risks of bleeding, infection, poor wound healing, risk of anesthesia.   Procedure: The patient is brought to the operating room and placed supine on the operating table. The area is prepped with Betadine and locally anesthesized. An incision is made with a 15 blade knife and the capsule is entered. Sutures are removed and the port and tubing are removed. Head is raised to decrease bleeding. Pressure is applied to the insertion site- no bleeding is noted. The wound is closed with continuous 4-0 Vicryl suture.  Tolerated  well.    ASSESSMENT    Diagnoses and all orders for this visit:    1. Malignant neoplasm of upper-outer quadrant of right breast in female, estrogen receptor positive (HCC) (Primary)  -     TISSUE EXAM, [facility] ([place]); Future  -     TISSUE EXAM, [facility] ([place])        PLAN    1. Recheck as per usual visit.              This document has been electronically signed by Duke Alexandra MD on July 15, 2022 17:32 CDT

## 2022-07-19 NOTE — TELEPHONE ENCOUNTER
1220: Discharge instructions reviewed with pt at bedside, last dose of indocin at 1630 discussed with pt, pt confirms pharmacy called and said medication was ready. Pt denies pain or contractions. Labor/pregnancy precautions and kick counts reviewed. Opportunity for questions provided. Copy of AVS signed per pt, signed copy placed in chart. Pt awaiting ride from boyfriend. 1322: Pt ambulating off unit with boyfriend, discharged home. Rx Refill Note    Requested Prescriptions     Pending Prescriptions Disp Refills   • capecitabine (XELODA) 500 MG chemo tablet 70 tablet 1     Sig: Take 3 tablets by mouth in the morning and 2 tablets at night.      Last office visit with prescribing clinician: 9/7/2021      Next office visit with prescribing clinician: 9/28/2021     Israel Ferguson RN  09/07/21, 12:39 CDT

## 2022-07-28 ENCOUNTER — TELEPHONE (OUTPATIENT)
Dept: FAMILY MEDICINE CLINIC | Facility: CLINIC | Age: 77
End: 2022-07-28

## 2022-07-28 ENCOUNTER — PRIOR AUTHORIZATION (OUTPATIENT)
Dept: FAMILY MEDICINE CLINIC | Facility: CLINIC | Age: 77
End: 2022-07-28

## 2022-07-28 DIAGNOSIS — B02.29 POSTHERPETIC NEURALGIA: Primary | ICD-10-CM

## 2022-07-28 RX ORDER — LIDOCAINE 50 MG/G
1 PATCH TOPICAL EVERY 24 HOURS
Qty: 6 EACH | Refills: 4 | Status: SHIPPED | OUTPATIENT
Start: 2022-07-28 | End: 2022-09-26

## 2022-07-28 RX ORDER — GABAPENTIN 300 MG/1
CAPSULE ORAL
Qty: 50 CAPSULE | Refills: 1 | Status: SHIPPED | OUTPATIENT
Start: 2022-07-28 | End: 2022-09-26

## 2022-07-28 NOTE — TELEPHONE ENCOUNTER
7/28/2022  Gale Cabral (Key: [id number]) - [id number]  Lidocaine 5% patches     Patient authentication failed.   Status: New - Unknown  REQUEST WOULD NOT PROCESS

## 2022-07-28 NOTE — TELEPHONE ENCOUNTER
I contacted the patient and informed her of the message. She stated understanding and thanked me.     ----- Message from Darian Soliman MD sent at 7/28/2022 10:42 AM CDT -----  Please inform Gale that I have sent prescriptions to her [facility] pharmacy in [place].  EB  ----- Message -----  From: Christina Pineda MA  Sent: 7/28/2022   9:43 AM CDT  To: Darian Soliman MD    Patient says Wednesday she has developed pain on areas where her shingles rash has been. No itching reported.

## 2022-08-01 RX ORDER — DAPAGLIFLOZIN 10 MG/1
10 TABLET, FILM COATED ORAL EVERY MORNING
Qty: 90 TABLET | Refills: 1 | Status: SHIPPED | OUTPATIENT
Start: 2022-08-01 | End: 2023-01-30 | Stop reason: SDUPTHER

## 2022-09-19 ENCOUNTER — LAB (OUTPATIENT)
Dept: LAB | Facility: OTHER | Age: 77
End: 2022-09-19

## 2022-09-19 DIAGNOSIS — E55.9 VITAMIN D DEFICIENCY: Chronic | ICD-10-CM

## 2022-09-19 DIAGNOSIS — E11.9 TYPE 2 DIABETES MELLITUS WITHOUT COMPLICATION, WITHOUT LONG-TERM CURRENT USE OF INSULIN: Chronic | ICD-10-CM

## 2022-09-19 DIAGNOSIS — I10 ESSENTIAL HYPERTENSION: Chronic | ICD-10-CM

## 2022-09-19 DIAGNOSIS — E11.69 HYPERLIPIDEMIA ASSOCIATED WITH TYPE 2 DIABETES MELLITUS: Chronic | ICD-10-CM

## 2022-09-19 DIAGNOSIS — M85.88 OSTEOPENIA OF SPINE: Chronic | ICD-10-CM

## 2022-09-19 DIAGNOSIS — J45.31 MILD PERSISTENT ASTHMA WITH ACUTE EXACERBATION: Chronic | ICD-10-CM

## 2022-09-19 DIAGNOSIS — D50.8 IRON DEFICIENCY ANEMIA SECONDARY TO INADEQUATE DIETARY IRON INTAKE: Chronic | ICD-10-CM

## 2022-09-19 DIAGNOSIS — D50.8 OTHER IRON DEFICIENCY ANEMIA: Chronic | ICD-10-CM

## 2022-09-19 DIAGNOSIS — E78.5 HYPERLIPIDEMIA ASSOCIATED WITH TYPE 2 DIABETES MELLITUS: Chronic | ICD-10-CM

## 2022-09-19 LAB
25(OH)D3 SERPL-MCNC: 38.8 NG/ML (ref 30–100)
ALBUMIN SERPL-MCNC: 4.3 G/DL (ref 3.5–5)
ALBUMIN UR-MCNC: <1.2 MG/DL
ALBUMIN/GLOB SERPL: 1.5 G/DL (ref 1.1–1.8)
ALP SERPL-CCNC: 84 U/L (ref 38–126)
ALT SERPL W P-5'-P-CCNC: 39 U/L
ANION GAP SERPL CALCULATED.3IONS-SCNC: 8 MMOL/L (ref 5–15)
ARTICHOKE IGE QN: 116 MG/DL (ref 0–100)
AST SERPL-CCNC: 40 U/L (ref 14–36)
BASOPHILS # BLD AUTO: 0.16 10*3/MM3 (ref 0–0.2)
BASOPHILS NFR BLD AUTO: 1.9 % (ref 0–1.5)
BILIRUB SERPL-MCNC: 0.5 MG/DL (ref 0.2–1.3)
BUN SERPL-MCNC: 15 MG/DL (ref 7–23)
BUN/CREAT SERPL: 16 (ref 7–25)
CALCIUM SPEC-SCNC: 9.6 MG/DL (ref 8.4–10.2)
CHLORIDE SERPL-SCNC: 108 MMOL/L (ref 101–112)
CO2 SERPL-SCNC: 24 MMOL/L (ref 22–30)
CREAT SERPL-MCNC: 0.94 MG/DL (ref 0.52–1.04)
CREAT UR-MCNC: 73.3 MG/DL
DEPRECATED RDW RBC AUTO: 49.1 FL (ref 37–54)
EGFRCR SERPLBLD CKD-EPI 2021: 63.8 ML/MIN/1.73
EOSINOPHIL # BLD AUTO: 0.56 10*3/MM3 (ref 0–0.4)
EOSINOPHIL NFR BLD AUTO: 6.7 % (ref 0.3–6.2)
ERYTHROCYTE [DISTWIDTH] IN BLOOD BY AUTOMATED COUNT: 14.2 % (ref 12.3–15.4)
GLOBULIN UR ELPH-MCNC: 2.8 GM/DL (ref 2.3–3.5)
GLUCOSE SERPL-MCNC: 110 MG/DL (ref 70–99)
HCT VFR BLD AUTO: 44.5 % (ref 34–46.6)
HGB BLD-MCNC: 14.4 G/DL (ref 12–15.9)
LYMPHOCYTES # BLD AUTO: 1.81 10*3/MM3 (ref 0.7–3.1)
LYMPHOCYTES NFR BLD AUTO: 21.7 % (ref 19.6–45.3)
MCH RBC QN AUTO: 31.3 PG (ref 26.6–33)
MCHC RBC AUTO-ENTMCNC: 32.4 G/DL (ref 31.5–35.7)
MCV RBC AUTO: 96.7 FL (ref 79–97)
MICROALBUMIN/CREAT UR: NORMAL MG/G{CREAT}
MONOCYTES # BLD AUTO: 0.75 10*3/MM3 (ref 0.1–0.9)
MONOCYTES NFR BLD AUTO: 9 % (ref 5–12)
NEUTROPHILS NFR BLD AUTO: 5.06 10*3/MM3 (ref 1.7–7)
NEUTROPHILS NFR BLD AUTO: 60.7 % (ref 42.7–76)
PLATELET # BLD AUTO: 258 10*3/MM3 (ref 140–450)
PMV BLD AUTO: 9.8 FL (ref 6–12)
POTASSIUM SERPL-SCNC: 4.2 MMOL/L (ref 3.4–5)
PROT SERPL-MCNC: 7.1 G/DL (ref 6.3–8.6)
RBC # BLD AUTO: 4.6 10*6/MM3 (ref 3.77–5.28)
SODIUM SERPL-SCNC: 140 MMOL/L (ref 137–145)
T4 FREE SERPL-MCNC: 1.15 NG/DL (ref 0.93–1.7)
TSH SERPL DL<=0.05 MIU/L-ACNC: 2.43 UIU/ML (ref 0.27–4.2)
WBC NRBC COR # BLD: 8.34 10*3/MM3 (ref 3.4–10.8)

## 2022-09-19 PROCEDURE — 84439 ASSAY OF FREE THYROXINE: CPT | Performed by: INTERNAL MEDICINE

## 2022-09-19 PROCEDURE — 36415 COLL VENOUS BLD VENIPUNCTURE: CPT | Performed by: INTERNAL MEDICINE

## 2022-09-19 PROCEDURE — 82043 UR ALBUMIN QUANTITATIVE: CPT | Performed by: INTERNAL MEDICINE

## 2022-09-19 PROCEDURE — 80053 COMPREHEN METABOLIC PANEL: CPT | Performed by: INTERNAL MEDICINE

## 2022-09-19 PROCEDURE — 83036 HEMOGLOBIN GLYCOSYLATED A1C: CPT | Performed by: INTERNAL MEDICINE

## 2022-09-19 PROCEDURE — 82306 VITAMIN D 25 HYDROXY: CPT | Performed by: INTERNAL MEDICINE

## 2022-09-19 PROCEDURE — 83721 ASSAY OF BLOOD LIPOPROTEIN: CPT | Performed by: INTERNAL MEDICINE

## 2022-09-19 PROCEDURE — 84443 ASSAY THYROID STIM HORMONE: CPT | Performed by: INTERNAL MEDICINE

## 2022-09-19 PROCEDURE — 82570 ASSAY OF URINE CREATININE: CPT | Performed by: INTERNAL MEDICINE

## 2022-09-19 PROCEDURE — 85025 COMPLETE CBC W/AUTO DIFF WBC: CPT | Performed by: INTERNAL MEDICINE

## 2022-09-20 LAB — HBA1C MFR BLD: 6.4 % (ref 4.8–5.6)

## 2022-09-26 ENCOUNTER — OFFICE VISIT (OUTPATIENT)
Dept: FAMILY MEDICINE CLINIC | Facility: CLINIC | Age: 77
End: 2022-09-26

## 2022-09-26 VITALS
WEIGHT: 159 LBS | HEART RATE: 64 BPM | BODY MASS INDEX: 29.26 KG/M2 | OXYGEN SATURATION: 98 % | TEMPERATURE: 98.4 F | SYSTOLIC BLOOD PRESSURE: 128 MMHG | DIASTOLIC BLOOD PRESSURE: 62 MMHG | HEIGHT: 62 IN

## 2022-09-26 DIAGNOSIS — H61.21 IMPACTED CERUMEN OF RIGHT EAR: ICD-10-CM

## 2022-09-26 DIAGNOSIS — I10 ESSENTIAL HYPERTENSION: Chronic | ICD-10-CM

## 2022-09-26 DIAGNOSIS — H69.83 ETD (EUSTACHIAN TUBE DYSFUNCTION), BILATERAL: ICD-10-CM

## 2022-09-26 DIAGNOSIS — E78.5 HYPERLIPIDEMIA ASSOCIATED WITH TYPE 2 DIABETES MELLITUS: Chronic | ICD-10-CM

## 2022-09-26 DIAGNOSIS — D50.8 IRON DEFICIENCY ANEMIA SECONDARY TO INADEQUATE DIETARY IRON INTAKE: Chronic | ICD-10-CM

## 2022-09-26 DIAGNOSIS — E03.9 ACQUIRED HYPOTHYROIDISM: Chronic | ICD-10-CM

## 2022-09-26 DIAGNOSIS — E11.69 HYPERLIPIDEMIA ASSOCIATED WITH TYPE 2 DIABETES MELLITUS: Chronic | ICD-10-CM

## 2022-09-26 DIAGNOSIS — E55.9 VITAMIN D DEFICIENCY: Chronic | ICD-10-CM

## 2022-09-26 DIAGNOSIS — E11.9 TYPE 2 DIABETES MELLITUS WITHOUT COMPLICATION, WITHOUT LONG-TERM CURRENT USE OF INSULIN: Primary | Chronic | ICD-10-CM

## 2022-09-26 DIAGNOSIS — M85.88 OSTEOPENIA OF SPINE: Chronic | ICD-10-CM

## 2022-09-26 DIAGNOSIS — H60.501 ACUTE OTITIS EXTERNA OF RIGHT EAR, UNSPECIFIED TYPE: ICD-10-CM

## 2022-09-26 PROCEDURE — 99214 OFFICE O/P EST MOD 30 MIN: CPT | Performed by: INTERNAL MEDICINE

## 2022-09-26 RX ORDER — NEOMYCIN SULFATE, POLYMYXIN B SULFATE AND HYDROCORTISONE 10; 3.5; 1 MG/ML; MG/ML; [USP'U]/ML
3 SUSPENSION/ DROPS AURICULAR (OTIC) 2 TIMES DAILY
Qty: 5 ML | Refills: 0 | Status: SHIPPED | OUTPATIENT
Start: 2022-09-26 | End: 2022-10-03

## 2022-09-26 RX ORDER — EZETIMIBE 10 MG/1
10 TABLET ORAL DAILY
Qty: 90 TABLET | Refills: 3 | Status: SHIPPED | OUTPATIENT
Start: 2022-09-26

## 2022-09-26 RX ORDER — PHENYLEPHRINE HCL 10 MG/1
TABLET, FILM COATED ORAL
Qty: 36 TABLET | Refills: 0 | Status: SHIPPED | OUTPATIENT
Start: 2022-09-26 | End: 2023-02-13 | Stop reason: SDUPTHER

## 2022-09-26 NOTE — PROGRESS NOTES
Chief Complaint  Follow-up (6 month) and Earache (Bilateral with pain going down right side of face)    Subjective        History of Present Illness     Gale Cabral presents to the office for 6-month follow up on type 2 diabetes, hypertension, hyperlipidemia/low HDL, iron deficiency anemia with iron malabsorption requiring episodic iron infusion, asthma, and hypothyroidism among other issues.  Patient continues to follow with Dr. Rhoda veliz [facility] since October 5, 2020 for triple negative right breast cancer.  Patient had colonoscopy by Dr. Peters 11/19/2020 revealing one polyp. However she developed perforated diverticulitis 2021 that was treated in [place], [place] with a laparoscopic sigmotomy and colectomy with diverting colostomy and subsequently underwent reanastomosis 01/28/2022.    Patient reports waxing and waning bilateral ear pressure, right greater than left.  She has not been using her Flonase routinely. Ear exam reveals right cerumen, mild otitis externa and small TM effusions.   She is describing symptoms consistent with eustachian tube dysfunction episodically.    With her visit 6 months ago, we increased Lipitor 20 mg from q.o.d. to daily use.  Despite the increase her LDL is only very slightly improved at 116. We discussed treatment options to help lower cholesterol and I recommended adding daily Zetia.  She is in agreement.       Weight is up 8 pounds in the past six months. Diabetes acceptable with A1c 6.4  I encouraged patient in weight loss efforts over the next few weeks and then try to maintain her weight through the holidays.  BP and HR at goal.      The patient's relevant past medical, surgical, and social history was reviewed in Epic.   Lab results are reviewed with the patient today. CBC unremarkable. Fasting glucose 110.  A1c 6.4. Vitamin D at goal with oral vitamin D.  Hypothyroidism at goal with Synthroid 50 mcg daily.  Normal renal function.  Liver enzymes very  "slightly elevated consistent with fatty liver.        Objective   Vital Signs:  /62   Pulse 64   Temp 98.4 °F (36.9 °C)   Ht 157.5 cm (62\")   Wt 72.1 kg (159 lb)   SpO2 98%   BMI 29.08 kg/m²   Estimated body mass index is 29.08 kg/m² as calculated from the following:    Height as of this encounter: 157.5 cm (62\").    Weight as of this encounter: 72.1 kg (159 lb).          Physical Exam  Vitals reviewed.   Constitutional:       General: She is not in acute distress.     Appearance: She is well-developed.      Comments: Pleasant female, overweight.    HENT:      Head: Normocephalic and atraumatic.      Ears:      Comments: Ear exam reveals soft wax in right ear canal, which is removed manually, as well as clear TM effusion, diminished light reflex and evidence of mild otitis externa.  Left ear exam reveals what appears to be mucous behind the TM.         Nose:      Right Sinus: No maxillary sinus tenderness or frontal sinus tenderness.      Left Sinus: No maxillary sinus tenderness or frontal sinus tenderness.      Mouth/Throat:      Mouth: No oral lesions.      Pharynx: Uvula midline.      Tonsils: No tonsillar exudate.   Eyes:      Conjunctiva/sclera: Conjunctivae normal.      Pupils: Pupils are equal, round, and reactive to light.   Neck:      Thyroid: No thyroid mass or thyromegaly.      Vascular: No carotid bruit or JVD.      Trachea: Trachea normal. No tracheal deviation.   Cardiovascular:      Rate and Rhythm: Normal rate and regular rhythm.  No extrasystoles are present.     Chest Wall: PMI is not displaced.      Heart sounds: Normal heart sounds. No murmur heard.  Pulmonary:      Effort: Pulmonary effort is normal. No accessory muscle usage or respiratory distress.      Breath sounds: Normal breath sounds. No decreased breath sounds, wheezing, rhonchi or rales.   Abdominal:      General: Bowel sounds are normal. There is no distension.      Palpations: Abdomen is soft.      Tenderness: There is no " abdominal tenderness.      Comments: Overweight abdomen   Musculoskeletal:      Cervical back: Neck supple.   Lymphadenopathy:      Cervical: No cervical adenopathy.   Skin:     General: Skin is warm and dry.      Findings: No rash.      Nails: There is no clubbing.      Comments: Residual scarring on left side of the back from Herpes Zoster rash     Neurological:      Mental Status: She is alert and oriented to person, place, and time. Mental status is at baseline.      Cranial Nerves: No cranial nerve deficit.      Coordination: Coordination normal.   Psychiatric:         Speech: Speech normal.         Behavior: Behavior normal.         Thought Content: Thought content normal.         Judgment: Judgment normal.            Result Review :    CMP    CMP 4/12/22 7/12/22 9/19/22   Glucose 152 (A) 97 110 (A)   BUN 19 16 15   Creatinine 1.01 (A) 1.00 0.94   Sodium 134 (A) 139 140   Potassium 3.7 4.5 4.2   Chloride 100 108 (A) 108   Calcium 9.3 9.5 9.6   Albumin 4.00 3.90 4.30   Total Bilirubin 0.3 0.4 0.5   Alkaline Phosphatase 85 86 84   AST (SGOT) 73 (A) 32 40 (A)   ALT (SGPT) 48 (A) 35 (A) 39 (A)   (A) Abnormal value       Comments are available for some flowsheets but are not being displayed.           CBC w/diff    CBC w/Diff 4/12/22 7/12/22 9/19/22   WBC 7.07 8.30 8.34   RBC 4.50 4.40 4.60   Hemoglobin 14.5 13.8 14.4   Hematocrit 42.5 40.6 44.5   MCV 94.4 92.3 96.7   MCH 32.2 31.4 31.3   MCHC 34.1 34.0 32.4   RDW 12.7 13.8 14.2   Platelets 333 291 258   Neutrophil Rel % 63.3 57.4 60.7   Immature Granulocyte Rel % 0.3 0.4    Lymphocyte Rel % 19.4 (A) 26.0 21.7   Monocyte Rel % 7.9 10.0 9.0   Eosinophil Rel % 7.1 (A) 4.3 6.7 (A)   Basophil Rel % 2.0 (A) 1.9 (A) 1.9 (A)   (A) Abnormal value            Lipid Panel    Lipid Panel 3/9/22 9/19/22   Total Cholesterol 212 (A)    Triglycerides 201 (A)    HDL Cholesterol 42    VLDL Cholesterol 36    LDL Cholesterol  134 (A) 116 (A)   LDL/HDL Ratio 3.09    (A) Abnormal value             TSH    TSH 3/9/22 9/19/22   TSH 3.250 2.430           A1C Last 3 Results    HGBA1C Last 3 Results 3/9/22 9/19/22   Hemoglobin A1C 6.60 (A) 6.40 (A)   (A) Abnormal value                Data reviewed: Consultant notes Dr. Alexandra, surgery          Assessment and Plan   Diagnoses and all orders for this visit:    1. Type 2 diabetes mellitus without complication, without long-term current use of insulin (HCC) (Primary)  -     CBC Auto Differential; Future  -     Comprehensive Metabolic Panel; Future  -     Hemoglobin A1c; Future  -     Lipid Panel; Future  -     TSH; Future  -     T4, free; Future    2. ETD (Eustachian tube dysfunction), bilateral    3. Acute otitis externa of right ear, unspecified type    4. Hyperlipidemia associated with type 2 diabetes mellitus (HCC)  -     Lipid Panel; Future    5. Essential hypertension  -     Comprehensive Metabolic Panel; Future    6. Acquired hypothyroidism  -     TSH; Future  -     T4, free; Future    7. Vitamin D deficiency  -     Vitamin D 25 Hydroxy; Future    8. Iron deficiency anemia secondary to inadequate dietary iron intake -response to IV iron infusions  -     CBC Auto Differential; Future    9. Osteopenia of spine  -     Vitamin D 25 Hydroxy; Future    10. Impacted cerumen of right ear    Other orders  -     ezetimibe (Zetia) 10 MG tablet; Take 1 tablet by mouth Daily. For cholesterol  Dispense: 90 tablet; Refill: 3  -     neomycin-polymyxin-hydrocortisone (CORTISPORIN) 3.5-30215-8 otic suspension; Administer 3 drops to the right ear 2 (Two) Times a Day for 7 days.  Dispense: 5 mL; Refill: 0  -     phenylephrine (SUDAFED PE) 10 MG tablet; 1 po 2-3 times daily prn congestion  Dispense: 36 tablet; Refill: 0           Ear Cerumen Removal    Date/Time: 9/27/2022 8:13 AM  Performed by: Darian Soliman MD  Authorized by: Darian Soliman MD     Anesthesia:  Local Anesthetic: none  Location details: right ear  Patient tolerance: patient tolerated the procedure well with  no immediate complications  Comments: After obtaining verbal informed consent, the soft cerumen was removed from the right ear canal without difficulty or complication.  There was some tenderness due to otitis externa.  Total procedure time: 70 minutes  Procedure type: instrumentation, curette   Sedation:  Patient sedated: no              To help lower LDL cholesterol, a prescription is sent for Zetia 10 mg to add one q.d. daily to her Lipitor 20 mg daily with a goal of LDL less than 100.  Pursue dietary efforts. Encouraged patient to pursue diet and exercise in the next few weeks to help achieve weight loss prior to the holidays and then try to maintain weight over the holidays.     After informed verbal consent, cerumen manually removed from right ear canal with evidence of eustachian tube dysfunction and mild otitis externa right ear canal.  She tolerated well and prescriptions sent for Sudafed 10 mg 1 po 2-3 times daily prn congestion and cortisporin otic drops to the right ear b.i.d. x 5 days..  Recommended she resume Flonase one spray each nostril b.i.d. no systemic steroids given due to her diabetes.     Continue to follow with Dr. Alexandra, surgery, and Dr. Duong, oncology, to address breast cancer and multifactorial anemia.  Continue the oral B-12.     Continue metformin and Farxiga.  Diabetes slightly improved and acceptable with A1c 6.4.    Try to intensify diabetic diet and exercise and weight loss efforts.  We particularly emphasized the need for regular exercise through the winter months.    Recommended updated COVID vaccine ASAP and high dose flu vaccine in the next 4-6 weeks.    Continue the Hyzaar.  Blood pressure is at goal.    Continue current dose of Synthroid.  Hypothyroidism at goal.      Return in 6 months for routine follow up with fasting labs one week prior or sooner if needed.      Scribed for Dr. Soliman by Lydia Davis, [facility].     Follow Up   Return in about 6 months (around 3/26/2023) for  Follow up in six months with labs one week prior..  Patient was given instructions and counseling regarding her condition or for health maintenance advice. Please see specific information pulled into the AVS if appropriate.

## 2022-09-27 PROBLEM — D75.839 THROMBOCYTOSIS: Chronic | Status: RESOLVED | Noted: 2021-06-01 | Resolved: 2022-09-27

## 2022-09-27 PROCEDURE — 69210 REMOVE IMPACTED EAR WAX UNI: CPT | Performed by: INTERNAL MEDICINE

## 2022-10-11 ENCOUNTER — OFFICE VISIT (OUTPATIENT)
Dept: ONCOLOGY | Facility: CLINIC | Age: 77
End: 2022-10-11

## 2022-10-11 ENCOUNTER — LAB (OUTPATIENT)
Dept: ONCOLOGY | Facility: HOSPITAL | Age: 77
End: 2022-10-11

## 2022-10-11 VITALS
BODY MASS INDEX: 28.79 KG/M2 | HEART RATE: 66 BPM | TEMPERATURE: 97.4 F | OXYGEN SATURATION: 95 % | SYSTOLIC BLOOD PRESSURE: 142 MMHG | WEIGHT: 157.4 LBS | DIASTOLIC BLOOD PRESSURE: 66 MMHG

## 2022-10-11 DIAGNOSIS — D50.8 IRON DEFICIENCY ANEMIA SECONDARY TO INADEQUATE DIETARY IRON INTAKE: Primary | Chronic | ICD-10-CM

## 2022-10-11 DIAGNOSIS — K90.9 IRON MALABSORPTION: ICD-10-CM

## 2022-10-11 DIAGNOSIS — R79.89 ELEVATED LFTS: ICD-10-CM

## 2022-10-11 DIAGNOSIS — C50.911 INVASIVE DUCTAL CARCINOMA OF RIGHT BREAST: ICD-10-CM

## 2022-10-11 DIAGNOSIS — D50.8 OTHER IRON DEFICIENCY ANEMIA: ICD-10-CM

## 2022-10-11 LAB
ALBUMIN SERPL-MCNC: 4.2 G/DL (ref 3.5–5.2)
ALBUMIN/GLOB SERPL: 1.3 G/DL
ALP SERPL-CCNC: 80 U/L (ref 39–117)
ALT SERPL W P-5'-P-CCNC: 32 U/L (ref 1–33)
ANION GAP SERPL CALCULATED.3IONS-SCNC: 12 MMOL/L (ref 5–15)
AST SERPL-CCNC: 35 U/L (ref 1–32)
BASOPHILS # BLD AUTO: 0.11 10*3/MM3 (ref 0–0.2)
BASOPHILS NFR BLD AUTO: 1.2 % (ref 0–1.5)
BILIRUB SERPL-MCNC: 0.3 MG/DL (ref 0–1.2)
BUN SERPL-MCNC: 15 MG/DL (ref 8–23)
BUN/CREAT SERPL: 14.6 (ref 7–25)
CALCIUM SPEC-SCNC: 9.8 MG/DL (ref 8.6–10.5)
CHLORIDE SERPL-SCNC: 104 MMOL/L (ref 98–107)
CO2 SERPL-SCNC: 24 MMOL/L (ref 22–29)
CREAT SERPL-MCNC: 1.03 MG/DL (ref 0.57–1)
DEPRECATED RDW RBC AUTO: 44.7 FL (ref 37–54)
EGFRCR SERPLBLD CKD-EPI 2021: 56.8 ML/MIN/1.73
EOSINOPHIL # BLD AUTO: 0.66 10*3/MM3 (ref 0–0.4)
EOSINOPHIL NFR BLD AUTO: 7.4 % (ref 0.3–6.2)
ERYTHROCYTE [DISTWIDTH] IN BLOOD BY AUTOMATED COUNT: 13.2 % (ref 12.3–15.4)
FERRITIN SERPL-MCNC: 297.1 NG/ML (ref 13–150)
FOLATE SERPL-MCNC: 11.2 NG/ML (ref 4.78–24.2)
GLOBULIN UR ELPH-MCNC: 3.2 GM/DL
GLUCOSE SERPL-MCNC: 152 MG/DL (ref 65–99)
HCT VFR BLD AUTO: 41.6 % (ref 34–46.6)
HGB BLD-MCNC: 14.2 G/DL (ref 12–15.9)
IMM GRANULOCYTES # BLD AUTO: 0.06 10*3/MM3 (ref 0–0.05)
IMM GRANULOCYTES NFR BLD AUTO: 0.7 % (ref 0–0.5)
IRON 24H UR-MRATE: 70 MCG/DL (ref 37–145)
IRON SATN MFR SERPL: 19 % (ref 20–50)
LYMPHOCYTES # BLD AUTO: 1.78 10*3/MM3 (ref 0.7–3.1)
LYMPHOCYTES NFR BLD AUTO: 19.9 % (ref 19.6–45.3)
MCH RBC QN AUTO: 31.9 PG (ref 26.6–33)
MCHC RBC AUTO-ENTMCNC: 34.1 G/DL (ref 31.5–35.7)
MCV RBC AUTO: 93.5 FL (ref 79–97)
MONOCYTES # BLD AUTO: 0.5 10*3/MM3 (ref 0.1–0.9)
MONOCYTES NFR BLD AUTO: 5.6 % (ref 5–12)
NEUTROPHILS NFR BLD AUTO: 5.85 10*3/MM3 (ref 1.7–7)
NEUTROPHILS NFR BLD AUTO: 65.2 % (ref 42.7–76)
NRBC BLD AUTO-RTO: 0 /100 WBC (ref 0–0.2)
PLATELET # BLD AUTO: 364 10*3/MM3 (ref 140–450)
PMV BLD AUTO: 8.9 FL (ref 6–12)
POTASSIUM SERPL-SCNC: 3.7 MMOL/L (ref 3.5–5.2)
PROT SERPL-MCNC: 7.4 G/DL (ref 6–8.5)
RBC # BLD AUTO: 4.45 10*6/MM3 (ref 3.77–5.28)
SODIUM SERPL-SCNC: 140 MMOL/L (ref 136–145)
TIBC SERPL-MCNC: 364 MCG/DL (ref 298–536)
TRANSFERRIN SERPL-MCNC: 244 MG/DL (ref 200–360)
VIT B12 BLD-MCNC: 1044 PG/ML (ref 211–946)
WBC NRBC COR # BLD: 8.96 10*3/MM3 (ref 3.4–10.8)

## 2022-10-11 PROCEDURE — 82607 VITAMIN B-12: CPT

## 2022-10-11 PROCEDURE — 84466 ASSAY OF TRANSFERRIN: CPT

## 2022-10-11 PROCEDURE — G0463 HOSPITAL OUTPT CLINIC VISIT: HCPCS | Performed by: NURSE PRACTITIONER

## 2022-10-11 PROCEDURE — 99214 OFFICE O/P EST MOD 30 MIN: CPT | Performed by: NURSE PRACTITIONER

## 2022-10-11 PROCEDURE — 83540 ASSAY OF IRON: CPT

## 2022-10-11 PROCEDURE — 80053 COMPREHEN METABOLIC PANEL: CPT

## 2022-10-11 PROCEDURE — 82728 ASSAY OF FERRITIN: CPT

## 2022-10-11 PROCEDURE — 85025 COMPLETE CBC W/AUTO DIFF WBC: CPT

## 2022-10-11 PROCEDURE — 82746 ASSAY OF FOLIC ACID SERUM: CPT

## 2022-10-11 RX ORDER — NEOMYCIN AND POLYMYXIN B SULFATES 40; 200000 MG/ML; [USP'U]/ML
SOLUTION IRRIGATION
COMMUNITY

## 2022-10-11 RX ORDER — PSEUDOEPHEDRINE HCL 30 MG
30 TABLET ORAL EVERY 4 HOURS PRN
COMMUNITY
End: 2023-02-13

## 2022-10-17 NOTE — PROGRESS NOTES
DATE OF VISIT: 10/11/2022      REASON FOR VISIT:  Triple negative right breast cancer, anemia,     HISTORY OF PRESENT ILLNESS:   77-year-old female with medical problem consisting of diabetes mellitus, hypertension, dyslipidemia, asthma, osteopenia, obstructive sleep apnea, chronic back pain has been following up with [facility] since October 5, 2020 for triple negative right breast cancer.  Patient completed adjuvant Xeloda in December 2021.  Patient is here for follow-up appointment today. Denies any worsening erythema affecting hands or feet.  Denies any bleeding.  Denies any new lymph node enlargement.           Oncology history:     1.  Triple negative right breast cancer, [id number] with 1 lymph node showing 1.9 cm deposit with extracapsular extension and tumor deposit lymphatic:  -Patient received neoadjuvant chemotherapy with Taxotere and Cytoxan for 3 cycles between October 15, 2020 and November 30, 2020.  -After cycle 3 unfortunately patient developed bowel obstruction requiring surgery and colostomy at hospital in [place].  -Patient did not want to do cycle 4 prior to surgery and was subsequently sent back to Dr. Alexandra underwent lumpectomy on January 11 2021 that showed 1 cm residual cancer.  -Patient had axillary dissection done on February 25, 2021 that showed 1.9 cm lymph node involvement with extracapsular extension and tumor deposit in the lymphatic channel.  -Result of pathology report were discussed with patient and her .  Patient had a minimal or no response to chemotherapy with Taxotere and Cytoxan in neoadjuvant setting.  PET/CT done prior to starting chemotherapy had a shotty axillary lymph node without any significant uptake.  Patient did have enlarged lymph node at the time of surgery again not responding to chemotherapy preoperatively  -Had a radiation treatment that completed on May 24, 2021 at [facility].  -Patient was started on cycle 1 of Xeloda on Pushpa 15,  2021.  -Dose of Xeloda was decreased to 3 tablets in the morning and 2 tablets at night with cycle 2.  -Dose of Xeloda will be decreased to 2 tablets in the morning and 2 tablets at night with cycle 5 due to worsening hand-foot syndrome  -Patient completed cycle 8 of Xeloda on December 28, 2021.        Past Medical History, Past Surgical History, Social History, Family History have been reviewed and are without significant changes except as mentioned.    Review of Systems   A comprehensive 14 point review of systems was performed and was negative except as mentioned.    Medications:  The current medication list was reviewed in the EMR    ALLERGIES:    Allergies   Allergen Reactions   • Other Confusion     Coda-clear       Objective      Vitals:    10/11/22 1046   BP: 142/66   Pulse: 66   Temp: 97.4 °F (36.3 °C)   TempSrc: Temporal   SpO2: 95%   Weight: 71.4 kg (157 lb 6.4 oz)   PainSc: 0-No pain     Current Status 9/1/2021   ECOG score 0       Physical Exam  General;alert and oriented no acute distress  Lungs; normal breath sounds  Card: RRR  Ext; no edema    RECENT LABS:  Glucose   Date Value Ref Range Status   10/11/2022 152 (H) 65 - 99 mg/dL Final     Sodium   Date Value Ref Range Status   10/11/2022 140 136 - 145 mmol/L Final     Potassium   Date Value Ref Range Status   10/11/2022 3.7 3.5 - 5.2 mmol/L Final     CO2   Date Value Ref Range Status   10/11/2022 24.0 22.0 - 29.0 mmol/L Final     Chloride   Date Value Ref Range Status   10/11/2022 104 98 - 107 mmol/L Final     Anion Gap   Date Value Ref Range Status   10/11/2022 12.0 5.0 - 15.0 mmol/L Final     Creatinine   Date Value Ref Range Status   10/11/2022 1.03 (H) 0.57 - 1.00 mg/dL Final     BUN   Date Value Ref Range Status   10/11/2022 15 8 - 23 mg/dL Final     BUN/Creatinine Ratio   Date Value Ref Range Status   10/11/2022 14.6 7.0 - 25.0 Final     Calcium   Date Value Ref Range Status   10/11/2022 9.8 8.6 - 10.5 mg/dL Final     eGFR Non African Amer    Date Value Ref Range Status   2022 56 (L) >60 mL/min/1.73 Final     Alkaline Phosphatase   Date Value Ref Range Status   10/11/2022 80 39 - 117 U/L Final     Total Protein   Date Value Ref Range Status   10/11/2022 7.4 6.0 - 8.5 g/dL Final     ALT (SGPT)   Date Value Ref Range Status   10/11/2022 32 1 - 33 U/L Final     AST (SGOT)   Date Value Ref Range Status   10/11/2022 35 (H) 1 - 32 U/L Final     Total Bilirubin   Date Value Ref Range Status   10/11/2022 0.3 0.0 - 1.2 mg/dL Final     Albumin   Date Value Ref Range Status   10/11/2022 4.20 3.50 - 5.20 g/dL Final     Globulin   Date Value Ref Range Status   10/11/2022 3.2 gm/dL Final     Lab Results   Component Value Date    WBC 8.96 10/11/2022    HGB 14.2 10/11/2022    HCT 41.6 10/11/2022    MCV 93.5 10/11/2022     10/11/2022     Lab Results   Component Value Date    NEUTROABS 5.85 10/11/2022    IRON 70 10/11/2022    IRON 97 2022    IRON 84 2022    TIBC 364 10/11/2022    TIBC 384 2022    TIBC 353 2022    LABIRON 19 (L) 10/11/2022    LABIRON 25 2022    LABIRON 24 2022    FERRITIN 297.10 (H) 10/11/2022    FERRITIN 317.40 (H) 2022    FERRITIN 431.40 (H) 2022    QIGTWMUT58 1,044 (H) 10/11/2022    IGMGXDBT97 1,215 (H) 2022    HTFCCWZR73 684 2022    FOLATE 11.20 10/11/2022    FOLATE >20.00 2022    FOLATE 9.33 2022     Lab Results   Component Value Date    REFLABREPO  2022     [facility]  [address] [place] [address]  Phone: [phone] or [phone]  Fax: [phone]  Ronald Garza M.D., Medical Director    PATIENT NAME                           LABORATORY NO.  MONIQUE REEVES.                    [id number]  [id number]                         AGE              SEX  SSN           CLIENT REF #  [facility]           76      1945  F    [id number]   [id number]    [place]                        "REQUESTING M.D.     ATTENDING M.D.     COPY TO.  [address] [place] [address]                 [place] [address]             DATE COLLECTED      DATE RECEIVED      DATE REPORTED  07/12/2022          07/13/2022         07/15/2022    DIAGNOSIS:  HARDWARE, LEFT CHEST:  GROSS DIAGNOSIS:  Explanted, intact prosthetic port    RLL/pah    CLINICAL HISTORY:  Malignant neoplasm of upper-outer quadrant of right breast in female, estrogen  receptor positive    SPECIMENS RECEIVED:  HARDWARE, LEFT CHEST    Professional interpretation rendered by Ronald Garza M.D., FREDDY at  [facility], [address] [place] [address].    GROSS DESCRIPTION:  The specimen is received fresh and consists of a 3.0 x 2.8 x 1.5 cm purple,  plastic, intact port inscribed with \"TC B NELSON 1732,\" and received with a 21.0 x  0.3 x 0.3 cm portion of transected, white, plastic tubing.  No defects or adherent  soft tissue fragments are identified.  No sections are submitted.  [facility]    REVIEWED, DIAGNOSED AND ELECTRONICALLY  SIGNED BY:    Ronald Garza M.D., LARONC.A.P.  CPT CODES:  00198           PATHOLOGY:  * Cannot find OR log *         RADIOLOGY DATA :  No radiology results for the last 7 days        Assessment & Plan     1.  Triple negative right breast cancer, [id number] on final pathology report showing 1.9 cm deposit with extracapsular extension and tumor deposit and lymphatics  - Review oncology history for prior treatment details  - Completed cycle 8 of adjuvant Xeloda on December 28, 2021  - We will continue with clinical surveillance at present.  - Patient will return to clinic in 3 months with repeat CBC CMP iron studies, ferritin, B12 and folate to be done on that day  - Mammogram on left breast in May 2022 was BI-RADS Category 2.  Recommend continue with yearly mammogram.  Mammogram has been ordered by surgery clinic     2.  Anemia:  - Hemoglobin is 14.2  - Due to iron malabsorption patient has received " 5 doses of Venofer in September 2021  - Anemia work-up done today shows iron saturation 19% but ferritin is 291; would not give any IV iron at this time.  -Will recheck anemia labs at next visit in 3 mos. B-12 and folic acid are on hold as of last visit.     3.  Elevated liver function test due to fatty liver  - CMP from today showing improvement in AST and ALT.  We will continue with clinical monitoring.     4.  Hand-foot syndrome from Xeloda  - Clinically improved.  We will continue with clinical monitoring     5.  Genetic testing:  - Genetic testing done in November 2020 was negative for BRCA1 and BRCA2     6.  Health maintenance: Patient does not smoke     7. Advance Care Planning: For now patient remains full code and is able to make decisions.  Patient has health care surrogate mentioned on chart.               PHQ-9 Total Score: 0     Gale Tapia Marianna reports a pain score of 0.  Given her pain assessment as noted, treatment options were discussed and the following options were decided upon as a follow-up plan to address the patient's pain: no pain today.         Sharon Holloway, APRN  10/17/2022  12:38 CDT        Part of this note may be an electronic transcription/translation of spoken language to printed text using the Dragon Dictation System.          CC:

## 2022-10-31 RX ORDER — CITALOPRAM 20 MG/1
10 TABLET ORAL DAILY
Qty: 90 TABLET | Refills: 3 | Status: SHIPPED | OUTPATIENT
Start: 2022-10-31

## 2022-10-31 RX ORDER — MONTELUKAST SODIUM 10 MG/1
10 TABLET ORAL NIGHTLY
Qty: 90 TABLET | Refills: 3 | Status: SHIPPED | OUTPATIENT
Start: 2022-10-31

## 2022-11-23 ENCOUNTER — OFFICE VISIT (OUTPATIENT)
Dept: SURGERY | Facility: CLINIC | Age: 77
End: 2022-11-23

## 2022-11-23 VITALS
TEMPERATURE: 97.1 F | SYSTOLIC BLOOD PRESSURE: 122 MMHG | OXYGEN SATURATION: 99 % | WEIGHT: 160.4 LBS | DIASTOLIC BLOOD PRESSURE: 68 MMHG | BODY MASS INDEX: 29.52 KG/M2 | HEIGHT: 62 IN | HEART RATE: 89 BPM

## 2022-11-23 DIAGNOSIS — Z12.31 SCREENING MAMMOGRAM FOR HIGH-RISK PATIENT: Primary | ICD-10-CM

## 2022-11-23 PROCEDURE — 99213 OFFICE O/P EST LOW 20 MIN: CPT | Performed by: NURSE PRACTITIONER

## 2022-11-23 NOTE — PROGRESS NOTES
"Chief Complaint  Follow-up (Mammogram Results)    Subjective        Gale Cabral presents to [facility]     History of Present Illness  Ms. Gale Cabral presents today to discuss recent left diagnostic mammography as well as for routine breast exam.  She has history of right triple negative breast cancer in 2020 treated with lumpectomy and neoadjuvant chemotherapy as well as adjuvant Xeloda. She denies any concerns with her breasts including lumps, masses, skin changes, nipple discharge or lymph node enlargement.  Still seeing medical oncology on routine basis.      Study Result  Narrative & Impression   PROCEDURE: Bilateral screening mammogram with 3-D tomosynthesis   with CAD   REASON FOR EXAM: Screening mammography. Patient with personal   history of right breast carcinoma with lumpectomy and post   radiation.   FINDINGS: Comparison study dated 5/24/2022, 11/22/2021,   9/11/2020,.No significant interval change in appearance of the   breast parenchyma. Right breast post lumpectomy changes with   associated dystrophic benign calcifications. No suspicious mass   or malignant type microcalcifications . Stable benign left breast   calcifications..   Parenchymal pattern: Scattered fibroglandular densities   IMPRESSION:   No mammographic evidence of malignancy.   BI-RADS category 2: Benign findings.   Recommendation: Annual mammography   Electronically signed by: Brad Mclain MD 11/23/2022 9:40 AM CST   Workstation: [id number]         Objective   Vital Signs:  /68   Pulse 89   Temp 97.1 °F (36.2 °C) (Temporal)   Ht 157.5 cm (62\")   Wt 72.8 kg (160 lb 6.4 oz)   SpO2 99%   BMI 29.34 kg/m²   Estimated body mass index is 29.34 kg/m² as calculated from the following:    Height as of this encounter: 157.5 cm (62\").    Weight as of this encounter: 72.8 kg (160 lb 6.4 oz).    BMI is >= 25 and <30. (Overweight) The following options were offered after " discussion;: weight loss educational material (shared in after visit summary)      Physical Exam  Vitals reviewed.   Constitutional:       General: She is not in acute distress.     Appearance: Normal appearance. She is not ill-appearing, toxic-appearing or diaphoretic.   Pulmonary:      Effort: Pulmonary effort is normal. No respiratory distress.   Chest:   Breasts:     Right: Normal. No swelling, bleeding, inverted nipple, mass, nipple discharge, skin change or tenderness.      Left: No swelling, bleeding, inverted nipple, mass, nipple discharge, skin change or tenderness.       Lymphadenopathy:      Upper Body:      Right upper body: No supraclavicular or axillary adenopathy.      Left upper body: No supraclavicular or axillary adenopathy.   Neurological:      General: No focal deficit present.      Mental Status: She is alert and oriented to person, place, and time.   Psychiatric:         Mood and Affect: Mood normal.         Behavior: Behavior normal.         Thought Content: Thought content normal.         Judgment: Judgment normal.        Result Review :      Data reviewed: Radiologic studies Mammography          Assessment and Plan   Diagnoses and all orders for this visit:    1. Screening mammogram for high-risk patient (Primary)  -     Mammo Screening Digital Tomosynthesis Bilateral With CAD; Future           I spent 30 minutes caring for Gale on this date of service. This time includes time spent by me in the following activities:preparing for the visit, reviewing tests, obtaining and/or reviewing a separately obtained history, performing a medically appropriate examination and/or evaluation , documenting information in the medical record and care coordination  Follow Up   Return in about 1 year (around 11/23/2023), or if symptoms worsen or fail to improve.     Continue annual mammography and breast exam unless concerns arise sooner.  Encouraged to continue routine self breast exams.  Continue follow-up  with medical oncology as scheduled    Patient was given instructions and counseling regarding her condition or for health maintenance advice. Please see specific information pulled into the AVS if appropriate.                 This document has been electronically signed by YOHANA Lugo on November 23, 2022 11:00 CST

## 2022-12-20 RX ORDER — BUDESONIDE AND FORMOTEROL FUMARATE DIHYDRATE 160; 4.5 UG/1; UG/1
2 AEROSOL RESPIRATORY (INHALATION)
Qty: 18 G | Refills: 3 | Status: SHIPPED | OUTPATIENT
Start: 2022-12-20

## 2023-01-10 NOTE — PROGRESS NOTES
DATE OF VISIT: 1/14/2023      REASON FOR VISIT: Triple negative right breast cancer, anemia, hand-foot syndrome,      HISTORY OF PRESENT ILLNESS:   77-year-old female with medical problem consisting of diabetes mellitus, hypertension, dyslipidemia, asthma, osteopenia, obstructive sleep apnea, chronic back pain has been following up with [facility] since October 5, 2020 for triple negative right breast cancer.  Patient completed adjuvant Xeloda in December 2021.  Patient is here for follow-up appointment today.  States she had a sinus infection that lasted for about 2 months requiring antibiotic and prednisone.  Denies any new lymph node enlargement.  Denies any new palpable mass.  Denies any recent worsening of erythema affecting hands or feet.  Denies any bleeding.  Denies any fevers.            Oncology history:    1.  Triple negative right breast cancer, [id number] with 1 lymph node showing 1.9 cm deposit with extracapsular extension and tumor deposit lymphatic:  -Patient received neoadjuvant chemotherapy with Taxotere and Cytoxan for 3 cycles between October 15, 2020 and November 30, 2020.  -After cycle 3 unfortunately patient developed bowel obstruction requiring surgery and colostomy at hospital in [place].  -Patient did not want to do cycle 4 prior to surgery and was subsequently sent back to Dr. Alexandra underwent lumpectomy on January 11 2021 that showed 1 cm residual cancer.  -Patient had axillary dissection done on February 25, 2021 that showed 1.9 cm lymph node involvement with extracapsular extension and tumor deposit in the lymphatic channel.  -Result of pathology report were discussed with patient and her .  Patient had a minimal or no response to chemotherapy with Taxotere and Cytoxan in neoadjuvant setting.  PET/CT done prior to starting chemotherapy had a shotty axillary lymph node without any significant uptake.  Patient did have enlarged lymph node at the time of surgery again not  responding to chemotherapy preoperatively  -Had a radiation treatment that completed on May 24, 2021 at [facility].  -Patient was started on cycle 1 of Xeloda on Pushpa 15, 2021.  -Dose of Xeloda was decreased to 3 tablets in the morning and 2 tablets at night with cycle 2.  -Dose of Xeloda will be decreased to 2 tablets in the morning and 2 tablets at night with cycle 5 due to worsening hand-foot syndrome  -Patient completed cycle 8 of Xeloda on December 28, 2021.              Past Medical History, Past Surgical History, Social History, Family History have been reviewed and are without significant changes except as mentioned.    Review of Systems   A comprehensive 14 point review of systems was performed and was negative except as mentioned in HPI.    Medications:  The current medication list was reviewed in the EMR    ALLERGIES:    Allergies   Allergen Reactions   • Other Confusion     Coda-clear       Objective      Vitals:    01/13/23 1056   BP: 128/59   Pulse: 70   Temp: 96.8 °F (36 °C)   TempSrc: Temporal   SpO2: 98%   Weight: 72.7 kg (160 lb 4.8 oz)   PainSc: 0-No pain     Current Status 9/1/2021   ECOG score 0       Physical Exam  Pulmonary:      Breath sounds: Normal breath sounds.   Neurological:      Mental Status: She is alert and oriented to person, place, and time.           RECENT LABS:  Glucose   Date Value Ref Range Status   01/13/2023 119 (H) 65 - 99 mg/dL Final     Sodium   Date Value Ref Range Status   01/13/2023 137 136 - 145 mmol/L Final     Potassium   Date Value Ref Range Status   01/13/2023 4.0 3.5 - 5.2 mmol/L Final     CO2   Date Value Ref Range Status   01/13/2023 23.0 22.0 - 29.0 mmol/L Final     Chloride   Date Value Ref Range Status   01/13/2023 102 98 - 107 mmol/L Final     Anion Gap   Date Value Ref Range Status   01/13/2023 12.0 5.0 - 15.0 mmol/L Final     Creatinine   Date Value Ref Range Status   01/13/2023 1.19 (H) 0.57 - 1.00 mg/dL Final     BUN   Date Value Ref Range  Status   01/13/2023 19 8 - 23 mg/dL Final     BUN/Creatinine Ratio   Date Value Ref Range Status   01/13/2023 16.0 7.0 - 25.0 Final     Calcium   Date Value Ref Range Status   01/13/2023 9.5 8.6 - 10.5 mg/dL Final     eGFR Non  Amer   Date Value Ref Range Status   01/11/2022 56 (L) >60 mL/min/1.73 Final     Alkaline Phosphatase   Date Value Ref Range Status   01/13/2023 73 39 - 117 U/L Final     Total Protein   Date Value Ref Range Status   01/13/2023 7.1 6.0 - 8.5 g/dL Final     ALT (SGPT)   Date Value Ref Range Status   01/13/2023 38 (H) 1 - 33 U/L Final     AST (SGOT)   Date Value Ref Range Status   01/13/2023 27 1 - 32 U/L Final     Total Bilirubin   Date Value Ref Range Status   01/13/2023 0.3 0.0 - 1.2 mg/dL Final     Albumin   Date Value Ref Range Status   01/13/2023 4.0 3.5 - 5.2 g/dL Final     Globulin   Date Value Ref Range Status   01/13/2023 3.1 gm/dL Final     Lab Results   Component Value Date    WBC 10.75 01/13/2023    HGB 14.8 01/13/2023    HCT 45.4 01/13/2023    MCV 92.1 01/13/2023     01/13/2023     Lab Results   Component Value Date    NEUTROABS 6.74 01/13/2023    IRON 63 01/13/2023    IRON 70 10/11/2022    IRON 97 07/12/2022    TIBC 352 01/13/2023    TIBC 364 10/11/2022    TIBC 384 07/12/2022    LABIRON 18 (L) 01/13/2023    LABIRON 19 (L) 10/11/2022    LABIRON 25 07/12/2022    FERRITIN 257.80 (H) 01/13/2023    FERRITIN 297.10 (H) 10/11/2022    FERRITIN 317.40 (H) 07/12/2022    NJNAQCEC88 777 01/13/2023    YVPCAVAA68 1,044 (H) 10/11/2022    CLJXTYSS84 1,215 (H) 07/12/2022    FOLATE 9.61 01/13/2023    FOLATE 11.20 10/11/2022    FOLATE >20.00 07/12/2022     Lab Results   Component Value Date    REFLABREPO  07/12/2022     [facility]  [address] [place] [address]  Phone: [phone] or [phone]  Fax: [phone]  Ronald Garza M.D., Medical Director    PATIENT NAME                           LABORATORY NO.  [id number]  MONIQUE WHITING.             "        [id number]  [id number]                         AGE              SEX  SSN           CLIENT REF #  [facility]           76      1945  F    [id number]   [id number]    [place]                       REQUESTING M.D.     ATTENDING M.D.     COPY TO.  [address] [place] [address]                 [place] [address]             DATE COLLECTED      DATE RECEIVED      DATE REPORTED  2022          2022         07/15/2022    DIAGNOSIS:  HARDWARE, LEFT CHEST:  GROSS DIAGNOSIS:  Explanted, intact prosthetic port    RLL/pah    CLINICAL HISTORY:  Malignant neoplasm of upper-outer quadrant of right breast in female, estrogen  receptor positive    SPECIMENS RECEIVED:  HARDWARE, LEFT CHEST    Professional interpretation rendered by Ronald Garza M.D., MARINE.SHAUNA.A.P. at  [employer], [address] [place] [address].    GROSS DESCRIPTION:  The specimen is received fresh and consists of a 3.0 x 2.8 x 1.5 cm purple,  plastic, intact port inscribed with \"TC B NELSON 1732,\" and received with a 21.0 x  0.3 x 0.3 cm portion of transected, white, plastic tubing.  No defects or adherent  soft tissue fragments are identified.  No sections are submitted.  YOKO    REVIEWED, DIAGNOSED AND ELECTRONICALLY  SIGNED BY:    Ronald Garza M.D., F.C.A.P.  CPT CODES:  27975           PATHOLOGY:  * Cannot find OR log *         RADIOLOGY DATA :  No radiology results for the last 7 days        Assessment & Plan     1.  Triple negative right breast cancer, [id number] on final pathology report showing 1.9 cm deposit with extracapsular extension and tumor deposit in lymphatics.  - Review oncology history for prior treatment details  - Completed cycle 8 of Xeloda on 2021  - We will continue with clinical surveillance at present  - Next mammogram will be due in May 2023 which gets ordered by surgery clinic  - We will have patient return to clinic in 3 months with repeat " CBC, CMP, iron studies, ferritin, B12 and folate to be done on that day.    2.  Anemia:  - Hemoglobin is 14.8 iron studies are adequate no need for any intravenous iron replacement at present  - Due to iron malabsorption, patient received 5 doses of Venofer in September 2021  -Folate level is decreased to 9.6.  Recommend starting folic acid p.o. daily.  Prescription for folic acid has been sent to her pharmacy today    3.  Elevated liver function test due to fatty liver  - CMP from today shows AST is borderline elevated at 38.  Will monitor with CMP for now    4.  Hand-foot syndrome from Xeloda  - Clinically improved.  We will continue with clinical monitoring    5.  Genetic testing:  -Genetic testing done in November 2020 was negative for BRCA1 and BRCA2 mutation    6.  Acute kidney injury:  - Creatinine today is elevated at 1.19.  Patient was notified about elevated creatinine and need to increase hydration.    7.  Health maintenance: Patient does not smoke    8. Advance Care Planning: For now patient remains full code and is able to make decisions.  Patient has health care surrogate mentioned on chart.               PHQ-9 Total Score: 0   -Patient is not homicidal or suicidal.  No acute intervention required.    Gale Cabral reports a pain score of 0.  Given her pain assessment as noted, treatment options were discussed and the following options were decided upon as a follow-up plan to address the patient's pain: continuation of current treatment plan for pain.         Carlos Duong MD  1/14/2023  11:58 CST        Part of this note may be an electronic transcription/translation of spoken language to printed text using the Dragon Dictation System.          CC:

## 2023-01-13 ENCOUNTER — OFFICE VISIT (OUTPATIENT)
Dept: ONCOLOGY | Facility: CLINIC | Age: 78
End: 2023-01-13
Payer: MEDICARE

## 2023-01-13 ENCOUNTER — LAB (OUTPATIENT)
Dept: ONCOLOGY | Facility: HOSPITAL | Age: 78
End: 2023-01-13
Payer: MEDICARE

## 2023-01-13 VITALS
TEMPERATURE: 96.8 F | WEIGHT: 160.3 LBS | SYSTOLIC BLOOD PRESSURE: 128 MMHG | BODY MASS INDEX: 29.32 KG/M2 | HEART RATE: 70 BPM | OXYGEN SATURATION: 98 % | DIASTOLIC BLOOD PRESSURE: 59 MMHG

## 2023-01-13 DIAGNOSIS — D50.8 OTHER IRON DEFICIENCY ANEMIA: Chronic | ICD-10-CM

## 2023-01-13 DIAGNOSIS — C50.911 INVASIVE DUCTAL CARCINOMA OF RIGHT BREAST: Primary | Chronic | ICD-10-CM

## 2023-01-13 DIAGNOSIS — D50.8 IRON DEFICIENCY ANEMIA SECONDARY TO INADEQUATE DIETARY IRON INTAKE: ICD-10-CM

## 2023-01-13 DIAGNOSIS — D50.8 IRON DEFICIENCY ANEMIA SECONDARY TO INADEQUATE DIETARY IRON INTAKE: Chronic | ICD-10-CM

## 2023-01-13 DIAGNOSIS — R79.89 ELEVATED LFTS: Chronic | ICD-10-CM

## 2023-01-13 DIAGNOSIS — K90.9 IRON MALABSORPTION: Chronic | ICD-10-CM

## 2023-01-13 LAB
ALBUMIN SERPL-MCNC: 4 G/DL (ref 3.5–5.2)
ALBUMIN/GLOB SERPL: 1.3 G/DL
ALP SERPL-CCNC: 73 U/L (ref 39–117)
ALT SERPL W P-5'-P-CCNC: 38 U/L (ref 1–33)
ANION GAP SERPL CALCULATED.3IONS-SCNC: 12 MMOL/L (ref 5–15)
AST SERPL-CCNC: 27 U/L (ref 1–32)
BASOPHILS # BLD AUTO: 0.18 10*3/MM3 (ref 0–0.2)
BASOPHILS NFR BLD AUTO: 1.7 % (ref 0–1.5)
BILIRUB SERPL-MCNC: 0.3 MG/DL (ref 0–1.2)
BUN SERPL-MCNC: 19 MG/DL (ref 8–23)
BUN/CREAT SERPL: 16 (ref 7–25)
CALCIUM SPEC-SCNC: 9.5 MG/DL (ref 8.6–10.5)
CHLORIDE SERPL-SCNC: 102 MMOL/L (ref 98–107)
CO2 SERPL-SCNC: 23 MMOL/L (ref 22–29)
CREAT SERPL-MCNC: 1.19 MG/DL (ref 0.57–1)
DEPRECATED RDW RBC AUTO: 44.5 FL (ref 37–54)
EGFRCR SERPLBLD CKD-EPI 2021: 47.8 ML/MIN/1.73
EOSINOPHIL # BLD AUTO: 0.67 10*3/MM3 (ref 0–0.4)
EOSINOPHIL NFR BLD AUTO: 6.2 % (ref 0.3–6.2)
ERYTHROCYTE [DISTWIDTH] IN BLOOD BY AUTOMATED COUNT: 13.1 % (ref 12.3–15.4)
FERRITIN SERPL-MCNC: 257.8 NG/ML (ref 13–150)
FOLATE SERPL-MCNC: 9.61 NG/ML (ref 4.78–24.2)
GLOBULIN UR ELPH-MCNC: 3.1 GM/DL
GLUCOSE SERPL-MCNC: 119 MG/DL (ref 65–99)
HCT VFR BLD AUTO: 45.4 % (ref 34–46.6)
HGB BLD-MCNC: 14.8 G/DL (ref 12–15.9)
IMM GRANULOCYTES # BLD AUTO: 0.09 10*3/MM3 (ref 0–0.05)
IMM GRANULOCYTES NFR BLD AUTO: 0.8 % (ref 0–0.5)
IRON 24H UR-MRATE: 63 MCG/DL (ref 37–145)
IRON SATN MFR SERPL: 18 % (ref 20–50)
LYMPHOCYTES # BLD AUTO: 2.07 10*3/MM3 (ref 0.7–3.1)
LYMPHOCYTES NFR BLD AUTO: 19.3 % (ref 19.6–45.3)
MCH RBC QN AUTO: 30 PG (ref 26.6–33)
MCHC RBC AUTO-ENTMCNC: 32.6 G/DL (ref 31.5–35.7)
MCV RBC AUTO: 92.1 FL (ref 79–97)
MONOCYTES # BLD AUTO: 1 10*3/MM3 (ref 0.1–0.9)
MONOCYTES NFR BLD AUTO: 9.3 % (ref 5–12)
NEUTROPHILS NFR BLD AUTO: 6.74 10*3/MM3 (ref 1.7–7)
NEUTROPHILS NFR BLD AUTO: 62.7 % (ref 42.7–76)
NRBC BLD AUTO-RTO: 0 /100 WBC (ref 0–0.2)
PLATELET # BLD AUTO: 359 10*3/MM3 (ref 140–450)
PMV BLD AUTO: 8.7 FL (ref 6–12)
POTASSIUM SERPL-SCNC: 4 MMOL/L (ref 3.5–5.2)
PROT SERPL-MCNC: 7.1 G/DL (ref 6–8.5)
RBC # BLD AUTO: 4.93 10*6/MM3 (ref 3.77–5.28)
SODIUM SERPL-SCNC: 137 MMOL/L (ref 136–145)
TIBC SERPL-MCNC: 352 MCG/DL (ref 298–536)
TRANSFERRIN SERPL-MCNC: 236 MG/DL (ref 200–360)
VIT B12 BLD-MCNC: 777 PG/ML (ref 211–946)
WBC NRBC COR # BLD: 10.75 10*3/MM3 (ref 3.4–10.8)

## 2023-01-13 PROCEDURE — 1126F AMNT PAIN NOTED NONE PRSNT: CPT | Performed by: INTERNAL MEDICINE

## 2023-01-13 PROCEDURE — 82746 ASSAY OF FOLIC ACID SERUM: CPT

## 2023-01-13 PROCEDURE — 80053 COMPREHEN METABOLIC PANEL: CPT

## 2023-01-13 PROCEDURE — G0463 HOSPITAL OUTPT CLINIC VISIT: HCPCS | Performed by: INTERNAL MEDICINE

## 2023-01-13 PROCEDURE — 82728 ASSAY OF FERRITIN: CPT

## 2023-01-13 PROCEDURE — 83540 ASSAY OF IRON: CPT

## 2023-01-13 PROCEDURE — 1123F ACP DISCUSS/DSCN MKR DOCD: CPT | Performed by: INTERNAL MEDICINE

## 2023-01-13 PROCEDURE — 82607 VITAMIN B-12: CPT

## 2023-01-13 PROCEDURE — 85025 COMPLETE CBC W/AUTO DIFF WBC: CPT

## 2023-01-13 PROCEDURE — 84466 ASSAY OF TRANSFERRIN: CPT

## 2023-01-13 PROCEDURE — 99214 OFFICE O/P EST MOD 30 MIN: CPT | Performed by: INTERNAL MEDICINE

## 2023-01-14 RX ORDER — FOLIC ACID 1 MG/1
1 TABLET ORAL DAILY
Qty: 90 TABLET | Refills: 1 | Status: SHIPPED | OUTPATIENT
Start: 2023-01-14

## 2023-01-16 ENCOUNTER — TELEPHONE (OUTPATIENT)
Dept: ONCOLOGY | Facility: CLINIC | Age: 78
End: 2023-01-16
Payer: MEDICARE

## 2023-01-16 NOTE — TELEPHONE ENCOUNTER
----- Message from Carlos Duong MD sent at 1/14/2023 11:59 AM CST -----  Regarding: Lab results  Please let patient know, her iron and B12 level is adequate.  No need for any iron or B12 replacement at present.  Folate level is decreased to 9 in blood.  Recommend starting folic acid p.o. daily.  Prescription for folic acid has been sent to her pharmacy today.  Thank you

## 2023-01-17 ENCOUNTER — TELEPHONE (OUTPATIENT)
Dept: ONCOLOGY | Facility: CLINIC | Age: 78
End: 2023-01-17
Payer: MEDICARE

## 2023-01-17 NOTE — TELEPHONE ENCOUNTER
----- Message from Carlos Duong MD sent at 1/16/2023 12:57 PM CST -----  Regarding: RE: mammogram  Surgery team has ordered 12-month follow-up mammogram.  Thank you  ----- Message -----  From: Natalie Rivas RN  Sent: 1/16/2023  10:19 AM CST  To: Carlos Duong MD  Subject: mammogram                                        Pt had mammogram and is asking if she needs another one in 6 months or 12 months. Thanks

## 2023-01-27 NOTE — DISCHARGE INSTRUCTIONS
[facility]  Pre-op Information and Guidelines    You will be called after 2 p.m. the day before your surgery (Friday for Monday surgery) and notified of your time for arrival and approximate surgery time.  If you have not received a call by 4P.M., please contact Same Day Surgery at [phone] of if outside [facility] call [phone].    Please Follow these Important Safety Guidelines:    • The morning of your procedure, take only the medications listed below with   A sip of water:_____________________________________________       ______________________________________________    • DO NOT eat or drink anything after 12:00 midnight the night before surgery  Specific instructions concerning drinking clear liquids will be discussed during  the pre-surgery instruction call the day before your surgery.    • If you take a blood thinner (ex. Plavix, Coumadin, aspirin), ask your doctor when to stop it before surgery  STOP DATE: _________________    • Only 2 visitors are allowed in patient rooms at a time  Your visitors will be asked to wait in the lobby until the admission process is complete with the exception of a parent with a child and patients in need of special assistance.    • YOU CANNOT DRIVE YOURSELF HOME  You must be accompanied by someone who will be responsible for driving you home after surgery and for your care at home.    • DO NOT chew gum, use breath mints, hard candy, or smoke the day of surgery  • DO NOT drink alcohol for at least 24 hours before your surgery  • DO NOT wear any jewelry and remove all body piercing before coming to the hospital  • DO NOT wear make-up to the hospital  • If you are having surgery on an extremity (arm/leg/foot) remove nail polish/artificial nails on the surgical side  • Clothing, glasses, contacts, dentures, and hairpieces must be removed before surgery  • Bathe the night before or the morning of your surgery and do not use powders/lotions on  skin.

## 2023-01-30 RX ORDER — DAPAGLIFLOZIN 10 MG/1
10 TABLET, FILM COATED ORAL EVERY MORNING
Qty: 90 TABLET | Refills: 1 | Status: SHIPPED | OUTPATIENT
Start: 2023-01-30

## 2023-02-13 ENCOUNTER — LAB (OUTPATIENT)
Dept: LAB | Facility: OTHER | Age: 78
End: 2023-02-13
Payer: MEDICARE

## 2023-02-13 ENCOUNTER — OFFICE VISIT (OUTPATIENT)
Dept: FAMILY MEDICINE CLINIC | Facility: CLINIC | Age: 78
End: 2023-02-13
Payer: MEDICARE

## 2023-02-13 VITALS
WEIGHT: 160 LBS | DIASTOLIC BLOOD PRESSURE: 60 MMHG | SYSTOLIC BLOOD PRESSURE: 120 MMHG | HEIGHT: 62 IN | TEMPERATURE: 96.8 F | HEART RATE: 84 BPM | BODY MASS INDEX: 29.44 KG/M2 | OXYGEN SATURATION: 96 %

## 2023-02-13 DIAGNOSIS — J20.9 ACUTE BRONCHITIS, UNSPECIFIED ORGANISM: ICD-10-CM

## 2023-02-13 DIAGNOSIS — J06.9 ACUTE URI: ICD-10-CM

## 2023-02-13 DIAGNOSIS — J45.31 MILD PERSISTENT ASTHMA WITH ACUTE EXACERBATION: Primary | ICD-10-CM

## 2023-02-13 DIAGNOSIS — I10 ESSENTIAL HYPERTENSION: Chronic | ICD-10-CM

## 2023-02-13 DIAGNOSIS — E11.9 TYPE 2 DIABETES MELLITUS WITHOUT COMPLICATION, WITHOUT LONG-TERM CURRENT USE OF INSULIN: Chronic | ICD-10-CM

## 2023-02-13 DIAGNOSIS — R05.1 ACUTE COUGH: ICD-10-CM

## 2023-02-13 LAB
FLUAV AG UPPER RESP QL IA.RAPID: NOT DETECTED
FLUBV AG UPPER RESP QL IA.RAPID: NOT DETECTED
SARS-COV-2 AG RESP QL IA.RAPID: NORMAL

## 2023-02-13 PROCEDURE — 87426 SARSCOV CORONAVIRUS AG IA: CPT | Performed by: INTERNAL MEDICINE

## 2023-02-13 PROCEDURE — 99214 OFFICE O/P EST MOD 30 MIN: CPT | Performed by: INTERNAL MEDICINE

## 2023-02-13 PROCEDURE — 87428 SARSCOV & INF VIR A&B AG IA: CPT | Performed by: INTERNAL MEDICINE

## 2023-02-13 RX ORDER — LEVOFLOXACIN 500 MG/1
500 TABLET, FILM COATED ORAL DAILY
Qty: 10 TABLET | Refills: 0 | Status: SHIPPED | OUTPATIENT
Start: 2023-02-13 | End: 2023-02-23

## 2023-02-13 RX ORDER — PREDNISONE 10 MG/1
TABLET ORAL
Qty: 20 TABLET | Refills: 0 | Status: SHIPPED | OUTPATIENT
Start: 2023-02-13 | End: 2023-03-28

## 2023-02-13 RX ORDER — GUAIFENESIN AND CODEINE PHOSPHATE 100; 10 MG/5ML; MG/5ML
SOLUTION ORAL
Qty: 180 ML | Refills: 0 | Status: SHIPPED | OUTPATIENT
Start: 2023-02-13 | End: 2023-03-28

## 2023-02-13 RX ORDER — PHENYLEPHRINE HCL 10 MG/1
TABLET, FILM COATED ORAL
Qty: 36 TABLET | Refills: 0 | Status: SHIPPED | OUTPATIENT
Start: 2023-02-13 | End: 2023-03-28

## 2023-02-13 NOTE — PROGRESS NOTES
"Chief Complaint  Cough (Chest tight and yellow mucus. She did home Covid test yesterday and was negative. Symptoms started 4 days ago. She is taking Albuterol inhaler and Symbicort )    Subjective        History of Present Illness     Gale Cabral presents to the office reporting onset 4-5 days ago of increased postnasal drainage with subsequent paroxysmal cough productive of yellow sputum.   She had a negative COVID test at home yesterday.  She is using her Symbicort two puffs twice daily and albuterol inhaler two puffs 3 times daily as well as Singulair 10 mg nightly.  She has not been using Flonase nasal spray. Her  also has a frequent cough, otherwise denies known sick contacts. She reports this is the third upper respiratory illness she had this winter, one in October, one in January treated with a course of Augmentin and 5-day course of prednisone. She reports past asthma flares typically improved after one day of steroids.         Objective   Vital Signs:  /60   Pulse 84   Temp 96.8 °F (36 °C)   Ht 157.5 cm (62\")   Wt 72.6 kg (160 lb)   SpO2 96%   BMI 29.26 kg/m²   Estimated body mass index is 29.26 kg/m² as calculated from the following:    Height as of this encounter: 157.5 cm (62\").    Weight as of this encounter: 72.6 kg (160 lb).             Physical Exam  Constitutional:       General: She is not in acute distress.     Appearance: She is well-developed.   HENT:      Head: Normocephalic and atraumatic.      Nose: Nose normal.      Mouth/Throat:      Pharynx: No oropharyngeal exudate.   Eyes:      Pupils: Pupils are equal, round, and reactive to light.   Neck:      Thyroid: No thyromegaly.      Vascular: No JVD.   Cardiovascular:      Rate and Rhythm: Normal rate and regular rhythm.      Heart sounds: Normal heart sounds.   Pulmonary:      Effort: Pulmonary effort is normal. No accessory muscle usage or respiratory distress.      Breath sounds: Wheezing present. No rales.      " Comments: Increased bronchial sounds with expiratory wheezes.   Abdominal:      General: There is no distension.      Palpations: Abdomen is soft.      Comments: Overweight abdomen   Musculoskeletal:      Cervical back: Neck supple.   Lymphadenopathy:      Cervical: No cervical adenopathy.   Neurological:      General: No focal deficit present.      Mental Status: She is alert and oriented to person, place, and time. Mental status is at baseline.      Cranial Nerves: No cranial nerve deficit.   Psychiatric:         Speech: Speech normal.         Behavior: Behavior normal.         Thought Content: Thought content normal.         Judgment: Judgment normal.            Result Review :    Renal Profile    Renal Profile 9/19/22 10/11/22 1/13/23   BUN 15 15 19   Creatinine 0.94 1.03 (A) 1.19 (A)   (A) Abnormal value          .infl  A1C Last 3 Results    HGBA1C Last 3 Results 3/9/22 9/19/22   Hemoglobin A1C 6.60 (A) 6.40 (A)   (A) Abnormal value          No results found for: RAPFLUA, RAPFLUB   COVID-19 Test Result       Patient Name: Gale Cabral   : 1945   MRN: [id number]     COVID19   Date Value Ref Range Status   2023 Presumptive Negative Presumptive Negative - Ref. Range Final        Influenza a/B negative        Electronically signed by Darian Soliman MD, 23, 12:59 PM CST.      Future Appointments   Date Time Provider Department Center   3/28/2023  1:00 PM Darian Soliman MD [facility] PC POW [facility]   2023 11:00 AM NURSE BH MAD BH MAD OPI [facility]   2023 11:30 AM Carlos Duong MD [facility] ONC [facility]   2023  9:00 AM Maral Smith, YOHANA BDM SM HOP [facility]   2023  9:30 AM Deidre Klein APRN MG[facility] GS MAD None   2023 10:30 AM [facility]            Assessment and Plan   Diagnoses and all orders for this visit:    1. Mild persistent asthma with acute exacerbation (Primary)    2. Acute bronchitis, unspecified organism    3. Acute URI    4. Acute  cough  -     Covid-19 + Flu A&B AG, Veritor  -     guaiFENesin-codeine (GUAIFENESIN AC) 100-10 MG/5ML liquid; 1-2 tsp qid prn cough  Dispense: 180 mL; Refill: 0    5. Type 2 diabetes mellitus without complication, without long-term current use of insulin (HCC)    6. Essential hypertension    Other orders  -     levoFLOXacin (LEVAQUIN) 500 MG tablet; Take 1 tablet by mouth Daily for 10 days.  Dispense: 10 tablet; Refill: 0  -     predniSONE (DELTASONE) 10 MG tablet; 1 by mouth 3 times a day times 4 days, then 1 by mouth twice a day times 4 days  Dispense: 20 tablet; Refill: 0  -     phenylephrine (SUDAFED PE) 10 MG tablet; 1 po 2-3 times daily prn congestion  Dispense: 36 tablet; Refill: 0                COVID and flu screen obtained in office today are negative.  For this acute asthma exacerbation and acute bronchitis/acute URI, prescriptions sent for Levaquin 500 mg one q.d. x 10 days, prednisone 8-day taper, and Guaifenesin AC to take as directed.  Continue Singulair 10 mg nightly.  Resume the Flonase nasal spray one spray each nostril b.i.d. and Sudafed PE 10 mg1 po 2-3 times daily prn congestion  Continue use of Albuterol and Symbicort inhalers.    She understands that the steroid taper will aggravate her diabetic control somewhat, but reports she has been able to manage it adequately in the past.  Follow a strict diabetic diet and monitor glucose closely.  Notify us of not able to reasonably control on the steroids.    Continue losartan HCT.  Blood pressure is well controlled today.    Notify us in a week if not improving, otherwise, return 03/28/2023 for routine follow up with fasting labs one week prior or sooner if needed.      Scribed for Dr. Soliman by Lydia Davis [facility].     Follow Up   Return if symptoms worsen or fail to improve, for Next scheduled follow up.  Patient was given instructions and counseling regarding her condition or for health maintenance advice. Please see specific information  pulled into the AVS if appropriate.

## 2023-03-06 DIAGNOSIS — E03.9 ACQUIRED HYPOTHYROIDISM: ICD-10-CM

## 2023-03-06 RX ORDER — LEVOTHYROXINE SODIUM 0.05 MG/1
50 TABLET ORAL DAILY
Qty: 90 TABLET | Refills: 3 | Status: SHIPPED | OUTPATIENT
Start: 2023-03-06

## 2023-03-23 ENCOUNTER — LAB (OUTPATIENT)
Dept: LAB | Facility: OTHER | Age: 78
End: 2023-03-23
Payer: MEDICARE

## 2023-03-23 DIAGNOSIS — E11.69 HYPERLIPIDEMIA ASSOCIATED WITH TYPE 2 DIABETES MELLITUS: Chronic | ICD-10-CM

## 2023-03-23 DIAGNOSIS — D50.8 IRON DEFICIENCY ANEMIA SECONDARY TO INADEQUATE DIETARY IRON INTAKE: Chronic | ICD-10-CM

## 2023-03-23 DIAGNOSIS — E11.9 TYPE 2 DIABETES MELLITUS WITHOUT COMPLICATION, WITHOUT LONG-TERM CURRENT USE OF INSULIN: Chronic | ICD-10-CM

## 2023-03-23 DIAGNOSIS — M85.88 OSTEOPENIA OF SPINE: Chronic | ICD-10-CM

## 2023-03-23 DIAGNOSIS — E03.9 ACQUIRED HYPOTHYROIDISM: Chronic | ICD-10-CM

## 2023-03-23 DIAGNOSIS — I10 ESSENTIAL HYPERTENSION: Chronic | ICD-10-CM

## 2023-03-23 DIAGNOSIS — E55.9 VITAMIN D DEFICIENCY: Chronic | ICD-10-CM

## 2023-03-23 DIAGNOSIS — E78.5 HYPERLIPIDEMIA ASSOCIATED WITH TYPE 2 DIABETES MELLITUS: Chronic | ICD-10-CM

## 2023-03-23 LAB
25(OH)D3 SERPL-MCNC: 46 NG/ML (ref 30–100)
ALBUMIN SERPL-MCNC: 4.5 G/DL (ref 3.5–5)
ALBUMIN/GLOB SERPL: 1.4 G/DL (ref 1.1–1.8)
ALP SERPL-CCNC: 72 U/L (ref 38–126)
ALT SERPL W P-5'-P-CCNC: 51 U/L
ANION GAP SERPL CALCULATED.3IONS-SCNC: 12 MMOL/L (ref 5–15)
AST SERPL-CCNC: 57 U/L (ref 14–36)
BASOPHILS # BLD AUTO: 0.12 10*3/MM3 (ref 0–0.2)
BASOPHILS NFR BLD AUTO: 1.1 % (ref 0–1.5)
BILIRUB SERPL-MCNC: 0.5 MG/DL (ref 0.2–1.3)
BUN SERPL-MCNC: 20 MG/DL (ref 7–23)
BUN/CREAT SERPL: 16.7 (ref 7–25)
CALCIUM SPEC-SCNC: 10.4 MG/DL (ref 8.4–10.2)
CHLORIDE SERPL-SCNC: 102 MMOL/L (ref 101–112)
CHOLEST SERPL-MCNC: 150 MG/DL (ref 150–200)
CO2 SERPL-SCNC: 25 MMOL/L (ref 22–30)
CREAT SERPL-MCNC: 1.2 MG/DL (ref 0.52–1.04)
DEPRECATED RDW RBC AUTO: 45.4 FL (ref 37–54)
EGFRCR SERPLBLD CKD-EPI 2021: 47.3 ML/MIN/1.73
EOSINOPHIL # BLD AUTO: 0.5 10*3/MM3 (ref 0–0.4)
EOSINOPHIL NFR BLD AUTO: 4.8 % (ref 0.3–6.2)
ERYTHROCYTE [DISTWIDTH] IN BLOOD BY AUTOMATED COUNT: 13.7 % (ref 12.3–15.4)
GLOBULIN UR ELPH-MCNC: 3.3 GM/DL (ref 2.3–3.5)
GLUCOSE SERPL-MCNC: 145 MG/DL (ref 70–99)
HBA1C MFR BLD: 7.7 % (ref 4.8–5.6)
HCT VFR BLD AUTO: 44 % (ref 34–46.6)
HDLC SERPL-MCNC: 39 MG/DL (ref 40–59)
HGB BLD-MCNC: 14.2 G/DL (ref 12–15.9)
LDLC SERPL CALC-MCNC: 74 MG/DL
LDLC/HDLC SERPL: 1.68 {RATIO} (ref 0–3.22)
LYMPHOCYTES # BLD AUTO: 2.25 10*3/MM3 (ref 0.7–3.1)
LYMPHOCYTES NFR BLD AUTO: 21.5 % (ref 19.6–45.3)
MCH RBC QN AUTO: 30.5 PG (ref 26.6–33)
MCHC RBC AUTO-ENTMCNC: 32.3 G/DL (ref 31.5–35.7)
MCV RBC AUTO: 94.4 FL (ref 79–97)
MONOCYTES # BLD AUTO: 1 10*3/MM3 (ref 0.1–0.9)
MONOCYTES NFR BLD AUTO: 9.5 % (ref 5–12)
NEUTROPHILS NFR BLD AUTO: 6.61 10*3/MM3 (ref 1.7–7)
NEUTROPHILS NFR BLD AUTO: 63.1 % (ref 42.7–76)
PLATELET # BLD AUTO: 392 10*3/MM3 (ref 140–450)
PMV BLD AUTO: 9.6 FL (ref 6–12)
POTASSIUM SERPL-SCNC: 4 MMOL/L (ref 3.4–5)
PROT SERPL-MCNC: 7.8 G/DL (ref 6.3–8.6)
RBC # BLD AUTO: 4.66 10*6/MM3 (ref 3.77–5.28)
SODIUM SERPL-SCNC: 139 MMOL/L (ref 137–145)
T4 FREE SERPL-MCNC: 1.17 NG/DL (ref 0.93–1.7)
TRIGL SERPL-MCNC: 227 MG/DL
TSH SERPL DL<=0.05 MIU/L-ACNC: 3.14 UIU/ML (ref 0.27–4.2)
VLDLC SERPL-MCNC: 37 MG/DL (ref 5–40)
WBC NRBC COR # BLD: 10.48 10*3/MM3 (ref 3.4–10.8)

## 2023-03-23 PROCEDURE — 82306 VITAMIN D 25 HYDROXY: CPT | Performed by: INTERNAL MEDICINE

## 2023-03-23 PROCEDURE — 85025 COMPLETE CBC W/AUTO DIFF WBC: CPT | Performed by: INTERNAL MEDICINE

## 2023-03-23 PROCEDURE — 80061 LIPID PANEL: CPT | Performed by: INTERNAL MEDICINE

## 2023-03-23 PROCEDURE — 80053 COMPREHEN METABOLIC PANEL: CPT | Performed by: INTERNAL MEDICINE

## 2023-03-23 PROCEDURE — 84443 ASSAY THYROID STIM HORMONE: CPT | Performed by: INTERNAL MEDICINE

## 2023-03-23 PROCEDURE — 83036 HEMOGLOBIN GLYCOSYLATED A1C: CPT | Performed by: INTERNAL MEDICINE

## 2023-03-23 PROCEDURE — 36415 COLL VENOUS BLD VENIPUNCTURE: CPT | Performed by: INTERNAL MEDICINE

## 2023-03-23 PROCEDURE — 84439 ASSAY OF FREE THYROXINE: CPT | Performed by: INTERNAL MEDICINE

## 2023-03-28 ENCOUNTER — OFFICE VISIT (OUTPATIENT)
Dept: FAMILY MEDICINE CLINIC | Facility: CLINIC | Age: 78
End: 2023-03-28
Payer: MEDICARE

## 2023-03-28 VITALS
HEIGHT: 62 IN | SYSTOLIC BLOOD PRESSURE: 118 MMHG | OXYGEN SATURATION: 97 % | WEIGHT: 162.2 LBS | BODY MASS INDEX: 29.85 KG/M2 | DIASTOLIC BLOOD PRESSURE: 54 MMHG | TEMPERATURE: 97.4 F | HEART RATE: 82 BPM

## 2023-03-28 DIAGNOSIS — E03.9 ACQUIRED HYPOTHYROIDISM: Chronic | ICD-10-CM

## 2023-03-28 DIAGNOSIS — E11.9 TYPE 2 DIABETES MELLITUS WITHOUT COMPLICATION, WITHOUT LONG-TERM CURRENT USE OF INSULIN: Primary | Chronic | ICD-10-CM

## 2023-03-28 DIAGNOSIS — E55.9 VITAMIN D DEFICIENCY: Chronic | ICD-10-CM

## 2023-03-28 DIAGNOSIS — L85.3 DRY SKIN: ICD-10-CM

## 2023-03-28 DIAGNOSIS — E53.8 FOLATE DEFICIENCY: Chronic | ICD-10-CM

## 2023-03-28 DIAGNOSIS — E11.69 HYPERLIPIDEMIA ASSOCIATED WITH TYPE 2 DIABETES MELLITUS: Chronic | ICD-10-CM

## 2023-03-28 DIAGNOSIS — I10 ESSENTIAL HYPERTENSION: Chronic | ICD-10-CM

## 2023-03-28 DIAGNOSIS — E78.5 HYPERLIPIDEMIA ASSOCIATED WITH TYPE 2 DIABETES MELLITUS: Chronic | ICD-10-CM

## 2023-03-28 DIAGNOSIS — E66.3 OVERWEIGHT (BMI 25.0-29.9): Chronic | ICD-10-CM

## 2023-03-28 PROBLEM — I95.9 HYPOTENSION: Status: RESOLVED | Noted: 2021-07-02 | Resolved: 2023-03-28

## 2023-03-28 NOTE — PROGRESS NOTES
Chief Complaint  Essential hypertension, Hyperlipidemia associated with type 2 diabetes mellitus (HC, Type 2 diabetes mellitus without complication (HCC), Acquired hypothyroidism, Spinal stenosis of lumbar region, and Iron deficiency anemia secondary to inadequate dietary iron    Subjective        History of Present Illness     Gale Cabral presents to the office for 6-month follow up on type 2 diabetes, hypertension, hyperlipidemia/low HDL, iron deficiency anemia with iron malabsorption requiring episodic iron infusion, asthma, and hypothyroidism among other issues.  Patient continues to follow with Dr. Duong at [facility] for triple negative right breast cancer.  Patient had colonoscopy by Dr. Peters 11/19/2020 revealing one polyp. However she developed perforated diverticulitis 2021 that was treated in [place], [place] with a laparoscopic sigmotomy and colectomy with diverting colostomy and subsequently underwent reanastomosis 01/28/2022.        Recent viral URI symptoms gradually resolved  She is up to date on COVID vaccines.   She uses an inhaler occasionally for mild persistent asthma.     Weight is up 3 pounds in the past six months.  Diabetes has progressed over past six months with A1c 7.7 increased from 6.4, most likely in part due to  2 recent courses of steroids in January.  She has been keeping a diabetic diary revealing excellent glucose control for the past week.     She reports a tender area under right arm.  No mass or rash on exam.        Six months ago, we sent prescription for Zetia 10 mg to add one q.d. daily to her Lipitor 20 mg daily.  LDL improved at 74 down from 116.  BP and HR at goal.      Lab results are reviewed with the patient today.  CBC unremarkable.   Fasting glucose 145.  Cholesterol at goal, although, triglycerides above goal at 227.  Renal function declined in past six months with creatinine 1.2.  Denies NSAID use.  Liver enzymes mildly elevated, most likely  "due to recent viral illness.   Vitamin D and calcium at goal with oral supplements.     Objective   Vital Signs:  /54 (BP Location: Left arm, Patient Position: Sitting, Cuff Size: Large Adult)   Pulse 82   Temp 97.4 °F (36.3 °C) (Tympanic)   Ht 157.5 cm (62.01\")   Wt 73.6 kg (162 lb 3.2 oz)   SpO2 97%   BMI 29.66 kg/m²   Estimated body mass index is 29.66 kg/m² as calculated from the following:    Height as of this encounter: 157.5 cm (62.01\").    Weight as of this encounter: 73.6 kg (162 lb 3.2 oz).             Physical Exam  Vitals reviewed.   Constitutional:       General: She is not in acute distress.     Appearance: She is well-developed.      Comments: Pleasant female, overweight.  Accompanied by  Fernandez.    HENT:      Head: Normocephalic and atraumatic.      Nose:      Right Sinus: No maxillary sinus tenderness or frontal sinus tenderness.      Left Sinus: No maxillary sinus tenderness or frontal sinus tenderness.      Mouth/Throat:      Mouth: No oral lesions.      Pharynx: Uvula midline.      Tonsils: No tonsillar exudate.   Eyes:      Conjunctiva/sclera: Conjunctivae normal.      Pupils: Pupils are equal, round, and reactive to light.   Neck:      Thyroid: No thyroid mass or thyromegaly.      Vascular: No carotid bruit or JVD.      Trachea: Trachea normal. No tracheal deviation.   Cardiovascular:      Rate and Rhythm: Normal rate and regular rhythm.  No extrasystoles are present.     Chest Wall: PMI is not displaced.      Heart sounds: Normal heart sounds. No murmur heard.  Pulmonary:      Effort: Pulmonary effort is normal. No accessory muscle usage or respiratory distress.      Breath sounds: Normal breath sounds. No decreased breath sounds, wheezing, rhonchi or rales.   Abdominal:      General: Bowel sounds are normal. There is no distension.      Palpations: Abdomen is soft.      Tenderness: There is no abdominal tenderness.   Musculoskeletal:      Cervical back: Neck supple. "   Lymphadenopathy:      Cervical: No cervical adenopathy.   Skin:     General: Skin is warm and dry.      Findings: No rash.      Nails: There is no clubbing.      Comments: Excessively dry skin lower extremities.    Neurological:      Mental Status: She is alert and oriented to person, place, and time.      Cranial Nerves: No cranial nerve deficit.      Coordination: Coordination normal.   Psychiatric:         Speech: Speech normal.         Behavior: Behavior normal.         Thought Content: Thought content normal.         Judgment: Judgment normal.            Result Review :    CMP    CMP 10/11/22 1/13/23 3/23/23   Glucose 152 (A) 119 (A) 145 (A)   BUN 15 19 20   Creatinine 1.03 (A) 1.19 (A) 1.20 (A)   eGFR 56.8 (A) 47.8 (A) 47.3 (A)   Sodium 140 137 139   Potassium 3.7 4.0 4.0   Chloride 104 102 102   Calcium 9.8 9.5 10.4 (A)   Total Protein 7.4 7.1 7.8   Albumin 4.20 4.0 4.5   Globulin 3.2 3.1 3.3   Total Bilirubin 0.3 0.3 0.5   Alkaline Phosphatase 80 73 72   AST (SGOT) 35 (A) 27 57 (A)   ALT (SGPT) 32 38 (A) 51 (A)   Albumin/Globulin Ratio 1.3 1.3 1.4   BUN/Creatinine Ratio 14.6 16.0 16.7   Anion Gap 12.0 12.0 12.0   (A) Abnormal value       Comments are available for some flowsheets but are not being displayed.           CBC w/diff    CBC w/Diff 10/11/22 1/13/23 3/23/23   WBC 8.96 10.75 10.48   RBC 4.45 4.93 4.66   Hemoglobin 14.2 14.8 14.2   Hematocrit 41.6 45.4 44.0   MCV 93.5 92.1 94.4   MCH 31.9 30.0 30.5   MCHC 34.1 32.6 32.3   RDW 13.2 13.1 13.7   Platelets 364 359 392   Neutrophil Rel % 65.2 62.7 63.1   Immature Granulocyte Rel % 0.7 (A) 0.8 (A)    Lymphocyte Rel % 19.9 19.3 (A) 21.5   Monocyte Rel % 5.6 9.3 9.5   Eosinophil Rel % 7.4 (A) 6.2 4.8   Basophil Rel % 1.2 1.7 (A) 1.1   (A) Abnormal value            Lipid Panel    Lipid Panel 9/19/22 3/23/23   Total Cholesterol  150   Triglycerides  227 (A)   HDL Cholesterol  39 (A)   VLDL Cholesterol  37   LDL Cholesterol  116 (A) 74   LDL/HDL Ratio  1.68    (A) Abnormal value            TSH    TSH 9/19/22 3/23/23   TSH 2.430 3.140           A1C Last 3 Results    HGBA1C Last 3 Results 9/19/22 3/23/23   Hemoglobin A1C 6.40 (A) 7.70 (A)   (A) Abnormal value                         Assessment and Plan   Diagnoses and all orders for this visit:    1. Type 2 diabetes mellitus without complication, without long-term current use of insulin (HCC) (Primary)  -     CBC Auto Differential; Future  -     Comprehensive Metabolic Panel; Future  -     Hemoglobin A1c; Future  -     LDL Cholesterol, Direct; Future  -     Triglycerides; Future  -     TSH; Future  -     T4, free; Future    2. Hyperlipidemia associated with type 2 diabetes mellitus (HCC)  -     LDL Cholesterol, Direct; Future  -     Triglycerides; Future    3. Essential hypertension  -     Comprehensive Metabolic Panel; Future    4. Acquired hypothyroidism  -     TSH; Future  -     T4, free; Future    5. Vitamin D deficiency  -     Vitamin D,25-Hydroxy; Future    6. Dry skin    7. Overweight (BMI 25.0-29.9)    8. Folate deficiency                Diabetes control is not at goal with this set of labs, most likely due to 2 recent steroid courses, although, her diary for the past week reveals improvement and excellent glucose control on current meds.  Continue metformin and Farxiga.   Try to intensify diabetic diet and exercise and weight loss efforts.    Continue daily glucose monitoring    Continue to follow with Dr. Alexandra, surgery, and Dr. Duong, oncology, to address breast cancer and multifactorial anemia.  Continue the oral B-12.  Continue the folic acid as recommended by Dr. Duong.    Renal function declined.  Avoid  NSAID use and other nephrotoxic agents.  Hydrate well.  Pursue weight loss.  We will continue to monitor.      LDL improved.  Continue the Zetia and Lipitor.   Continue 81 mg aspirin.    Aggressively moisturize dry skin 2-3 times daily with fragrance free lotion.     Return in 6 months for routine follow up  with fasting labs one week prior or sooner if needed.        Scribed for Dr. Soliman by Lydia Davis [facility].     Follow Up   Return in about 6 months (around 9/28/2023) for Follow up in six months with labs one week prior..  Patient was given instructions and counseling regarding her condition or for health maintenance advice. Please see specific information pulled into the AVS if appropriate.

## 2023-03-28 NOTE — PATIENT INSTRUCTIONS
Exercising to Lose Weight  Getting regular exercise is important for everyone. It is especially important if you are overweight. Being overweight increases your risk of heart disease, stroke, diabetes, high blood pressure, and several types of cancer. Exercising, and reducing the calories you consume, can help you lose weight and improve fitness and health.  Exercise can be moderate or vigorous intensity. To lose weight, most people need to do a certain amount of moderate or vigorous-intensity exercise each week.  How can exercise affect me?  You lose weight when you exercise enough to burn more calories than you eat. Exercise also reduces body fat and builds muscle. The more muscle you have, the more calories you burn. Exercise also:  Improves mood.  Reduces stress and tension.  Improves your overall fitness, flexibility, and endurance.  Increases bone strength.  Moderate-intensity exercise  Moderate-intensity exercise is any activity that gets you moving enough to burn at least three times more energy (calories) than if you were sitting.  Examples of moderate exercise include:  Walking a mile in 15 minutes.  Doing light yard work.  Biking at an easy pace.  Most people should get at least 150 minutes of moderate-intensity exercise a week to maintain their body weight.  Vigorous-intensity exercise  Vigorous-intensity exercise is any activity that gets you moving enough to burn at least six times more calories than if you were sitting. When you exercise at this intensity, you should be working hard enough that you are not able to carry on a conversation.  Examples of vigorous exercise include:  Running.  Playing a team sport, such as football, basketball, and soccer.  Jumping rope.  Most people should get at least 75 minutes a week of vigorous exercise to maintain their body weight.  What actions can I take to lose weight?  The amount of exercise you need to lose weight depends on:  Your age.  The type of  exercise.  Any health conditions you have.  Your overall physical ability.  Talk to your health care provider about how much exercise you need and what types of activities are safe for you.  Nutrition    Make changes to your diet as told by your health care provider or diet and nutrition specialist (dietitian). This may include:  Eating fewer calories.  Eating more protein.  Eating less unhealthy fats.  Eating a diet that includes fresh fruits and vegetables, whole grains, low-fat dairy products, and lean protein.  Avoiding foods with added fat, salt, and sugar.  Drink plenty of water while you exercise to prevent dehydration or heat stroke.  Activity  Choose an activity that you enjoy and set realistic goals. Your health care provider can help you make an exercise plan that works for you.  Exercise at a moderate or vigorous intensity most days of the week.  The intensity of exercise may vary from person to person. You can tell how intense a workout is for you by paying attention to your breathing and heartbeat. Most people will notice their breathing and heartbeat get faster with more intense exercise.  Do resistance training twice each week, such as:  Push-ups.  Sit-ups.  Lifting weights.  Using resistance bands.  Getting short amounts of exercise can be just as helpful as long, structured periods of exercise. If you have trouble finding time to exercise, try doing these things as part of your daily routine:  Get up, stretch, and walk around every 30 minutes throughout the day.  Go for a walk during your lunch break.  Park your car farther away from your destination.  If you take public transportation, get off one stop early and walk the rest of the way.  Make phone calls while standing up and walking around.  Take the stairs instead of elevators or escalators.  Wear comfortable clothes and shoes with good support.  Do not exercise so much that you hurt yourself, feel dizzy, or get very short of breath.  Where to  find more information  [place] Department of Health and Human Services: www.hhs.gov  Centers for Disease Control and Prevention: www.cdc.gov  Contact a health care provider:  Before starting a new exercise program.  If you have questions or concerns about your weight.  If you have a medical problem that keeps you from exercising.  Get help right away if:  You have any of the following while exercising:  Injury.  Dizziness.  Difficulty breathing or shortness of breath that does not go away when you stop exercising.  Chest pain.  Rapid heartbeat.  These symptoms may represent a serious problem that is an emergency. Do not wait to see if the symptoms will go away. Get medical help right away. Call your local emergency services (911 in the [place] Do not drive yourself to the hospital.  Summary  Getting regular exercise is especially important if you are overweight.  Being overweight increases your risk of heart disease, stroke, diabetes, high blood pressure, and several types of cancer.  Losing weight happens when you burn more calories than you eat.  Reducing the amount of calories you eat, and getting regular moderate or vigorous exercise each week, helps you lose weight.  This information is not intended to replace advice given to you by your health care provider. Make sure you discuss any questions you have with your health care provider.  Document Revised: 02/13/2022 Document Reviewed: 02/13/2022  Elsevier Patient Education © 2022 [employer]  Calorie Counting for Weight Loss  Calories are units of energy. Your body needs a certain number of calories from food to keep going throughout the day. When you eat or drink more calories than your body needs, your body stores the extra calories mostly as fat. When you eat or drink fewer calories than your body needs, your body burns fat to get the energy it needs.  Calorie counting means keeping track of how many calories you eat and drink each day. Calorie counting can be  helpful if you need to lose weight. If you eat fewer calories than your body needs, you should lose weight. Ask your health care provider what a healthy weight is for you.  For calorie counting to work, you will need to eat the right number of calories each day to lose a healthy amount of weight per week. A dietitian can help you figure out how many calories you need in a day and will suggest ways to reach your calorie goal.  A healthy amount of weight to lose each week is usually 1-2 lb (0.5-0.9 kg). This usually means that your daily calorie intake should be reduced by 500-750 calories.  Eating 1,200-1,500 calories a day can help most women lose weight.  Eating 1,500-1,800 calories a day can help most men lose weight.  What do I need to know about calorie counting?  Work with your health care provider or dietitian to determine how many calories you should get each day. To meet your daily calorie goal, you will need to:  Find out how many calories are in each food that you would like to eat. Try to do this before you eat.  Decide how much of the food you plan to eat.  Keep a food log. Do this by writing down what you ate and how many calories it had.  To successfully lose weight, it is important to balance calorie counting with a healthy lifestyle that includes regular activity.  Where do I find calorie information?  The number of calories in a food can be found on a Nutrition Facts label. If a food does not have a Nutrition Facts label, try to look up the calories online or ask your dietitian for help.  Remember that calories are listed per serving. If you choose to have more than one serving of a food, you will have to multiply the calories per serving by the number of servings you plan to eat. For example, the label on a package of bread might say that a serving size is 1 slice and that there are 90 calories in a serving. If you eat 1 slice, you will have eaten 90 calories. If you eat 2 slices, you will have  eaten 180 calories.  How do I keep a food log?  After each time that you eat, record the following in your food log as soon as possible:  What you ate. Be sure to include toppings, sauces, and other extras on the food.  How much you ate. This can be measured in cups, ounces, or number of items.  How many calories were in each food and drink.  The total number of calories in the food you ate.  Keep your food log near you, such as in a pocket-sized notebook or on an hebert or website on your mobile phone. Some programs will calculate calories for you and show you how many calories you have left to meet your daily goal.  What are some portion-control tips?  Know how many calories are in a serving. This will help you know how many servings you can have of a certain food.  Use a measuring cup to measure serving sizes. You could also try weighing out portions on a kitchen scale. With time, you will be able to estimate serving sizes for some foods.  Take time to put servings of different foods on your favorite plates or in your favorite bowls and cups so you know what a serving looks like.  Try not to eat straight from a food's packaging, such as from a bag or box. Eating straight from the package makes it hard to see how much you are eating and can lead to overeating. Put the amount you would like to eat in a cup or on a plate to make sure you are eating the right portion.  Use smaller plates, glasses, and bowls for smaller portions and to prevent overeating.  Try not to multitask. For example, avoid watching TV or using your computer while eating. If it is time to eat, sit down at a table and enjoy your food. This will help you recognize when you are full. It will also help you be more mindful of what and how much you are eating.  What are tips for following this plan?  Reading food labels  Check the calorie count compared with the serving size. The serving size may be smaller than what you are used to eating.  Check the  source of the calories. Try to choose foods that are high in protein, fiber, and vitamins, and low in saturated fat, trans fat, and sodium.  Shopping  Read nutrition labels while you shop. This will help you make healthy decisions about which foods to buy.  Pay attention to nutrition labels for low-fat or fat-free foods. These foods sometimes have the same number of calories or more calories than the full-fat versions. They also often have added sugar, starch, or salt to make up for flavor that was removed with the fat.  Make a grocery list of lower-calorie foods and stick to it.  Cooking  Try to cook your favorite foods in a healthier way. For example, try baking instead of frying.  Use low-fat dairy products.  Meal planning  Use more fruits and vegetables. One-half of your plate should be fruits and vegetables.  Include lean proteins, such as chicken, turkey, and fish.  Lifestyle  Each week, aim to do one of the followin minutes of moderate exercise, such as walking.  75 minutes of vigorous exercise, such as running.  General information  Know how many calories are in the foods you eat most often. This will help you calculate calorie counts faster.  Find a way of tracking calories that works for you. Get creative. Try different apps or programs if writing down calories does not work for you.  What foods should I eat?    Eat nutritious foods. It is better to have a nutritious, high-calorie food, such as an avocado, than a food with few nutrients, such as a bag of potato chips.  Use your calories on foods and drinks that will fill you up and will not leave you hungry soon after eating.  Examples of foods that fill you up are nuts and nut butters, vegetables, lean proteins, and high-fiber foods such as whole grains. High-fiber foods are foods with more than 5 g of fiber per serving.  Pay attention to calories in drinks. Low-calorie drinks include water and unsweetened drinks.  The items listed above may not be  a complete list of foods and beverages you can eat. Contact a dietitian for more information.  What foods should I limit?  Limit foods or drinks that are not good sources of vitamins, minerals, or protein or that are high in unhealthy fats. These include:  Candy.  Other sweets.  Sodas, specialty coffee drinks, alcohol, and juice.  The items listed above may not be a complete list of foods and beverages you should avoid. Contact a dietitian for more information.  How do I count calories when eating out?  Pay attention to portions. Often, portions are much larger when eating out. Try these tips to keep portions smaller:  Consider sharing a meal instead of getting your own.  If you get your own meal, eat only half of it. Before you start eating, ask for a container and put half of your meal into it.  When available, consider ordering smaller portions from the menu instead of full portions.  Pay attention to your food and drink choices. Knowing the way food is cooked and what is included with the meal can help you eat fewer calories.  If calories are listed on the menu, choose the lower-calorie options.  Choose dishes that include vegetables, fruits, whole grains, low-fat dairy products, and lean proteins.  Choose items that are boiled, broiled, grilled, or steamed. Avoid items that are buttered, battered, fried, or served with cream sauce. Items labeled as crispy are usually fried, unless stated otherwise.  Choose water, low-fat milk, unsweetened iced tea, or other drinks without added sugar. If you want an alcoholic beverage, choose a lower-calorie option, such as a glass of wine or light beer.  Ask for dressings, sauces, and syrups on the side. These are usually high in calories, so you should limit the amount you eat.  If you want a salad, choose a garden salad and ask for grilled meats. Avoid extra toppings such as juarez, cheese, or fried items. Ask for the dressing on the side, or ask for olive oil and vinegar or  lemon to use as dressing.  Estimate how many servings of a food you are given. Knowing serving sizes will help you be aware of how much food you are eating at restaurants.  Where to find more information  Centers for Disease Control and Prevention: www.cdc.gov  [place] Department of Agriculture: myplate.gov  Summary  Calorie counting means keeping track of how many calories you eat and drink each day. If you eat fewer calories than your body needs, you should lose weight.  A healthy amount of weight to lose per week is usually 1-2 lb (0.5-0.9 kg). This usually means reducing your daily calorie intake by 500-750 calories.  The number of calories in a food can be found on a Nutrition Facts label. If a food does not have a Nutrition Facts label, try to look up the calories online or ask your dietitian for help.  Use smaller plates, glasses, and bowls for smaller portions and to prevent overeating.  Use your calories on foods and drinks that will fill you up and not leave you hungry shortly after a meal.  This information is not intended to replace advice given to you by your health care provider. Make sure you discuss any questions you have with your health care provider.  Document Revised: 01/28/2021 Document Reviewed: 01/28/2021  Elsevier Patient Education © 2022 [employer]

## 2023-04-14 ENCOUNTER — LAB (OUTPATIENT)
Dept: ONCOLOGY | Facility: HOSPITAL | Age: 78
End: 2023-04-14
Payer: MEDICARE

## 2023-04-14 ENCOUNTER — OFFICE VISIT (OUTPATIENT)
Dept: ONCOLOGY | Facility: CLINIC | Age: 78
End: 2023-04-14
Payer: MEDICARE

## 2023-04-14 VITALS
WEIGHT: 161 LBS | RESPIRATION RATE: 18 BRPM | BODY MASS INDEX: 29.44 KG/M2 | DIASTOLIC BLOOD PRESSURE: 63 MMHG | OXYGEN SATURATION: 95 % | HEART RATE: 77 BPM | SYSTOLIC BLOOD PRESSURE: 151 MMHG

## 2023-04-14 DIAGNOSIS — C50.911 INVASIVE DUCTAL CARCINOMA OF RIGHT BREAST: ICD-10-CM

## 2023-04-14 DIAGNOSIS — D50.8 IRON DEFICIENCY ANEMIA SECONDARY TO INADEQUATE DIETARY IRON INTAKE: ICD-10-CM

## 2023-04-14 DIAGNOSIS — L27.1 HAND FOOT SYNDROME: ICD-10-CM

## 2023-04-14 DIAGNOSIS — R79.89 ELEVATED LFTS: ICD-10-CM

## 2023-04-14 DIAGNOSIS — K90.9 IRON MALABSORPTION: ICD-10-CM

## 2023-04-14 DIAGNOSIS — D50.8 OTHER IRON DEFICIENCY ANEMIA: ICD-10-CM

## 2023-04-14 DIAGNOSIS — D50.8 IRON DEFICIENCY ANEMIA SECONDARY TO INADEQUATE DIETARY IRON INTAKE: Primary | ICD-10-CM

## 2023-04-14 LAB
ALBUMIN SERPL-MCNC: 4.2 G/DL (ref 3.5–5.2)
ALBUMIN/GLOB SERPL: 1.2 G/DL
ALP SERPL-CCNC: 72 U/L (ref 39–117)
ALT SERPL W P-5'-P-CCNC: 36 U/L (ref 1–33)
ANION GAP SERPL CALCULATED.3IONS-SCNC: 15 MMOL/L (ref 5–15)
AST SERPL-CCNC: 57 U/L (ref 1–32)
BASOPHILS # BLD AUTO: 0.17 10*3/MM3 (ref 0–0.2)
BASOPHILS NFR BLD AUTO: 1.7 % (ref 0–1.5)
BILIRUB SERPL-MCNC: 0.4 MG/DL (ref 0–1.2)
BUN SERPL-MCNC: 17 MG/DL (ref 8–23)
BUN/CREAT SERPL: 16 (ref 7–25)
CALCIUM SPEC-SCNC: 10.2 MG/DL (ref 8.6–10.5)
CHLORIDE SERPL-SCNC: 103 MMOL/L (ref 98–107)
CO2 SERPL-SCNC: 21 MMOL/L (ref 22–29)
CREAT SERPL-MCNC: 1.06 MG/DL (ref 0.57–1)
DEPRECATED RDW RBC AUTO: 43.7 FL (ref 37–54)
EGFRCR SERPLBLD CKD-EPI 2021: 54.9 ML/MIN/1.73
EOSINOPHIL # BLD AUTO: 0.98 10*3/MM3 (ref 0–0.4)
EOSINOPHIL NFR BLD AUTO: 9.7 % (ref 0.3–6.2)
ERYTHROCYTE [DISTWIDTH] IN BLOOD BY AUTOMATED COUNT: 13.2 % (ref 12.3–15.4)
FERRITIN SERPL-MCNC: 233.9 NG/ML (ref 13–150)
FOLATE SERPL-MCNC: >20 NG/ML (ref 4.78–24.2)
GLOBULIN UR ELPH-MCNC: 3.4 GM/DL
GLUCOSE SERPL-MCNC: 106 MG/DL (ref 65–99)
HCT VFR BLD AUTO: 44 % (ref 34–46.6)
HGB BLD-MCNC: 14.6 G/DL (ref 12–15.9)
IMM GRANULOCYTES # BLD AUTO: 0.05 10*3/MM3 (ref 0–0.05)
IMM GRANULOCYTES NFR BLD AUTO: 0.5 % (ref 0–0.5)
IRON 24H UR-MRATE: 63 MCG/DL (ref 37–145)
IRON SATN MFR SERPL: 16 % (ref 20–50)
LYMPHOCYTES # BLD AUTO: 1.98 10*3/MM3 (ref 0.7–3.1)
LYMPHOCYTES NFR BLD AUTO: 19.6 % (ref 19.6–45.3)
MCH RBC QN AUTO: 30.1 PG (ref 26.6–33)
MCHC RBC AUTO-ENTMCNC: 33.2 G/DL (ref 31.5–35.7)
MCV RBC AUTO: 90.7 FL (ref 79–97)
MONOCYTES # BLD AUTO: 0.75 10*3/MM3 (ref 0.1–0.9)
MONOCYTES NFR BLD AUTO: 7.4 % (ref 5–12)
NEUTROPHILS NFR BLD AUTO: 6.15 10*3/MM3 (ref 1.7–7)
NEUTROPHILS NFR BLD AUTO: 61.1 % (ref 42.7–76)
NRBC BLD AUTO-RTO: 0 /100 WBC (ref 0–0.2)
PLATELET # BLD AUTO: 393 10*3/MM3 (ref 140–450)
PMV BLD AUTO: 8.9 FL (ref 6–12)
POTASSIUM SERPL-SCNC: 4.3 MMOL/L (ref 3.5–5.2)
PROT SERPL-MCNC: 7.6 G/DL (ref 6–8.5)
RBC # BLD AUTO: 4.85 10*6/MM3 (ref 3.77–5.28)
SODIUM SERPL-SCNC: 139 MMOL/L (ref 136–145)
TIBC SERPL-MCNC: 396 MCG/DL (ref 298–536)
TRANSFERRIN SERPL-MCNC: 266 MG/DL (ref 200–360)
VIT B12 BLD-MCNC: 682 PG/ML (ref 211–946)
WBC NRBC COR # BLD: 10.08 10*3/MM3 (ref 3.4–10.8)

## 2023-04-14 NOTE — PROGRESS NOTES
DATE OF VISIT: 4/15/2023      REASON FOR VISIT: Triple negative right breast cancer, anemia,      HISTORY OF PRESENT ILLNESS:   77-year-old female with medical problem consisting of diabetes mellitus, hypertension, dyslipidemia, asthma, osteopenia, obstructive sleep apnea, chronic back pain has been following up with [facility] since October 5, 2020 for triple negative right breast cancer.  Patient completed adjuvant Xeloda in December 2021.  Patient is here for follow-up appointment today.  Complains of axillary discomfort on the left side along with decreased range of motion at left shoulder that has started recently.  Denies any trauma.  Denies any recent infection.  Denies any new lymph node enlargement.  Denies any worsening erythema affecting hands or feet.  Denies any bleeding.  Denies any fevers.                Oncology history:    1.  Triple negative right breast cancer, [id number] with 1 lymph node showing 1.9 cm deposit with extracapsular extension and tumor deposit lymphatic:  -Patient received neoadjuvant chemotherapy with Taxotere and Cytoxan for 3 cycles between October 15, 2020 and November 30, 2020.  -After cycle 3 unfortunately patient developed bowel obstruction requiring surgery and colostomy at hospital in [place].  -Patient did not want to do cycle 4 prior to surgery and was subsequently sent back to Dr. Alexandra underwent lumpectomy on January 11 2021 that showed 1 cm residual cancer.  -Patient had axillary dissection done on February 25, 2021 that showed 1.9 cm lymph node involvement with extracapsular extension and tumor deposit in the lymphatic channel.  -Result of pathology report were discussed with patient and her .  Patient had a minimal or no response to chemotherapy with Taxotere and Cytoxan in neoadjuvant setting.  PET/CT done prior to starting chemotherapy had a shotty axillary lymph node without any significant uptake.  Patient did have enlarged lymph node at the time  of surgery again not responding to chemotherapy preoperatively  -Had a radiation treatment that completed on May 24, 2021 at [facility].  -Patient was started on cycle 1 of Xeloda on Pushpa 15, 2021.  -Dose of Xeloda was decreased to 3 tablets in the morning and 2 tablets at night with cycle 2.  -Dose of Xeloda will be decreased to 2 tablets in the morning and 2 tablets at night with cycle 5 due to worsening hand-foot syndrome  -Patient completed cycle 8 of Xeloda on December 28, 2021.              Past Medical History, Past Surgical History, Social History, Family History have been reviewed and are without significant changes except as mentioned.    Review of Systems   A comprehensive 14 point review of systems was performed and was negative except as mentioned in HPI.    Medications:  The current medication list was reviewed in the EMR    ALLERGIES:    Allergies   Allergen Reactions   • Other Confusion     Coda-clear       Objective      Vitals:    04/14/23 1112   BP: 151/63   Pulse: 77   Resp: 18   SpO2: 95%   Weight: 73 kg (161 lb)   PainSc: 0-No pain         9/1/2021     1:48 PM   Current Status   ECOG score 0       Physical Exam  Pulmonary:      Breath sounds: Normal breath sounds.   Chest:      Comments: Left axillary area was examined in presence of registered nurse Natalie.  There is no evidence of any palpable adenopathy involving left axilla or any skin rash.  Neurological:      Mental Status: She is alert and oriented to person, place, and time.           RECENT LABS:  Glucose   Date Value Ref Range Status   04/14/2023 106 (H) 65 - 99 mg/dL Final     Sodium   Date Value Ref Range Status   04/14/2023 139 136 - 145 mmol/L Final     Potassium   Date Value Ref Range Status   04/14/2023 4.3 3.5 - 5.2 mmol/L Final     CO2   Date Value Ref Range Status   04/14/2023 21.0 (L) 22.0 - 29.0 mmol/L Final     Chloride   Date Value Ref Range Status   04/14/2023 103 98 - 107 mmol/L Final     Anion Gap   Date  Value Ref Range Status   04/14/2023 15.0 5.0 - 15.0 mmol/L Final     Creatinine   Date Value Ref Range Status   04/14/2023 1.06 (H) 0.57 - 1.00 mg/dL Final     BUN   Date Value Ref Range Status   04/14/2023 17 8 - 23 mg/dL Final     BUN/Creatinine Ratio   Date Value Ref Range Status   04/14/2023 16.0 7.0 - 25.0 Final     Calcium   Date Value Ref Range Status   04/14/2023 10.2 8.6 - 10.5 mg/dL Final     eGFR Non  Amer   Date Value Ref Range Status   01/11/2022 56 (L) >60 mL/min/1.73 Final     Alkaline Phosphatase   Date Value Ref Range Status   04/14/2023 72 39 - 117 U/L Final     Total Protein   Date Value Ref Range Status   04/14/2023 7.6 6.0 - 8.5 g/dL Final     ALT (SGPT)   Date Value Ref Range Status   04/14/2023 36 (H) 1 - 33 U/L Final     AST (SGOT)   Date Value Ref Range Status   04/14/2023 57 (H) 1 - 32 U/L Final     Total Bilirubin   Date Value Ref Range Status   04/14/2023 0.4 0.0 - 1.2 mg/dL Final     Albumin   Date Value Ref Range Status   04/14/2023 4.2 3.5 - 5.2 g/dL Final     Globulin   Date Value Ref Range Status   04/14/2023 3.4 gm/dL Final     Lab Results   Component Value Date    WBC 10.08 04/14/2023    HGB 14.6 04/14/2023    HCT 44.0 04/14/2023    MCV 90.7 04/14/2023     04/14/2023     Lab Results   Component Value Date    NEUTROABS 6.15 04/14/2023    IRON 63 04/14/2023    IRON 63 01/13/2023    IRON 70 10/11/2022    TIBC 396 04/14/2023    TIBC 352 01/13/2023    TIBC 364 10/11/2022    LABIRON 16 (L) 04/14/2023    LABIRON 18 (L) 01/13/2023    LABIRON 19 (L) 10/11/2022    FERRITIN 233.90 (H) 04/14/2023    FERRITIN 257.80 (H) 01/13/2023    FERRITIN 297.10 (H) 10/11/2022    RLWLQIUF31 682 04/14/2023    ZYFAYXPJ67 777 01/13/2023    BCSIHWRB34 1,044 (H) 10/11/2022    FOLATE >20.00 04/14/2023    FOLATE 9.61 01/13/2023    FOLATE 11.20 10/11/2022     Lab Results   Component Value Date    REFLABREPO  07/12/2022     [facility]  [address] [place] [address]   "[id number]  Phone: [phone] or [phone]  Fax: [phone]  Ronald Garza M.D., Medical Director    PATIENT NAME                           LABORATORY NO.  MONIQUE REEVES.                    [id number]  [id number]                         AGE              SEX  SSN           CLIENT REF #  [facility]           76      1945  F    [id number]   [id number]    [place]                       REQUESTING M.D.     ATTENDING M.D.     COPY TO[address]                 GEORGE HOLDER ERIC  [place] [id number]             DATE COLLECTED      DATE RECEIVED      DATE REPORTED  2022          2022         07/15/2022    DIAGNOSIS:  HARDWARE, LEFT CHEST:  GROSS DIAGNOSIS:  Explanted, intact prosthetic port    RLL/pah    CLINICAL HISTORY:  Malignant neoplasm of upper-outer quadrant of right breast in female, estrogen  receptor positive    SPECIMENS RECEIVED:  HARDWARE, LEFT CHEST    Professional interpretation rendered by Ronald Garza M.D., F.C.A.P. at  [facility], [place], [address] [place] [address].    GROSS DESCRIPTION:  The specimen is received fresh and consists of a 3.0 x 2.8 x 1.5 cm purple,  plastic, intact port inscribed with \"TC B NELSON 1732,\" and received with a 21.0 x  0.3 x 0.3 cm portion of transected, white, plastic tubing.  No defects or adherent  soft tissue fragments are identified.  No sections are submitted.  [facility]    REVIEWED, DIAGNOSED AND ELECTRONICALLY  SIGNED BY:    Ronald Garza M.D., F.C.A.P.  CPT CODES:  [id number]           PATHOLOGY:  * Cannot find OR log *         RADIOLOGY DATA :  No radiology results for the last 7 days        Assessment & Plan     1.  Triple negative right breast cancer, [id number] pN1a on final pathology report showing 1.9 cm deposit with extracapsular extension and tumor deposit in lymphatics  - Review oncology history for prior treatment details  - Completed cycle 8 of Xeloda on 2021  - " We will continue with clinical surveillance at present  - Patient will be due for screening mammogram in May 2023 which has been ordered by surgery clinic  - We will have patient return to clinic in 3 months with repeat CBC, CMP, iron studies, ferritin, B12 and folate to be done on that day.    2.  Anemia:  - Hemoglobin is 14.6.  Iron level is adequate no need for any intravenous iron replacement at present.  B12 level is adequate  - Due to iron malabsorption patient has received Venofer in September 2021.  - Currently on folic acid p.o. daily    3.  Elevated liver function test due to fatty liver  -Liver enzymes are again elevated but stable.  - We will monitor with CMP.    4.  Hand-foot syndrome from Xeloda:  - Clinically improved.  We will continue with clinical monitoring    5.  Genetic testing:  - Genetic testing in November 2020 was negative for BRCA1 and BRCA2 mutation    6.  Health maintenance: Patient does not smoke    7.  Advance care planning:  - CODE STATUS and resuscitation were discussed with patient today.  Patient remains full code    8.  Left axillary discomfort and decreased range of motion at left shoulder:  - Upon physical exam today there is no evidence of palpable adenopathy involving left axilla.  Patient does have a decreased range of motion on left shoulder.  Recommend following up with primary medical provider regarding further work-up of decreased range of motion of the left shoulder as patient may have some arthritis or rotator cuff issue on that side.    9.  Prescriptions:  - Prescription for folic acid has been sent to patient's pharmacy today.           PHQ-9 Total Score: 0   -Patient is not homicidal or suicidal.  No acute intervention required.    Gale Tapia Marianna reports a pain score of 0.  Given her pain assessment as noted, treatment options were discussed and the following options were decided upon as a follow-up plan to address the patient's pain: continuation of current  treatment plan for pain.         Carlos Duong MD  4/15/2023  07:46 CDT        Part of this note may be an electronic transcription/translation of spoken language to printed text using the Dragon Dictation System.          CC:

## 2023-04-15 RX ORDER — FOLIC ACID 1 MG/1
1 TABLET ORAL DAILY
Qty: 90 TABLET | Refills: 1 | Status: SHIPPED | OUTPATIENT
Start: 2023-04-15

## 2023-04-17 ENCOUNTER — OFFICE VISIT (OUTPATIENT)
Dept: SLEEP MEDICINE | Facility: CLINIC | Age: 78
End: 2023-04-17
Payer: MEDICARE

## 2023-04-17 ENCOUNTER — TELEPHONE (OUTPATIENT)
Dept: ONCOLOGY | Facility: HOSPITAL | Age: 78
End: 2023-04-17
Payer: MEDICARE

## 2023-04-17 VITALS
WEIGHT: 161 LBS | BODY MASS INDEX: 29.63 KG/M2 | HEART RATE: 74 BPM | SYSTOLIC BLOOD PRESSURE: 110 MMHG | OXYGEN SATURATION: 95 % | HEIGHT: 62 IN | DIASTOLIC BLOOD PRESSURE: 58 MMHG

## 2023-04-17 DIAGNOSIS — G47.33 OBSTRUCTIVE SLEEP APNEA, ADULT: Primary | ICD-10-CM

## 2023-04-17 PROCEDURE — 99213 OFFICE O/P EST LOW 20 MIN: CPT | Performed by: NURSE PRACTITIONER

## 2023-04-17 PROCEDURE — 1159F MED LIST DOCD IN RCRD: CPT | Performed by: NURSE PRACTITIONER

## 2023-04-17 PROCEDURE — 1160F RVW MEDS BY RX/DR IN RCRD: CPT | Performed by: NURSE PRACTITIONER

## 2023-04-17 PROCEDURE — 3074F SYST BP LT 130 MM HG: CPT | Performed by: NURSE PRACTITIONER

## 2023-04-17 PROCEDURE — 3078F DIAST BP <80 MM HG: CPT | Performed by: NURSE PRACTITIONER

## 2023-04-17 NOTE — TELEPHONE ENCOUNTER
----- Message from Carlos Duong MD sent at 4/15/2023  7:54 AM CDT -----  Please let patient know, her iron level is adequate.  No need for any iron replacement at present.  Liver enzymes are again elevated but stable.  Recommend continue with folic acid p.o. daily.  Prescription for folic acid has been sent to patient's pharmacy today.  Thank you

## 2023-04-17 NOTE — PROGRESS NOTES
Sleep Clinic Follow Up    Date: 2023  Primary Care Provider: Darian Soliman MD    Last office visit: 2021 (I reviewed this note)    CC: Follow up: GLO on CPAP      Interim History:  Since the last visit:    1) mild GLO -  Gale Cabral has recently remained compliant with CPAP. She denies mask and machine issues, dry mouth, headaches, or pressures intolerance. She denies abnormal dreams, sleep paralysis, nasal congestion, URI sx. She has received her replacement machine.     2) Patient denies RLS symptoms.     Sleep Testin. HST on 2019, AHI of 8.7   2. Currently on 8-20 cm H2O    PAP Data:    Time frame: 02/15/2023-2023  Compliance: 96.8%  Average use on days used: 6 hrs 59 min  Percent of days with usage greater than or equal to 4 hours: 91.9%  PAP range: 8-20 cm H2O  Average 90% pressure: 9.5 cmH2O  Leak: 14 minutes  Average AHI: 2.3 events/hr  Mask type: Nasal pillows  DME: [employer], wants to switch to [facility]  Machine type: [employer] DreamStation    Bed time: 3086-5297  Sleep latency: 30 minutes  Number of times awakens during the night: 2-3  Wake time: 0260-1457  Estimated total sleep time at night: 5-6 hours  Caffeine intake: 3 cups of coffee, 1 cups of tea, and 1 sodas per day  Alcohol intake: 0 drinks per week  Nap time: rare   Sleepiness with Driving: none     [place] - 7  [place] Sleepiness Scale  Sitting and reading: Slight chance of dozing  Watching TV: Slight chance of dozing  Sitting, inactive in a public place (e.g. a theatre or a meeting): Would never doze  As a passenger in a car for an hour without a break: Moderate chance of dozing  Lying down to rest in the afternoon when circumstances permit: High chance of dozing  Sitting and talking to someone: Would never doze  Sitting quietly after a lunch without alcohol: Would never doze  In a car, while stopped for a few minutes in traffic: Would never doze  Total score: 7    PMHx, FH, SH reviewed and  pertinent changes are: Reportedly unchanged from last office visit with us.      Review of Systems:   Negative for chest pain, SOA, fever, chills, cough, N/V/D, abdominal pain.    Smoking:none    Gale Cabral  reports that she has never smoked. She has been exposed to tobacco smoke. She has never used smokeless tobacco.    Physical Exam:  Vitals:    04/17/23 0925   BP: 110/58   Pulse: 74   SpO2: 95%           04/17/23  0925   Weight: 73 kg (161 lb)     Body mass index is 29.44 kg/m². BMI is >= 25 and <30. (Overweight) The following options were offered after discussion;: referral to primary care    Gen:                No distress, conversant, pleasant, appears stated age, alert, oriented  Eyes:               Anicteric sclera, moist conjunctiva, no lid lag                           PERRL, EOMI   Heent:             NC/AT                          Oropharynx clear                          Normal hearing  Lungs:             Normal effort, non-labored breathing      CV:                  Normal S1/S2                          No lower extremity edema  ABD:               Soft, rounded, non-distended                  Psych:             Appropriate affect  Neuro:             CN 2-12 appear intact    Past Medical History:   Diagnosis Date   • Abnormal mammogram of right breast    • Acquired hypothyroidism     started synthroid 9/2015   • Allergic rhinitis, seasonal    • Breast cancer    • Breast cyst    • Cervicalgia     right upper extremity radiculopathy   • Diabetes mellitus    • Drug therapy    • Encounter for screening for malignant neoplasm of colon    • Essential hypertension    • Glaucoma    • Health maintenance examination     individual health exam   • Hx of radiation therapy    • Hypercholesterolemia    • Hyperglycemia     steroid-induced hyperglycemia in diabetic patient   • Hyperlipidemia associated with type 2 diabetes mellitus 2/26/2021   • Iron deficiency anemia secondary to inadequate dietary iron intake  -response to IV iron infusions 9/8/2021   • Lumbar disc disorder     w/right radiculopathy   • Malignant neoplasm of upper-outer quadrant of right breast in female, estrogen receptor positive 2/9/2021    Added automatically from request for surgery [id number]   • Menopause    • Mild persistent asthma     resumed dulera twice daily   • Obstructive sleep apnea syndrome 9/17/2019   • Osteopenia     improved DEXA 2/2013   • 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   • Rupture of colon    • Sebaceous cyst     subepidermal cyst   • Shoulder pain     likely radicular pain due to cervical DJD   • Spasm of back muscles     right neck and trapezius   • Spinal stenosis of lumbar region    • Strain of neck muscle     cervical strain   • Temporomandibular joint disorder     h/o   • Vitamin D deficiency     on oral calcium/vitamin D supplement       Current Outpatient Medications:   •  acetaminophen (TYLENOL) 500 MG tablet, Take 1 tablet by mouth Every 6 (Six) Hours As Needed for Mild Pain., Disp: , Rfl:   •  albuterol sulfate HFA (ProAir HFA) 108 (90 Base) MCG/ACT inhaler, Inhale 2 puffs 4 (Four) Times a Day As Needed for Wheezing., Disp: 8 g, Rfl: 5  •  aspirin 81 MG EC tablet, Take 1 tablet by mouth Every Night., Disp: , Rfl:   •  atorvastatin (LIPITOR) 20 MG tablet, Take 1 tablet by mouth Daily., Disp: 90 tablet, Rfl: 3  •  azelastine (ASTELIN) 0.1 % nasal spray, 2 sprays into the nostril(s) as directed by provider Daily. Use in each nostril as directed, Disp: 30 mL, Rfl: 0  •  Blood Glucose Monitoring Suppl (ONE TOUCH ULTRA 2) W/DEVICE kit, , Disp: , Rfl:   •  citalopram (CeleXA) 20 MG tablet, Take 0.5 tablets by mouth Daily., Disp: 90 tablet, Rfl: 3  •  dapagliflozin Propanediol (Farxiga) 10 MG tablet, Take 10 mg by mouth Every Morning., Disp: 90 tablet, Rfl: 1  •  ezetimibe (Zetia) 10 MG tablet, Take 1 tablet by mouth Daily. For cholesterol, Disp: 90 tablet, Rfl: 3  •  fluticasone (FLONASE) 50 MCG/ACT nasal spray, 2 sprays into  the nostril(s) as directed by provider As Needed for Rhinitis., Disp: , Rfl:   •  folic acid (FOLVITE) 1 MG tablet, Take 1 tablet by mouth Daily., Disp: 90 tablet, Rfl: 1  •  glucose blood (ONE TOUCH ULTRA TEST) test strip, Check 1-2 times daily One Touch Ultra, Disp: 150 each, Rfl: 3  •  Lancets Micro Thin 33G misc, Check once daily  E11.9  Trueplus, Disp: 100 each, Rfl: 3  •  latanoprost (XALATAN) 0.005 % ophthalmic solution, Administer 1 drop to both eyes Every Night., Disp: , Rfl:   •  levothyroxine (SYNTHROID, LEVOTHROID) 50 MCG tablet, Take 1 tablet by mouth Daily., Disp: 90 tablet, Rfl: 3  •  losartan-hydrochlorothiazide (HYZAAR) 100-12.5 MG per tablet, Take 1 tablet by mouth Daily., Disp: 90 tablet, Rfl: 3  •  metFORMIN (GLUCOPHAGE) 500 MG tablet, Take 1 tablet by mouth 2 (Two) Times a Day With Meals., Disp: 180 tablet, Rfl: 1  •  montelukast (SINGULAIR) 10 MG tablet, Take 1 tablet by mouth Every Night., Disp: 90 tablet, Rfl: 3  •  ondansetron (ZOFRAN) 4 MG tablet, Take 1 tablet by mouth 4 (Four) Times a Day As Needed for Nausea or Vomiting., Disp: 40 tablet, Rfl: 3  •  Symbicort 160-4.5 MCG/ACT inhaler, Inhale 2 puffs 2 (Two) Times a Day., Disp: 18 g, Rfl: 3    WBC   Date Value Ref Range Status   04/14/2023 10.08 3.40 - 10.80 10*3/mm3 Final     RBC   Date Value Ref Range Status   04/14/2023 4.85 3.77 - 5.28 10*6/mm3 Final     Hemoglobin   Date Value Ref Range Status   04/14/2023 14.6 12.0 - 15.9 g/dL Final     Hematocrit   Date Value Ref Range Status   04/14/2023 44.0 34.0 - 46.6 % Final     MCV   Date Value Ref Range Status   04/14/2023 90.7 79.0 - 97.0 fL Final     MCH   Date Value Ref Range Status   04/14/2023 30.1 26.6 - 33.0 pg Final     MCHC   Date Value Ref Range Status   04/14/2023 33.2 31.5 - 35.7 g/dL Final     RDW   Date Value Ref Range Status   04/14/2023 13.2 12.3 - 15.4 % Final     RDW-SD   Date Value Ref Range Status   04/14/2023 43.7 37.0 - 54.0 fl Final     MPV   Date Value Ref Range Status    04/14/2023 8.9 6.0 - 12.0 fL Final     Platelets   Date Value Ref Range Status   04/14/2023 393 140 - 450 10*3/mm3 Final     Neutrophil %   Date Value Ref Range Status   04/14/2023 61.1 42.7 - 76.0 % Final     Lymphocyte %   Date Value Ref Range Status   04/14/2023 19.6 19.6 - 45.3 % Final     Monocyte %   Date Value Ref Range Status   04/14/2023 7.4 5.0 - 12.0 % Final     Eosinophil %   Date Value Ref Range Status   04/14/2023 9.7 (H) 0.3 - 6.2 % Final     Basophil %   Date Value Ref Range Status   04/14/2023 1.7 (H) 0.0 - 1.5 % Final     Immature Grans %   Date Value Ref Range Status   04/14/2023 0.5 0.0 - 0.5 % Final     Neutrophils, Absolute   Date Value Ref Range Status   04/14/2023 6.15 1.70 - 7.00 10*3/mm3 Final     Lymphocytes, Absolute   Date Value Ref Range Status   04/14/2023 1.98 0.70 - 3.10 10*3/mm3 Final     Monocytes, Absolute   Date Value Ref Range Status   04/14/2023 0.75 0.10 - 0.90 10*3/mm3 Final     Eosinophils, Absolute   Date Value Ref Range Status   04/14/2023 0.98 (H) 0.00 - 0.40 10*3/mm3 Final     Basophils, Absolute   Date Value Ref Range Status   04/14/2023 0.17 0.00 - 0.20 10*3/mm3 Final     Immature Grans, Absolute   Date Value Ref Range Status   04/14/2023 0.05 0.00 - 0.05 10*3/mm3 Final     nRBC   Date Value Ref Range Status   04/14/2023 0.0 0.0 - 0.2 /100 WBC Final       Lab Results   Component Value Date    GLUCOSE 106 (H) 04/14/2023    BUN 17 04/14/2023    CREATININE 1.06 (H) 04/14/2023    EGFR 54.9 (L) 04/14/2023    BCR 16.0 04/14/2023    K 4.3 04/14/2023    CO2 21.0 (L) 04/14/2023    CALCIUM 10.2 04/14/2023    ALBUMIN 4.2 04/14/2023    BILITOT 0.4 04/14/2023    AST 57 (H) 04/14/2023    ALT 36 (H) 04/14/2023       Assessment and Plan:    1. Obstructive sleep apnea - Established, stable (1)  1. Compliant with PAP therapy  2. Continue PAP as prescribed, change pressure to 4-20 cm H2O for comfort  3. Script for PAP supplies  4. Pertinent labs were reviewed as listed above  5. Return  to clinic in 1 year with compliance report unless change in symptoms in interim period      I spent 15 minutes caring for Gale on this date of service. This time includes time spent by me in the following activities: preparing for the visit, reviewing tests, obtaining and/or reviewing a separately obtained history, performing a medically appropriate examination and/or evaluation , counseling and educating the patient/family/caregiver, documenting information in the medical record and care coordination; discussing PAP therapy, PAP compliance and PAP maintenance    RTC in 12 months. Patient agrees to return sooner if changes in symptoms.        This document has been electronically signed by YOHANA Chavis on April 17, 2023 09:32 CDT          CC: Darian Soliman MD          No ref. provider found

## 2023-05-01 DIAGNOSIS — I10 ESSENTIAL HYPERTENSION: ICD-10-CM

## 2023-05-01 RX ORDER — ATORVASTATIN CALCIUM 20 MG/1
20 TABLET, FILM COATED ORAL DAILY
Qty: 90 TABLET | Refills: 3 | Status: SHIPPED | OUTPATIENT
Start: 2023-05-01

## 2023-05-01 RX ORDER — LOSARTAN POTASSIUM AND HYDROCHLOROTHIAZIDE 12.5; 1 MG/1; MG/1
1 TABLET ORAL DAILY
Qty: 90 TABLET | Refills: 3 | Status: SHIPPED | OUTPATIENT
Start: 2023-05-01

## 2023-07-21 ENCOUNTER — OFFICE VISIT (OUTPATIENT)
Dept: ONCOLOGY | Facility: CLINIC | Age: 78
End: 2023-07-21
Payer: MEDICARE

## 2023-07-21 VITALS
SYSTOLIC BLOOD PRESSURE: 135 MMHG | OXYGEN SATURATION: 97 % | HEART RATE: 82 BPM | DIASTOLIC BLOOD PRESSURE: 55 MMHG | BODY MASS INDEX: 28.34 KG/M2 | WEIGHT: 155 LBS | RESPIRATION RATE: 18 BRPM

## 2023-07-21 DIAGNOSIS — D50.8 IRON DEFICIENCY ANEMIA SECONDARY TO INADEQUATE DIETARY IRON INTAKE: Primary | Chronic | ICD-10-CM

## 2023-07-21 PROCEDURE — G0463 HOSPITAL OUTPT CLINIC VISIT: HCPCS | Performed by: NURSE PRACTITIONER

## 2023-07-24 RX ORDER — FOLIC ACID 1 MG/1
1 TABLET ORAL DAILY
Qty: 90 TABLET | Refills: 1 | Status: SHIPPED | OUTPATIENT
Start: 2023-07-24

## 2023-07-24 NOTE — PROGRESS NOTES
DATE OF VISIT: 7/21/2023      REASON FOR VISIT:  Triple negative right breast cancer, anemia,        HISTORY OF PRESENT ILLNESS:   77-year-old female with medical problem consisting of diabetes mellitus, hypertension, dyslipidemia, asthma, osteopenia, obstructive sleep apnea, chronic back pain has been following up with [facility] since October 5, 2020 for triple negative right breast cancer.  Patient completed adjuvant Xeloda in December 2021.  Patient is here for follow-up appointment today.  Denies any  recent infection.  Denies any new lymph node enlargement.  Denies any worsening erythema affecting hands or feet.  Denies any bleeding.  Denies any fevers.     Oncology history:     1.  Triple negative right breast cancer, [id number] with 1 lymph node showing 1.9 cm deposit with extracapsular extension and tumor deposit lymphatic:  -Patient received neoadjuvant chemotherapy with Taxotere and Cytoxan for 3 cycles between October 15, 2020 and November 30, 2020.  -After cycle 3 unfortunately patient developed bowel obstruction requiring surgery and colostomy at hospital in [place].  -Patient did not want to do cycle 4 prior to surgery and was subsequently sent back to Dr. Alexandra underwent lumpectomy on January 11 2021 that showed 1 cm residual cancer.  -Patient had axillary dissection done on February 25, 2021 that showed 1.9 cm lymph node involvement with extracapsular extension and tumor deposit in the lymphatic channel.  -Result of pathology report were discussed with patient and her .  Patient had a minimal or no response to chemotherapy with Taxotere and Cytoxan in neoadjuvant setting.  PET/CT done prior to starting chemotherapy had a shotty axillary lymph node without any significant uptake.  Patient did have enlarged lymph node at the time of surgery again not responding to chemotherapy preoperatively  -Had a radiation treatment that completed on May 24, 2021 at [place].  -Patient was started on cycle 1 of Xeloda on Pushpa 15, 2021.  -Dose of Xeloda was decreased to 3 tablets in the morning and 2 tablets at night with cycle 2.  -Dose of Xeloda will be decreased to 2 tablets in the morning and 2 tablets at night with cycle 5 due to worsening hand-foot syndrome  -Patient completed cycle 8 of Xeloda on December 28, 2021.            Past Medical History, Past Surgical History, Social History, Family History have been reviewed and are without significant changes except as mentioned.    Review of Systems   A comprehensive 14 point review of systems was performed and was negative except as mentioned.    Medications:  The current medication list was reviewed in the EMR    ALLERGIES:    Allergies   Allergen Reactions    Other Confusion     Coda-clear       Objective      Vitals:    07/21/23 1334   BP: 135/55   Pulse: 82   Resp: 18   SpO2: 97%   Weight: 70.3 kg (155 lb)   PainSc: 0-No pain         9/1/2021     1:48 PM   Current Status   ECOG score 0       Physical Exam  General: alert and oriented no acute distress  Lungs: normal breath sounds  Card:RRR  Ext;no edema    RECENT LABS:  Glucose   Date Value Ref Range Status   07/21/2023 111 (H) 65 - 99 mg/dL Final     Sodium   Date Value Ref Range Status   07/21/2023 140 136 - 145 mmol/L Final     Potassium   Date Value Ref Range Status   07/21/2023 3.8 3.5 - 5.2 mmol/L Final     CO2   Date Value Ref Range Status   07/21/2023 26.0 22.0 - 29.0 mmol/L Final     Chloride   Date Value Ref Range Status   07/21/2023 103 98 - 107 mmol/L Final     Anion Gap   Date Value Ref Range Status   07/21/2023 11.0 5.0 - 15.0 mmol/L Final     Creatinine   Date Value Ref Range Status   07/21/2023 1.18 (H) 0.57 - 1.00 mg/dL Final     BUN   Date Value Ref Range Status   07/21/2023 19 8 - 23 mg/dL Final     BUN/Creatinine Ratio   Date Value Ref Range Status   07/21/2023 16.1 7.0 - 25.0 Final     Calcium   Date Value Ref Range Status   07/21/2023 9.7 8.6 -  10.5 mg/dL Final     eGFR Non  Amer   Date Value Ref Range Status   2022 56 (L) >60 mL/min/1.73 Final     Alkaline Phosphatase   Date Value Ref Range Status   2023 71 39 - 117 U/L Final     Total Protein   Date Value Ref Range Status   2023 8.2 6.0 - 8.5 g/dL Final     ALT (SGPT)   Date Value Ref Range Status   2023 34 (H) 1 - 33 U/L Final     AST (SGOT)   Date Value Ref Range Status   2023 42 (H) 1 - 32 U/L Final     Total Bilirubin   Date Value Ref Range Status   2023 0.3 0.0 - 1.2 mg/dL Final     Albumin   Date Value Ref Range Status   2023 4.5 3.5 - 5.2 g/dL Final     Globulin   Date Value Ref Range Status   2023 3.7 gm/dL Final     Lab Results   Component Value Date    WBC 9.39 2023    HGB 13.8 2023    HCT 41.9 2023    MCV 90.5 2023     2023     Lab Results   Component Value Date    NEUTROABS 5.27 2023    IRON 62 2023    IRON 63 2023    IRON 63 2023    TIBC 386 2023    TIBC 396 2023    TIBC 352 2023    LABIRON 16 (L) 2023    LABIRON 16 (L) 2023    LABIRON 18 (L) 2023    FERRITIN 182.60 (H) 2023    FERRITIN 233.90 (H) 2023    FERRITIN 257.80 (H) 2023    BWUPHAKR77 657 2023    XCGTGAOZ60 682 2023    EIKETORW01 777 2023    FOLATE >20.00 2023    FOLATE >20.00 2023    FOLATE 9.61 2023     Lab Results   Component Value Date    REFLABREPO  2022     [facility]  [address] [place] [address]  Phone: [phone] or [phone]  Fax: [phone]  Ronald Garza M.D., Medical Director    PATIENT NAME                           LABORATORY NO.  [id number]  MONIQUE WHITING.                    [id number]  [id number]                         AGE              SEX  SSN           CLIENT REF #  [facility]           76      1945  F    [id number]    [id number]    [place]                       REQUESTING M.D.     ATTENDING M.D.     COPY TO.  [address] [place] [address]                 [place] [address]             DATE COLLECTED      DATE RECEIVED      DATE REPORTED  07/12/2022          07/13/2022         07/15/2022    DIAGNOSIS:  HARDWARE, LEFT CHEST:  GROSS DIAGNOSIS:  Explanted, intact prosthetic port    RLL/pah    CLINICAL HISTORY:  Malignant neoplasm of upper-outer quadrant of right breast in female, estrogen  receptor positive    SPECIMENS RECEIVED:  HARDWARE, LEFT CHEST    Professional interpretation rendered by Ronald Garza M.D., [facility] at  [facility], [employer], [address] [place] [address].    GROSS DESCRIPTION:  The specimen is received fresh and consists of a 3.0 x 2.8 x 1.5 cm purple,  plastic, intact port inscribed with \"TC B NELSON 1732,\" and received with a 21.0 x  0.3 x 0.3 cm portion of transected, white, plastic tubing.  No defects or adherent  soft tissue fragments are identified.  No sections are submitted.  [facility]    REVIEWED, DIAGNOSED AND ELECTRONICALLY  SIGNED BY:    Ronald Garza M.D., F.C.A.P.  CPT CODES:  13156                 RADIOLOGY DATA :  No radiology results for the last 7 days        Assessment & Plan       1.  Triple negative right breast cancer, [id number] on final pathology report showing 1.9 cm deposit with extracapsular extension and tumor deposit in lymphatics  - Review oncology history for prior treatment details  - Completed cycle 8 of Xeloda on December 28, 2021  - We will continue with clinical surveillance at present  - Patient will be due for screening mammogram in November 2023 which has been ordered by surgery clinic  - We will have patient return to clinic in 3 months with repeat CBC, CMP, iron studies, ferritin, B12 and folate to be done on that day.     2.  Anemia:  - Hemoglobin is 13.8.  Iron level is adequate no need for any intravenous iron replacement at present.  " B12 level is adequate  - Due to iron malabsorption patient has received Venofer in September 2021.  - Currently on folic acid p.o. daily     3.  Elevated liver function test due to fatty liver  -Liver enzymes are again elevated but stable.  - We will monitor with CMP.     4.  Hand-foot syndrome from Xeloda:  - Clinically improved.  We will continue with clinical monitoring     5.  Genetic testing:  - Genetic testing in November 2020 was negative for BRCA1 and BRCA2 mutation     6.  Health maintenance: Patient does not smoke     7.  Advance care planning:  - CODE STATUS and resuscitation were discussed with patient today.  Patient remains full code                PHQ-9 Total Score: 0 pt is not suicidal or homicidal    Gale Cabral reports a pain score of 0.  Given her pain assessment as noted, treatment options were discussed and the following options were decided upon as a follow-up plan to address the patient's pain:  no pain today .         Sharon Holloway, APRN  7/24/2023  09:58 CDT        Part of this note may be an electronic transcription/translation of spoken language to printed text using the Dragon Dictation System.          CC:

## 2023-07-25 ENCOUNTER — TELEPHONE (OUTPATIENT)
Dept: ONCOLOGY | Facility: CLINIC | Age: 78
End: 2023-07-25
Payer: MEDICARE

## 2023-07-25 NOTE — TELEPHONE ENCOUNTER
Called and spoke with pt regarding lab results, v/u obtained.    Patient dropped off results from an Echo he had done. Placed in Dr. Ogden's box. Thanks!

## 2023-07-25 NOTE — TELEPHONE ENCOUNTER
Caller requesting test results.    What test was performed: Labs    When was the test performed: 7/21/23    Best time to return your call: Anytime    Best number to call back: [phone]    Additional notes: Patient wanted to check her lab results and see if she is to continue the folic acid - please call and advise.

## 2023-07-27 RX ORDER — DAPAGLIFLOZIN 10 MG/1
10 TABLET, FILM COATED ORAL EVERY MORNING
Qty: 90 TABLET | Refills: 1 | Status: SHIPPED | OUTPATIENT
Start: 2023-07-27

## 2023-09-18 RX ORDER — EZETIMIBE 10 MG/1
10 TABLET ORAL DAILY
Qty: 90 TABLET | Refills: 3 | Status: SHIPPED | OUTPATIENT
Start: 2023-09-18

## 2023-09-25 ENCOUNTER — LAB (OUTPATIENT)
Dept: LAB | Facility: OTHER | Age: 78
End: 2023-09-25
Payer: MEDICARE

## 2023-09-25 DIAGNOSIS — E55.9 VITAMIN D DEFICIENCY: Chronic | ICD-10-CM

## 2023-09-25 DIAGNOSIS — E11.69 HYPERLIPIDEMIA ASSOCIATED WITH TYPE 2 DIABETES MELLITUS: Chronic | ICD-10-CM

## 2023-09-25 DIAGNOSIS — E11.9 TYPE 2 DIABETES MELLITUS WITHOUT COMPLICATION, WITHOUT LONG-TERM CURRENT USE OF INSULIN: ICD-10-CM

## 2023-09-25 DIAGNOSIS — E78.5 HYPERLIPIDEMIA ASSOCIATED WITH TYPE 2 DIABETES MELLITUS: Chronic | ICD-10-CM

## 2023-09-25 DIAGNOSIS — I10 ESSENTIAL HYPERTENSION: Chronic | ICD-10-CM

## 2023-09-25 DIAGNOSIS — E03.9 ACQUIRED HYPOTHYROIDISM: Chronic | ICD-10-CM

## 2023-09-25 LAB
ALBUMIN SERPL-MCNC: 4.5 G/DL (ref 3.5–5)
ALBUMIN/GLOB SERPL: 1.4 G/DL (ref 1.1–1.8)
ALP SERPL-CCNC: 60 U/L (ref 38–126)
ALT SERPL W P-5'-P-CCNC: 47 U/L
ANION GAP SERPL CALCULATED.3IONS-SCNC: 8 MMOL/L (ref 5–15)
ANISOCYTOSIS BLD QL: ABNORMAL
AST SERPL-CCNC: 66 U/L (ref 14–36)
BILIRUB SERPL-MCNC: 0.7 MG/DL (ref 0–1.2)
BUN SERPL-MCNC: 17 MG/DL (ref 7–23)
BUN/CREAT SERPL: 14.9 (ref 7–25)
CALCIUM SPEC-SCNC: 9.7 MG/DL (ref 8.4–10.2)
CHLORIDE SERPL-SCNC: 105 MMOL/L (ref 101–112)
CO2 SERPL-SCNC: 28 MMOL/L (ref 22–30)
CREAT SERPL-MCNC: 1.14 MG/DL (ref 0.52–1.04)
DEPRECATED RDW RBC AUTO: 47.2 FL (ref 37–54)
EGFRCR SERPLBLD CKD-EPI 2021: 50.3 ML/MIN/1.73
EOSINOPHIL # BLD MANUAL: 0.95 10*3/MM3 (ref 0–0.4)
EOSINOPHIL NFR BLD MANUAL: 10 % (ref 0.3–6.2)
ERYTHROCYTE [DISTWIDTH] IN BLOOD BY AUTOMATED COUNT: 14 % (ref 12.3–15.4)
GLOBULIN UR ELPH-MCNC: 3.2 GM/DL (ref 2.3–3.5)
GLUCOSE SERPL-MCNC: 128 MG/DL (ref 70–99)
HCT VFR BLD AUTO: 43 % (ref 34–46.6)
HGB BLD-MCNC: 13.9 G/DL (ref 12–15.9)
LYMPHOCYTES # BLD MANUAL: 2.19 10*3/MM3 (ref 0.7–3.1)
LYMPHOCYTES NFR BLD MANUAL: 6 % (ref 5–12)
MCH RBC QN AUTO: 30.9 PG (ref 26.6–33)
MCHC RBC AUTO-ENTMCNC: 32.3 G/DL (ref 31.5–35.7)
MCV RBC AUTO: 95.6 FL (ref 79–97)
MONOCYTES # BLD: 0.57 10*3/MM3 (ref 0.1–0.9)
NEUTROPHILS # BLD AUTO: 5.8 10*3/MM3 (ref 1.7–7)
NEUTROPHILS NFR BLD MANUAL: 61 % (ref 42.7–76)
PLATELET # BLD AUTO: 368 10*3/MM3 (ref 140–450)
PMV BLD AUTO: 9 FL (ref 6–12)
POTASSIUM SERPL-SCNC: 4.5 MMOL/L (ref 3.4–5)
PROT SERPL-MCNC: 7.7 G/DL (ref 6.3–8.6)
RBC # BLD AUTO: 4.5 10*6/MM3 (ref 3.77–5.28)
SMALL PLATELETS BLD QL SMEAR: ADEQUATE
SODIUM SERPL-SCNC: 141 MMOL/L (ref 137–145)
TRIGL SERPL-MCNC: 145 MG/DL
VARIANT LYMPHS NFR BLD MANUAL: 23 % (ref 19.6–45.3)
WBC MORPH BLD: NORMAL
WBC NRBC COR # BLD: 9.51 10*3/MM3 (ref 3.4–10.8)

## 2023-09-25 PROCEDURE — 80053 COMPREHEN METABOLIC PANEL: CPT | Performed by: INTERNAL MEDICINE

## 2023-09-25 PROCEDURE — 83036 HEMOGLOBIN GLYCOSYLATED A1C: CPT | Performed by: INTERNAL MEDICINE

## 2023-09-25 PROCEDURE — 84443 ASSAY THYROID STIM HORMONE: CPT | Performed by: INTERNAL MEDICINE

## 2023-09-25 PROCEDURE — 82306 VITAMIN D 25 HYDROXY: CPT | Performed by: INTERNAL MEDICINE

## 2023-09-25 PROCEDURE — 84439 ASSAY OF FREE THYROXINE: CPT | Performed by: INTERNAL MEDICINE

## 2023-09-25 PROCEDURE — 84478 ASSAY OF TRIGLYCERIDES: CPT | Performed by: INTERNAL MEDICINE

## 2023-09-25 PROCEDURE — 85025 COMPLETE CBC W/AUTO DIFF WBC: CPT | Performed by: INTERNAL MEDICINE

## 2023-09-25 PROCEDURE — 36415 COLL VENOUS BLD VENIPUNCTURE: CPT | Performed by: INTERNAL MEDICINE

## 2023-09-25 PROCEDURE — 83721 ASSAY OF BLOOD LIPOPROTEIN: CPT | Performed by: INTERNAL MEDICINE

## 2023-09-26 LAB
25(OH)D3 SERPL-MCNC: 50 NG/ML (ref 30–100)
ARTICHOKE IGE QN: 84 MG/DL (ref 0–100)
HBA1C MFR BLD: 7.3 % (ref 4.8–5.6)
T4 FREE SERPL-MCNC: 1.25 NG/DL (ref 0.93–1.7)
TSH SERPL DL<=0.05 MIU/L-ACNC: 2.24 UIU/ML (ref 0.27–4.2)

## (undated) DEVICE — SHEET,DRAPE,53X77,STERILE: Brand: MEDLINE

## (undated) DEVICE — SOL IRR NACL 0.9PCT BT 1000ML

## (undated) DEVICE — PK MAJ PROC LF 60

## (undated) DEVICE — PENCL ES MEGADINE EZ/CLEAN BUTN W/HOLSTR 10FT

## (undated) DEVICE — SUT VIC 3/0 TIES 18IN J110T

## (undated) DEVICE — BNDG ELAS ELITE V/CLOSE 4IN 5YD LF STRL

## (undated) DEVICE — DRSNG SURESITE WNDW 4X4.5

## (undated) DEVICE — CONTAINER,SPECIMEN,OR STERILE,4OZ: Brand: MEDLINE

## (undated) DEVICE — SPECIMEN ORIENTATION CHARMS, SIX DISTINCTLY SHAPED STERILE 10MM CHARMS: Brand: MARGINMAP

## (undated) DEVICE — PROXIMATE PLUS MD MULTI-DIRECTIONAL RELEASE SKIN STAPLERS CONTAINS 35 STAINLESS STEEL STAPLES APPROXIMATE CLOSED DIMENSIONS: 6.9MM X 3.9MM WIDE: Brand: PROXIMATE

## (undated) DEVICE — SUT PROLN 3/0 RB1 D/A 36IN 8558H

## (undated) DEVICE — STCKNT STRL 6X60IN

## (undated) DEVICE — ANTIBACTERIAL UNDYED BRAIDED (POLYGLACTIN 910), SYNTHETIC ABSORBABLE SUTURE: Brand: COATED VICRYL

## (undated) DEVICE — SUT VIC 2/0 SH 27IN

## (undated) DEVICE — GLV SURG SIGNATURE ESSENTIAL PF LTX SZ6.5

## (undated) DEVICE — DRN WND JP RND W TROC SIL 19F 1/4IN

## (undated) DEVICE — BNDG ELAS ELITE V/CLOSE 6IN 5YD LF STRL

## (undated) DEVICE — SUT VIC 3/0 RB1 27IN J215H

## (undated) DEVICE — ADHS LIQ MASTISOL 2/3ML

## (undated) DEVICE — CVR PROB W/ULTRASND GEL 2 ELAS BND 6X24IN STRL

## (undated) DEVICE — RESERVOIR,SUCTION,100CC,SILICONE: Brand: MEDLINE

## (undated) DEVICE — SUT VIC 3/0 SH 27IN J416H

## (undated) DEVICE — ST CVR PROB PULLUP ULTRASND 5X48IN

## (undated) DEVICE — PENCL E/S ULTRAVAC TELESCP NOSE HOLSTR 10FT

## (undated) DEVICE — 9165 UNIVERSAL PATIENT PLATE: Brand: 3M™

## (undated) DEVICE — STERILE SLEEVE: Brand: CONVERTORS

## (undated) DEVICE — SINGLE-USE BIOPSY FORCEPS: Brand: RADIAL JAW 4

## (undated) DEVICE — INTENDED FOR TISSUE SEPARATION, AND OTHER PROCEDURES THAT REQUIRE A SHARP SURGICAL BLADE TO PUNCTURE OR CUT.: Brand: BARD-PARKER ® STAINLESS STEEL BLADES

## (undated) DEVICE — SKIN AFFIX SURG ADHESIVE 72/CS 0.55ML: Brand: MEDLINE

## (undated) DEVICE — SPNG LAP 18X18IN LF STRL PK/5

## (undated) DEVICE — SPNG GZ WOVN 4X4IN 12PLY 10/BX STRL

## (undated) DEVICE — GLV SURG TRIUMPH PF LTX 6.5 STRL

## (undated) DEVICE — NDL HYPO SFTY GLD 22G 1 1/2IN

## (undated) DEVICE — SUT VICRYL 3-0 SH-1 PO 18IN J772D

## (undated) DEVICE — DEV SPECI TRANSPEC W/PERF PLT

## (undated) DEVICE — 3M™ STERI-STRIP™ REINFORCED ADHESIVE SKIN CLOSURES, R1547, 1/2 IN X 4 IN (12 MM X 100 MM), 6 STRIPS/ENVELOPE: Brand: 3M™ STERI-STRIP™

## (undated) DEVICE — GLV SURG SENSICARE POLYISPRN W/ALOE PF LF 6.5 GRN STRL

## (undated) DEVICE — SUT VIC 2/0 TIES 18IN J111T

## (undated) DEVICE — STERILE POLYISOPRENE POWDER-FREE SURGICAL GLOVES WITH EMOLLIENT COATING: Brand: PROTEXIS

## (undated) DEVICE — SUT NLY 2/0 664G

## (undated) DEVICE — GOWN,NON-REINFORCED,SIRUS,SET IN SLV,XL: Brand: MEDLINE

## (undated) DEVICE — PAD,ABDOMINAL,8"X10",ST,LF: Brand: MEDLINE

## (undated) DEVICE — CVR SURG EQUIP BND RECTG 36X28